# Patient Record
Sex: FEMALE | HISPANIC OR LATINO | Employment: FULL TIME | RURAL
[De-identification: names, ages, dates, MRNs, and addresses within clinical notes are randomized per-mention and may not be internally consistent; named-entity substitution may affect disease eponyms.]

---

## 2017-01-05 ENCOUNTER — OFFICE VISIT (OUTPATIENT)
Dept: FAMILY MEDICINE CLINIC | Facility: CLINIC | Age: 62
End: 2017-01-05

## 2017-01-05 DIAGNOSIS — A04.72 CLOSTRIDIUM DIFFICILE COLITIS: Primary | ICD-10-CM

## 2017-01-05 PROCEDURE — 99213 OFFICE O/P EST LOW 20 MIN: CPT | Performed by: FAMILY MEDICINE

## 2017-01-05 NOTE — PATIENT INSTRUCTIONS
Clostridium Difficile Infection  Clostridium difficile (C. difficile or C. diff) is a bacterium normally found in the intestinal tract or colon. C. difficile infection causes diarrhea and sometimes a severe disease called pseudomembranous colitis (C. difficile colitis). C. difficile colitis can damage the lining of the colon or cause the colon to become very large (toxic megacolon). Older adults and people with certain medical conditions have a greater risk of getting C. difficile infections.  CAUSES  The balance of bacteria in your colon can change when you are sick, especially when taking antibiotic medicine. Taking antibiotics may allow the C. difficile to grow, multiply, and make a toxin that causes C. difficile infection.   SYMPTOMS  · Diarrhea.  · Fever.  · Fatigue.  · Loss of appetite.  · Nausea.  · Abdominal swelling, pain, or tenderness.  · Dehydration.  DIAGNOSIS  Your health care provider may suspect C. difficile infection based on your symptoms and if you have taken antibiotics recently. Your health care provider may also order:  · A lab test that can detect the toxin in your stool.  · A sigmoidoscopy or colonoscopy to look at the appearance of your colon. These procedures involve passing an instrument through your rectum to look at the inside of your colon.  Your health care provider will help determine if these tests are necessary.  TREATMENT  Treatment may include:  · Taking antibiotics that keep C. difficile from growing.  · Stopping the antibiotics you were on before the C. difficile infection began. Only do this if instructed to do so by your health care provider.  · IV fluids and correction of electrolyte imbalance.  · Surgery to remove the infected part of the intestines. This is rare.  HOME CARE INSTRUCTIONS  · Drink enough fluids to keep your urine clear or pale yellow. Avoid milk, caffeine, and alcohol.  · Ask your health care provider for specific rehydration instructions.  · Eat small,  frequent meals rather than large meals.  · Take your antibiotics as directed. Finish them even if you start to feel better.  · Do not use medicines to slow diarrhea. This could delay healing or cause problems.  · Wash your hands thoroughly after using the bathroom and before preparing food. Make sure people who live with you wash their hands often, too.  · Clean all surfaces with a product that contains chlorine bleach.  SEEK MEDICAL CARE IF:  · Your diarrhea lasts longer than expected or comes back after you finish your antibiotic medicine for the C. difficile infection.  · You have trouble staying hydrated.  · You have a fever.  SEEK IMMEDIATE MEDICAL CARE IF:  · You have increasing abdominal pain or tenderness.  · You have blood in your stools, or your stools look dark black and tarry.  · You cannot eat or drink without vomiting.     This information is not intended to replace advice given to you by your health care provider. Make sure you discuss any questions you have with your health care provider.     Document Released: 09/27/2006 Document Revised: 01/08/2016 Document Reviewed: 06/20/2016  [employer] Interactive Patient Education ©2016 [employer]

## 2017-01-05 NOTE — MR AVS SNAPSHOT
Mira Deal   1/5/2017 8:15 AM   Office Visit    Dept Phone:  [phone]   Encounter #:  [id number]    Provider:  Lloyd Varghese MD   Department:  [facility]                Your Full Care Plan              Today's Medication Changes          These changes are accurate as of: 1/5/17  8:32 AM.  If you have any questions, ask your nurse or doctor.               Stop taking medication(s)listed here:     hydrocortisone 2.5 % rectal cream   Commonly known as:  ANUSOL-HC   Stopped by:  Lloyd Varghese MD           metroNIDAZOLE 500 MG tablet   Commonly known as:  FLAGYL   Stopped by:  Lloyd Varghese MD                      Your Updated Medication List          This list is accurate as of: 1/5/17  8:32 AM.  Always use your most recent med list.                ACIDOPHILUS EXTRA STRENGTH capsule   Take 1 tablet/day by mouth Daily With Breakfast.       levothyroxine 100 MCG tablet   Commonly known as:  SYNTHROID, LEVOTHROID   TAKE ONE TABLET BY MOUTH ONCE DAILY       lisinopril 20 MG tablet   Commonly known as:  PRINIVIL,ZESTRIL   TAKE ONE TABLET BY MOUTH ONCE DAILY               You Were Diagnosed With        Codes Comments    Clostridium difficile colitis    -  Primary ICD-10-CM: A04.7  ICD-9-CM: 008.45       Instructions     None    Patient Instructions History      Goals     Other     Diarrhea resolved  Rectal pain resolved        Upcoming Appointments     Visit Type Date Time Department    OFFICE VISIT 1/5/2017  8:15 AM [facility]    OFFICE VISIT 5/5/2017  8:00 AM [facility]      [employer] Signup     [facility] [employer] allows you to send messages to your doctor, view your test results, renew your prescriptions, schedule appointments, and more. To sign up, go to [employer] and click on the Sign Up Now link in the New User? box. Enter your [employer] Activation Code exactly as it appears below along  with the last four digits of your Social Security Number and your Date of Birth () to complete the sign-up process. If you do not sign up before the expiration date, you must request a new code.    [employer] Activation Code: [email or web address]  Expires: 2017  8:32 AM    If you have questions, you can email [email or web address] or call [phone] to talk to our [employer] staff. Remember, [employer] is NOT to be used for urgent needs. For medical emergencies, dial 911.               Other Info from Your Visit           Your Appointments     May 05, 2017  8:00 AM CDT   Office Visit with Lloyd Varghese MD   [facility] (--)    [facility] [place] [address] [place] [address]  [facility] [id number] [phone]           Arrive 15 minutes prior to appointment.              Allergies     Lortab [Hydrocodone-acetaminophen]      Sulfa Antibiotics        Reason for Visit     GI Problem c-diff    Hypertension     Hypothyroidism           Vital Signs     Smoking Status                   Former Smoker           Problems and Diagnoses Noted     Clostridium difficile colitis    -  Primary

## 2017-01-05 NOTE — PROGRESS NOTES
Subjective   Mira Deal is a obese 61 y.o.  female former smoker with HTN, Hypothyroidism, Hyperinsulinar obesity, Hyperlipidemia, Recurrent OM, Eustachian tube dysfunction, Allergies, Chronic knee pain, Past meniscus repair, osteoarthritis, Recent cellulitis of face treated with Clindamycin, developed C-diff Colitis and others see PMH/PSH. Pt presents for exam, to discuss acute health problem.    ' Was better last visit, ate out and had diarrhea start again, not as bad as before seen and abx started, started second course of abx, feeling much better now.'      Diarrhea    This is a new problem. The current episode started more than 1 month ago. The problem has been gradually improving. The stool consistency is described as mucous and watery. Associated symptoms include abdominal pain. Pertinent negatives include no arthralgias, coughing, fever or headaches. 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 She has tried anti-motility drug, increased fluids and analgesics (Flagyl) for the symptoms. The treatment provided significant relief. Clindamycin   Rectal Pain   This is a new problem. The current episode started more than 1 month ago. The problem occurs intermittently. The problem has been gradually improving. Associated symptoms include abdominal pain. Pertinent negatives include no arthralgias, chest pain, coughing, fatigue, fever, headaches, nausea, rash, sore throat or weakness. Treatments tried: As per HPI. The treatment provided significant relief.        The following portions of the patient's history were reviewed and updated as appropriate: allergies, current medications, past family history, past medical history, past social history, past surgical history and problem list.    Review of Systems   Constitutional: Negative for activity change, appetite change, fatigue and fever.   HENT: Negative for ear pain and sore throat.     Eyes: Negative for pain and visual disturbance.   Respiratory: Negative for cough and shortness of breath.    Cardiovascular: Negative for chest pain and palpitations.   Gastrointestinal: Positive for abdominal pain and diarrhea. Negative for nausea and rectal pain.        Resolved   Endocrine: Negative for cold intolerance and heat intolerance.   Genitourinary: Negative for difficulty urinating and dysuria.   Musculoskeletal: Negative for arthralgias and gait problem.   Skin: Negative for color change and rash.   Neurological: Negative for dizziness, weakness and headaches.   Hematological: Negative for adenopathy. Does not bruise/bleed easily.   Psychiatric/Behavioral: Negative for agitation, confusion and sleep disturbance.       Objective   Physical Exam   Constitutional: She is oriented to person, place, and time. She appears well-developed and well-nourished.   HENT:   Head: Normocephalic.   Right Ear: External ear normal.   Left Ear: External ear normal.   Nose: Nose normal.   Mouth/Throat: Oropharynx is clear and moist.   Eyes: Conjunctivae and EOM are normal. Pupils are equal, round, and reactive to light. Left eye exhibits no discharge. No scleral icterus.   Neck: Normal range of motion. No JVD present. No tracheal deviation present. No thyromegaly present.   Cardiovascular: Normal rate, regular rhythm and normal heart sounds.  Exam reveals no gallop and no friction rub.    No murmur heard.  Pulmonary/Chest: Effort normal and breath sounds normal. No respiratory distress. She has no rales. She exhibits no tenderness.   Abdominal: Soft. Bowel sounds are normal. She exhibits no distension and no mass. There is no tenderness. No hernia.   Musculoskeletal: Normal range of motion.   Lymphadenopathy:     She has no cervical adenopathy.   Neurological: She is alert and oriented to person, place, and time.   Skin: Skin is warm and dry.   Psychiatric: She has a normal mood and affect. Her behavior is normal.  Judgment and thought content normal.   Nursing note and vitals reviewed.      Assessment/Plan      Mira was seen today for gi problem, hypertension and hypothyroidism.    Diagnoses and all orders for this visit:    Clostridium difficile colitis  -     C-reactive Protein; Future        Discussed current health problems, C-diff colitis, Tx plan, f/U plan, recheck CRP

## 2017-01-09 ENCOUNTER — TELEPHONE (OUTPATIENT)
Dept: FAMILY MEDICINE CLINIC | Facility: CLINIC | Age: 62
End: 2017-01-09

## 2017-01-09 NOTE — TELEPHONE ENCOUNTER
----- Message from Kamlesh Cool LPN sent at 1/9/2017  8:03 AM CST -----      ----- Message -----     From: Lloyd Varghese MD     Sent: 1/8/2017  10:04 AM       To: Kamlesh Cool LPN    Inflammatory marker still up, F/U if not continuing to improve.      Attempted to call pt.  No answer.  Left message for pt as above, to call if any questions.

## 2017-05-05 ENCOUNTER — RESULTS ENCOUNTER (OUTPATIENT)
Dept: FAMILY MEDICINE CLINIC | Facility: CLINIC | Age: 62
End: 2017-05-05

## 2017-05-05 ENCOUNTER — OFFICE VISIT (OUTPATIENT)
Dept: FAMILY MEDICINE CLINIC | Facility: CLINIC | Age: 62
End: 2017-05-05

## 2017-05-05 ENCOUNTER — LAB (OUTPATIENT)
Dept: LAB | Facility: CLINIC | Age: 62
End: 2017-05-05

## 2017-05-05 VITALS
SYSTOLIC BLOOD PRESSURE: 134 MMHG | HEART RATE: 70 BPM | TEMPERATURE: 98.1 F | OXYGEN SATURATION: 98 % | BODY MASS INDEX: 40.21 KG/M2 | DIASTOLIC BLOOD PRESSURE: 80 MMHG | HEIGHT: 57 IN | WEIGHT: 186.4 LBS

## 2017-05-05 DIAGNOSIS — M79.672 LEFT FOOT PAIN: ICD-10-CM

## 2017-05-05 DIAGNOSIS — Z12.11 SCREENING FOR COLON CANCER: ICD-10-CM

## 2017-05-05 DIAGNOSIS — Z11.59 NEED FOR HEPATITIS C SCREENING TEST: ICD-10-CM

## 2017-05-05 DIAGNOSIS — A04.72 CLOSTRIDIUM DIFFICILE COLITIS: ICD-10-CM

## 2017-05-05 DIAGNOSIS — I10 ESSENTIAL HYPERTENSION: Primary | ICD-10-CM

## 2017-05-05 DIAGNOSIS — I10 ESSENTIAL HYPERTENSION: ICD-10-CM

## 2017-05-05 DIAGNOSIS — E03.9 ACQUIRED HYPOTHYROIDISM: ICD-10-CM

## 2017-05-05 DIAGNOSIS — K62.89 PROCTITIS: ICD-10-CM

## 2017-05-05 DIAGNOSIS — L30.9 DERMATITIS: ICD-10-CM

## 2017-05-05 DIAGNOSIS — E66.9 OBESITY (BMI 30-39.9): ICD-10-CM

## 2017-05-05 LAB
ALBUMIN SERPL-MCNC: 4.3 G/DL (ref 3.4–4.8)
ALBUMIN/GLOB SERPL: 1.3 G/DL (ref 1.1–1.8)
ALP SERPL-CCNC: 133 U/L (ref 38–126)
ALT SERPL W P-5'-P-CCNC: 38 U/L (ref 9–52)
ANION GAP SERPL CALCULATED.3IONS-SCNC: 11 MMOL/L (ref 5–15)
AST SERPL-CCNC: 28 U/L (ref 14–36)
BASOPHILS # BLD AUTO: 0.02 10*3/MM3 (ref 0–0.2)
BASOPHILS NFR BLD AUTO: 0.2 % (ref 0–2)
BILIRUB SERPL-MCNC: 0.8 MG/DL (ref 0.2–1.3)
BILIRUB UR QL STRIP: ABNORMAL
BUN BLD-MCNC: 18 MG/DL (ref 7–21)
BUN/CREAT SERPL: 20.7 (ref 7–25)
CALCIUM SPEC-SCNC: 9.9 MG/DL (ref 8.4–10.2)
CHLORIDE SERPL-SCNC: 103 MMOL/L (ref 95–110)
CLARITY UR: CLEAR
CO2 SERPL-SCNC: 30 MMOL/L (ref 22–31)
COLOR UR: ABNORMAL
CREAT BLD-MCNC: 0.87 MG/DL (ref 0.5–1)
CRP SERPL-MCNC: 1.7 MG/DL (ref 0–1)
DEPRECATED RDW RBC AUTO: 53.2 FL (ref 36.4–46.3)
EOSINOPHIL # BLD AUTO: 0.24 10*3/MM3 (ref 0–0.7)
EOSINOPHIL NFR BLD AUTO: 2.2 % (ref 0–7)
ERYTHROCYTE [DISTWIDTH] IN BLOOD BY AUTOMATED COUNT: 15.5 % (ref 11.5–14.5)
GFR SERPL CREATININE-BSD FRML MDRD: 66 ML/MIN/1.73 (ref 45–104)
GFR SERPL CREATININE-BSD FRML MDRD: 80 ML/MIN/1.73 (ref 45–104)
GLOBULIN UR ELPH-MCNC: 3.3 GM/DL (ref 2.3–3.5)
GLUCOSE BLD-MCNC: 99 MG/DL (ref 60–100)
GLUCOSE UR STRIP-MCNC: NEGATIVE MG/DL
HCT VFR BLD AUTO: 42.4 % (ref 35–45)
HCV AB SER DONR QL: NEGATIVE
HGB BLD-MCNC: 14.2 G/DL (ref 12–15.5)
HGB UR QL STRIP.AUTO: NEGATIVE
IMM GRANULOCYTES # BLD: 0.04 10*3/MM3 (ref 0–0.02)
IMM GRANULOCYTES NFR BLD: 0.4 % (ref 0–0.5)
KETONES UR QL STRIP: ABNORMAL
LEUKOCYTE ESTERASE UR QL STRIP.AUTO: NEGATIVE
LYMPHOCYTES # BLD AUTO: 3.39 10*3/MM3 (ref 0.6–4.2)
LYMPHOCYTES NFR BLD AUTO: 31.1 % (ref 10–50)
MCH RBC QN AUTO: 31.8 PG (ref 26.5–34)
MCHC RBC AUTO-ENTMCNC: 33.5 G/DL (ref 31.4–36)
MCV RBC AUTO: 95.1 FL (ref 80–98)
MONOCYTES # BLD AUTO: 0.86 10*3/MM3 (ref 0–0.9)
MONOCYTES NFR BLD AUTO: 7.9 % (ref 0–12)
NEUTROPHILS # BLD AUTO: 6.36 10*3/MM3 (ref 2–8.6)
NEUTROPHILS NFR BLD AUTO: 58.2 % (ref 37–80)
NITRITE UR QL STRIP: NEGATIVE
PH UR STRIP.AUTO: 5 [PH] (ref 5–8)
PLATELET # BLD AUTO: 320 10*3/MM3 (ref 150–450)
PMV BLD AUTO: 11.4 FL (ref 8–12)
POTASSIUM BLD-SCNC: 4.9 MMOL/L (ref 3.5–5.1)
PROT SERPL-MCNC: 7.6 G/DL (ref 6.3–8.6)
PROT UR QL STRIP: ABNORMAL
RBC # BLD AUTO: 4.46 10*6/MM3 (ref 3.77–5.16)
SODIUM BLD-SCNC: 144 MMOL/L (ref 137–145)
SP GR UR STRIP: 1.03 (ref 1–1.03)
TSH SERPL DL<=0.05 MIU/L-ACNC: 0.31 MIU/ML (ref 0.46–4.68)
UROBILINOGEN UR QL STRIP: ABNORMAL
WBC NRBC COR # BLD: 10.91 10*3/MM3 (ref 3.2–9.8)

## 2017-05-05 PROCEDURE — 81003 URINALYSIS AUTO W/O SCOPE: CPT | Performed by: FAMILY MEDICINE

## 2017-05-05 PROCEDURE — 85025 COMPLETE CBC W/AUTO DIFF WBC: CPT | Performed by: FAMILY MEDICINE

## 2017-05-05 PROCEDURE — 86803 HEPATITIS C AB TEST: CPT | Performed by: FAMILY MEDICINE

## 2017-05-05 PROCEDURE — 84443 ASSAY THYROID STIM HORMONE: CPT | Performed by: FAMILY MEDICINE

## 2017-05-05 PROCEDURE — 99214 OFFICE O/P EST MOD 30 MIN: CPT | Performed by: FAMILY MEDICINE

## 2017-05-05 PROCEDURE — 80053 COMPREHEN METABOLIC PANEL: CPT | Performed by: FAMILY MEDICINE

## 2017-05-05 PROCEDURE — 86140 C-REACTIVE PROTEIN: CPT | Performed by: FAMILY MEDICINE

## 2017-05-05 RX ORDER — TRIAMCINOLONE ACETONIDE 0.25 MG/G
OINTMENT TOPICAL 2 TIMES DAILY
Qty: 80 G | Refills: 1 | Status: SHIPPED | OUTPATIENT
Start: 2017-05-05 | End: 2017-11-16

## 2017-05-08 ENCOUNTER — TELEPHONE (OUTPATIENT)
Dept: FAMILY MEDICINE CLINIC | Facility: CLINIC | Age: 62
End: 2017-05-08

## 2017-05-09 ENCOUNTER — TELEPHONE (OUTPATIENT)
Dept: FAMILY MEDICINE CLINIC | Facility: CLINIC | Age: 62
End: 2017-05-09

## 2017-05-09 PROBLEM — M79.672 LEFT FOOT PAIN: Status: ACTIVE | Noted: 2017-05-09

## 2017-05-09 RX ORDER — LISINOPRIL 20 MG/1
20 TABLET ORAL DAILY
Qty: 30 TABLET | Refills: 5 | Status: SHIPPED | OUTPATIENT
Start: 2017-05-09 | End: 2017-11-14 | Stop reason: SDUPTHER

## 2017-05-09 RX ORDER — LEVOTHYROXINE SODIUM 0.1 MG/1
100 TABLET ORAL DAILY
Qty: 30 TABLET | Refills: 5 | Status: SHIPPED | OUTPATIENT
Start: 2017-05-09 | End: 2017-11-21 | Stop reason: SDUPTHER

## 2017-05-19 ENCOUNTER — OFFICE VISIT (OUTPATIENT)
Dept: PODIATRY | Facility: CLINIC | Age: 62
End: 2017-05-19

## 2017-05-19 ENCOUNTER — TELEPHONE (OUTPATIENT)
Dept: FAMILY MEDICINE CLINIC | Facility: CLINIC | Age: 62
End: 2017-05-19

## 2017-05-19 VITALS — HEIGHT: 57 IN | BODY MASS INDEX: 40.13 KG/M2 | WEIGHT: 186 LBS

## 2017-05-19 DIAGNOSIS — M79.672 LEFT FOOT PAIN: Primary | ICD-10-CM

## 2017-05-19 PROCEDURE — 99202 OFFICE O/P NEW SF 15 MIN: CPT | Performed by: PODIATRIST

## 2017-05-23 ENCOUNTER — OFFICE VISIT (OUTPATIENT)
Dept: FAMILY MEDICINE CLINIC | Facility: CLINIC | Age: 62
End: 2017-05-23

## 2017-05-23 ENCOUNTER — TELEPHONE (OUTPATIENT)
Dept: FAMILY MEDICINE CLINIC | Facility: CLINIC | Age: 62
End: 2017-05-23

## 2017-05-23 VITALS
DIASTOLIC BLOOD PRESSURE: 84 MMHG | TEMPERATURE: 98 F | HEIGHT: 57 IN | WEIGHT: 185.6 LBS | OXYGEN SATURATION: 98 % | SYSTOLIC BLOOD PRESSURE: 126 MMHG | HEART RATE: 74 BPM | BODY MASS INDEX: 40.04 KG/M2

## 2017-05-23 DIAGNOSIS — E66.9 OBESITY (BMI 30-39.9): ICD-10-CM

## 2017-05-23 DIAGNOSIS — E03.9 ACQUIRED HYPOTHYROIDISM: ICD-10-CM

## 2017-05-23 DIAGNOSIS — M79.672 LEFT FOOT PAIN: ICD-10-CM

## 2017-05-23 DIAGNOSIS — I10 ESSENTIAL HYPERTENSION: Primary | ICD-10-CM

## 2017-05-23 PROCEDURE — 99214 OFFICE O/P EST MOD 30 MIN: CPT | Performed by: FAMILY MEDICINE

## 2017-09-14 ENCOUNTER — OFFICE VISIT (OUTPATIENT)
Dept: FAMILY MEDICINE CLINIC | Facility: CLINIC | Age: 62
End: 2017-09-14

## 2017-09-14 VITALS
HEIGHT: 59 IN | DIASTOLIC BLOOD PRESSURE: 73 MMHG | HEART RATE: 70 BPM | TEMPERATURE: 99.1 F | BODY MASS INDEX: 36.49 KG/M2 | SYSTOLIC BLOOD PRESSURE: 124 MMHG | OXYGEN SATURATION: 95 % | WEIGHT: 181 LBS

## 2017-09-14 DIAGNOSIS — H66.92 LEFT OTITIS MEDIA, UNSPECIFIED CHRONICITY, UNSPECIFIED OTITIS MEDIA TYPE: ICD-10-CM

## 2017-09-14 DIAGNOSIS — J02.9 SORE THROAT: ICD-10-CM

## 2017-09-14 DIAGNOSIS — J06.9 UPPER RESPIRATORY TRACT INFECTION, UNSPECIFIED TYPE: Primary | ICD-10-CM

## 2017-09-14 LAB
EXPIRATION DATE: NORMAL
INTERNAL CONTROL: NORMAL
Lab: NORMAL
S PYO AG THROAT QL: NEGATIVE

## 2017-09-14 PROCEDURE — 99213 OFFICE O/P EST LOW 20 MIN: CPT | Performed by: NURSE PRACTITIONER

## 2017-09-14 PROCEDURE — 87880 STREP A ASSAY W/OPTIC: CPT | Performed by: NURSE PRACTITIONER

## 2017-09-14 RX ORDER — CHLORPHENIRAMINE MALEATE 4 MG/1
4 TABLET ORAL EVERY 6 HOURS PRN
Qty: 20 TABLET | Refills: 0 | Status: SHIPPED | OUTPATIENT
Start: 2017-09-14 | End: 2017-11-16

## 2017-09-14 RX ORDER — CEPHALEXIN 500 MG/1
500 CAPSULE ORAL 3 TIMES DAILY
Qty: 30 CAPSULE | Refills: 0 | Status: SHIPPED | OUTPATIENT
Start: 2017-09-14 | End: 2017-11-16

## 2017-09-14 NOTE — PATIENT INSTRUCTIONS
Otitis Media, Adult  Otitis media is redness, soreness, and inflammation of the middle ear. Otitis media may be caused by allergies or, most commonly, by infection. Often it occurs as a complication of the common cold.  SIGNS AND SYMPTOMS  Symptoms of otitis media may include:  · Earache.  · Fever.  · Ringing in your ear.  · Headache.  · Leakage of fluid from the ear.  DIAGNOSIS  To diagnose otitis media, your health care provider will examine your ear with an otoscope. This is an instrument that allows your health care provider to see into your ear in order to examine your eardrum. Your health care provider also will ask you questions about your symptoms.  TREATMENT   Typically, otitis media resolves on its own within 3-5 days. Your health care provider may prescribe medicine to ease your symptoms of pain. If otitis media does not resolve within 5 days or is recurrent, your health care provider may prescribe antibiotic medicines if he or she suspects that a bacterial infection is the cause.  HOME CARE INSTRUCTIONS   · If you were prescribed an antibiotic medicine, finish it all even if you start to feel better.  · Take medicines only as directed by your health care provider.  · Keep all follow-up visits as directed by your health care provider.  SEEK MEDICAL CARE IF:  · You have otitis media only in one ear, or bleeding from your nose, or both.  · You notice a lump on your neck.  · You are not getting better in 3-5 days.  · You feel worse instead of better.  SEEK IMMEDIATE MEDICAL CARE IF:   · You have pain that is not controlled with medicine.  · You have swelling, redness, or pain around your ear or stiffness in your neck.  · You notice that part of your face is paralyzed.  · You notice that the bone behind your ear (mastoid) is tender when you touch it.  MAKE SURE YOU:   · Understand these instructions.  · Will watch your condition.  · Will get help right away if you are not doing well or get worse.     This  "information is not intended to replace advice given to you by your health care provider. Make sure you discuss any questions you have with your health care provider.     Document Released: 09/22/2005 Document Revised: 04/10/2017 Document Reviewed: 07/15/2014  [employer] Interactive Patient Education ©2017 [employer]  Upper Respiratory Infection, Adult  Most upper respiratory infections (URIs) are a viral infection of the air passages leading to the lungs. A URI affects the nose, throat, and upper air passages. The most common type of URI is nasopharyngitis and is typically referred to as \"the common cold.\"  URIs run their course and usually go away on their own. Most of the time, a URI does not require medical attention, but sometimes a bacterial infection in the upper airways can follow a viral infection. This is called a secondary infection. Sinus and middle ear infections are common types of secondary upper respiratory infections.  Bacterial pneumonia can also complicate a URI. A URI can worsen asthma and chronic obstructive pulmonary disease (COPD). Sometimes, these complications can require emergency medical care and may be life threatening.   CAUSES  Almost all URIs are caused by viruses. A virus is a type of germ and can spread from one person to another.   RISKS FACTORS  You may be at risk for a URI if:   · You smoke.    · You have chronic heart or lung disease.  · You have a weakened defense (immune) system.    · You are very young or very old.    · You have nasal allergies or asthma.  · You work in crowded or poorly ventilated areas.  · You work in health care facilities or schools.  SIGNS AND SYMPTOMS   Symptoms typically develop 2-3 days after you come in contact with a cold virus. Most viral URIs last 7-10 days. However, viral URIs from the influenza virus (flu virus) can last 14-18 days and are typically more severe. Symptoms may include:   · Runny or stuffy (congested) nose.    · Sneezing. "    · Cough.    · Sore throat.    · Headache.    · Fatigue.    · Fever.    · Loss of appetite.    · Pain in your forehead, behind your eyes, and over your cheekbones (sinus pain).  · Muscle aches.    DIAGNOSIS   Your health care provider may diagnose a URI by:  · Physical exam.  · Tests to check that your symptoms are not due to another condition such as:  ¨ Strep throat.  ¨ Sinusitis.  ¨ Pneumonia.  ¨ Asthma.  TREATMENT   A URI goes away on its own with time. It cannot be cured with medicines, but medicines may be prescribed or recommended to relieve symptoms. Medicines may help:  · Reduce your fever.  · Reduce your cough.  · Relieve nasal congestion.  HOME CARE INSTRUCTIONS   · Take medicines only as directed by your health care provider.    · Gargle warm saltwater or take cough drops to comfort your throat as directed by your health care provider.  · Use a warm mist humidifier or inhale steam from a shower to increase air moisture. This may make it easier to breathe.  · Drink enough fluid to keep your urine clear or pale yellow.    · Eat soups and other clear broths and maintain good nutrition.    · Rest as needed.    · Return to work when your temperature has returned to normal or as your health care provider advises. You may need to stay home longer to avoid infecting others. You can also use a face mask and careful hand washing to prevent spread of the virus.  · Increase the usage of your inhaler if you have asthma.    · Do not use any tobacco products, including cigarettes, chewing tobacco, or electronic cigarettes. If you need help quitting, ask your health care provider.  PREVENTION   The best way to protect yourself from getting a cold is to practice good hygiene.   · Avoid oral or hand contact with people with cold symptoms.    · Wash your hands often if contact occurs.    There is no clear evidence that vitamin C, vitamin E, echinacea, or exercise reduces the chance of developing a cold. However, it is  always recommended to get plenty of rest, exercise, and practice good nutrition.   SEEK MEDICAL CARE IF:   · You are getting worse rather than better.    · Your symptoms are not controlled by medicine.    · You have chills.  · You have worsening shortness of breath.  · You have brown or red mucus.  · You have yellow or brown nasal discharge.  · You have pain in your face, especially when you bend forward.  · You have a fever.  · You have swollen neck glands.  · You have pain while swallowing.  · You have white areas in the back of your throat.  SEEK IMMEDIATE MEDICAL CARE IF:   · You have severe or persistent:    Headache.    Ear pain.    Sinus pain.    Chest pain.  · You have chronic lung disease and any of the following:    Wheezing.    Prolonged cough.    Coughing up blood.    A change in your usual mucus.  · You have a stiff neck.  · You have changes in your:    Vision.    Hearing.    Thinking.    Mood.  MAKE SURE YOU:   · Understand these instructions.  · Will watch your condition.  · Will get help right away if you are not doing well or get worse.     This information is not intended to replace advice given to you by your health care provider. Make sure you discuss any questions you have with your health care provider.     Document Released: 06/13/2002 Document Revised: 05/03/2016 Document Reviewed: 03/25/2015  [employer] Interactive Patient Education ©2017 [employer]

## 2017-09-14 NOTE — PROGRESS NOTES
Subjective   Mira Deal is a 61 y.o. female.     Earache    There is pain in the left ear. This is a new problem. Episode onset: x 2 days. The problem occurs hourly. The problem has been unchanged. Maximum temperature: low grade. The pain is at a severity of 7/10 (ear/neck/throat). Associated symptoms include coughing ( minimal), neck pain ( left side), rhinorrhea and a sore throat. Pertinent negatives include no abdominal pain, diarrhea, ear discharge, headaches, hearing loss, rash or vomiting. She has tried acetaminophen for the symptoms. The treatment provided mild relief.   Sore Throat    This is a new problem. Episode onset: x 2-3 days. The problem has been unchanged. The pain is worse on the left side. Maximum temperature: low grade. The pain is at a severity of 7/10 (neck/throat/ear). Associated symptoms include congestion, coughing ( minimal), ear pain, neck pain ( left side) and swollen glands ( behind left ear). Pertinent negatives include no abdominal pain, diarrhea, drooling, ear discharge, headaches, hoarse voice, plugged ear sensation, shortness of breath, stridor, trouble swallowing or vomiting. She has had no exposure to strep or mono. She has tried acetaminophen for the symptoms. The treatment provided mild relief.        The following portions of the patient's history were reviewed and updated as appropriate: allergies, current medications, past medical history, past social history, past surgical history and problem list.    Review of Systems   Constitutional: Positive for chills and fever ( low grade). Negative for appetite change.   HENT: Positive for congestion, ear pain, rhinorrhea and sore throat. Negative for drooling, ear discharge, hearing loss, hoarse voice, sinus pressure, sneezing and trouble swallowing.    Respiratory: Positive for cough ( minimal). Negative for chest tightness, shortness of breath and stridor.    Cardiovascular: Negative for chest pain.   Gastrointestinal: Negative  "for abdominal pain, diarrhea, nausea and vomiting.   Musculoskeletal: Positive for myalgias ( mild) and neck pain ( left side). Negative for neck stiffness.   Skin: Negative for rash.   Neurological: Negative for dizziness and headaches.   Hematological: Positive for adenopathy ( behind left ear).       Objective    /73 (BP Location: Left arm, Patient Position: Sitting, Cuff Size: Adult)  Pulse 70  Temp 99.1 °F (37.3 °C) (Tympanic)   Ht 59\" (149.9 cm)  Wt 181 lb (82.1 kg)  SpO2 95%  BMI 36.56 kg/m2    Physical Exam   Constitutional: She is oriented to person, place, and time. She appears well-developed and well-nourished. No distress.   HENT:   Head: Normocephalic and atraumatic.   Right Ear: Tympanic membrane and ear canal normal.   Left Ear: Ear canal normal. Tympanic membrane is erythematous ( dull).   Nose: Mucosal edema ( injected, mildly stuffed) present. Right sinus exhibits no maxillary sinus tenderness and no frontal sinus tenderness. Left sinus exhibits no maxillary sinus tenderness and no frontal sinus tenderness.   Mouth/Throat: Uvula is midline and mucous membranes are normal. Posterior oropharyngeal erythema ( mild injection) present. No oropharyngeal exudate.   Eyes: Conjunctivae are normal. Right eye exhibits no discharge. Left eye exhibits no discharge.   Cardiovascular: Normal rate and regular rhythm.    Pulmonary/Chest: Effort normal and breath sounds normal. She has no wheezes. She has no rales.   Loose cough     Lymphadenopathy:     Cervical adenopathy:  shotty on left; left postauricular tender node    Neurological: She is alert and oriented to person, place, and time.   Nursing note and vitals reviewed.    Recent Results (from the past 24 hour(s))   POCT rapid strep A    Collection Time: 09/14/17 11:40 AM   Result Value Ref Range    Rapid Strep A Screen Negative Negative, VALID, INVALID, Not Performed    Internal Control Passed Passed    Lot Number [id number]     Expiration Date " 08-          Assessment/Plan   Mira was seen today for fever, earache, sore throat and neck pain.    Diagnoses and all orders for this visit:    Upper respiratory tract infection, unspecified type  -     chlorpheniramine (CHLOR-TRIMETON) 4 MG tablet; Take 1 tablet by mouth Every 6 (Six) Hours As Needed for Rhinitis.    Left otitis media, unspecified chronicity, unspecified otitis media type  -     cephalexin (KEFLEX) 500 MG capsule; Take 1 capsule by mouth 3 (Three) Times a Day. If worsening ear pain or persistent fever    Sore throat  -     POCT rapid strep A      Rx for Chlortrimeton.  May continue with Tylenol.  Throat lozenges as needed.     Rx for Keflex if fever persists or earache does not improve.  Has hx of C-diff, so will give it a few days to see if the OM if viral as well.     RTW: 9-16-17    RTC or see PCP if symptoms persist/worsen

## 2017-11-14 RX ORDER — LISINOPRIL 20 MG/1
TABLET ORAL
Qty: 30 TABLET | Refills: 5 | Status: SHIPPED | OUTPATIENT
Start: 2017-11-14 | End: 2017-11-21 | Stop reason: SDUPTHER

## 2017-11-16 ENCOUNTER — OFFICE VISIT (OUTPATIENT)
Dept: FAMILY MEDICINE CLINIC | Facility: CLINIC | Age: 62
End: 2017-11-16

## 2017-11-16 VITALS
BODY MASS INDEX: 38.62 KG/M2 | HEART RATE: 65 BPM | OXYGEN SATURATION: 94 % | HEIGHT: 57 IN | TEMPERATURE: 97.8 F | DIASTOLIC BLOOD PRESSURE: 75 MMHG | SYSTOLIC BLOOD PRESSURE: 135 MMHG | WEIGHT: 179 LBS

## 2017-11-16 DIAGNOSIS — J06.9 UPPER RESPIRATORY TRACT INFECTION, UNSPECIFIED TYPE: ICD-10-CM

## 2017-11-16 DIAGNOSIS — R06.02 SHORTNESS OF BREATH: ICD-10-CM

## 2017-11-16 DIAGNOSIS — J20.9 ACUTE BRONCHITIS, UNSPECIFIED ORGANISM: Primary | ICD-10-CM

## 2017-11-16 PROCEDURE — 99213 OFFICE O/P EST LOW 20 MIN: CPT | Performed by: NURSE PRACTITIONER

## 2017-11-16 RX ORDER — ALBUTEROL SULFATE 90 UG/1
2 AEROSOL, METERED RESPIRATORY (INHALATION) EVERY 4 HOURS PRN
Qty: 18 G | Refills: 0 | Status: SHIPPED | OUTPATIENT
Start: 2017-11-16 | End: 2018-05-23

## 2017-11-16 RX ORDER — BROMPHENIRAMINE MALEATE, PSEUDOEPHEDRINE HYDROCHLORIDE, AND DEXTROMETHORPHAN HYDROBROMIDE 2; 30; 10 MG/5ML; MG/5ML; MG/5ML
SYRUP ORAL
Qty: 180 ML | Refills: 0 | Status: SHIPPED | OUTPATIENT
Start: 2017-11-16 | End: 2017-11-21

## 2017-11-16 RX ORDER — ALBUTEROL SULFATE 2.5 MG/3ML
2.5 SOLUTION RESPIRATORY (INHALATION) ONCE
Status: COMPLETED | OUTPATIENT
Start: 2017-11-16 | End: 2017-11-16

## 2017-11-16 RX ADMIN — ALBUTEROL SULFATE 2.5 MG: 2.5 SOLUTION RESPIRATORY (INHALATION) at 09:59

## 2017-11-16 NOTE — PROGRESS NOTES
Subjective   Mira Deal is a 62 y.o. female.     Cough   This is a new problem. Episode onset: x 3 days. The problem has been gradually worsening. The cough is non-productive. Associated symptoms include chest pain ( with cough), headaches, nasal congestion, postnasal drip, rhinorrhea and a sore throat. Pertinent negatives include no chills, ear congestion, ear pain, fever, heartburn, hemoptysis, myalgias, rash, shortness of breath, sweats, weight loss or wheezing. Nothing aggravates the symptoms. Treatments tried: tussin. The treatment provided mild relief. There is no history of asthma, bronchitis or COPD.   URI    This is a new problem. Episode onset: x 4 days. The problem has been gradually worsening. There has been no fever. Associated symptoms include chest pain ( with cough), congestion, coughing, headaches, rhinorrhea, sinus pain ( mild), sneezing and a sore throat. Pertinent negatives include no abdominal pain, diarrhea, dysuria, ear pain, joint pain, joint swelling, nausea, neck pain, plugged ear sensation, rash, swollen glands, vomiting or wheezing. Treatments tried: alkaseltzer, tussin. The treatment provided mild relief.        The following portions of the patient's history were reviewed and updated as appropriate: allergies, current medications, past medical history, past social history, past surgical history and problem list.    Review of Systems   Constitutional: Negative for appetite change, chills, fever and weight loss.   HENT: Positive for congestion, postnasal drip, rhinorrhea, sinus pain ( mild), sinus pressure ( mild --generalized), sneezing and sore throat. Negative for ear pain.    Eyes: Negative for discharge and itching.   Respiratory: Positive for cough and chest tightness. Negative for hemoptysis, shortness of breath and wheezing.    Cardiovascular: Positive for chest pain ( with cough).   Gastrointestinal: Negative for abdominal pain, diarrhea, heartburn, nausea and vomiting.  "  Genitourinary: Negative for dysuria.   Musculoskeletal: Negative for joint pain, myalgias and neck pain.   Skin: Negative for rash.   Neurological: Positive for headaches. Negative for dizziness.   Hematological: Negative for adenopathy.       Objective    /75 (BP Location: Right arm, Patient Position: Sitting, Cuff Size: Adult)  Pulse 65  Temp 97.8 °F (36.6 °C) (Tympanic)   Ht 57\" (144.8 cm)  Wt 179 lb (81.2 kg)  SpO2 94%  BMI 38.74 kg/m2    Physical Exam   Constitutional: She is oriented to person, place, and time. She appears well-developed and well-nourished. No distress.   HENT:   Head: Normocephalic and atraumatic.   Right Ear: Ear canal normal. Tympanic membrane is not erythematous ( dull).   Left Ear: Ear canal normal. Tympanic membrane is injected.   Nose: Mucosal edema ( injected) present.   Mouth/Throat: Uvula is midline and mucous membranes are normal. Posterior oropharyngeal erythema ( mild injection with PND) present.   No localized pain over the sinuses     Eyes: Conjunctivae are normal.   Cardiovascular: Normal rate and regular rhythm.    Pulmonary/Chest: Effort normal.   Decreased aeration with tight, wheezy cough.  Albuterol neb given in office. Significantly improved aeration following treatment.  Wheezing resolved.    Lymphadenopathy:     She has no cervical adenopathy.   Neurological: She is alert and oriented to person, place, and time.   Nursing note and vitals reviewed.      Assessment/Plan   Mira was seen today for cough and uri.    Diagnoses and all orders for this visit:    Acute bronchitis, unspecified organism  -     albuterol (PROVENTIL HFA;VENTOLIN HFA) 108 (90 Base) MCG/ACT inhaler; Inhale 2 puffs Every 4 (Four) Hours As Needed for Wheezing or Shortness of Air.    Shortness of breath  -     albuterol (PROVENTIL) nebulizer solution 0.083% 2.5 mg/3mL; Take 2.5 mg by nebulization 1 (One) Time.  -     Nebulizer Treatment    Upper respiratory tract infection, unspecified " type  -     brompheniramine-pseudoephedrine-DM 30-2-10 MG/5ML syrup; 10 ml po QID prn cough/congestion      Push fluids  Rest  Tylenol or Motrin as needed  Rx for Albuterol, Bromfed (monitor B/P while taking)    Will defer antibiotics as no fever or purulent sputum present.  See PCP or RTC if these develop for re-evaluation.      RTW: 11-19-17

## 2017-11-16 NOTE — PATIENT INSTRUCTIONS
Acute Bronchitis  Bronchitis is inflammation of the airways that extend from the windpipe into the lungs (bronchi). The inflammation often causes mucus to develop. This leads to a cough, which is the most common symptom of bronchitis.   In acute bronchitis, the condition usually develops suddenly and goes away over time, usually in a couple weeks. Smoking, allergies, and asthma can make bronchitis worse. Repeated episodes of bronchitis may cause further lung problems.   CAUSES  Acute bronchitis is most often caused by the same virus that causes a cold. The virus can spread from person to person (contagious) through coughing, sneezing, and touching contaminated objects.  SIGNS AND SYMPTOMS   · Cough.    · Fever.    · Coughing up mucus.    · Body aches.    · Chest congestion.    · Chills.    · Shortness of breath.    · Sore throat.    DIAGNOSIS   Acute bronchitis is usually diagnosed through a physical exam. Your health care provider will also ask you questions about your medical history. Tests, such as chest X-rays, are sometimes done to rule out other conditions.   TREATMENT   Acute bronchitis usually goes away in a couple weeks. Oftentimes, no medical treatment is necessary. Medicines are sometimes given for relief of fever or cough. Antibiotic medicines are usually not needed but may be prescribed in certain situations. In some cases, an inhaler may be recommended to help reduce shortness of breath and control the cough. A cool mist vaporizer may also be used to help thin bronchial secretions and make it easier to clear the chest.   HOME CARE INSTRUCTIONS  · Get plenty of rest.    · Drink enough fluids to keep your urine clear or pale yellow (unless you have a medical condition that requires fluid restriction). Increasing fluids may help thin your respiratory secretions (sputum) and reduce chest congestion, and it will prevent dehydration.    · Take medicines only as directed by your health care provider.  · If  "you were prescribed an antibiotic medicine, finish it all even if you start to feel better.  · Avoid smoking and secondhand smoke. Exposure to cigarette smoke or irritating chemicals will make bronchitis worse. If you are a smoker, consider using nicotine gum or skin patches to help control withdrawal symptoms. Quitting smoking will help your lungs heal faster.    · Reduce the chances of another bout of acute bronchitis by washing your hands frequently, avoiding people with cold symptoms, and trying not to touch your hands to your mouth, nose, or eyes.    · Keep all follow-up visits as directed by your health care provider.    SEEK MEDICAL CARE IF:  Your symptoms do not improve after 1 week of treatment.   SEEK IMMEDIATE MEDICAL CARE IF:  · You develop an increased fever or chills.    · You have chest pain.    · You have severe shortness of breath.  · You have bloody sputum.    · You develop dehydration.  · You faint or repeatedly feel like you are going to pass out.  · You develop repeated vomiting.  · You develop a severe headache.  MAKE SURE YOU:   · Understand these instructions.  · Will watch your condition.  · Will get help right away if you are not doing well or get worse.     This information is not intended to replace advice given to you by your health care provider. Make sure you discuss any questions you have with your health care provider.     Document Released: 01/25/2006 Document Revised: 01/08/2016 Document Reviewed: 06/10/2014  [employer] Patient Education ©2017 [employer]  Upper Respiratory Infection, Adult  Most upper respiratory infections (URIs) are a viral infection of the air passages leading to the lungs. A URI affects the nose, throat, and upper air passages. The most common type of URI is nasopharyngitis and is typically referred to as \"the common cold.\"  URIs run their course and usually go away on their own. Most of the time, a URI does not require medical attention, but " sometimes a bacterial infection in the upper airways can follow a viral infection. This is called a secondary infection. Sinus and middle ear infections are common types of secondary upper respiratory infections.  Bacterial pneumonia can also complicate a URI. A URI can worsen asthma and chronic obstructive pulmonary disease (COPD). Sometimes, these complications can require emergency medical care and may be life threatening.   CAUSES  Almost all URIs are caused by viruses. A virus is a type of germ and can spread from one person to another.   RISKS FACTORS  You may be at risk for a URI if:   · You smoke.    · You have chronic heart or lung disease.  · You have a weakened defense (immune) system.    · You are very young or very old.    · You have nasal allergies or asthma.  · You work in crowded or poorly ventilated areas.  · You work in health care facilities or schools.  SIGNS AND SYMPTOMS   Symptoms typically develop 2-3 days after you come in contact with a cold virus. Most viral URIs last 7-10 days. However, viral URIs from the influenza virus (flu virus) can last 14-18 days and are typically more severe. Symptoms may include:   · Runny or stuffy (congested) nose.    · Sneezing.    · Cough.    · Sore throat.    · Headache.    · Fatigue.    · Fever.    · Loss of appetite.    · Pain in your forehead, behind your eyes, and over your cheekbones (sinus pain).  · Muscle aches.    DIAGNOSIS   Your health care provider may diagnose a URI by:  · Physical exam.  · Tests to check that your symptoms are not due to another condition such as:  ¨ Strep throat.  ¨ Sinusitis.  ¨ Pneumonia.  ¨ Asthma.  TREATMENT   A URI goes away on its own with time. It cannot be cured with medicines, but medicines may be prescribed or recommended to relieve symptoms. Medicines may help:  · Reduce your fever.  · Reduce your cough.  · Relieve nasal congestion.  HOME CARE INSTRUCTIONS   · Take medicines only as directed by your health care  provider.    · Gargle warm saltwater or take cough drops to comfort your throat as directed by your health care provider.  · Use a warm mist humidifier or inhale steam from a shower to increase air moisture. This may make it easier to breathe.  · Drink enough fluid to keep your urine clear or pale yellow.    · Eat soups and other clear broths and maintain good nutrition.    · Rest as needed.    · Return to work when your temperature has returned to normal or as your health care provider advises. You may need to stay home longer to avoid infecting others. You can also use a face mask and careful hand washing to prevent spread of the virus.  · Increase the usage of your inhaler if you have asthma.    · Do not use any tobacco products, including cigarettes, chewing tobacco, or electronic cigarettes. If you need help quitting, ask your health care provider.  PREVENTION   The best way to protect yourself from getting a cold is to practice good hygiene.   · Avoid oral or hand contact with people with cold symptoms.    · Wash your hands often if contact occurs.    There is no clear evidence that vitamin C, vitamin E, echinacea, or exercise reduces the chance of developing a cold. However, it is always recommended to get plenty of rest, exercise, and practice good nutrition.   SEEK MEDICAL CARE IF:   · You are getting worse rather than better.    · Your symptoms are not controlled by medicine.    · You have chills.  · You have worsening shortness of breath.  · You have brown or red mucus.  · You have yellow or brown nasal discharge.  · You have pain in your face, especially when you bend forward.  · You have a fever.  · You have swollen neck glands.  · You have pain while swallowing.  · You have white areas in the back of your throat.  SEEK IMMEDIATE MEDICAL CARE IF:   · You have severe or persistent:    Headache.    Ear pain.    Sinus pain.    Chest pain.  · You have chronic lung disease and any of the following:     Wheezing.    Prolonged cough.    Coughing up blood.    A change in your usual mucus.  · You have a stiff neck.  · You have changes in your:    Vision.    Hearing.    Thinking.    Mood.  MAKE SURE YOU:   · Understand these instructions.  · Will watch your condition.  · Will get help right away if you are not doing well or get worse.     This information is not intended to replace advice given to you by your health care provider. Make sure you discuss any questions you have with your health care provider.     Document Released: 06/13/2002 Document Revised: 05/03/2016 Document Reviewed: 03/25/2015  [employer] Patient Education ©2017 [employer]

## 2017-11-21 ENCOUNTER — OFFICE VISIT (OUTPATIENT)
Dept: FAMILY MEDICINE CLINIC | Facility: CLINIC | Age: 62
End: 2017-11-21

## 2017-11-21 VITALS
DIASTOLIC BLOOD PRESSURE: 76 MMHG | BODY MASS INDEX: 38.94 KG/M2 | HEIGHT: 57 IN | OXYGEN SATURATION: 97 % | HEART RATE: 73 BPM | WEIGHT: 180.5 LBS | SYSTOLIC BLOOD PRESSURE: 130 MMHG | TEMPERATURE: 97.7 F

## 2017-11-21 DIAGNOSIS — I10 ESSENTIAL HYPERTENSION: Primary | ICD-10-CM

## 2017-11-21 DIAGNOSIS — E03.9 ACQUIRED HYPOTHYROIDISM: ICD-10-CM

## 2017-11-21 DIAGNOSIS — J06.9 UPPER RESPIRATORY TRACT INFECTION, UNSPECIFIED TYPE: ICD-10-CM

## 2017-11-21 PROCEDURE — 99214 OFFICE O/P EST MOD 30 MIN: CPT | Performed by: FAMILY MEDICINE

## 2017-11-21 RX ORDER — LEVOTHYROXINE SODIUM 0.1 MG/1
100 TABLET ORAL DAILY
Qty: 30 TABLET | Refills: 5 | Status: SHIPPED | OUTPATIENT
Start: 2017-11-21 | End: 2018-05-23 | Stop reason: SDUPTHER

## 2017-11-21 RX ORDER — LISINOPRIL 20 MG/1
20 TABLET ORAL DAILY
Qty: 30 TABLET | Refills: 5 | Status: SHIPPED | OUTPATIENT
Start: 2017-11-21 | End: 2018-11-14 | Stop reason: SDUPTHER

## 2017-11-21 NOTE — PROGRESS NOTES
Subjective   Mira Deal is a 62 y.o. obese  female former smoker with HTN, Hypothyroidism, Hyperinsulinar obesity, Hyperlipidemia, H/O Recurrent OM, Eustachian tube dysfunction, Allergies, Chronic knee pain, Past meniscus repair, Osteoarthritis, H/O C-diff colitis after Clindamycin for cellulitis of face, and other health problems see PMH/PSH. Pt presents for exam, to discuss health problem list, Tx and F/U plans      ' Recently went to [facility] with URI, earaches, has gotten over illness, feeling better from that. But,  fell at work and hit L knee, will be seeing WC, has filled out paper work. B/P doing has been good at checks'..     Hypertension   This is a chronic problem. The current episode started more than 1 year ago. The problem is unchanged. The problem is controlled. Pertinent negatives include no chest pain, headaches, neck pain, palpitations or shortness of breath. Risk factors for coronary artery disease include obesity and post-menopausal state. Past treatments include ACE inhibitors. The current treatment provides significant improvement.   Obesity   This is a chronic problem. The current episode started more than 1 year ago. The problem occurs daily. The problem has been unchanged. Associated symptoms include arthralgias and joint swelling. Pertinent negatives include no abdominal pain, chest pain, chills, congestion, coughing, fatigue, fever, headaches, myalgias, nausea, neck pain, rash, sore throat, vomiting or weakness. The symptoms are aggravated by eating. Treatments tried: Diet and exercise. The treatment provided no relief.   Lower Extremity Issue   This is a new (Fell hit L leg at work) problem. The current episode started in the past 7 days. The problem has been waxing and waning. Associated symptoms include arthralgias and joint swelling. Pertinent negatives include no abdominal pain, chest pain, chills, congestion, coughing, fatigue, fever, headaches, myalgias, nausea,  neck pain, rash, sore throat, vomiting or weakness. The symptoms are aggravated by walking and standing. She has tried NSAIDs and acetaminophen (Ankle brace, Physical therapy.) for the symptoms. The treatment provided mild (Has appt with  Occupational health to Tx L knee) relief.   URI    This is a new problem. The current episode started 1 to 4 weeks ago. The problem has been resolved. Pertinent negatives include no abdominal pain, chest pain, congestion, coughing, diarrhea, dysuria, ear pain, headaches, nausea, neck pain, rash, rhinorrhea, sinus pain ( mild), sneezing, sore throat, vomiting or wheezing. She has tried decongestant, antihistamine, NSAIDs and acetaminophen for the symptoms. The treatment provided moderate relief.        The following portions of the patient's history were reviewed and updated as appropriate: allergies, current medications, past family history, past medical history, past social history, past surgical history and problem list.    Review of Systems   Constitutional: Negative for activity change, appetite change, chills, fatigue and fever.   HENT: Negative for congestion, ear pain, postnasal drip, rhinorrhea, sinus pain ( mild), sinus pressure ( mild --generalized), sneezing and sore throat.         URI symptoms resolving with meds.   Eyes: Negative for pain, discharge, itching and visual disturbance.   Respiratory: Negative for cough, chest tightness, shortness of breath and wheezing.    Cardiovascular: Negative for chest pain and palpitations.   Gastrointestinal: Negative for abdominal pain, diarrhea, nausea, rectal pain and vomiting.        Resolved   Endocrine: Negative for cold intolerance and heat intolerance.   Genitourinary: Negative for difficulty urinating and dysuria.   Musculoskeletal: Positive for arthralgias and joint swelling. Negative for gait problem, myalgias and neck pain.        L knee   Skin: Negative for color change and rash.   Neurological: Negative for dizziness,  weakness and headaches.   Hematological: Negative for adenopathy. Does not bruise/bleed easily.   Psychiatric/Behavioral: Negative for agitation, confusion and sleep disturbance.       Objective   Physical Exam   Constitutional: She is oriented to person, place, and time. She appears well-developed and well-nourished. No distress.   HENT:   Head: Normocephalic and atraumatic.   Right Ear: External ear normal.   Left Ear: External ear normal.   Nose: Nose normal.   Mouth/Throat: Oropharynx is clear and moist. No oropharyngeal exudate.   Brownish fluid behind R TMI. L TMI. Able to clear ET bilateral   Eyes: Conjunctivae and EOM are normal. Pupils are equal, round, and reactive to light. Right eye exhibits no discharge. Left eye exhibits no discharge. No scleral icterus.   Neck: Normal range of motion. Neck supple. No JVD present. No tracheal deviation present. No thyromegaly present.   Cardiovascular: Normal rate, regular rhythm, normal heart sounds and intact distal pulses.  Exam reveals no gallop and no friction rub.    No murmur heard.  Pulmonary/Chest: Effort normal and breath sounds normal. No respiratory distress. She has no wheezes. She has no rales. She exhibits no tenderness.   Abdominal: Soft. Bowel sounds are normal. She exhibits no distension and no mass. There is no tenderness. No hernia.   Musculoskeletal: Normal range of motion. She exhibits no edema, tenderness or deformity.   Lymphadenopathy:     She has no cervical adenopathy.   Neurological: She is alert and oriented to person, place, and time. She has normal reflexes. She displays normal reflexes. No cranial nerve deficit. She exhibits normal muscle tone. Coordination normal.   Skin: Skin is warm and dry. No rash noted. She is not diaphoretic. No erythema. No pallor.   Psoriasis   Psychiatric: She has a normal mood and affect. Her behavior is normal. Judgment and thought content normal.   Nursing note and vitals reviewed.      Assessment/Plan    [place] was seen today for hypertension, hypothyroidism and bronchitis.    Diagnoses and all orders for this visit:    Essential hypertension    Upper respiratory tract infection, unspecified type    Acquired hypothyroidism    Other orders  -     lisinopril (PRINIVIL,ZESTRIL) 20 MG tablet; Take 1 tablet by mouth Daily.  -     levothyroxine (SYNTHROID, LEVOTHROID) 100 MCG tablet; Take 1 tablet by mouth Daily.      Discussed current health problems, meds, indications, Tx plan. Discussed Tx and F/U plan.          This document has been electronically signed by Lloyd Varghese MD on November 21, 2017

## 2017-11-30 ENCOUNTER — OFFICE VISIT (OUTPATIENT)
Dept: FAMILY MEDICINE CLINIC | Facility: CLINIC | Age: 62
End: 2017-11-30

## 2017-11-30 VITALS
HEART RATE: 83 BPM | WEIGHT: 179.2 LBS | OXYGEN SATURATION: 99 % | SYSTOLIC BLOOD PRESSURE: 110 MMHG | DIASTOLIC BLOOD PRESSURE: 70 MMHG | HEIGHT: 57 IN | BODY MASS INDEX: 38.66 KG/M2 | TEMPERATURE: 97.8 F

## 2017-11-30 DIAGNOSIS — J06.9 UPPER RESPIRATORY TRACT INFECTION, UNSPECIFIED TYPE: Primary | ICD-10-CM

## 2017-11-30 DIAGNOSIS — I10 ESSENTIAL HYPERTENSION: ICD-10-CM

## 2017-11-30 DIAGNOSIS — J01.41 ACUTE RECURRENT PANSINUSITIS: ICD-10-CM

## 2017-11-30 PROCEDURE — 99214 OFFICE O/P EST MOD 30 MIN: CPT | Performed by: FAMILY MEDICINE

## 2017-11-30 RX ORDER — BROMPHENIRAMINE MALEATE, PSEUDOEPHEDRINE HYDROCHLORIDE, AND DEXTROMETHORPHAN HYDROBROMIDE 2; 30; 10 MG/5ML; MG/5ML; MG/5ML
5 SYRUP ORAL 4 TIMES DAILY PRN
Qty: 118 ML | Refills: 1 | Status: SHIPPED | OUTPATIENT
Start: 2017-11-30 | End: 2018-05-23

## 2017-11-30 RX ORDER — PREDNISONE 10 MG/1
10 TABLET ORAL SEE ADMIN INSTRUCTIONS
Qty: 17 TABLET | Refills: 0 | Status: SHIPPED | OUTPATIENT
Start: 2017-11-30 | End: 2018-05-23

## 2017-11-30 RX ORDER — AZITHROMYCIN 250 MG/1
TABLET, FILM COATED ORAL
Qty: 6 TABLET | Refills: 0 | Status: SHIPPED | OUTPATIENT
Start: 2017-11-30 | End: 2018-05-23

## 2017-11-30 RX ORDER — FAMOTIDINE 20 MG/1
20 TABLET, FILM COATED ORAL 2 TIMES DAILY
Qty: 30 TABLET | Refills: 0 | Status: SHIPPED | OUTPATIENT
Start: 2017-11-30 | End: 2018-05-23

## 2017-11-30 NOTE — PROGRESS NOTES
Subjective   Mira Deal is a 62 y.o. morbidly obese  female former smoker with HTN, Hypothyroidism, Hyperinsulinar obesity, Hyperlipidemia, H/O Recurrent OM, Eustachian tube dysfunction, Allergies, Chronic knee pain, Past meniscus repair, Osteoarthritis, H/O C-diff colitis after Clindamycin for cellulitis of face, and other health problems see PMH/PSH. Pt presents for exam, to discuss acute health problem.      ' 10 days headache, sinus pressure and drainage, earache, fever and chills. Symptoms not relieved by Sharifa-Beckemeyer cold med. Now has harsh unrelenting cough, throat getting sore'..     Hypertension   This is a chronic problem. The current episode started more than 1 year ago. The problem is unchanged. The problem is controlled. Associated symptoms include shortness of breath. Pertinent negatives include no chest pain, headaches, neck pain or palpitations. Risk factors for coronary artery disease include obesity and post-menopausal state. Past treatments include ACE inhibitors. The current treatment provides significant improvement.   Obesity   This is a chronic problem. The current episode started more than 1 year ago. The problem occurs daily. The problem has been unchanged. Associated symptoms include arthralgias, chills, congestion, coughing, a fever, joint swelling and a sore throat. Pertinent negatives include no abdominal pain, chest pain, fatigue, headaches, myalgias, nausea, neck pain, rash, vomiting or weakness. The symptoms are aggravated by eating. Treatments tried: Diet and exercise. The treatment provided no relief.   Lower Extremity Issue   This is a new (Fell hit L leg at work) problem. The current episode started in the past 7 days. The problem has been waxing and waning. Associated symptoms include arthralgias, chills, congestion, coughing, a fever, joint swelling and a sore throat. Pertinent negatives include no abdominal pain, chest pain, fatigue, headaches, myalgias, nausea,  neck pain, rash, vomiting or weakness. The symptoms are aggravated by walking and standing. She has tried NSAIDs and acetaminophen (Ankle brace, Physical therapy.) for the symptoms. The treatment provided mild (Has appt with  Occupational health to Tx L knee) relief.   URI    This is a new problem. The current episode started 1 to 4 weeks ago. The problem has been resolved. Associated symptoms include congestion, coughing, ear pain, rhinorrhea, sinus pain ( mild), sneezing, a sore throat and wheezing. Pertinent negatives include no abdominal pain, chest pain, diarrhea, dysuria, headaches, nausea, neck pain, rash or vomiting. She has tried decongestant, antihistamine, NSAIDs and acetaminophen for the symptoms. The treatment provided moderate relief.        The following portions of the patient's history were reviewed and updated as appropriate: allergies, current medications, past family history, past medical history, past social history, past surgical history and problem list.    Review of Systems   Constitutional: Positive for chills and fever. Negative for fatigue.   HENT: Positive for congestion, ear pain, postnasal drip, rhinorrhea, sinus pain ( mild), sinus pressure, sneezing and sore throat.    Eyes: Negative.    Respiratory: Positive for cough, shortness of breath and wheezing.    Cardiovascular: Negative for chest pain and palpitations.   Gastrointestinal: Negative for abdominal pain, diarrhea, nausea and vomiting.   Endocrine: Negative.    Genitourinary: Negative for dysuria.   Musculoskeletal: Positive for arthralgias and joint swelling. Negative for myalgias and neck pain.   Skin: Negative for rash.   Allergic/Immunologic: Positive for environmental allergies.   Neurological: Negative for weakness and headaches.   Hematological: Negative.    Psychiatric/Behavioral: Negative.        Objective   Physical Exam   Constitutional: She is oriented to person, place, and time. She appears well-developed and  well-nourished. No distress.   HENT:   Head: Normocephalic and atraumatic.   Right Ear: External ear normal.   Left Ear: External ear normal.   Nose: Nose normal.   Mouth/Throat: Oropharynx is clear and moist. No oropharyngeal exudate.   Brownish fluid behind R TMI. L TMI. Able to clear ET bilateral    Erythema Nasopharynx  Tan PND  Has C/O pain over sinuses R more than L   Eyes: Conjunctivae and EOM are normal. Pupils are equal, round, and reactive to light. Right eye exhibits no discharge. Left eye exhibits no discharge. No scleral icterus.   Neck: Normal range of motion. Neck supple. No JVD present. No tracheal deviation present. No thyromegaly present.   Cardiovascular: Normal rate, regular rhythm, normal heart sounds and intact distal pulses.  Exam reveals no gallop and no friction rub.    No murmur heard.  Pulmonary/Chest: Effort normal and breath sounds normal. No respiratory distress. She has no wheezes. She has no rales. She exhibits no tenderness.   Scattered rhonchi, productive cough, martínez tan secretions.   Abdominal: Soft. Bowel sounds are normal. She exhibits no distension and no mass. There is no tenderness. No hernia.   Musculoskeletal: Normal range of motion. She exhibits no edema, tenderness or deformity.   Lymphadenopathy:     She has no cervical adenopathy.   Neurological: She is alert and oriented to person, place, and time. She has normal reflexes. She displays normal reflexes. No cranial nerve deficit. She exhibits normal muscle tone. Coordination normal.   Skin: Skin is warm and dry. No rash noted. She is not diaphoretic. No erythema. No pallor.   Psoriasis   Psychiatric: She has a normal mood and affect. Her behavior is normal. Judgment and thought content normal.   Nursing note and vitals reviewed.      Assessment/Plan   Mira was seen today for cough and headache.    Diagnoses and all orders for this visit:    Upper respiratory tract infection, unspecified type    Acute recurrent  pansinusitis    Essential hypertension    Other orders  -     brompheniramine-pseudoephedrine-DM 30-2-10 MG/5ML syrup; Take 5 mL by mouth 4 (Four) Times a Day As Needed for Congestion or Cough.  -     predniSONE (DELTASONE) 10 MG tablet; Take 1 tablet by mouth See Admin Instructions. Take 1 Tab Tid x3 days, Then 1 Tab Bid x 2 days Then 1 Tab QD x3 days,then D/C  -     azithromycin (ZITHROMAX) 250 MG tablet; Take 2 tablets the first day, then 1 tablet daily for 4 days.  -     famotidine (PEPCID) 20 MG tablet; Take 1 tablet by mouth 2 (Two) Times a Day.      Discussed current health problems, sinusitis, URI, Tx plan, rationale. Discussed F/U prn if not improved, or call with any concerns.          This document has been electronically signed by Lloyd Varghese MD on November 30, 2017

## 2018-05-23 ENCOUNTER — OFFICE VISIT (OUTPATIENT)
Dept: FAMILY MEDICINE CLINIC | Facility: CLINIC | Age: 63
End: 2018-05-23

## 2018-05-23 VITALS
BODY MASS INDEX: 39.4 KG/M2 | OXYGEN SATURATION: 99 % | DIASTOLIC BLOOD PRESSURE: 72 MMHG | WEIGHT: 182.6 LBS | SYSTOLIC BLOOD PRESSURE: 130 MMHG | HEART RATE: 83 BPM | TEMPERATURE: 97.8 F | HEIGHT: 57 IN

## 2018-05-23 DIAGNOSIS — M25.562 CHRONIC PAIN OF LEFT KNEE: ICD-10-CM

## 2018-05-23 DIAGNOSIS — I10 ESSENTIAL HYPERTENSION: Primary | ICD-10-CM

## 2018-05-23 DIAGNOSIS — E66.9 OBESITY (BMI 30-39.9): ICD-10-CM

## 2018-05-23 DIAGNOSIS — G89.29 CHRONIC PAIN OF LEFT KNEE: ICD-10-CM

## 2018-05-23 DIAGNOSIS — E03.9 ACQUIRED HYPOTHYROIDISM: ICD-10-CM

## 2018-05-23 PROCEDURE — 99214 OFFICE O/P EST MOD 30 MIN: CPT | Performed by: FAMILY MEDICINE

## 2018-05-23 RX ORDER — LEVOTHYROXINE SODIUM 0.1 MG/1
100 TABLET ORAL DAILY
Qty: 30 TABLET | Refills: 5 | Status: SHIPPED | OUTPATIENT
Start: 2018-05-23 | End: 2018-11-15 | Stop reason: SDUPTHER

## 2018-05-23 NOTE — PROGRESS NOTES
Subjective   Mira Deal is a 62 y.o. morbidly obese  female former smoker with HTN, Hypothyroidism, Hyperinsulinar obesity, Hyperlipidemia, H/O Recurrent OM, Eustachian tube dysfunction, Allergies, Chronic knee pain, Past meniscus repair, Osteoarthritis, H/O C-diff colitis after Clindamycin, and other health problems see PMH/PSH. Pt presents for exam, to discuss acute health problem.       ' L knee pain has been flaring, worse over last month, pain 8/10 at times. B/P has been good at checks. Taking meds as Rx'ed. Have not had thyroid level checked recently'.       Hypertension   This is a chronic problem. The current episode started more than 1 year ago. The problem is unchanged. The problem is controlled. Pertinent negatives include no chest pain, headaches, neck pain, palpitations or shortness of breath. Risk factors for coronary artery disease include obesity and post-menopausal state. Current antihypertension treatment includes ACE inhibitors. The current treatment provides significant improvement.   Hypothyroidism   This is a chronic problem. The current episode started more than 1 year ago. The problem occurs daily. The problem has been gradually improving. Associated symptoms include arthralgias and joint swelling. Pertinent negatives include no abdominal pain, chest pain, congestion, coughing, fatigue, fever, headaches, myalgias, nausea, neck pain, rash, sore throat, vomiting or weakness. Nothing aggravates the symptoms. Treatments tried: Levothyroxine. The treatment provided significant relief.   Obesity   This is a chronic problem. The current episode started more than 1 year ago. The problem occurs daily. The problem has been gradually improving. Associated symptoms include arthralgias and joint swelling. Pertinent negatives include no abdominal pain, chest pain, congestion, coughing, fatigue, fever, headaches, myalgias, nausea, neck pain, rash, sore throat, vomiting or weakness. The symptoms  are aggravated by eating. Treatments tried: Diet, exercise. The treatment provided no relief.        The following portions of the patient's history were reviewed and updated as appropriate: allergies, current medications, past family history, past medical history, past social history, past surgical history and problem list.    Review of Systems   Constitutional: Negative for fatigue and fever.   HENT: Negative for congestion, ear pain, postnasal drip, rhinorrhea, sinus pain ( mild), sinus pressure, sneezing and sore throat.    Eyes: Negative.    Respiratory: Negative for cough, shortness of breath and wheezing.    Cardiovascular: Negative for chest pain and palpitations.   Gastrointestinal: Negative for abdominal pain, diarrhea, nausea and vomiting.   Endocrine: Negative.    Genitourinary: Negative for dysuria.   Musculoskeletal: Positive for arthralgias and joint swelling. Negative for myalgias and neck pain.   Skin: Negative for rash.   Allergic/Immunologic: Positive for environmental allergies.   Neurological: Negative for weakness and headaches.   Hematological: Negative.    Psychiatric/Behavioral: Negative.        Objective   Physical Exam   Constitutional: She is oriented to person, place, and time. She appears well-developed and well-nourished. No distress.   HENT:   Head: Normocephalic and atraumatic.   Right Ear: External ear normal.   Left Ear: External ear normal.   Nose: Nose normal.   Mouth/Throat: Oropharynx is clear and moist. No oropharyngeal exudate.   Eyes: Conjunctivae and EOM are normal. Pupils are equal, round, and reactive to light. Right eye exhibits no discharge. Left eye exhibits no discharge. No scleral icterus.   Neck: Normal range of motion. Neck supple. No JVD present. No tracheal deviation present. No thyromegaly present.   Cardiovascular: Normal rate, regular rhythm, normal heart sounds and intact distal pulses.  Exam reveals no gallop and no friction rub.    No murmur  heard.  Pulmonary/Chest: Effort normal and breath sounds normal. No respiratory distress. She has no wheezes. She has no rales. She exhibits no tenderness.   Abdominal: Soft. Bowel sounds are normal. She exhibits no distension and no mass. There is no tenderness. No hernia.   Musculoskeletal: Normal range of motion. She exhibits no edema, tenderness or deformity.   Lymphadenopathy:     She has no cervical adenopathy.   Neurological: She is alert and oriented to person, place, and time. She has normal reflexes. She displays normal reflexes. No cranial nerve deficit. She exhibits normal muscle tone. Coordination normal.   Skin: Skin is warm and dry. No rash noted. She is not diaphoretic. No erythema. No pallor.   Psoriasis   Psychiatric: She has a normal mood and affect. Her behavior is normal. Judgment and thought content normal.   Nursing note and vitals reviewed.      Assessment/Plan   Mira was seen today for hypertension and hypothyroidism.    Diagnoses and all orders for this visit:    Essential hypertension  -     CBC & Differential; Future  -     Comprehensive metabolic panel; Future  -     TSH; Future  -     Urinalysis With / Culture If Indicated - Urine, Clean Catch; Future    Acquired hypothyroidism  -     CBC & Differential; Future  -     Comprehensive metabolic panel; Future  -     TSH; Future  -     Urinalysis With / Culture If Indicated - Urine, Clean Catch; Future    Chronic pain of left knee  -     Ambulatory Referral to Orthopedic Surgery    Obesity (BMI 30-39.9)    Other orders  -     levothyroxine (SYNTHROID, LEVOTHROID) 100 MCG tablet; Take 1 tablet by mouth Daily.      Discussed current health problems , meds, indications, Tx plan, rationale. Discussed USPSTF recommendations. Discussed F/U plan.    Patient's Body mass index is 39.51 kg/m². BMI is above normal parameters. Recommendations include: educational material, exercise counseling, nutrition counseling and referral to primary  care.          This document has been electronically signed by Lloyd Varghese MD

## 2018-05-27 PROBLEM — M25.562 CHRONIC PAIN OF LEFT KNEE: Status: ACTIVE | Noted: 2018-05-27

## 2018-05-27 PROBLEM — G89.29 CHRONIC PAIN OF LEFT KNEE: Status: ACTIVE | Noted: 2018-05-27

## 2018-06-04 PROCEDURE — 87086 URINE CULTURE/COLONY COUNT: CPT | Performed by: FAMILY MEDICINE

## 2018-06-04 PROCEDURE — 81001 URINALYSIS AUTO W/SCOPE: CPT | Performed by: FAMILY MEDICINE

## 2018-06-04 PROCEDURE — 80053 COMPREHEN METABOLIC PANEL: CPT | Performed by: FAMILY MEDICINE

## 2018-06-04 PROCEDURE — 85025 COMPLETE CBC W/AUTO DIFF WBC: CPT | Performed by: FAMILY MEDICINE

## 2018-06-04 PROCEDURE — 84443 ASSAY THYROID STIM HORMONE: CPT | Performed by: FAMILY MEDICINE

## 2018-06-07 ENCOUNTER — TELEPHONE (OUTPATIENT)
Dept: FAMILY MEDICINE CLINIC | Facility: CLINIC | Age: 63
End: 2018-06-07

## 2018-06-07 NOTE — TELEPHONE ENCOUNTER
----- Message from Lloyd Varghese MD sent at 6/5/2018  7:09 AM CDT -----  Thyroid is overtreated should hold one dose of levothyroxine weekly, take Monday through Saturday hold on Sunday.  Other labs look okay we'll go over att follow-up visit

## 2018-06-07 NOTE — TELEPHONE ENCOUNTER
Attempted to call pt with results.  No answer.  Left VM with results, directions for levothyroxine.

## 2018-06-12 ENCOUNTER — TELEPHONE (OUTPATIENT)
Dept: FAMILY MEDICINE CLINIC | Facility: CLINIC | Age: 63
End: 2018-06-12

## 2018-06-12 NOTE — TELEPHONE ENCOUNTER
Left message on patient's voicemail that she needs to come by the office for a meningococcal vaccine and a pneumovax 23, due to history of splenectomy, if she has not had these within the last 5 years. 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

## 2018-07-09 ENCOUNTER — OFFICE VISIT (OUTPATIENT)
Dept: FAMILY MEDICINE CLINIC | Facility: CLINIC | Age: 63
End: 2018-07-09

## 2018-07-09 VITALS
DIASTOLIC BLOOD PRESSURE: 82 MMHG | HEART RATE: 68 BPM | HEIGHT: 57 IN | SYSTOLIC BLOOD PRESSURE: 130 MMHG | BODY MASS INDEX: 39.22 KG/M2 | OXYGEN SATURATION: 98 % | WEIGHT: 181.8 LBS | TEMPERATURE: 97.9 F

## 2018-07-09 DIAGNOSIS — G89.29 CHRONIC PAIN OF LEFT KNEE: ICD-10-CM

## 2018-07-09 DIAGNOSIS — M25.562 CHRONIC PAIN OF LEFT KNEE: ICD-10-CM

## 2018-07-09 DIAGNOSIS — I10 ESSENTIAL HYPERTENSION: ICD-10-CM

## 2018-07-09 DIAGNOSIS — E66.9 OBESITY (BMI 30-39.9): ICD-10-CM

## 2018-07-09 DIAGNOSIS — Z01.818 PRE-OPERATIVE CLEARANCE: Primary | ICD-10-CM

## 2018-07-09 PROCEDURE — 99214 OFFICE O/P EST MOD 30 MIN: CPT | Performed by: FAMILY MEDICINE

## 2018-07-09 NOTE — PROGRESS NOTES
Subjective   Mira Deal is a 62 y.o. obese  female former smoker with HTN, Hypothyroidism, Hyperinsulinar obesity, Hyperlipidemia, H/O Recurrent OM, Eustachian tube dysfunction, Allergies, Chronic knee pain, Past meniscus repair, Osteoarthritis, H/O C-diff colitis after Clindamycin, and other health problems see PMH/PSH. Pt presents for exam, to discuss health problems Tx and F/u plans       ' Chronic L Knee pain has been worse , saw Ortho on referral. Have scheduled for L TKA  Will be done next week, have PW for Clearance here. '    Hypertension   This is a chronic problem. The current episode started more than 1 year ago. The problem is unchanged. The problem is controlled. Pertinent negatives include no chest pain, headaches, neck pain, palpitations or shortness of breath. Risk factors for coronary artery disease include obesity and post-menopausal state. Current antihypertension treatment includes ACE inhibitors. The current treatment provides significant improvement.   Hypothyroidism   This is a chronic problem. The current episode started more than 1 year ago. The problem occurs daily. The problem has been gradually improving. Associated symptoms include arthralgias and joint swelling. Pertinent negatives include no abdominal pain, chest pain, congestion, coughing, fatigue, fever, headaches, myalgias, nausea, neck pain, rash, sore throat, vomiting or weakness. Nothing aggravates the symptoms. Treatments tried: Levothyroxine. The treatment provided significant relief.   Obesity   This is a chronic problem. The current episode started more than 1 year ago. The problem occurs daily. The problem has been gradually improving. Associated symptoms include arthralgias and joint swelling. Pertinent negatives include no abdominal pain, chest pain, congestion, coughing, fatigue, fever, headaches, myalgias, nausea, neck pain, rash, sore throat, vomiting or weakness. The symptoms are aggravated by eating.  Treatments tried: Diet, exercise. The treatment provided no relief.   Knee Pain    Incident onset: Chronic. Injury mechanism: Chronic use. The pain is present in the left knee. The pain is at a severity of 5/10. The symptoms are aggravated by weight bearing. She has tried NSAIDs and rest for the symptoms. The treatment provided no relief (Plan for TKA).        The following portions of the patient's history were reviewed and updated as appropriate: allergies, current medications, past family history, past medical history, past social history, past surgical history and problem list.    Review of Systems   Constitutional: Negative for fatigue and fever.   HENT: Negative for congestion, ear pain, postnasal drip, rhinorrhea, sinus pain ( mild), sinus pressure, sneezing and sore throat.    Eyes: Negative.    Respiratory: Negative for cough, shortness of breath and wheezing.    Cardiovascular: Negative for chest pain and palpitations.   Gastrointestinal: Negative for abdominal pain, diarrhea, nausea and vomiting.   Endocrine: Negative.    Genitourinary: Negative for dysuria.   Musculoskeletal: Positive for arthralgias and joint swelling. Negative for myalgias and neck pain.        L knee worse than R   Skin: Negative for rash.   Allergic/Immunologic: Positive for environmental allergies.   Neurological: Negative for weakness and headaches.   Hematological: Negative.    Psychiatric/Behavioral: Negative.        Objective   Physical Exam   Constitutional: She is oriented to person, place, and time. She appears well-developed and well-nourished. No distress.   HENT:   Head: Normocephalic and atraumatic.   Right Ear: External ear normal.   Left Ear: External ear normal.   Nose: Nose normal.   Mouth/Throat: Oropharynx is clear and moist. No oropharyngeal exudate.   Eyes: Conjunctivae and EOM are normal. Pupils are equal, round, and reactive to light. Right eye exhibits no discharge. Left eye exhibits no discharge. No scleral  icterus.   Neck: Normal range of motion. Neck supple. No JVD present. No tracheal deviation present. No thyromegaly present.   Cardiovascular: Normal rate, regular rhythm, normal heart sounds and intact distal pulses.  Exam reveals no gallop and no friction rub.    No murmur heard.  Pulmonary/Chest: Effort normal and breath sounds normal. No respiratory distress. She has no wheezes. She has no rales. She exhibits no tenderness.   Abdominal: Soft. Bowel sounds are normal. She exhibits no distension and no mass. There is no tenderness. No hernia.   Musculoskeletal: She exhibits no edema, tenderness or deformity.   WB 4 with ROM L knee   Lymphadenopathy:     She has no cervical adenopathy.   Neurological: She is alert and oriented to person, place, and time. She has normal reflexes. She displays normal reflexes. No cranial nerve deficit. She exhibits normal muscle tone. Coordination normal.   Skin: Skin is warm and dry. No rash noted. She is not diaphoretic. No erythema. No pallor.   Psoriasis   Psychiatric: She has a normal mood and affect. Her behavior is normal. Judgment and thought content normal.   Nursing note and vitals reviewed.      Assessment/Plan   Mira was seen today for surgical clearance.    Diagnoses and all orders for this visit:    Pre-operative clearance    Obesity (BMI 30-39.9)    Essential hypertension    Chronic pain of left knee      Discussed health problems, meds, indications, plan for TKA. Recent labs reviewed, a risk assessment tool was reviewed with Pt, risk for surgery are understood by Pt, see scanned documents. Pt is cleared from FM standpoint to procede for L TKA, discussed F/u here.           This document has been electronically signed by Lloyd Varghese MD

## 2018-07-10 PROBLEM — Z01.818 PRE-OPERATIVE CLEARANCE: Status: ACTIVE | Noted: 2018-07-10

## 2018-07-11 ENCOUNTER — TRANSCRIBE ORDERS (OUTPATIENT)
Dept: PHYSICAL THERAPY | Facility: HOSPITAL | Age: 63
End: 2018-07-11

## 2018-07-11 DIAGNOSIS — M25.562 ACUTE PAIN OF LEFT KNEE: Primary | ICD-10-CM

## 2018-07-11 DIAGNOSIS — Z96.652 S/P TOTAL KNEE ARTHROPLASTY, LEFT: ICD-10-CM

## 2018-07-19 ENCOUNTER — HOSPITAL ENCOUNTER (OUTPATIENT)
Dept: PHYSICAL THERAPY | Facility: HOSPITAL | Age: 63
Setting detail: THERAPIES SERIES
Discharge: HOME | End: 2018-07-19

## 2018-07-19 DIAGNOSIS — Z96.652 STATUS POST TOTAL LEFT KNEE REPLACEMENT: Primary | ICD-10-CM

## 2018-07-19 PROCEDURE — G0283 ELEC STIM OTHER THAN WOUND: HCPCS | Performed by: PHYSICAL THERAPIST

## 2018-07-19 PROCEDURE — 97162 PT EVAL MOD COMPLEX 30 MIN: CPT | Performed by: PHYSICAL THERAPIST

## 2018-07-19 PROCEDURE — 97110 THERAPEUTIC EXERCISES: CPT | Performed by: PHYSICAL THERAPIST

## 2018-07-19 NOTE — THERAPY EVALUATION
"    Outpatient Physical Therapy Ortho Initial Evaluation  [facility]  Eneida Ruth, PT, DPT, CSCS       Patient Name: Mira Deal  : 1955  MRN: [id number]  Today's Date: 2018      Visit Date: 2018     Pt reports 3/10 pain pre treatment, \"numb\"/10 pain post treatment  Reports N/A% of improvement.  Attended  visits.  Insurance available: 30 visits  Next MD appt: 2018.  Recertification: 2018.    Patient Active Problem List   Diagnosis   • Essential hypertension   • Acquired hypothyroidism   • Obesity (BMI 30-39.9)   • Rectal tenesmus   • Proctitis   • Clostridium difficile infection   • Clostridium difficile colitis   • Screening for colon cancer   • Need for hepatitis C screening test   • Left foot pain   • Upper respiratory tract infection   • Left otitis media   • Acute recurrent pansinusitis   • Chronic pain of left knee   • Pre-operative clearance        Past Medical History:   Diagnosis Date   • Acute suppurative otitis media without spontaneous rupture of eardrum     right ear   • Benign essential hypertension    • Chronic osteoarthritis    • Diverticular disease of colon    • Dysfunction of eustachian tube     Will start OTC Nasal steroid      • Encounter for gynecological examination (general) (routine) without abnormal findings    • Encounter for screening for malignant neoplasm of breast    • Encounter for screening mammogram for malignant neoplasm of breast     16 negative      • Gastrointestinal hemorrhage    • Hypercholesterolemia    • Hyperlipidemia    • Hypothyroidism    • Left lower quadrant pain    • Mild depression (CMS/HCC)    • Nausea    • Obesity     Hyperinsulinar    • Obesity, unspecified    • Vertigo         Past Surgical History:   Procedure Laterality Date   • CARPAL TUNNEL RELEASE     • COLONOSCOPY      Dr Maria   • FOREARM SURGERY  2011    /wrist   • INJECTION OF MEDICATION  2016    Kenalog (1)    "   • INJECTION OF MEDICATION  05/12/2016    Rocephin (1)      • INJECTION OF MEDICATION  05/12/2016    Zofran (1)      • KNEE CARTILAGE SURGERY  2012   • SPLENECTOMY  1972   • TOTAL KNEE ARTHROPLASTY Left 07/16/2018   • VAGINAL HYSTERECTOMY SALPINGO OOPHORECTOMY  1995    cerv cancer       Visit Dx:     ICD-10-CM ICD-9-CM   1. Status post total left knee replacement Z96.652 V43.65     Number of days off work: Off since surgery    Patient is .    Patient has grown children.    Medications: Lisinopril 20mg, Levothyroxine  Asprin 325mg, Oxycodone/PAP    Allergies: SUlfa, Lortab          Patient History     Row Name 07/19/18 0800             History    Chief Complaint Pain  -AJ      Type of Pain Knee pain  -AJ      Date Current Problem(s) Began 07/16/18  -      Brief Description of Current Complaint Patient reports years of issues with the knees prior to surgery. She reports she had injects, therapy, and lseveral scopes. She reprots she decided ot undergo a TKA for pain. She reports she had surgery monday and home tuesday. No assistive device prior to surgery. 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 Yes, Ortho  -AJ      What clinical tests have you had for this problem? X-ray  -AJ      Related/Recent Hospitalizations Yes  -AJ      Date of Hospitalization 07/16/18  -AJ      Surgery/Hospitalization L TKA  -AJ      History of Previous Related Injuries None  -AJ      Are you  "or can you be pregnant No  -AJ         Pain     Pain Location Knee  -AJ      Pain at Present 3  -AJ      Pain at Best 3  -AJ      Pain at Worst 8  -AJ      Pain Frequency Constant/continuous  -AJ      Pain Description Aching   Stiffness  -AJ      What Performance Factors Make the Current Problem(s) WORSE? bending it  -AJ      What Performance Factors Make the Current Problem(s) BETTER? Ice  -AJ      Tolerance Time- Standing ~5 minutes  -AJ      Tolerance Time- Sitting 1 hour  -AJ      Tolerance Time- Walking ~ 5 minutes  -AJ      Is your sleep disturbed? No  -AJ      Is medication used to assist with sleep? Yes  -AJ      Difficulties at work? Off since surgery  -AJ      Difficulties with ADL's? dressing, grooming  -AJ      Difficulties with recreational activities? fishing, bowling  -AJ        User Key  (r) = Recorded By, (t) = Taken By, (c) = Cosigned By    Initials Name Provider Type    AJ Eneida Ruth, PT Physical Therapist                PT Ortho     Row Name 07/19/18 0900       Subjective Comments    Subjective Comments Patient wishes to get the knee feeling normal  -AJ       Precautions and Contraindications    Precautions None  -AJ       Subjective Pain    Able to rate subjective pain? yes  -AJ    Pre-Treatment Pain Level 3  -AJ    Post-Treatment Pain Level --   \"numb\"  -AJ       Posture/Observations    Posture- WNL Posture is WNL   for B LE  -AJ    Observations Edema;Erythema;Incision healing   Incision covered waterproof bandage, small bloody drainage  -AJ    Posture/Observations Comments No acute distress, wearing immobolizer on L LE and ambulating with RW.  -AJ       Special Tests/Palpation    Special Tests/Palpation --   Mod TTP globally in L knee  -AJ       Right Lower Ext    Rt Knee Extension/Flexion AROM 0°-125°  -AJ       Left Lower Ext    Lt Knee Extension/Flexion AROM 15°-68°  -AJ       MMT (Manual Muscle Testing)    Additional Documentation --  -AJ       General Assessment (Manual Muscle " Testing)    Comment, General Manual Muscle Testing (MMT) Assessment Able to SLR< but with ~10° lag and extension deficit  -AJ       Sensation    Sensation WNL? WFL  -AJ    Light Touch No apparent deficits  -AJ    Additional Comments Some numbness on lateral knee.  -AJ       Lower Extremity Flexibility    Hamstrings Left:;Moderately limited;Right:;Mildly limited  -AJ       RLE Quick Girth (cm)    Other 1 40 cm   circum at the knee joint line  -AJ       LLE Quick Girth (cm)    Other 1 46.8 cm   circum at the knee joint line  -AJ       Transfers    Comment (Transfers) I with all transfers, unweights L LE with sit to/from stand  -AJ       Gait/Stairs Assessment/Training    [place]ham Level (Gait) conditional independence  -AJ    Assistive Device (Gait) walker, front-wheeled  -AJ    Pattern (Gait) 3-point  -AJ    Deviations/Abnormal Patterns (Gait) left sided deviations;antalgic  -AJ    Left Sided Gait Deviations hip circumduction   wearing knee immobolizer  -AJ    Number of Steps (Stairs) Not tested.  -AJ    Comment (Gait/Stairs) WBAT with RW wearing knee immobolizer  -AJ      User Key  (r) = Recorded By, (t) = Taken By, (c) = Cosigned By    Initials Name Provider Type    AJ Eneida Ruth, PT Physical Therapist          Therapy Education  Given: HEP, Symptoms/condition management, Pain management, Fall prevention and home safety, Mobility training, Edema management, Bandaging/dressing change (POC)  Program: New  How Provided: Verbal, Demonstration, Written  Provided to: Patient  Level of Understanding: Verbalized, Demonstrated           PT OP Goals     Row Name 07/19/18 1000          PT Short Term Goals    STG Date to Achieve 08/09/18  -AJ     STG 1 I with HEP and have additions/changes by next recertification.  -AJ     STG 2 AROM L knee 5° from extension or less.  -AJ     STG 3 AROM L knee flexion >= 95°.  -AJ     STG 4 Able to perform 20 SLR with no lag present.  -AJ     STG 5 No increase in circum  measurements for joint effusion.  -        Long Term Goals    LTG Date to Achieve 08/31/18  -     LTG 1 AROm L knee 0°->=115°.  -     LTG 2 B LE 5/5  -     LTG 3 Circum measurements for joint effusion within 2cm of R=L.  -     LTG 4 Patient able to ambulate 1/4 mile non-antalgically with no assistive device and no increase in pain.  -     LTG 5 Patient able to ambulate up/down 3 steps reciprocally HRA for balance x10 reps.  -     LTG 6 I with final HEP.  -     LTG 7 D/C with a final HEP and free 30 day fitness formula membership.  -        Time Calculation    PT Goal Re-Cert Due Date 08/09/18  -       User Key  (r) = Recorded By, (t) = Taken By, (c) = Cosigned By    Initials Name Provider Type     Eneida Ruth, PT Physical Therapist         Barriers to Rehab: Include significant or possible arthritic/degenerative changes that have occurred within the joint, The patient's obesity.    Safety Issues: None noted.          PT Assessment/Plan     Row Name 07/19/18 1000          PT Assessment    Functional Limitations Impaired gait;Impaired locomotion;Limitation in home management;Limitations in community activities;Performance in leisure activities;Performance in self-care ADL;Performance in work activities  -     Impairments Balance;Edema;Endurance;Gait;Impaired flexibility;Impaired muscle endurance;Impaired muscle length;Impaired muscle power;Range of motion;Pain;Muscle strength;Joint mobility  -     Assessment Comments Patient did well with al lther ex and given written copy of HEP exercises.  -     Rehab Potential Good  -     Patient/caregiver participated in establishment of treatment plan and goals Yes  -     Patient would benefit from skilled therapy intervention Yes  -        PT Plan    PT Frequency 3x/week  -     Predicted Duration of Therapy Intervention (Therapy Eval) 4-6 weeks  -     Planned CPT's? PT EVAL MOD COMPLELITY: 86142;PT RE-EVAL: 77824;PT THER PROC EA 15  "MIN: 81522;PT THER ACT EA 15 MIN: 09291;PT MANUAL THERAPY EA 15 MIN: 32246;PT ELECTRICAL STIM UNATTEND: ;PT THER SUPP EA 15 MIN  -AJ     Physical Therapy Interventions (Optional Details) balance training;gait training;gross motor skills;home exercise program;joint mobilization;manual therapy techniques;modalities;patient/family education;ROM (Range of Motion);stair training;strengthening;stretching  -AJ     PT Plan Comments Add St. HS S.  -AJ       User Key  (r) = Recorded By, (t) = Taken By, (c) = Cosigned By    Initials Name Provider Type    AJ Eneida Ruth, PT Physical Therapist       Other therapeutic activities and/or exercises will be prescribed depending on the patients progress or lack there of.          Modalities     Row Name 07/19/18 0900             Ice    Ice Applied Yes  -AJ      Location L knee with elevation  -AJ      Ice S/P Rx Yes  -AJ         ELECTRICAL STIMULATION    Attended/Unattended Unattended  -AJ      Stimulation Type IFC  -AJ      Location/Electrode Placement/Other 20  -AJ        User Key  (r) = Recorded By, (t) = Taken By, (c) = Cosigned By    Initials Name Provider Type    AJ Eneida Ruth, PT Physical Therapist              Exercises     Row Name 07/19/18 0900             Subjective Comments    Subjective Comments Patient wishes to get the knee feeling normal  -AJ         Subjective Pain    Able to rate subjective pain? yes  -AJ      Pre-Treatment Pain Level 3  -AJ      Post-Treatment Pain Level --   \"numb\"  -AJ         Exercise 1    Exercise Name 1 Pro II LE- Rocking  -AJ      Time 1 8 minutes  -AJ      Additional Comments L 1.0  -AJ         Exercise 2    Exercise Name 2 L St. Lunge S  -AJ      Reps 2 10  -AJ      Time 2 10\" hold  -AJ         Exercise 3    Exercise Name 3 L Quad sets  -AJ      Reps 3 20  -AJ      Time 3 5\" hold  -AJ         Exercise 4    Exercise Name 4 L SLR  -AJ      Sets 4 3  -AJ      Reps 4 5  -AJ      Time 4 no hold  -AJ         Exercise 5    " "Exercise Name 5 Heel prop  -AJ      Time 5 5 minutes  -AJ         Exercise 6    Exercise Name 6 Heel slides with strap  -AJ      Reps 6 10  -AJ      Time 6 3-5\" hold end of range  -AJ        User Key  (r) = Recorded By, (t) = Taken By, (c) = Cosigned By    Initials Name Provider Type    DIVYA Ruth PT Physical Therapist                        Outcome Measure Options: Lower Extremity Functional Scale (LEFS)  Lower Extremity Functional Index  Any of your usual work, housework or school activities: A little bit of difficulty  Your usual hobbies, recreational or sporting activities: No difficulty  Getting into or out of the bath: Extreme difficulty or unable to perform activity  Walking between rooms: No difficulty  Putting on your shoes or socks: Moderate difficulty  Squatting: Extreme difficulty or unable to perform activity  Lifting an object, like a bag of groceries from the floor: Moderate difficulty  Performing light activities around your home: A little bit of difficulty  Performing heavy activities around your home: Quite a bit of difficulty  Getting into or out of a car: A little bit of difficulty  Walking 2 blocks: Quite a bit of difficulty  Walking a mile: Extreme difficulty or unable to perform activity  Going up or down 10 stairs (about 1 flight of stairs): Moderate difficulty  Standing for 1 hour: Quite a bit of difficulty  Sitting for 1 hour: No difficulty  Running on even ground: Extreme difficulty or unable to perform activity  Running on uneven ground: Extreme difficulty or unable to perform activity  Making sharp turns while running fast: Extreme difficulty or unable to perform activity  Hopping: Extreme difficulty or unable to perform activity  Rolling over in bed: Extreme difficulty or unable to perform activity  Total: 30      Time Calculation:   Start Time: 0848  Stop Time: 1000  Time Calculation (min): 72 min  Total Timed Code Minutes- PT: 25 minute(s)     Therapy Charges for Today  "    Code Description Service Date Service Provider Modifiers Qty    56093414743 HC PT EVAL MOD COMPLEXITY 2 7/19/2018 Eneida Ruth, PT GP 1    52881234708 HC PT THER PROC EA 15 MIN 7/19/2018 Eneida Ruth, PT GP 2    75659053090 HC PT THER SUPP EA 15 MIN 7/19/2018 Eneida Ruth, PT GP 1    05157447353 HC PT ELECTRICAL STIM UNATTENDED 7/19/2018 Eneida Ruth, PT  1     NH TENS SUPPL 2 LEAD PER MONTH 7/19/2018 Eneida Ruth, PT  1          PT G-Codes  Outcome Measure Options: Lower Extremity Functional Scale (LEFS)         Eneida Ruth, PT, DPT, CSCS  7/19/2018

## 2018-07-20 ENCOUNTER — HOSPITAL ENCOUNTER (OUTPATIENT)
Dept: PHYSICAL THERAPY | Facility: HOSPITAL | Age: 63
Setting detail: THERAPIES SERIES
Discharge: HOME | End: 2018-07-20

## 2018-07-20 DIAGNOSIS — Z96.652 STATUS POST TOTAL LEFT KNEE REPLACEMENT: Primary | ICD-10-CM

## 2018-07-20 PROCEDURE — G0283 ELEC STIM OTHER THAN WOUND: HCPCS

## 2018-07-20 PROCEDURE — 97110 THERAPEUTIC EXERCISES: CPT

## 2018-07-20 NOTE — THERAPY TREATMENT NOTE
Outpatient Physical Therapy Ortho Treatment Note  [facility]  Katy Vital PTA       Patient Name: Mira Deal  : 1955  MRN: [id number]  Today's Date: 2018      Visit Date: 2018     Visits: 2/2  Insurance Visits Approved: 30 visits  Recert Due: 2018  MD Appt: 2018  Pain: pretreatment 6/10; post treatment 0/10  Improvement: pt is subjectively reporting 0% improvement since initial evaluation    Visit Dx:    ICD-10-CM ICD-9-CM   1. Status post total left knee replacement Z96.652 V43.65       Patient Active Problem List   Diagnosis   • Essential hypertension   • Acquired hypothyroidism   • Obesity (BMI 30-39.9)   • Rectal tenesmus   • Proctitis   • Clostridium difficile infection   • Clostridium difficile colitis   • Screening for colon cancer   • Need for hepatitis C screening test   • Left foot pain   • Upper respiratory tract infection   • Left otitis media   • Acute recurrent pansinusitis   • Chronic pain of left knee   • Pre-operative clearance        Past Medical History:   Diagnosis Date   • Acute suppurative otitis media without spontaneous rupture of eardrum     right ear   • Benign essential hypertension    • Chronic osteoarthritis    • Diverticular disease of colon    • Dysfunction of eustachian tube     Will start OTC Nasal steroid      • Encounter for gynecological examination (general) (routine) without abnormal findings    • Encounter for screening for malignant neoplasm of breast    • Encounter for screening mammogram for malignant neoplasm of breast     16 negative      • Gastrointestinal hemorrhage    • Hypercholesterolemia    • Hyperlipidemia    • Hypothyroidism    • Left lower quadrant pain    • Mild depression (CMS/HCC)    • Nausea    • Obesity     Hyperinsulinar    • Obesity, unspecified    • Vertigo         Past Surgical History:   Procedure Laterality Date   • CARPAL TUNNEL RELEASE     • COLONOSCOPY      Dr Maria  "  • FOREARM SURGERY  2011    /wrist   • INJECTION OF MEDICATION  05/12/2016    Kenalog (1)      • INJECTION OF MEDICATION  05/12/2016    Rocephin (1)      • INJECTION OF MEDICATION  05/12/2016    Zofran (1)      • KNEE CARTILAGE SURGERY  2012   • SPLENECTOMY  1972   • TOTAL KNEE ARTHROPLASTY Left 07/16/2018   • VAGINAL HYSTERECTOMY SALPINGO OOPHORECTOMY  1995    cerv cancer             PT Ortho     Row Name 07/20/18 0800       Subjective Comments    Subjective Comments states that she is very tired and sore  -       Precautions and Contraindications    Precautions None  -       Subjective Pain    Able to rate subjective pain? yes  -    Pre-Treatment Pain Level 6  -    Post-Treatment Pain Level 0  -       Left Lower Ext    Lt Knee Extension/Flexion AROM 9°-95°  -    Row Name 07/19/18 0900       Subjective Comments    Subjective Comments Patient wishes to get the knee feeling normal  -       Precautions and Contraindications    Precautions None  -       Subjective Pain    Able to rate subjective pain? yes  -    Pre-Treatment Pain Level 3  -    Post-Treatment Pain Level --   \"numb\"  -       Posture/Observations    Posture- WNL Posture is WNL   for B LE  -    Observations Edema;Erythema;Incision healing   Incision covered waterproof bandage, small bloody drainage  -    Posture/Observations Comments No acute distress, wearing immobolizer on L LE and ambulating with RW.  -       Special Tests/Palpation    Special Tests/Palpation --   Mod TTP globally in L knee  -AJ       Right Lower Ext    Rt Knee Extension/Flexion AROM 0°-125°  -AJ       Left Lower Ext    Lt Knee Extension/Flexion AROM 15°-68°  -       MMT (Manual Muscle Testing)    Additional Documentation --  -       General Assessment (Manual Muscle Testing)    Comment, General Manual Muscle Testing (MMT) Assessment Able to SLR< but with ~10° lag and extension deficit  -       Sensation    Sensation WNL? WFL  -    Light Touch No " apparent deficits  -    Additional Comments Some numbness on lateral knee.  -AJ       Lower Extremity Flexibility    Hamstrings Left:;Moderately limited;Right:;Mildly limited  -AJ       RLE Quick Girth (cm)    Other 1 40 cm   circum at the knee joint line  -AJ       LLE Quick Girth (cm)    Other 1 46.8 cm   circum at the knee joint line  -AJ       Transfers    Comment (Transfers) I with all transfers, unweights L LE with sit to/from stand  -AJ       Gait/Stairs Assessment/Training    [place] Level (Gait) conditional independence  -    Assistive Device (Gait) walker, front-wheeled  -AJ    Pattern (Gait) 3-point  -AJ    Deviations/Abnormal Patterns (Gait) left sided deviations;antalgic  -AJ    Left Sided Gait Deviations hip circumduction   wearing knee immobolizer  -AJ    Number of Steps (Stairs) Not tested.  -    Comment (Gait/Stairs) WBAT with RW wearing knee immobolizer  -      User Key  (r) = Recorded By, (t) = Taken By, (c) = Cosigned By    Initials Name Provider Type     Katy Vital, PTA Physical Therapy Assistant     Eneida Ruth, PT Physical Therapist                            PT Assessment/Plan     Row Name 07/20/18 0800 07/19/18 1000       PT Assessment    Functional Limitations  -- Impaired gait;Impaired locomotion;Limitation in home management;Limitations in community activities;Performance in leisure activities;Performance in self-care ADL;Performance in work activities  -    Impairments  -- Balance;Edema;Endurance;Gait;Impaired flexibility;Impaired muscle endurance;Impaired muscle length;Impaired muscle power;Range of motion;Pain;Muscle strength;Joint mobility  -    Assessment Comments patient has improved AROM today with good effort throughout  - Patient did well with al lther ex and given written copy of HEP exercises.  -    Rehab Potential  -- Good  -    Patient/caregiver participated in establishment of treatment plan and goals  -- Yes  -    Patient would  benefit from skilled therapy intervention  -- Yes  -       PT Plan    PT Frequency 3x/week  - 3x/week  -    Predicted Duration of Therapy Intervention (Therapy Eval)  -- 4-6 weeks  -    Planned CPT's?  -- PT EVAL MOD COMPLELITY: 07602;PT RE-EVAL: 55444;PT THER PROC EA 15 MIN: 22314;PT THER ACT EA 15 MIN: 89517;PT MANUAL THERAPY EA 15 MIN: 16976;PT ELECTRICAL STIM UNATTEND: ;PT THER SUPP EA 15 MIN  -    Physical Therapy Interventions (Optional Details)  -- balance training;gait training;gross motor skills;home exercise program;joint mobilization;manual therapy techniques;modalities;patient/family education;ROM (Range of Motion);stair training;strengthening;stretching  -    PT Plan Comments add standing HS S, resume lunge S   -MH Add St. HS S.  -      User Key  (r) = Recorded By, (t) = Taken By, (c) = Cosigned By    Initials Name Provider Type     Katy Vital PTA Physical Therapy Assistant    AJ Eneida Ruth, PT Physical Therapist                Modalities     Row Name 07/20/18 0800             Ice    Ice Applied Yes  -      Location L knee with elevation  -      Rx Minutes Other:   20 minutes  -      Ice S/P Rx Yes  -         ELECTRICAL STIMULATION    Attended/Unattended Unattended  -      Stimulation Type IFC  -      Location/Electrode Placement/Other 20  -        User Key  (r) = Recorded By, (t) = Taken By, (c) = Cosigned By    Initials Name Provider Type     Katy Vital PTA Physical Therapy Assistant                Exercises     Row Name 07/20/18 0800             Subjective Comments    Subjective Comments states that she is very tired and sore  -         Subjective Pain    Able to rate subjective pain? yes  -      Pre-Treatment Pain Level 6  -MH      Post-Treatment Pain Level 0  -         Exercise 1    Exercise Name 1 Pro II LE Rocking  -      Time 1 10 minutes  -      Additional Comments L 1.0  -MH         Exercise 2    Exercise Name 2 Left Heel Prop   -MH      Time 2 5 minutes  -MH         Exercise 3    Exercise Name 3 Quad Sets  -MH      Reps 3 20  -MH      Time 3 5 sec hold  -MH         Exercise 4    Exercise Name 4 L SLR Fwd Flex  -MH      Reps 4 20  -MH      Time 4 3-5 sec hold  -MH         Exercise 5    Exercise Name 5 SAQ  -MH      Time 5 5 minutes  -MH         Exercise 6    Exercise Name 6 Heel Slides with strap  -MH      Time 6 5 minutes with a 3-5 sec hold at end range  -        User Key  (r) = Recorded By, (t) = Taken By, (c) = Cosigned By    Initials Name Provider Type     Katy Vital, PTA Physical Therapy Assistant                               PT OP Goals     Row Name 07/20/18 0800 07/19/18 1000       PT Short Term Goals    STG Date to Achieve 08/09/18  -MH 08/09/18  -    STG 1 I with HEP and have additions/changes by next recertification.  - I with HEP and have additions/changes by next recertification.  -    STG 1 Progress Ongoing  -MH  --    STG 2 AROM L knee 5° from extension or less.  -MH AROM L knee 5° from extension or less.  -    STG 2 Progress Ongoing  -MH  --    STG 3 AROM L knee flexion >= 95°.  - AROM L knee flexion >= 95°.  -    STG 3 Progress Met  -  --    STG 4 Able to perform 20 SLR with no lag present.  - Able to perform 20 SLR with no lag present.  -    STG 4 Progress Ongoing  -  --    STG 5 No increase in circum measurements for joint effusion.  - No increase in circum measurements for joint effusion.  -    STG 5 Progress Ongoing  -MH  --       Long Term Goals    LTG Date to Achieve 08/31/18  -MH 08/31/18  -    LTG 1 AROm L knee 0°->=115°.  -MH AROm L knee 0°->=115°.  -    LTG 1 Progress Ongoing  -MH  --    LTG 2 B LE 5/5  -MH B LE 5/5  -    LTG 2 Progress Ongoing  -MH  --    LTG 3 Circum measurements for joint effusion within 2cm of R=L.  - Circum measurements for joint effusion within 2cm of R=L.  -    LTG 3 Progress Ongoing  -MH  --    LTG 4 Patient able to ambulate 1/4 mile  non-antalgically with no assistive device and no increase in pain.  - Patient able to ambulate 1/4 mile non-antalgically with no assistive device and no increase in pain.  -    LTG 4 Progress Ongoing  -  --    LTG 5 Patient able to ambulate up/down 3 steps reciprocally HRA for balance x10 reps.  - Patient able to ambulate up/down 3 steps reciprocally HRA for balance x10 reps.  -    LTG 5 Progress Ongoing  -  --    LTG 6 I with final HEP.  - I with final HEP.  -    LTG 6 Progress Ongoing  -  --    LTG 7 D/C with a final HEP and free 30 day fitness formula membership.  - D/C with a final HEP and free 30 day fitness formula membership.  -    LTG 7 Progress Ongoing  -  --       Time Calculation    PT Goal Re-Cert Due Date 08/09/18  - 08/09/18  -      User Key  (r) = Recorded By, (t) = Taken By, (c) = Cosigned By    Initials Name Provider Type     Katy Vital PTA Physical Therapy Assistant     Eneida Ruth, PT Physical Therapist          Therapy Education  Education Details: SLR, Heel Slides, Heel Prop every hour  Given: HEP, Symptoms/condition management, Pain management, Fall prevention and home safety, Mobility training, Edema management, Bandaging/dressing change  Program: Reinforced  How Provided: Verbal, Demonstration  Provided to: Patient  Level of Understanding: Verbalized, Demonstrated              Time Calculation:   Start Time: 0800  Stop Time: 0905  Time Calculation (min): 65 min  Total Timed Code Minutes- PT: 45 minute(s)    Therapy Charges for Today     Code Description Service Date Service Provider Modifiers Qty    11523222565 HC PT THER PROC EA 15 MIN 7/20/2018 Katy Vital PTA GP 3    48481675453 HC PT THER SUPP EA 15 MIN 7/20/2018 Katy Vital PTA GP 1    69315302524 HC PT ELECTRICAL STIM UNATTENDED 7/20/2018 Katy Vital PTA  1                    Katy Vital PTA  7/20/2018

## 2018-07-23 ENCOUNTER — HOSPITAL ENCOUNTER (OUTPATIENT)
Dept: PHYSICAL THERAPY | Facility: HOSPITAL | Age: 63
Setting detail: THERAPIES SERIES
Discharge: HOME | End: 2018-07-23

## 2018-07-23 DIAGNOSIS — Z96.652 STATUS POST TOTAL LEFT KNEE REPLACEMENT: Primary | ICD-10-CM

## 2018-07-23 PROCEDURE — G0283 ELEC STIM OTHER THAN WOUND: HCPCS | Performed by: PHYSICAL THERAPIST

## 2018-07-23 PROCEDURE — 97110 THERAPEUTIC EXERCISES: CPT | Performed by: PHYSICAL THERAPIST

## 2018-07-23 NOTE — THERAPY TREATMENT NOTE
Outpatient Physical Therapy Ortho Treatment Note  [facility]  Eneida Ruth, PT, DPT, CSCS       Patient Name: Mira Deal  : 1955  MRN: [id number]  Today's Date: 2018      Visit Date: 2018     Pt reports 6/10 pain pre treatment, 0/10 pain post treatment  Reports 25% of improvement.  Attended 3/3 visits.  Insurance available: 30 visits  Next MD appt: 2018.  Recertification: 2018.    Visit Dx:    ICD-10-CM ICD-9-CM   1. Status post total left knee replacement Z96.652 V43.65       Patient Active Problem List   Diagnosis   • Essential hypertension   • Acquired hypothyroidism   • Obesity (BMI 30-39.9)   • Rectal tenesmus   • Proctitis   • Clostridium difficile infection   • Clostridium difficile colitis   • Screening for colon cancer   • Need for hepatitis C screening test   • Left foot pain   • Upper respiratory tract infection   • Left otitis media   • Acute recurrent pansinusitis   • Chronic pain of left knee   • Pre-operative clearance        Past Medical History:   Diagnosis Date   • Acute suppurative otitis media without spontaneous rupture of eardrum     right ear   • Benign essential hypertension    • Chronic osteoarthritis    • Diverticular disease of colon    • Dysfunction of eustachian tube     Will start OTC Nasal steroid      • Encounter for gynecological examination (general) (routine) without abnormal findings    • Encounter for screening for malignant neoplasm of breast    • Encounter for screening mammogram for malignant neoplasm of breast     16 negative      • Gastrointestinal hemorrhage    • Hypercholesterolemia    • Hyperlipidemia    • Hypothyroidism    • Left lower quadrant pain    • Mild depression (CMS/HCC)    • Nausea    • Obesity     Hyperinsulinar    • Obesity, unspecified    • Vertigo         Past Surgical History:   Procedure Laterality Date   • CARPAL TUNNEL RELEASE     • COLONOSCOPY      Dr Maria   •  FOREARM SURGERY  2011    /wrist   • INJECTION OF MEDICATION  05/12/2016    Kenalog (1)      • INJECTION OF MEDICATION  05/12/2016    Rocephin (1)      • INJECTION OF MEDICATION  05/12/2016    Zofran (1)      • KNEE CARTILAGE SURGERY  2012   • SPLENECTOMY  1972   • TOTAL KNEE ARTHROPLASTY Left 07/16/2018   • VAGINAL HYSTERECTOMY SALPINGO OOPHORECTOMY  1995    cerv cancer             PT Ortho     Row Name 07/23/18 0800       Subjective Comments    Subjective Comments Patient rpeorts the knee is still really stiff. Reports it is geting better though.  -       Precautions and Contraindications    Precautions/Limitations no known precautions/limitations  -       Subjective Pain    Able to rate subjective pain? yes  -AJ    Pre-Treatment Pain Level 6  -AJ    Post-Treatment Pain Level 0  -       Posture/Observations    Posture/Observations Comments No acute distress, wearing immobolizer on L LE and ambulating with RW.  -AJ       Left Lower Ext    Lt Knee Extension/Flexion AROM 5°-100°  -      User Key  (r) = Recorded By, (t) = Taken By, (c) = Cosigned By    Initials Name Provider Type    DIVYA Ruth, PT Physical Therapist                            PT Assessment/Plan     Row Name 07/23/18 0800          PT Assessment    Assessment Comments Improved ROM today with both extension and flexion.  -        PT Plan    PT Frequency 3x/week  -     PT Plan Comments Add step ups F/L and possible shuttle.  -       User Key  (r) = Recorded By, (t) = Taken By, (c) = Cosigned By    Initials Name Provider Type    DIVYA Ruth, PT Physical Therapist                Modalities     Row Name 07/23/18 0800             Ice    Ice Applied Yes  -      Location L knee with elevation  -      Rx Minutes 15 mins  -      Ice S/P Rx Yes  -         ELECTRICAL STIMULATION    Attended/Unattended Unattended  -      Stimulation Type IFC  -      Location/Electrode Placement/Other L knee with elevation  -AJ       "18203 - PT Electrical Stimulation (Manual) Minutes 15  -AJ        User Key  (r) = Recorded By, (t) = Taken By, (c) = Cosigned By    Initials Name Provider Type    AJ Eneida Ruth, PT Physical Therapist                Exercises     Row Name 07/23/18 0800             Subjective Comments    Subjective Comments Patient rpeorts the knee is still really stiff. Reports it is geting better though.  -AJ         Subjective Pain    Able to rate subjective pain? yes  -AJ      Pre-Treatment Pain Level 6  -AJ      Post-Treatment Pain Level 0  -AJ         Exercise 1    Exercise Name 1 Pro II LE  -AJ      Time 1 10 minutes  -AJ      Additional Comments L 3.0  -AJ         Exercise 2    Exercise Name 2 Sit to/from Stand  -AJ      Sets 2 2  -AJ      Reps 2 10  -AJ         Exercise 3    Exercise Name 3 L St. [place] S  -AJ      Reps 3 10  -AJ      Time 3 10\" hold  -AJ         Exercise 4    Exercise Name 4 Wall slides  -AJ      Time 4 5\" holds for 5 minutes  -AJ         Exercise 5    Exercise Name 5 SLR  -AJ      Sets 5 2  -AJ      Reps 5 10  -AJ      Time 5 no hold  -AJ         Exercise 6    Exercise Name 6 SAQ  -AJ      Reps 6 20  -AJ      Time 6 5\" hold  -AJ         Exercise 7    Exercise Name 7 Heel prop  -AJ      Time 7 5 minutes  -AJ         Exercise 8    Exercise Name 8 Heel slides with strap  -AJ      Time 8 5\" end of range holds for 5 minutes  -AJ        User Key  (r) = Recorded By, (t) = Taken By, (c) = Cosigned By    Initials Name Provider Type    AJ Eneida Ruth, PT Physical Therapist                               PT OP Goals     Row Name 07/23/18 0800          PT Short Term Goals    STG Date to Achieve 08/09/18  -AJ     STG 1 I with HEP and have additions/changes by next recertification.  -AJ     STG 1 Progress Ongoing  -     STG 2 AROM L knee 5° from extension or less.  -AJ     STG 2 Progress Ongoing  -     STG 3 AROM L knee flexion >= 95°.  -AJ     STG 3 Progress Met  -     STG 4 Able to perform 20 SLR " with no lag present.  -     STG 4 Progress Ongoing  -     STG 5 No increase in circum measurements for joint effusion.  -     STG 5 Progress Ongoing  -        Long Term Goals    LTG Date to Achieve 08/31/18  -     LTG 1 AROm L knee 0°->=115°.  -     LTG 1 Progress Ongoing  -     LTG 2 B LE 5/5  -     LTG 2 Progress Ongoing  -     LTG 3 Circum measurements for joint effusion within 2cm of R=L.  -     LTG 3 Progress Ongoing  -     LTG 4 Patient able to ambulate 1/4 mile non-antalgically with no assistive device and no increase in pain.  -     LTG 4 Progress Ongoing  -     LTG 5 Patient able to ambulate up/down 3 steps reciprocally HRA for balance x10 reps.  -     LTG 5 Progress Ongoing  -     LTG 6 I with final HEP.  -     LTG 6 Progress Ongoing  -     LTG 7 D/C with a final HEP and free 30 day fitness formula membership.  -     LTG 7 Progress Ongoing  -        Time Calculation    PT Goal Re-Cert Due Date 08/09/18  -       User Key  (r) = Recorded By, (t) = Taken By, (c) = Cosigned By    Initials Name Provider Type    DIVYA Ruth PT Physical Therapist          Therapy Education  Given: HEP, Pain management  Program: Reinforced  How Provided: Verbal  Provided to: Patient  Level of Understanding: Verbalized, Demonstrated              Time Calculation:   Start Time: 0800  Stop Time: 0910  Time Calculation (min): 70 min  Total Timed Code Minutes- PT: 55 minute(s)    Therapy Charges for Today     Code Description Service Date Service Provider Modifiers Qty    34639991572  PT THER PROC EA 15 MIN 7/23/2018 Eneida Ruth PT GP 4    [id number]  PT ELECTRICAL STIM UNATTENDED 7/23/2018 Eneida Ruth, PT  1                    Eneida Ruth PT, DPT, CSCS  7/23/2018

## 2018-07-24 ENCOUNTER — HOSPITAL ENCOUNTER (OUTPATIENT)
Dept: PHYSICAL THERAPY | Facility: HOSPITAL | Age: 63
Setting detail: THERAPIES SERIES
Discharge: HOME | End: 2018-07-24

## 2018-07-24 DIAGNOSIS — Z96.652 STATUS POST TOTAL LEFT KNEE REPLACEMENT: Primary | ICD-10-CM

## 2018-07-24 PROCEDURE — 97110 THERAPEUTIC EXERCISES: CPT

## 2018-07-24 PROCEDURE — G0283 ELEC STIM OTHER THAN WOUND: HCPCS

## 2018-07-24 NOTE — THERAPY TREATMENT NOTE
Outpatient Physical Therapy Ortho Treatment Note  [facility]     Patient Name: Mira Deal  : 1955  MRN: [id number]  Today's Date: 2018      Visit Date: 2018  Pt reports 8/10 pain pre treatment, 0/10 pain post treatment  Reports 25% of improvement.  Attended 4/4 visits.  Insurance available: 30 visits  Next MD appt:2018.  Recertification: 2018.  Visit Dx:    ICD-10-CM ICD-9-CM   1. Status post total left knee replacement Z96.652 V43.65       Patient Active Problem List   Diagnosis   • Essential hypertension   • Acquired hypothyroidism   • Obesity (BMI 30-39.9)   • Rectal tenesmus   • Proctitis   • Clostridium difficile infection   • Clostridium difficile colitis   • Screening for colon cancer   • Need for hepatitis C screening test   • Left foot pain   • Upper respiratory tract infection   • Left otitis media   • Acute recurrent pansinusitis   • Chronic pain of left knee   • Pre-operative clearance        Past Medical History:   Diagnosis Date   • Acute suppurative otitis media without spontaneous rupture of eardrum     right ear   • Benign essential hypertension    • Chronic osteoarthritis    • Diverticular disease of colon    • Dysfunction of eustachian tube     Will start OTC Nasal steroid      • Encounter for gynecological examination (general) (routine) without abnormal findings    • Encounter for screening for malignant neoplasm of breast    • Encounter for screening mammogram for malignant neoplasm of breast     16 negative      • Gastrointestinal hemorrhage    • Hypercholesterolemia    • Hyperlipidemia    • Hypothyroidism    • Left lower quadrant pain    • Mild depression (CMS/HCC)    • Nausea    • Obesity     Hyperinsulinar    • Obesity, unspecified    • Vertigo         Past Surgical History:   Procedure Laterality Date   • CARPAL TUNNEL RELEASE     • COLONOSCOPY      Dr Maria   • FOREARM SURGERY  2011    /wrist   • INJECTION OF  MEDICATION  05/12/2016    Kenalog (1)      • INJECTION OF MEDICATION  05/12/2016    Rocephin (1)      • INJECTION OF MEDICATION  05/12/2016    Zofran (1)      • KNEE CARTILAGE SURGERY  2012   • SPLENECTOMY  1972   • TOTAL KNEE ARTHROPLASTY Left 07/16/2018   • VAGINAL HYSTERECTOMY SALPINGO OOPHORECTOMY  1995    cerv cancer             PT Ortho     Row Name 07/24/18 1017       Subjective Comments    Subjective Comments Pt stated that she is really stiff. pt unable to sleep per patient. pt stated that she was able to get leg upon bed today.  -TL       Precautions and Contraindications    Precautions/Limitations no known precautions/limitations  -TL       Subjective Pain    Able to rate subjective pain? yes  -TL    Pre-Treatment Pain Level 8  -TL    Post-Treatment Pain Level 0  -TL       Left Lower Ext    Lt Knee Extension/Flexion AROM 3-100  -TL    Row Name 07/23/18 0800       Subjective Comments    Subjective Comments Patient rpeorts the knee is still really stiff. Reports it is geting better though.  -AJ       Precautions and Contraindications    Precautions/Limitations no known precautions/limitations  -AJ       Subjective Pain    Able to rate subjective pain? yes  -AJ    Pre-Treatment Pain Level 6  -AJ    Post-Treatment Pain Level 0  -AJ       Posture/Observations    Posture/Observations Comments No acute distress, wearing immobolizer on L LE and ambulating with RW.  -AJ       Left Lower Ext    Lt Knee Extension/Flexion AROM 5°-100°  -AJ      User Key  (r) = Recorded By, (t) = Taken By, (c) = Cosigned By    Initials Name Provider Type    DIVYA Ruth, PT Physical Therapist    TL Kailyn Maharaj, PTA Physical Therapy Assistant                            PT Assessment/Plan     Row Name 07/24/18 1017          PT Assessment    Assessment Comments Pt has improved with ext this date. Pt has met short term goals 2 and 3. Pt progressing toward goals. Pt performed step ups without difficulty. Pt able to perform  pro ll full revolutions.  -TL        PT Plan    PT Frequency 3x/week  -TL     PT Plan Comments add shuttle leg press next visits if able  -TL       User Key  (r) = Recorded By, (t) = Taken By, (c) = Cosigned By    Initials Name Provider Type    TL Kailyn Maharaj PTA Physical Therapy Assistant                Modalities     Row Name 07/24/18 1017             Ice    Ice Applied Yes  -TL      Location left knee with elevation  -TL      Rx Minutes 15 mins  -TL      Ice S/P Rx Yes  -TL         ELECTRICAL STIMULATION    Attended/Unattended Unattended  -TL      Stimulation Type IFC  -TL      Location/Electrode Placement/Other left knee with elevation  -TL      74981 - PT Electrical Stimulation (Manual) Minutes 15  -TL        User Key  (r) = Recorded By, (t) = Taken By, (c) = Cosigned By    Initials Name Provider Type    GERI Anguloantoinette COLLINS SHELLY Maharaj Physical Therapy Assistant                Exercises     Row Name 07/24/18 1017             Subjective Comments    Subjective Comments Pt stated that she is really stiff. pt unable to sleep per patient. pt stated that she was able to get leg upon bed today.  -TL         Subjective Pain    Able to rate subjective pain? yes  -TL      Pre-Treatment Pain Level 8  -TL      Post-Treatment Pain Level 0  -TL         Exercise 1    Exercise Name 1 Pro II LE  -TL      Time 1 10 minutes  -TL      Additional Comments level 3  -TL         Exercise 2    Exercise Name 2 Sit to/from Stand  -TL      Reps 2 20  -TL         Exercise 3    Exercise Name 3 L St. Lunge S  -TL      Reps 3 10  -TL      Time 3 10 sec hold  -TL         Exercise 4    Exercise Name 4 step ups fwd/lat  -TL      Reps 4 10 each  -TL         Exercise 5    Exercise Name 5 SLR  -TL      Sets 5 2  -TL      Reps 5 10  -TL      Time 5 5 sec hold  -TL         Exercise 6    Exercise Name 6 supine heelslides  -TL      Reps 6 20  -TL      Additional Comments 10 with strap/10 without  -TL         Exercise 7    Exercise Name 7 quad sets  -TL       Reps 7 20  -TL      Time 7 5 sec hold  -TL        User Key  (r) = Recorded By, (t) = Taken By, (c) = Cosigned By    Initials Name Provider Type    GERI Maharaj PTA Physical Therapy Assistant                               PT OP Goals     Row Name 07/24/18 1000          PT Short Term Goals    STG Date to Achieve 08/09/18  -TL     STG 1 I with HEP and have additions/changes by next recertification.  -TL     STG 1 Progress Ongoing;Progressing  -TL     STG 2 AROM L knee 5° from extension or less.  -TL     STG 2 Progress Met  -TL     STG 3 AROM L knee flexion >= 95°.  -TL     STG 3 Progress Met  -TL     STG 4 Able to perform 20 SLR with no lag present.  -TL     STG 4 Progress Ongoing;Progressing  -TL     STG 5 No increase in circum measurements for joint effusion.  -TL     STG 5 Progress Ongoing  -TL        Long Term Goals    LTG Date to Achieve 08/31/18  -TL     LTG 1 AROm L knee 0°->=115°.  -TL     LTG 1 Progress Ongoing  -TL     LTG 2 B LE 5/5  -TL     LTG 2 Progress Ongoing  -TL     LTG 3 Circum measurements for joint effusion within 2cm of R=L.  -TL     LTG 3 Progress Ongoing  -TL     LTG 4 Patient able to ambulate 1/4 mile non-antalgically with no assistive device and no increase in pain.  -TL     LTG 4 Progress Ongoing  -TL     LTG 5 Patient able to ambulate up/down 3 steps reciprocally HRA for balance x10 reps.  -TL     LTG 5 Progress Ongoing  -TL     LTG 6 I with final HEP.  -TL     LTG 6 Progress Ongoing  -TL     LTG 7 D/C with a final HEP and free 30 day fitness formula membership.  -TL     LTG 7 Progress Ongoing  -TL        Time Calculation    PT Goal Re-Cert Due Date 08/09/18  -TL       User Key  (r) = Recorded By, (t) = Taken By, (c) = Cosigned By    Initials Name Provider Type    GERI Maharaj PTA Physical Therapy Assistant          Therapy Education  Given: HEP, Symptoms/condition management, Pain management  Program: Reinforced  How Provided: Verbal  Provided to: Patient  Level of  Understanding: Verbalized, Demonstrated              Time Calculation:   Start Time: 1017  Stop Time: 1120  Time Calculation (min): 63 min  Total Timed Code Minutes- PT: 48 minute(s)  Therapy Suggested Charges     Code   Minutes Charges    81249 (CPT®) Hc Pt Neuromusc Re Education Ea 15 Min      89021 (CPT®) Hc Pt Ther Proc Ea 15 Min      34636 (CPT®) Hc Gait Training Ea 15 Min      61388 (CPT®) Hc Pt Therapeutic Act Ea 15 Min      40858 (CPT®) Hc Pt Manual Therapy Ea 15 Min      19478 (CPT®) Hc Pt Ther Massage- Per 15 Min      13929 (CPT®) Hc Pt Iontophoresis Ea 15 Min      53926 (CPT®) Hc Pt Elec Stim Ea-Per 15 Min 15 1    10349 (CPT®) Hc Pt Ultrasound Ea 15 Min      46526 (CPT®) Hc Pt Self Care/Mgmt/Train Ea 15 Min      Total  15 1        Therapy Charges for Today     Code Description Service Date Service Provider Modifiers Qty    55772784163 HC PT THER PROC EA 15 MIN 7/24/2018 Kailyn Maharaj, PTA GP 3    76526562501 HC PT ELECTRICAL STIM UNATTENDED 7/24/2018 Kailyn Maharaj, PTA  1                    Kailyn Maharaj, PTA  7/24/2018

## 2018-07-27 ENCOUNTER — HOSPITAL ENCOUNTER (OUTPATIENT)
Dept: PHYSICAL THERAPY | Facility: HOSPITAL | Age: 63
Setting detail: THERAPIES SERIES
Discharge: HOME | End: 2018-07-27

## 2018-07-27 DIAGNOSIS — Z96.652 STATUS POST TOTAL LEFT KNEE REPLACEMENT: Primary | ICD-10-CM

## 2018-07-27 PROCEDURE — 97110 THERAPEUTIC EXERCISES: CPT

## 2018-07-27 PROCEDURE — G0283 ELEC STIM OTHER THAN WOUND: HCPCS

## 2018-07-27 NOTE — THERAPY TREATMENT NOTE
Outpatient Physical Therapy Ortho Treatment Note  [facility]     Patient Name: Mira Deal  : 1955  MRN: [id number]  Today's Date: 2018      Visit Date: 2018  Pt reports 4/10 pain pre treatment, 3/10 pain post treatment  Reports 25% of improvement.  Attended 5/5 visits.  Insurance available:30 day   Next MD appt: 2018.  Recertification: 2018.  Visit Dx:    ICD-10-CM ICD-9-CM   1. Status post total left knee replacement Z96.652 V43.65       Patient Active Problem List   Diagnosis   • Essential hypertension   • Acquired hypothyroidism   • Obesity (BMI 30-39.9)   • Rectal tenesmus   • Proctitis   • Clostridium difficile infection   • Clostridium difficile colitis   • Screening for colon cancer   • Need for hepatitis C screening test   • Left foot pain   • Upper respiratory tract infection   • Left otitis media   • Acute recurrent pansinusitis   • Chronic pain of left knee   • Pre-operative clearance        Past Medical History:   Diagnosis Date   • Acute suppurative otitis media without spontaneous rupture of eardrum     right ear   • Benign essential hypertension    • Chronic osteoarthritis    • Diverticular disease of colon    • Dysfunction of eustachian tube     Will start OTC Nasal steroid      • Encounter for gynecological examination (general) (routine) without abnormal findings    • Encounter for screening for malignant neoplasm of breast    • Encounter for screening mammogram for malignant neoplasm of breast     16 negative      • Gastrointestinal hemorrhage    • Hypercholesterolemia    • Hyperlipidemia    • Hypothyroidism    • Left lower quadrant pain    • Mild depression (CMS/HCC)    • Nausea    • Obesity     Hyperinsulinar    • Obesity, unspecified    • Vertigo         Past Surgical History:   Procedure Laterality Date   • CARPAL TUNNEL RELEASE     • COLONOSCOPY      Dr Maria   • FOREARM SURGERY  2011    /wrist   • INJECTION OF  MEDICATION  05/12/2016    Kenalog (1)      • INJECTION OF MEDICATION  05/12/2016    Rocephin (1)      • INJECTION OF MEDICATION  05/12/2016    Zofran (1)      • KNEE CARTILAGE SURGERY  2012   • SPLENECTOMY  1972   • TOTAL KNEE ARTHROPLASTY Left 07/16/2018   • VAGINAL HYSTERECTOMY SALPINGO OOPHORECTOMY  1995    cerv cancer             PT Ortho     Row Name 07/27/18 0800       Subjective Comments    Subjective Comments Pt continues to have trouble sleeping.  -TL       Precautions and Contraindications    Precautions/Limitations no known precautions/limitations  -TL       Subjective Pain    Able to rate subjective pain? yes  -TL    Pre-Treatment Pain Level 4  -TL       Left Lower Ext    Lt Knee Extension/Flexion AROM ext 3-110  -TL      User Key  (r) = Recorded By, (t) = Taken By, (c) = Cosigned By    Initials Name Provider Type    GERI Maharaj PTA Physical Therapy Assistant                            PT Assessment/Plan     Row Name 07/27/18 0847          PT Assessment    Assessment Comments No new goals met. pt progressing well with flexion. pt increase ROM flexion by 10 degrees. Pt tolerated wallslides. Pt reports that she is icing and doing her HEP at home. Pt still has some quad lag with SLR. Encourage pt to work on SLR and quad sets Ham s at home  -TL        PT Plan    PT Frequency 3x/week  -TL     PT Plan Comments add shuttle  -TL       User Key  (r) = Recorded By, (t) = Taken By, (c) = Cosigned By    Initials Name Provider Type    GERI Maharaj PTA Physical Therapy Assistant                Modalities     Row Name 07/27/18 0900 07/27/18 0800          Subjective Pain    Post-Treatment Pain Level  -- 3  -TL        Ice    Ice Applied Yes  -TL  --     Location left knee with elevation  -TL  --     Rx Minutes 15 mins  -TL  --     Ice S/P Rx Yes  -TL  --        ELECTRICAL STIMULATION    Attended/Unattended Unattended  -TL  --     Stimulation Type IFC  -TL  --     Location/Electrode Placement/Other left  knee with elevation  -TL  --     49541 - PT Electrical Stimulation (Manual) Minutes 15  -TL  --       User Key  (r) = Recorded By, (t) = Taken By, (c) = Cosigned By    Initials Name Provider Type    TL Kailyn Maharaj PTA Physical Therapy Assistant                Exercises     Row Name 07/27/18 0800             Subjective Comments    Subjective Comments Pt continues to have trouble sleeping.  -TL         Subjective Pain    Able to rate subjective pain? yes  -TL      Pre-Treatment Pain Level 4  -TL      Post-Treatment Pain Level 3  -TL         Exercise 1    Exercise Name 1 Pro II LE  -TL      Time 1 10 minutes  -TL      Additional Comments level 4.0  -TL         Exercise 2    Exercise Name 2 B St ham S  -TL      Reps 2 2  -TL      Time 2 30 sec hold  -TL         Exercise 3    Exercise Name 3 Incline S  -TL      Reps 3 2  -TL      Time 3 30 sec hold  -TL         Exercise 4    Exercise Name 4 L st Lunge S  -TL      Reps 4 10  -TL      Time 4 10 sec hold  -TL         Exercise 5    Exercise Name 5 wall slides flexion  -TL      Time 5 5 mins ,5 sec hold  -TL         Exercise 6    Exercise Name 6 Supine heelslides  -TL      Reps 6 20  -TL      Time 6 5 sec hold  -TL         Exercise 7    Exercise Name 7 quad sets  -TL      Reps 7 20  -TL         Exercise 8    Exercise Name 8 SLR  -TL      Reps 8 20  -TL         Exercise 9    Exercise Name 9 Heel prop for ext  -TL      Time 9 5 mins  -TL        User Key  (r) = Recorded By, (t) = Taken By, (c) = Cosigned By    Initials Name Provider Type    TL Kailyn Maharaj PTA Physical Therapy Assistant                               PT OP Goals     Row Name 07/27/18 0847          PT Short Term Goals    STG Date to Achieve 08/09/18  -TL     STG 1 I with HEP and have additions/changes by next recertification.  -TL     STG 1 Progress Ongoing;Progressing  -TL     STG 2 AROM L knee 5° from extension or less.  -TL     STG 2 Progress Met  -TL     STG 3 AROM L knee flexion >= 95°.  -TL     STG  3 Progress Met  -TL     STG 4 Able to perform 20 SLR with no lag present.  -TL     STG 4 Progress Ongoing;Progressing  -TL     STG 5 No increase in circum measurements for joint effusion.  -TL     STG 5 Progress Ongoing  -TL        Long Term Goals    LTG Date to Achieve 08/31/18  -TL     LTG 1 AROm L knee 0°->=115°.  -TL     LTG 1 Progress Ongoing  -TL     LTG 2 B LE 5/5  -TL     LTG 2 Progress Ongoing  -TL     LTG 3 Circum measurements for joint effusion within 2cm of R=L.  -TL     LTG 3 Progress Ongoing  -TL     LTG 4 Patient able to ambulate 1/4 mile non-antalgically with no assistive device and no increase in pain.  -TL     LTG 4 Progress Ongoing  -TL     LTG 5 Patient able to ambulate up/down 3 steps reciprocally HRA for balance x10 reps.  -TL     LTG 5 Progress Ongoing  -TL     LTG 6 I with final HEP.  -TL     LTG 6 Progress Ongoing  -TL     LTG 7 D/C with a final HEP and free 30 day fitness formula membership.  -TL     LTG 7 Progress Ongoing  -TL        Time Calculation    PT Goal Re-Cert Due Date 08/09/18  -       User Key  (r) = Recorded By, (t) = Taken By, (c) = Cosigned By    Initials Name Provider Type    GERI Maharaj PTA Physical Therapy Assistant          Therapy Education  Education Details: wallslides,ice before bed  Given: HEP, Symptoms/condition management, Pain management  Program: New  How Provided: Verbal, Demonstration  Provided to: Patient  Level of Understanding: Verbalized, Demonstrated              Time Calculation:   Start Time: 0847  Stop Time: 0959  Time Calculation (min): 72 min  Total Timed Code Minutes- PT: 52 minute(s)  Therapy Suggested Charges     Code   Minutes Charges    30151 (CPT®) Hc Pt Neuromusc Re Education Ea 15 Min      94382 (CPT®) Hc Pt Ther Proc Ea 15 Min      86080 (CPT®) Hc Gait Training Ea 15 Min      92814 (CPT®) Hc Pt Therapeutic Act Ea 15 Min      77578 (CPT®) Hc Pt Manual Therapy Ea 15 Min      66856 (CPT®) Hc Pt Ther Massage- Per 15 Min      61208  (CPT®) Hc Pt Iontophoresis Ea 15 Min      02673 (CPT®) Hc Pt Elec Stim Ea-Per 15 Min 15 1    07758 (CPT®) Hc Pt Ultrasound Ea 15 Min      11318 (CPT®) Hc Pt Self Care/Mgmt/Train Ea 15 Min      Total  15 1        Therapy Charges for Today     Code Description Service Date Service Provider Modifiers Qty    47568564077 HC PT THER PROC EA 15 MIN 7/27/2018 Kailyn Maharaj, PTA GP 3    58407722642 HC PT ELECTRICAL STIM UNATTENDED 7/27/2018 Kailyn Maharaj, PTA  1                    aKilyn Maharaj, PTA  7/27/2018

## 2018-07-30 ENCOUNTER — HOSPITAL ENCOUNTER (OUTPATIENT)
Dept: PHYSICAL THERAPY | Facility: HOSPITAL | Age: 63
Setting detail: THERAPIES SERIES
Discharge: HOME | End: 2018-07-30

## 2018-07-30 DIAGNOSIS — Z96.652 STATUS POST TOTAL LEFT KNEE REPLACEMENT: Primary | ICD-10-CM

## 2018-07-30 PROCEDURE — 97110 THERAPEUTIC EXERCISES: CPT

## 2018-07-30 PROCEDURE — G0283 ELEC STIM OTHER THAN WOUND: HCPCS

## 2018-07-30 NOTE — THERAPY TREATMENT NOTE
"    Outpatient Physical Therapy Ortho Treatment Note  [facility]  Katy Vital PTA       Patient Name: Mira Deal  : 1955  MRN: [id number]  Today's Date: 2018      Visit Date: 2018     Visits: 6  Insurance Visits Approved: 30 visits  Recert Due: 2018  MD Appt: 2018  Pain: pretreatment 4/10; post treatment \"numb\"/10  Improvement: pt is subjectively reporting 70% improvement since initial evaluation    Visit Dx:    ICD-10-CM ICD-9-CM   1. Status post total left knee replacement Z96.652 V43.65       Patient Active Problem List   Diagnosis   • Essential hypertension   • Acquired hypothyroidism   • Obesity (BMI 30-39.9)   • Rectal tenesmus   • Proctitis   • Clostridium difficile infection   • Clostridium difficile colitis   • Screening for colon cancer   • Need for hepatitis C screening test   • Left foot pain   • Upper respiratory tract infection   • Left otitis media   • Acute recurrent pansinusitis   • Chronic pain of left knee   • Pre-operative clearance        Past Medical History:   Diagnosis Date   • Acute suppurative otitis media without spontaneous rupture of eardrum     right ear   • Benign essential hypertension    • Chronic osteoarthritis    • Diverticular disease of colon    • Dysfunction of eustachian tube     Will start OTC Nasal steroid      • Encounter for gynecological examination (general) (routine) without abnormal findings    • Encounter for screening for malignant neoplasm of breast    • Encounter for screening mammogram for malignant neoplasm of breast     16 negative      • Gastrointestinal hemorrhage    • Hypercholesterolemia    • Hyperlipidemia    • Hypothyroidism    • Left lower quadrant pain    • Mild depression (CMS/HCC)    • Nausea    • Obesity     Hyperinsulinar    • Obesity, unspecified    • Vertigo         Past Surgical History:   Procedure Laterality Date   • CARPAL TUNNEL RELEASE     • COLONOSCOPY      Dr" "Maria   • FOREARM SURGERY  2011    /wrist   • INJECTION OF MEDICATION  05/12/2016    Kenalog (1)      • INJECTION OF MEDICATION  05/12/2016    Rocephin (1)      • INJECTION OF MEDICATION  05/12/2016    Zofran (1)      • KNEE CARTILAGE SURGERY  2012   • SPLENECTOMY  1972   • TOTAL KNEE ARTHROPLASTY Left 07/16/2018   • VAGINAL HYSTERECTOMY SALPINGO OOPHORECTOMY  1995    cerv cancer             PT Ortho     Row Name 07/30/18 0800       Subjective Comments    Subjective Comments reports that this morning she had a pretty severe muscle spasm in her leg. states that was the first time that had happened  -       Precautions and Contraindications    Precautions/Limitations no known precautions/limitations  -       Subjective Pain    Able to rate subjective pain? yes  -    Pre-Treatment Pain Level 4  -    Post-Treatment Pain Level --   \"numb\"  -       Left Lower Ext    Lt Knee Extension/Flexion AROM 4-110°  -      User Key  (r) = Recorded By, (t) = Taken By, (c) = Cosigned By    Initials Name Provider Type     Katy Vital PTA Physical Therapy Assistant                            PT Assessment/Plan     Row Name 07/30/18 0800          PT Assessment    Assessment Comments SLR with no quad lag despite patient's current extension deficit  -        PT Plan    PT Frequency 3x/week  -     PT Plan Comments continue working on ROM Extension  -       User Key  (r) = Recorded By, (t) = Taken By, (c) = Cosigned By    Initials Name Provider Type     Katy Vital PTA Physical Therapy Assistant                Modalities     Row Name 07/30/18 0800             Ice    Ice Applied Yes  -      Location left knee with elevation  -      Rx Minutes --   20 minutes  -      Ice S/P Rx Yes  -         ELECTRICAL STIMULATION    Attended/Unattended Unattended  -      Stimulation Type IFC  -      Location/Electrode Placement/Other left knee with elevation  -      24542 - PT Electrical Stimulation (Manual) Minutes " "20  -        User Key  (r) = Recorded By, (t) = Taken By, (c) = Cosigned By    Initials Name Provider Type     Katy PRASHANT Vital PTA Physical Therapy Assistant                Exercises     Row Name 07/30/18 0800             Subjective Comments    Subjective Comments reports that this morning she had a pretty severe muscle spasm in her leg. states that was the first time that had happened  -         Subjective Pain    Able to rate subjective pain? yes  -      Pre-Treatment Pain Level 4  -      Post-Treatment Pain Level --   \"numb\"  -         Exercise 1    Exercise Name 1 Pro II LE's  -      Time 1 10 minutes  -      Additional Comments L 4.0  -         Exercise 2    Exercise Name 2 Sit to/from stand   -      Reps 2 20  -         Exercise 3    Exercise Name 3 Shuttle 2L  -      Time 3 5 minutes  -      Additional Comments 7 cords  -         Exercise 4    Exercise Name 4 Shuttle 2L  -      Time 4 5 minutes  -      Additional Comments 5 cords  -         Exercise 5    Exercise Name 5 SLR Fwd Flexion  -      Reps 5 20  -         Exercise 6    Exercise Name 6 Heel Prop  -      Time 6 5 minutes  -         Exercise 7    Exercise Name 7 Heel Slides with Strap  -      Time 7 5 minutes  -        User Key  (r) = Recorded By, (t) = Taken By, (c) = Cosigned By    Initials Name Provider Type     Katy Vital PTA Physical Therapy Assistant                               PT OP Goals     Row Name 07/30/18 0800          PT Short Term Goals    STG Date to Achieve 08/09/18  -     STG 1 I with HEP and have additions/changes by next recertification.  -     STG 1 Progress Ongoing;Progressing  -     STG 2 AROM L knee 5° from extension or less.  -     STG 2 Progress Met  [facility]     STG 3 AROM L knee flexion >= 95°.  -     STG 3 Progress Met  -[facility]     STG 4 Able to perform 20 SLR with no lag present.  -     STG 4 Progress Met  [facility]     STG 5 No increase in circum measurements for joint " effusion.  -     STG 5 Progress Ongoing  -        Long Term Goals    LTG Date to Achieve 08/31/18  -     LTG 1 AROm L knee 0°->=115°.  -     LTG 1 Progress Ongoing  -     LTG 2 B LE 5/5  -     LTG 2 Progress Ongoing  -     LTG 3 Circum measurements for joint effusion within 2cm of R=L.  -     LTG 3 Progress Ongoing  -     LTG 4 Patient able to ambulate 1/4 mile non-antalgically with no assistive device and no increase in pain.  -     LTG 4 Progress Ongoing  -     LTG 5 Patient able to ambulate up/down 3 steps reciprocally HRA for balance x10 reps.  -     LTG 5 Progress Ongoing  -     LTG 6 I with final HEP.  -     LTG 6 Progress Ongoing  [facility]     LTG 7 D/C with a final HEP and free 30 day fitness formula membership.  -     LTG 7 Progress Ongoing  [facility]        Time Calculation    PT Goal Re-Cert Due Date 08/09/18  -       User Key  (r) = Recorded By, (t) = Taken By, (c) = Cosigned By    Initials Name Provider Type     Katy Vital PTA Physical Therapy Assistant          Therapy Education  Given: HEP, Symptoms/condition management, Pain management  Program: Reinforced  How Provided: Verbal, Demonstration  Provided to: Patient  Level of Understanding: Verbalized, Demonstrated              Time Calculation:   Start Time: 0851  Stop Time: 1005  Time Calculation (min): 74 min  PT Non-Billable Time (min): 5 min (waiting on clinician)  Total Timed Code Minutes- PT: 50 minute(s)  Therapy Suggested Charges     Code   Minutes Charges    92487 (CPT®)  Pt Neuromusc Re Education Ea 15 Min      58733 (CPT®) Hc Pt Ther Proc Ea 15 Min      59363 (CPT®) Hc Gait Training Ea 15 Min      65748 (CPT®) Hc Pt Therapeutic Act Ea 15 Min      87120 (CPT®) Hc Pt Manual Therapy Ea 15 Min      98019 (CPT®) Hc Pt Ther Massage- Per 15 Min      45059 (CPT®) Hc Pt Iontophoresis Ea 15 Min      42524 (CPT®) Hc Pt Elec Stim Ea-Per 15 Min 20 1    74394 (CPT®) Hc Pt Ultrasound Ea 15 Min      14043 (CPT®) Hc Pt Self  Care/Mgmt/Train Ea 15 Min      Total  20 1        Therapy Charges for Today     Code Description Service Date Service Provider Modifiers Qty    65493372541 HC PT THER PROC EA 15 MIN 7/30/2018 Katy Vital, PTA GP 3    97013983281 HC PT THER SUPP EA 15 MIN 7/30/2018 Katy Vital, PTA GP 1    11621771724 HC PT ELECTRICAL STIM UNATTENDED 7/30/2018 Katy Vital, PTA  1                    Katy Vital, PTA  7/30/2018

## 2018-08-01 ENCOUNTER — HOSPITAL ENCOUNTER (OUTPATIENT)
Dept: PHYSICAL THERAPY | Facility: HOSPITAL | Age: 63
Setting detail: THERAPIES SERIES
Discharge: HOME | End: 2018-08-01

## 2018-08-01 DIAGNOSIS — Z96.652 STATUS POST TOTAL LEFT KNEE REPLACEMENT: Primary | ICD-10-CM

## 2018-08-01 PROCEDURE — G0283 ELEC STIM OTHER THAN WOUND: HCPCS

## 2018-08-01 PROCEDURE — 97110 THERAPEUTIC EXERCISES: CPT

## 2018-08-01 NOTE — THERAPY TREATMENT NOTE
Outpatient Physical Therapy Ortho Treatment Note  [facility]  Katy Vital PTA       Patient Name: Mira Deal  : 1955  MRN: [id number]  Today's Date: 2018      Visit Date: 2018     Visits:   Insurance Visits Approved: 30 visits  Recert Due: 2018  MD Appt: TBD  Pain: pretreatment 0/10; post treatment 2/10  Improvement: pt is subjectively reporting *70% improvement since initial evaluation    Visit Dx:    ICD-10-CM ICD-9-CM   1. Status post total left knee replacement Z96.652 V43.65       Patient Active Problem List   Diagnosis   • Essential hypertension   • Acquired hypothyroidism   • Obesity (BMI 30-39.9)   • Rectal tenesmus   • Proctitis   • Clostridium difficile infection   • Clostridium difficile colitis   • Screening for colon cancer   • Need for hepatitis C screening test   • Left foot pain   • Upper respiratory tract infection   • Left otitis media   • Acute recurrent pansinusitis   • Chronic pain of left knee   • Pre-operative clearance        Past Medical History:   Diagnosis Date   • Acute suppurative otitis media without spontaneous rupture of eardrum     right ear   • Benign essential hypertension    • Chronic osteoarthritis    • Diverticular disease of colon    • Dysfunction of eustachian tube     Will start OTC Nasal steroid      • Encounter for gynecological examination (general) (routine) without abnormal findings    • Encounter for screening for malignant neoplasm of breast    • Encounter for screening mammogram for malignant neoplasm of breast     16 negative      • Gastrointestinal hemorrhage    • Hypercholesterolemia    • Hyperlipidemia    • Hypothyroidism    • Left lower quadrant pain    • Mild depression (CMS/HCC)    • Nausea    • Obesity     Hyperinsulinar    • Obesity, unspecified    • Vertigo         Past Surgical History:   Procedure Laterality Date   • CARPAL TUNNEL RELEASE     • COLONOSCOPY      Dr Maria   •  "FOREARM SURGERY  2011    /wrist   • INJECTION OF MEDICATION  05/12/2016    Kenalog (1)      • INJECTION OF MEDICATION  05/12/2016    Rocephin (1)      • INJECTION OF MEDICATION  05/12/2016    Zofran (1)      • KNEE CARTILAGE SURGERY  2012   • SPLENECTOMY  1972   • TOTAL KNEE ARTHROPLASTY Left 07/16/2018   • VAGINAL HYSTERECTOMY SALPINGO OOPHORECTOMY  1995    cerv cancer             PT Ortho     Row Name 08/01/18 0800       Subjective Comments    Subjective Comments apprehensive about being here today. didn't want to come. nervous because now she doesn't have the immobilizer or the staples in and is just nervous about it all now.   -       Precautions and Contraindications    Precautions/Limitations no known precautions/limitations  -       Subjective Pain    Able to rate subjective pain? yes  -    Pre-Treatment Pain Level 0  -    Post-Treatment Pain Level 2  -       Posture/Observations    Posture/Observations Comments ambulates with RW, mild antalgic gait  -       Left Lower Ext    Lt Knee Extension/Flexion AROM 4°-111°  -    Row Name 07/30/18 0800       Subjective Comments    Subjective Comments reports that this morning she had a pretty severe muscle spasm in her leg. states that was the first time that had happened  -       Precautions and Contraindications    Precautions/Limitations no known precautions/limitations  -       Subjective Pain    Able to rate subjective pain? yes  -    Pre-Treatment Pain Level 4  -    Post-Treatment Pain Level --   \"numb\"  -       Left Lower Ext    Lt Knee Extension/Flexion AROM 4-110°  -      User Key  (r) = Recorded By, (t) = Taken By, (c) = Cosigned By    Initials Name Provider Type     Katy Vital, PTA Physical Therapy Assistant                            PT Assessment/Plan     Row Name 08/01/18 0800          PT Assessment    Assessment Comments patient continues to lack full knee extension at this time. cues for appropriate technique with step ups " this treatment  -        PT Plan    PT Frequency 3x/week  -     PT Plan Comments continue pushing Extension ROM at this time. standing exercises sucha s HR/TR and mini squats to be added  -       User Key  (r) = Recorded By, (t) = Taken By, (c) = Cosigned By    Initials Name Provider Type     Katy Vital PTA Physical Therapy Assistant                Modalities     Row Name 08/01/18 0800             Ice    Ice Applied Yes  -      Location left knee with elevation  -      Rx Minutes Other:   20 mins  -      Ice S/P Rx Yes  -         ELECTRICAL STIMULATION    Attended/Unattended Unattended  -      Stimulation Type IFC  -      Location/Electrode Placement/Other left knee with elevation  -      52312 - PT Electrical Stimulation (Manual) Minutes 20  -        User Key  (r) = Recorded By, (t) = Taken By, (c) = Cosigned By    Initials Name Provider Type     Katy PRASHANT SHELLY Vital Physical Therapy Assistant                Exercises     Row Name 08/01/18 0800             Subjective Comments    Subjective Comments apprehensive about being here today. didn't want to come. nervous because now she doesn't have the immobilizer or the staples in and is just nervous about it all now.   -         Subjective Pain    Able to rate subjective pain? yes  -      Pre-Treatment Pain Level 0  -      Post-Treatment Pain Level 2  -         Exercise 1    Exercise Name 1 Pro II LE's  -      Time 1 10 minutes  -      Additional Comments L 4.0  -         Exercise 2    Exercise Name 2 B St. HS S  -      Reps 2 2  -      Time 2 30 sec hold  -         Exercise 3    Exercise Name 3 L St. Lunge S  -MH      Reps 3 10  -      Time 3 10 sec hold  -         Exercise 4    Exercise Name 4 Heel Prop   -      Time 4 5 minutes  -      Additional Comments 3#  -         Exercise 5    Exercise Name 5 Heel Slides  -      Time 5 5 minutes  -      Additional Comments with strap  -         Exercise 6     Exercise Name 6 Step Up Fwd  -      Reps 6 20  -      Additional Comments 6 inch step  -        User Key  (r) = Recorded By, (t) = Taken By, (c) = Cosigned By    Initials Name Provider Type     Katy Vital PTA Physical Therapy Assistant                               PT OP Goals     Row Name 08/01/18 0800          PT Short Term Goals    STG Date to Achieve 08/09/18  -     STG 1 I with HEP and have additions/changes by next recertification.  -     STG 1 Progress Partially Met;Ongoing  -     STG 2 AROM L knee 5° from extension or less.  -     STG 2 Progress Met  -     STG 3 AROM L knee flexion >= 95°.  -     STG 3 Progress Met  -     STG 4 Able to perform 20 SLR with no lag present.  -     STG 4 Progress Met  -     STG 5 No increase in circum measurements for joint effusion.  -     STG 5 Progress Ongoing  [facility]        Long Term Goals    LTG Date to Achieve 08/31/18  -     LTG 1 AROm L knee 0°->=115°.  -     LTG 1 Progress Ongoing  -     LTG 2 B LE 5/5  -     LTG 2 Progress Ongoing  -     LTG 3 Circum measurements for joint effusion within 2cm of R=L.  -     LTG 3 Progress Ongoing  [facility]     LTG 4 Patient able to ambulate 1/4 mile non-antalgically with no assistive device and no increase in pain.  -     LTG 4 Progress Ongoing  -     LTG 5 Patient able to ambulate up/down 3 steps reciprocally HRA for balance x10 reps.  -     LTG 5 Progress Ongoing  [facility]     LTG 6 I with final HEP.  -     LTG 6 Progress Ongoing  [facility]     LTG 7 D/C with a final HEP and free 30 day fitness formula membership.  -     LT 7 Progress Ongoing  [facility]        Time Calculation    PT Goal Re-Cert Due Date 08/09/18  -       User Key  (r) = Recorded By, (t) = Taken By, (c) = Cosigned By    Initials Name Provider Type     Katy Vital PTA Physical Therapy Assistant          Therapy Education  Education Details: heel prop, heel slides every hour for 5 minutes  Given: HEP, Symptoms/condition management,  Pain management  Program: Reinforced  How Provided: Verbal, Demonstration  Provided to: Patient  Level of Understanding: Verbalized, Demonstrated              Time Calculation:   Start Time: 0852  Stop Time: 0954  Time Calculation (min): 62 min  Total Timed Code Minutes- PT: 42 minute(s)    Therapy Charges for Today     Code Description Service Date Service Provider Modifiers Qty    91602675507 HC PT THER PROC EA 15 MIN 8/1/2018 Katy Vital, PTA GP 3    94591681244 HC PT THER SUPP EA 15 MIN 8/1/2018 Katy Vital, PTA GP 1    89876881725 HC PT ELECTRICAL STIM UNATTENDED 8/1/2018 Katy Vital, PTA  1                    Katy Vital, PTA  8/1/2018

## 2018-08-03 ENCOUNTER — HOSPITAL ENCOUNTER (OUTPATIENT)
Dept: PHYSICAL THERAPY | Facility: HOSPITAL | Age: 63
Setting detail: THERAPIES SERIES
Discharge: HOME | End: 2018-08-03

## 2018-08-03 DIAGNOSIS — Z96.652 STATUS POST TOTAL LEFT KNEE REPLACEMENT: Primary | ICD-10-CM

## 2018-08-03 PROCEDURE — G0283 ELEC STIM OTHER THAN WOUND: HCPCS

## 2018-08-03 PROCEDURE — 97110 THERAPEUTIC EXERCISES: CPT

## 2018-08-03 NOTE — THERAPY TREATMENT NOTE
Outpatient Physical Therapy Ortho Treatment Note  [facility]  Katy Vital PTA       Patient Name: Mira Deal  : 1955  MRN: [id number]  Today's Date: 8/3/2018      Visit Date: 2018     Visits:   Insurance Visits Approved: 30 visits  Recert Due: 2018  MD Appt: 2018  Pain: pretreatment 0/10; post treatment 0/10  Improvement: pt is subjectively reporting 40% improvement since initial evaluation    Visit Dx:    ICD-10-CM ICD-9-CM   1. Status post total left knee replacement Z96.652 V43.65       Patient Active Problem List   Diagnosis   • Essential hypertension   • Acquired hypothyroidism   • Obesity (BMI 30-39.9)   • Rectal tenesmus   • Proctitis   • Clostridium difficile infection   • Clostridium difficile colitis   • Screening for colon cancer   • Need for hepatitis C screening test   • Left foot pain   • Upper respiratory tract infection   • Left otitis media   • Acute recurrent pansinusitis   • Chronic pain of left knee   • Pre-operative clearance        Past Medical History:   Diagnosis Date   • Acute suppurative otitis media without spontaneous rupture of eardrum     right ear   • Benign essential hypertension    • Chronic osteoarthritis    • Diverticular disease of colon    • Dysfunction of eustachian tube     Will start OTC Nasal steroid      • Encounter for gynecological examination (general) (routine) without abnormal findings    • Encounter for screening for malignant neoplasm of breast    • Encounter for screening mammogram for malignant neoplasm of breast     16 negative      • Gastrointestinal hemorrhage    • Hypercholesterolemia    • Hyperlipidemia    • Hypothyroidism    • Left lower quadrant pain    • Mild depression (CMS/HCC)    • Nausea    • Obesity     Hyperinsulinar    • Obesity, unspecified    • Vertigo         Past Surgical History:   Procedure Laterality Date   • CARPAL TUNNEL RELEASE     • COLONOSCOPY      Dr Maria    • FOREARM SURGERY  2011    /wrist   • INJECTION OF MEDICATION  05/12/2016    Kenalog (1)      • INJECTION OF MEDICATION  05/12/2016    Rocephin (1)      • INJECTION OF MEDICATION  05/12/2016    Zofran (1)      • KNEE CARTILAGE SURGERY  2012   • SPLENECTOMY  1972   • TOTAL KNEE ARTHROPLASTY Left 07/16/2018   • VAGINAL HYSTERECTOMY SALPINGO OOPHORECTOMY  1995    cerv cancer             PT Ortho     Row Name 08/03/18 0900       Subjective Comments    Subjective Comments stats needs to cancel for monday secondary to her  having an appointment in [place]. states that she doesn't have pain today just apprehensive and feels stiff.   -       Precautions and Contraindications    Precautions/Limitations no known precautions/limitations  -       Subjective Pain    Able to rate subjective pain? yes  -    Pre-Treatment Pain Level 0  -    Post-Treatment Pain Level 0  -       Left Lower Ext    Lt Knee Extension/Flexion AROM 3° from full extension  -    Row Name 08/01/18 0800       Subjective Comments    Subjective Comments apprehensive about being here today. didn't want to come. nervous because now she doesn't have the immobilizer or the staples in and is just nervous about it all now.   -       Precautions and Contraindications    Precautions/Limitations no known precautions/limitations  -       Subjective Pain    Able to rate subjective pain? yes  -    Pre-Treatment Pain Level 0  -    Post-Treatment Pain Level 2  -       Posture/Observations    Posture/Observations Comments ambulates with RW, mild antalgic gait  -       Left Lower Ext    Lt Knee Extension/Flexion AROM 4°-111°  -      User Key  (r) = Recorded By, (t) = Taken By, (c) = Cosigned By    Initials Name Provider Type     Katy Vital PTA Physical Therapy Assistant                            PT Assessment/Plan     Row Name 08/03/18 0900          PT Assessment    Assessment Comments patient requires cues with step ups to allow  left knee to bend to decend and to straigthen to ascend steps. cues and encouragement throughout treatment.   -        PT Plan    PT Frequency 3x/week  -     PT Plan Comments assess ROM next visit  -       User Key  (r) = Recorded By, (t) = Taken By, (c) = Cosigned By    Initials Name Provider Type     Katy Vital, PTA Physical Therapy Assistant                Modalities     Row Name 08/03/18 0900             Ice    Ice Applied Yes  -      Location left knee with elevation  -      Rx Minutes Other:   20 minutes  -      Ice S/P Rx Yes  -         ELECTRICAL STIMULATION    Attended/Unattended Unattended  -      Stimulation Type IFC  -      Location/Electrode Placement/Other left knee with elevation  -      85315 - PT Electrical Stimulation (Manual) Minutes 20  -        User Key  (r) = Recorded By, (t) = Taken By, (c) = Cosigned By    Initials Name Provider Type     Katy Vital, PTA Physical Therapy Assistant                Exercises     Row Name 08/03/18 0900             Subjective Comments    Subjective Comments stats needs to cancel for monday secondary to her  having an appointment in [place]. states that she doesn't have pain today just apprehensive and feels stiff.   -         Subjective Pain    Able to rate subjective pain? yes  -      Pre-Treatment Pain Level 0  -      Post-Treatment Pain Level 0  -         Exercise 1    Exercise Name 1 Pro II LE's  -      Time 1 10 minutes  -      Additional Comments L 4.0  -         Exercise 2    Exercise Name 2 Sit to/from stand   -      Reps 2 20  -MH         Exercise 3    Exercise Name 3 Wall Slides  -      Time 3 5 minutes  -         Exercise 4    Exercise Name 4 Prone Hang  -      Time 4 5 minutes  -         Exercise 5    Exercise Name 5 B St. HS S  -      Reps 5 2  -      Time 5 30 sec hold  -         Exercise 6    Exercise Name 6 L St. Lunge S  -      Reps 6 10  -      Time 6 10 sec hold  -          Exercise 7    Exercise Name 7 Step Up Fwd  -      Reps 7 20  -      Additional Comments 6 inch step  -         Exercise 8    Exercise Name 8 Fwd Step Down/Retro Step Up  -      Reps 8 20  -      Additional Comments 4 inch step  -         Exercise 9    Exercise Name 9 Shuttle 2L   -      Time 9 5 minutes  -      Additional Comments 7 cords  -         Exercise 10    Exercise Name 10 Shuttle 1L   -      Time 10 5 minutes  -      Additional Comments 5 cords  -         Exercise 11    Exercise Name 11 Heel Prop  -      Time 11 5 minutes  -      Additional Comments 3# weight  -        User Key  (r) = Recorded By, (t) = Taken By, (c) = Cosigned By    Initials Name Provider Type    Katy Burrows, PTA Physical Therapy Assistant                               PT OP Goals     Row Name 08/03/18 0900          PT Short Term Goals    STG Date to Achieve 08/09/18  -     STG 1 I with HEP and have additions/changes by next recertification.  -     STG 1 Progress Partially Met;Ongoing  -     STG 2 AROM L knee 5° from extension or less.  -     STG 2 Progress Met  -     STG 3 AROM L knee flexion >= 95°.  -     STG 3 Progress Met  -     STG 4 Able to perform 20 SLR with no lag present.  -     STG 4 Progress Met  -     STG 5 No increase in circum measurements for joint effusion.  -     STG 5 Progress Ongoing  -        Long Term Goals    LTG Date to Achieve 08/31/18  -     LTG 1 AROm L knee 0°->=115°.  -     LTG 1 Progress Ongoing  [facility]     LTG 2 B LE 5/5  -     LTG 2 Progress Ongoing  [facility]     LTG 3 Circum measurements for joint effusion within 2cm of R=L.  -     LTG 3 Progress Ongoing  [facility]     LTG 4 Patient able to ambulate 1/4 mile non-antalgically with no assistive device and no increase in pain.  -     LTG 4 Progress Ongoing  [facility]     LTG 5 Patient able to ambulate up/down 3 steps reciprocally HRA for balance x10 reps.  -     LTG 5 Progress Ongoing  [facility]     LTG 6 I  with final HEP.  -     LTG 6 Progress Ongoing  -     LTG 7 D/C with a final HEP and free 30 day fitness formula membership.  -     LTG 7 Progress Ongoing  -        Time Calculation    PT Goal Re-Cert Due Date 08/09/18  -       User Key  (r) = Recorded By, (t) = Taken By, (c) = Cosigned By    Initials Name Provider Type     Katy Vital PTA Physical Therapy Assistant          Therapy Education  Given: HEP, Symptoms/condition management, Pain management  Program: Reinforced  How Provided: Verbal, Demonstration  Provided to: Patient  Level of Understanding: Verbalized, Demonstrated              Time Calculation:   Start Time: 0934  Stop Time: 1057  Time Calculation (min): 83 min  Total Timed Code Minutes- PT: 63 minute(s)    Therapy Charges for Today     Code Description Service Date Service Provider Modifiers Qty    26746879449 HC PT THER PROC EA 15 MIN 8/3/2018 Katy Vital PTA GP 4    32010759752 HC PT THER SUPP EA 15 MIN 8/3/2018 Katy Vital PTA GP 1    49080177275 HC PT ELECTRICAL STIM UNATTENDED 8/3/2018 Katy Vital PTA  1                    Katy Vital PTA  8/3/2018

## 2018-08-06 ENCOUNTER — APPOINTMENT (OUTPATIENT)
Dept: PHYSICAL THERAPY | Facility: HOSPITAL | Age: 63
End: 2018-08-06

## 2018-08-08 ENCOUNTER — HOSPITAL ENCOUNTER (OUTPATIENT)
Dept: PHYSICAL THERAPY | Facility: HOSPITAL | Age: 63
Setting detail: THERAPIES SERIES
Discharge: HOME | End: 2018-08-08

## 2018-08-08 DIAGNOSIS — Z96.652 STATUS POST TOTAL LEFT KNEE REPLACEMENT: Primary | ICD-10-CM

## 2018-08-08 PROCEDURE — 97110 THERAPEUTIC EXERCISES: CPT

## 2018-08-08 PROCEDURE — G0283 ELEC STIM OTHER THAN WOUND: HCPCS

## 2018-08-08 NOTE — THERAPY TREATMENT NOTE
Outpatient Physical Therapy Ortho Treatment Note  [facility]     Patient Name: Mira Deal  : 1955  MRN: [id number]  Today's Date: 2018      Visit Date: 2018  Pt reports 2/10 pain pre treatment, 0/10 pain post treatment  Reports 40% of improvement.  Attended 9/10 visits.  Insurance available: 30 visits  Next MD appt: 2018.  Recertification: 2018.  Visit Dx:    ICD-10-CM ICD-9-CM   1. Status post total left knee replacement Z96.652 V43.65       Patient Active Problem List   Diagnosis   • Essential hypertension   • Acquired hypothyroidism   • Obesity (BMI 30-39.9)   • Rectal tenesmus   • Proctitis   • Clostridium difficile infection   • Clostridium difficile colitis   • Screening for colon cancer   • Need for hepatitis C screening test   • Left foot pain   • Upper respiratory tract infection   • Left otitis media   • Acute recurrent pansinusitis   • Chronic pain of left knee   • Pre-operative clearance        Past Medical History:   Diagnosis Date   • Acute suppurative otitis media without spontaneous rupture of eardrum     right ear   • Benign essential hypertension    • Chronic osteoarthritis    • Diverticular disease of colon    • Dysfunction of eustachian tube     Will start OTC Nasal steroid      • Encounter for gynecological examination (general) (routine) without abnormal findings    • Encounter for screening for malignant neoplasm of breast    • Encounter for screening mammogram for malignant neoplasm of breast     16 negative      • Gastrointestinal hemorrhage    • Hypercholesterolemia    • Hyperlipidemia    • Hypothyroidism    • Left lower quadrant pain    • Mild depression (CMS/HCC)    • Nausea    • Obesity     Hyperinsulinar    • Obesity, unspecified    • Vertigo         Past Surgical History:   Procedure Laterality Date   • CARPAL TUNNEL RELEASE     • COLONOSCOPY      Dr Maria   • FOREARM SURGERY  2011    /wrist   • INJECTION OF  MEDICATION  05/12/2016    Kenalog (1)      • INJECTION OF MEDICATION  05/12/2016    Rocephin (1)      • INJECTION OF MEDICATION  05/12/2016    Zofran (1)      • KNEE CARTILAGE SURGERY  2012   • SPLENECTOMY  1972   • TOTAL KNEE ARTHROPLASTY Left 07/16/2018   • VAGINAL HYSTERECTOMY SALPINGO OOPHORECTOMY  1995    cerv cancer             PT Ortho     Row Name 08/08/18 1000       Subjective Pain    Able to rate subjective pain? yes  -TL    Pre-Treatment Pain Level 2  -TL    Post-Treatment Pain Level 1  -TL       Left Lower Ext    Lt Knee Extension/Flexion AROM 2-112  -TL      User Key  (r) = Recorded By, (t) = Taken By, (c) = Cosigned By    Initials Name Provider Type    Kailyn Summers PTA Physical Therapy Assistant                            PT Assessment/Plan     Row Name 08/08/18 1200          PT Assessment    Assessment Comments Pt slowly progressing with ROm. No signs of infections. pt working toward goals . No new goals met. Pt tolerated stool scoots this date and LAQ with 2 #weight.  -TL        PT Plan    PT Frequency 3x/week  -TL     PT Plan Comments Start gym walk and per alternating steps  -TL       User Key  (r) = Recorded By, (t) = Taken By, (c) = Cosigned By    Initials Name Provider Type    TL Kailyn Maharaj PTA Physical Therapy Assistant                Modalities     Row Name 08/08/18 1000             Ice    Ice Applied Yes  -TL      Location left knee  -TL      Rx Minutes 15 mins  -TL      Ice S/P Rx Yes  -TL         ELECTRICAL STIMULATION    Attended/Unattended Unattended  -TL      Stimulation Type IFC  -TL      Location/Electrode Placement/Other left knee  -TL      55599 - PT Electrical Stimulation (Manual) Minutes 15  -TL        User Key  (r) = Recorded By, (t) = Taken By, (c) = Cosigned By    Initials Name Provider Type    Kailyn Summers PTA Physical Therapy Assistant                Exercises     Row Name 08/08/18 1000             Subjective Pain    Able to rate subjective pain? yes   -TL      Pre-Treatment Pain Level 2  -TL      Post-Treatment Pain Level 1  -TL         Exercise 1    Exercise Name 1 Pro ll legs  -TL      Time 1 10 mins  -TL      Additional Comments Level 5  -TL         Exercise 2    Exercise Name 2 B St ham S  -TL      Reps 2 2  -TL      Time 2 30 sec hold  -TL         Exercise 3    Exercise Name 3 Lunge S  -TL      Reps 3 10  -TL      Time 3 10 sec hold  -TL         Exercise 4    Exercise Name 4 Track walk with cane  -TL      Additional Comments 395 feet with cane SBA with few cues. No LOB  -TL         Exercise 5    Exercise Name 5 shuttle press 2L leg press  -TL      Time 5 5 mins  -TL      Additional Comments 7 cords  -TL         Exercise 6    Exercise Name 6 shuttle press L!  -TL      Time 6 5 mins   -TL      Additional Comments 5 cords  -TL         Exercise 7    Exercise Name 7 stool scoot  -TL      Reps 7 2  -TL         Exercise 8    Exercise Name 8 Heel prop for ext  -TL      Time 8 5 mins  -TL         Exercise 9    Exercise Name 9 LAQ with #2  -TL      Reps 9 20  -TL        User Key  (r) = Recorded By, (t) = Taken By, (c) = Cosigned By    Initials Name Provider Type    TL Kailyn Maharaj, PTA Physical Therapy Assistant                               PT OP Goals     Row Name 08/08/18 1000          PT Short Term Goals    STG Date to Achieve 08/09/18  -TL     STG 1 I with HEP and have additions/changes by next recertification.  -TL     STG 1 Progress Partially Met;Ongoing  -TL     STG 2 AROM L knee 5° from extension or less.  -TL     STG 2 Progress Met  -TL     STG 3 AROM L knee flexion >= 95°.  -TL     STG 3 Progress Met  -TL     STG 4 Able to perform 20 SLR with no lag present.  -TL     STG 4 Progress Met  -TL     STG 5 No increase in circum measurements for joint effusion.  -TL     STG 5 Progress Ongoing  -TL        Long Term Goals    LTG Date to Achieve 08/31/18  -TL     LTG 1 AROm L knee 0°->=115°.  -TL     LTG 1 Progress Ongoing  -TL     LTG 2 B LE 5/5  -TL     LTG 2  Progress Ongoing  -TL     LTG 3 Circum measurements for joint effusion within 2cm of R=L.  -TL     LTG 3 Progress Ongoing  -TL     LTG 4 Patient able to ambulate 1/4 mile non-antalgically with no assistive device and no increase in pain.  -TL     LTG 4 Progress Ongoing  -TL     LTG 5 Patient able to ambulate up/down 3 steps reciprocally HRA for balance x10 reps.  -TL     LTG 5 Progress Ongoing  -TL     LTG 6 I with final HEP.  -TL     LTG 6 Progress Ongoing  -TL     LTG 7 D/C with a final HEP and free 30 day fitness formula membership.  -TL     LTG 7 Progress Ongoing  -TL        Time Calculation    PT Goal Re-Cert Due Date 08/09/18  -TL       User Key  (r) = Recorded By, (t) = Taken By, (c) = Cosigned By    Initials Name Provider Type    Kailyn Summers PTA Physical Therapy Assistant          Therapy Education  Education Details: education proper use of cane  Given: HEP, Symptoms/condition management, Pain management, Mobility training  Program: New  How Provided: Verbal, Demonstration  Provided to: Patient  Level of Understanding: Teach back education performed, Verbalized, Demonstrated              Time Calculation:   Start Time: 1015  Stop Time: 1132  Time Calculation (min): 77 min  Total Timed Code Minutes- PT: 62 minute(s)  Therapy Suggested Charges     Code   Minutes Charges    15892 (CPT®) Hc Pt Neuromusc Re Education Ea 15 Min      17391 (CPT®) Hc Pt Ther Proc Ea 15 Min      70002 (CPT®) Hc Gait Training Ea 15 Min      00222 (CPT®) Hc Pt Therapeutic Act Ea 15 Min      67466 (CPT®) Hc Pt Manual Therapy Ea 15 Min      65789 (CPT®) Hc Pt Ther Massage- Per 15 Min      84481 (CPT®) Hc Pt Iontophoresis Ea 15 Min      09479 (CPT®) Hc Pt Elec Stim Ea-Per 15 Min 15 1    52692 (CPT®) Hc Pt Ultrasound Ea 15 Min      04994 (CPT®) Hc Pt Self Care/Mgmt/Train Ea 15 Min      Total  15 1        Therapy Charges for Today     Code Description Service Date Service Provider Modifiers Qty    61562562766 HC PT THER PROC EA 15  MIN 8/8/2018 Kailyn Maharaj, PTA GP 4    22151599849 HC PT ELECTRICAL STIM UNATTENDED 8/8/2018 Kailyn Maharaj, PTA  1                    Kailyn Maharaj, SHELLY  8/8/2018

## 2018-08-10 ENCOUNTER — HOSPITAL ENCOUNTER (OUTPATIENT)
Dept: PHYSICAL THERAPY | Facility: HOSPITAL | Age: 63
Setting detail: THERAPIES SERIES
Discharge: HOME | End: 2018-08-10

## 2018-08-10 DIAGNOSIS — Z96.652 STATUS POST TOTAL LEFT KNEE REPLACEMENT: Primary | ICD-10-CM

## 2018-08-10 PROCEDURE — 97110 THERAPEUTIC EXERCISES: CPT | Performed by: PHYSICAL THERAPIST

## 2018-08-10 PROCEDURE — G0283 ELEC STIM OTHER THAN WOUND: HCPCS | Performed by: PHYSICAL THERAPIST

## 2018-08-10 NOTE — THERAPY PROGRESS REPORT/RE-CERT
Outpatient Physical Therapy Ortho Progress Note  [facility]  Eneida Ruth, PT, DPT, CSCS       Patient Name: Mira Deal  : 1955  MRN: [id number]  Today's Date: 8/10/2018      Visit Date: 08/10/2018     Pt reports 1/10 pain pre treatment, 0/10 pain post treatment  Reports 40% of improvement.  Attended 10/11 visits.  Insurance available: 30 visits  Next MD appt: 2018.  Recertification: 2018.    Visit Dx:    ICD-10-CM ICD-9-CM   1. Status post total left knee replacement Z96.652 V43.65       Patient Active Problem List   Diagnosis   • Essential hypertension   • Acquired hypothyroidism   • Obesity (BMI 30-39.9)   • Rectal tenesmus   • Proctitis   • Clostridium difficile infection   • Clostridium difficile colitis   • Screening for colon cancer   • Need for hepatitis C screening test   • Left foot pain   • Upper respiratory tract infection   • Left otitis media   • Acute recurrent pansinusitis   • Chronic pain of left knee   • Pre-operative clearance        Past Medical History:   Diagnosis Date   • Acute suppurative otitis media without spontaneous rupture of eardrum     right ear   • Benign essential hypertension    • Chronic osteoarthritis    • Diverticular disease of colon    • Dysfunction of eustachian tube     Will start OTC Nasal steroid      • Encounter for gynecological examination (general) (routine) without abnormal findings    • Encounter for screening for malignant neoplasm of breast    • Encounter for screening mammogram for malignant neoplasm of breast     16 negative      • Gastrointestinal hemorrhage    • Hypercholesterolemia    • Hyperlipidemia    • Hypothyroidism    • Left lower quadrant pain    • Mild depression (CMS/HCC)    • Nausea    • Obesity     Hyperinsulinar    • Obesity, unspecified    • Vertigo         Past Surgical History:   Procedure Laterality Date   • CARPAL TUNNEL RELEASE     • COLONOSCOPY      Dr Maria   •  FOREARM SURGERY  2011    /wrist   • INJECTION OF MEDICATION  05/12/2016    Kenalog (1)      • INJECTION OF MEDICATION  05/12/2016    Rocephin (1)      • INJECTION OF MEDICATION  05/12/2016    Zofran (1)      • KNEE CARTILAGE SURGERY  2012   • SPLENECTOMY  1972   • TOTAL KNEE ARTHROPLASTY Left 07/16/2018   • VAGINAL HYSTERECTOMY SALPINGO OOPHORECTOMY  1995    cerv cancer     Changes to medications: None noted.    Changes to MD orders: None noted.          PT Ortho     Row Name 08/10/18 1000       Subjective Comments    Subjective Comments Patient reports the knee is feeling pretty good.  -AJ       Precautions and Contraindications    Precautions/Limitations no known precautions/limitations  -AJ       Subjective Pain    Able to rate subjective pain? yes  -AJ    Pre-Treatment Pain Level 1  -AJ    Post-Treatment Pain Level 0  -AJ       Posture/Observations    Posture/Observations Comments Incision is well healed, ambulating with stiff gait and straight cane.  -AJ       Special Tests/Palpation    Special Tests/Palpation --   Mild ant knee TTP.  -AJ       Right Lower Ext    Rt Knee Extension/Flexion AROM 0°-125°  -AJ       Left Lower Ext    Lt Knee Extension/Flexion AROM 2°-120°  -AJ       General Assessment (Manual Muscle Testing)    Comment, General Manual Muscle Testing (MMT) Assessment L QS 4/5, HS 4/5, others 5/5  -AJ       Sensation    Sensation WNL? WFL  -AJ    Additional Comments Some numbness on lateral knee.  -AJ       Lower Extremity Flexibility    Hamstrings Bilateral:;Mildly limited  -AJ       Pathomechanics    Lower Extremity Pathomechanics Antalgic with midstance  -AJ       Transfers    Comment (Transfers) I with all transfers.  -AJ       Gait/Stairs Assessment/Training    Gait/Stairs Assessment/Training gait/ambulation assistive device  -AJ    [place] Level (Gait) conditional independence  -AJ    Assistive Device (Gait) cane, straight  -AJ    Pattern (Gait) 3-point  -AJ    Deviations/Abnormal Patterns  (Gait) left sided deviations;antalgic  -AJ    Handrail Location (Stairs) both sides  -AJ    Number of Steps (Stairs) 9  -AJ    Ascending Technique (Stairs) step-over-step  -AJ    Descending Technique (Stairs) step-over-step  -AJ    Row Name 08/08/18 1000       Subjective Pain    Able to rate subjective pain? yes  -TL    Pre-Treatment Pain Level 2  -TL    Post-Treatment Pain Level 1  -TL       Left Lower Ext    Lt Knee Extension/Flexion AROM 2-112  -TL      User Key  (r) = Recorded By, (t) = Taken By, (c) = Cosigned By    Initials Name Provider Type    AJ Eneida Ruth, PT Physical Therapist    TL Kailyn Maharaj, PTA Physical Therapy Assistant         Barriers to Rehab: The patient's obesity      Safety Issues: None noted.            PT Assessment/Plan     Row Name 08/10/18 1000          PT Assessment    Functional Limitations Impaired gait;Impaired locomotion;Limitation in home management;Limitations in community activities;Performance in leisure activities;Performance in self-care ADL;Performance in work activities  -     Impairments Balance;Edema;Endurance;Gait;Impaired flexibility;Impaired muscle endurance;Impaired muscle length;Impaired muscle power;Range of motion;Pain;Muscle strength;Joint mobility  -AJ     Assessment Comments Progresing well with ROM and strength. Gait smoothed after instruction.  -AJ     Rehab Potential Good  -AJ     Patient/caregiver participated in establishment of treatment plan and goals Yes  -AJ     Patient would benefit from skilled therapy intervention Yes  -AJ        PT Plan    PT Frequency 3x/week  -AJ     Predicted Duration of Therapy Intervention (Therapy Eval) 2-3 more weeks, 6-9 more visits  -AJ     Planned CPT's? PT RE-EVAL: 98061;PT THER PROC EA 15 MIN: 61083;PT THER ACT EA 15 MIN: 31638;PT MANUAL THERAPY EA 15 MIN: 87451;PT ELECTRICAL STIM UNATTEND: ;PT THER SUPP EA 15 MIN  -AJ     Physical Therapy Interventions (Optional Details) balance training;gait  "training;gross motor skills;home exercise program;joint mobilization;manual therapy techniques;modalities;patient/family education;postural re-education;ROM (Range of Motion);stair training;strengthening;stretching  -AJ     PT Plan Comments Progress overall strength, rom, and work on smoothing gait.  -AJ       User Key  (r) = Recorded By, (t) = Taken By, (c) = Cosigned By    Initials Name Provider Type    Eneida Adame, PT Physical Therapist       Other therapeutic activities and/or exercises will be prescribed depending on the patients progress or lack there of.          Modalities     Row Name 08/10/18 1000             Ice    Ice Applied Yes  -AJ      Location left knee  -AJ      Rx Minutes 15 mins  -AJ      Ice S/P Rx Yes  -AJ         ELECTRICAL STIMULATION    Attended/Unattended Unattended  -      Stimulation Type IFC  -AJ      Location/Electrode Placement/Other left knee  -AJ      33874 - PT Electrical Stimulation (Manual) Minutes 15  -AJ        User Key  (r) = Recorded By, (t) = Taken By, (c) = Cosigned By    Initials Name Provider Type    Eneida Adame, PT Physical Therapist                Exercises     Row Name 08/10/18 1000             Subjective Comments    Subjective Comments Patient reports the knee is feeling pretty good.  -AJ         Subjective Pain    Able to rate subjective pain? yes  -AJ      Pre-Treatment Pain Level 1  -AJ      Post-Treatment Pain Level 0  -AJ         Exercise 1    Exercise Name 1 Pro II LE  -AJ      Time 1 10 minutes  -AJ      Additional Comments L 5.0  -AJ         Exercise 2    Exercise Name 2 B St. S  -AJ      Reps 2 2  -AJ      Time 2 30 seconds  -AJ         Exercise 3    Exercise Name 3 L St. Lunge S  -AJ      Reps 3 10  -AJ      Time 3 10\" holds  -AJ         Exercise 4    Exercise Name 4 3 reciprocal steps  -AJ      Reps 4 3  -AJ         Exercise 5    Exercise Name 5 Heel slides with strap  -AJ      Time 5 5\" holds for 5 minutes  -AJ         Exercise 6 " "   Exercise Name 6 Heel Prop with 3# weight  -      Time 6 5 minutes  -         Exercise 7    Exercise Name 7 SLR  -      Sets 7 2  -      Reps 7 10  -      Time 7 5\" hold  -         Exercise 8    Exercise Name 8 Gym: Track Walk  -      Reps 8 1 lap  -         Exercise 9    Exercise Name 9 B St. ITB S  -      Reps 9 2  -      Time 9 30 seconds  -        User Key  (r) = Recorded By, (t) = Taken By, (c) = Cosigned By    Initials Name Provider Type    Eneida Adame, PT Physical Therapist                               PT OP Goals     Row Name 08/10/18 1000          PT Short Term Goals    STG Date to Achieve 08/09/18  -     STG 1 I with HEP and have additions/changes by next recertification.  -     STG 1 Progress Met  -     STG 2 AROM L knee 5° from extension or less.  -     STG 2 Progress Met  -     STG 3 AROM L knee flexion >= 95°.  -     STG 3 Progress Met  -     STG 4 Able to perform 20 SLR with no lag present.  -     STG 4 Progress Met  -     STG 5 No increase in circum measurements for joint effusion.  -     STG 5 Progress Ongoing  -        Long Term Goals    LTG Date to Achieve 08/31/18  -     LTG 1 AROm L knee 0°->=115°.  -     LTG 1 Progress Ongoing;Partially Met;Progressing  -     LTG 2 B LE 5/5  -     LTG 2 Progress Ongoing;Progressing;Not Met  -     LTG 3 Circum measurements for joint effusion within 2cm of R=L.  -     LTG 3 Progress Ongoing  -     LTG 4 Patient able to ambulate 1/4 mile non-antalgically with no assistive device and no increase in pain.  -     LTG 4 Progress Ongoing;Progressing;Not Met  -     LTG 5 Patient able to ambulate up/down 3 steps reciprocally HRA for balance x10 reps.  -     LTG 5 Progress Ongoing;Progressing;Not Met  -     LTG 6 I with final HEP.  -     LTG 6 Progress Ongoing  -     LTG 7 D/C with a final HEP and free 30 day fitness formula membership.  -     LTG 7 Progress Ongoing  -        Time " Calculation    PT Goal Re-Cert Due Date 08/31/18  -DIVYA       User Key  (r) = Recorded By, (t) = Taken By, (c) = Cosigned By    Initials Name Provider Type    Eneida Adame, PT Physical Therapist          Therapy Education  Given: HEP, Symptoms/condition management, Pain management, Mobility training  Program: New  How Provided: Verbal, Demonstration  Provided to: Patient  Level of Understanding: Verbalized, Demonstrated    Outcome Measure Options: Lower Extremity Functional Scale (LEFS)  Lower Extremity Functional Index  Any of your usual work, housework or school activities: Moderate difficulty  Your usual hobbies, recreational or sporting activities: A little bit of difficulty  Getting into or out of the bath: Moderate difficulty  Walking between rooms: A little bit of difficulty  Putting on your shoes or socks: A little bit of difficulty  Squatting: Moderate difficulty  Lifting an object, like a bag of groceries from the floor: No difficulty  Performing light activities around your home: A little bit of difficulty  Performing heavy activities around your home: Moderate difficulty  Getting into or out of a car: A little bit of difficulty  Walking 2 blocks: A little bit of difficulty  Walking a mile: A little bit of difficulty  Going up or down 10 stairs (about 1 flight of stairs): Moderate difficulty  Standing for 1 hour: Moderate difficulty  Sitting for 1 hour: No difficulty  Running on even ground: Extreme difficulty or unable to perform activity  Running on uneven ground: Extreme difficulty or unable to perform activity  Making sharp turns while running fast: Extreme difficulty or unable to perform activity  Hopping: Extreme difficulty or unable to perform activity  Rolling over in bed: No difficulty  Total: 45      Time Calculation:   Start Time: 1004  Stop Time: 1116  Time Calculation (min): 72 min  Total Timed Code Minutes- PT: 57 minute(s)    Therapy Charges for Today     Code Description Service  Date Service Provider Modifiers Qty    46810227350 HC PT THER PROC EA 15 MIN 8/10/2018 Eneida Ruth, PT GP 4    56304304992 HC PT ELECTRICAL STIM UNATTENDED 8/10/2018 Eneida Ruth, PT  1    42591982617 HC PT THER SUPP EA 15 MIN 8/10/2018 Eneida Ruth, PT GP 1          PT G-Codes  Outcome Measure Options: Lower Extremity Functional Scale (LEFS)         Eneida Ruth PT, DPT, CSCS  8/10/2018

## 2018-08-14 ENCOUNTER — HOSPITAL ENCOUNTER (OUTPATIENT)
Dept: PHYSICAL THERAPY | Facility: HOSPITAL | Age: 63
Setting detail: THERAPIES SERIES
Discharge: HOME | End: 2018-08-14

## 2018-08-14 DIAGNOSIS — Z96.652 STATUS POST TOTAL LEFT KNEE REPLACEMENT: Primary | ICD-10-CM

## 2018-08-14 PROCEDURE — 97110 THERAPEUTIC EXERCISES: CPT

## 2018-08-14 NOTE — THERAPY TREATMENT NOTE
Outpatient Physical Therapy Ortho Treatment Note  [facility]  Ashwini Quinn ATC       Patient Name: Mira Deal  : 1955  MRN: [id number]  Today's Date: 2018      Visit Date: 2018  Pt reports 3/10 pain pre treatment, 3/10 pain post treatment  Reports 40% of improvement.  Attended 11/12 visits.  Insurance available: 30 visits  Next MD appt: 2018.  Recertification: 2018.  Visit Dx:    ICD-10-CM ICD-9-CM   1. Status post total left knee replacement Z96.652 V43.65       Patient Active Problem List   Diagnosis   • Essential hypertension   • Acquired hypothyroidism   • Obesity (BMI 30-39.9)   • Rectal tenesmus   • Proctitis   • Clostridium difficile infection   • Clostridium difficile colitis   • Screening for colon cancer   • Need for hepatitis C screening test   • Left foot pain   • Upper respiratory tract infection   • Left otitis media   • Acute recurrent pansinusitis   • Chronic pain of left knee   • Pre-operative clearance        Past Medical History:   Diagnosis Date   • Acute suppurative otitis media without spontaneous rupture of eardrum     right ear   • Benign essential hypertension    • Chronic osteoarthritis    • Diverticular disease of colon    • Dysfunction of eustachian tube     Will start OTC Nasal steroid      • Encounter for gynecological examination (general) (routine) without abnormal findings    • Encounter for screening for malignant neoplasm of breast    • Encounter for screening mammogram for malignant neoplasm of breast     16 negative      • Gastrointestinal hemorrhage    • Hypercholesterolemia    • Hyperlipidemia    • Hypothyroidism    • Left lower quadrant pain    • Mild depression (CMS/HCC)    • Nausea    • Obesity     Hyperinsulinar    • Obesity, unspecified    • Vertigo         Past Surgical History:   Procedure Laterality Date   • CARPAL TUNNEL RELEASE     • COLONOSCOPY      Dr Maria   • FOREARM SURGERY       /wrist   • INJECTION OF MEDICATION  05/12/2016    Kenalog (1)      • INJECTION OF MEDICATION  05/12/2016    Rocephin (1)      • INJECTION OF MEDICATION  05/12/2016    Zofran (1)      • KNEE CARTILAGE SURGERY  2012   • SPLENECTOMY  1972   • TOTAL KNEE ARTHROPLASTY Left 07/16/2018   • VAGINAL HYSTERECTOMY SALPINGO OOPHORECTOMY  1995    cerv cancer             PT Ortho     Row Name 08/14/18 0900       Subjective Comments    Subjective Comments (P)  states that she is a little painful and is having some stiffness  -HB       Subjective Pain    Able to rate subjective pain? (P)  yes  -HB    Pre-Treatment Pain Level (P)  3  -HB      User Key  (r) = Recorded By, (t) = Taken By, (c) = Cosigned By    Initials Name Provider Type    Ashwini Prince, ATC                             PT Assessment/Plan     Row Name 08/14/18 1000          PT Assessment    Assessment Comments (P)  patient started to feel alittle sick at her stomach from taking medication without eating and we ended a little early so she can go home and rest   -HB        PT Plan    PT Frequency (P)  3x/week  -HB     PT Plan Comments (P)  resume full sesion and work on gait   -HB       User Key  (r) = Recorded By, (t) = Taken By, (c) = Cosigned By    Initials Name Provider Type     Ashwini Quinn, ATC                 Modalities     Row Name 08/14/18 0900             Ice    Patient reports will apply ice at home to involved area (P)  Yes  -HB        User Key  (r) = Recorded By, (t) = Taken By, (c) = Cosigned By    Initials Name Provider Type    Ashwini Prince, ATC                 Exercises     Row Name 08/14/18 0900             Subjective Comments    Subjective Comments (P)  states that she is a little painful and is having some stiffness  -HB         Subjective Pain    Able to rate subjective pain? (P)  yes  -HB      Pre-Treatment Pain Level (P)  3  -HB         Exercise 1    Exercise Name 1 (P)  Pro II LE  -HB       "Time 1 (P)  12 minutes  -HB      Additional Comments (P)  L 4.0  -HB         Exercise 2    Exercise Name 2 (P)  B St. S  -HB      Reps 2 (P)  2  -HB      Time 2 (P)  30 seconds  -HB         Exercise 3    Exercise Name 3 (P)  L St. Lunge S  -HB      Reps 3 (P)  10  -HB      Time 3 (P)  10\" holds  -HB         Exercise 4    Exercise Name 4 (P)  heelslides with strap   -HB      Time 4 (P)  5 minutes  -HB         Exercise 5    Exercise Name 5 (P)  heel prop wtih 4# weight   -HB      Time 5 (P)  5 minutes  -HB         Exercise 6    Exercise Name 6 (P)  Shuttle 2 L/1L  -HB      Time 6 (P)  5 minutes each   -HB      Additional Comments (P)  6 cors  -HB         Exercise 7    Exercise Name 7 (P)  stool scoots   -HB      Reps 7 (P)  2  -HB        User Key  (r) = Recorded By, (t) = Taken By, (c) = Cosigned By    Initials Name Provider Type    Ashwini Prince, ATC                                PT OP Goals     Row Name 08/14/18 1000          PT Short Term Goals    STG Date to Achieve (P)  08/09/18  -HB     STG 1 (P)  I with HEP and have additions/changes by next recertification.  -HB     STG 1 Progress (P)  Met  -HB     STG 2 (P)  AROM L knee 5° from extension or less.  -HB     STG 2 Progress (P)  Met  -HB     STG 3 (P)  AROM L knee flexion >= 95°.  -HB     STG 3 Progress (P)  Met  -HB     STG 4 (P)  Able to perform 20 SLR with no lag present.  -HB     STG 4 Progress (P)  Met  -HB     STG 5 (P)  No increase in circum measurements for joint effusion.  -HB     STG 5 Progress (P)  Ongoing  -HB        Long Term Goals    LTG Date to Achieve (P)  08/31/18  -HB     LTG 1 (P)  AROm L knee 0°->=115°.  -HB     LTG 1 Progress (P)  Ongoing;Partially Met;Progressing  -HB     LTG 2 (P)  B LE 5/5  -HB     LTG 2 Progress (P)  Ongoing;Progressing;Not Met  -HB     LTG 3 (P)  Circum measurements for joint effusion within 2cm of R=L.  -HB     LTG 3 Progress (P)  Ongoing  -HB     LTG 4 (P)  Patient able to ambulate 1/4 mile " non-antalgically with no assistive device and no increase in pain.  -HB     LTG 4 Progress (P)  Ongoing;Progressing;Not Met  -HB     LTG 5 (P)  Patient able to ambulate up/down 3 steps reciprocally HRA for balance x10 reps.  -HB     LTG 5 Progress (P)  Ongoing;Progressing;Not Met  -HB     LTG 6 (P)  I with final HEP.  -HB     LTG 6 Progress (P)  Ongoing  -HB     LTG 7 (P)  D/C with a final HEP and free 30 day fitness formula membership.  -HB     LTG 7 Progress (P)  Ongoing  -HB       User Key  (r) = Recorded By, (t) = Taken By, (c) = Cosigned By    Initials Name Provider Type    Ashwini Prince ATC                          Time Calculation:   Start Time: (P) 0955  Stop Time: (P) 1050  Time Calculation (min): (P) 55 min  Total Timed Code Minutes- PT: (P) 55 minute(s)      Therapy Charges for Today     Code Description Service Date Service Provider Modifiers Qty    56614136239 HC PT THER PROC EA 15 MIN 8/14/2018 Ashwini Quinn ATC  4                    Ashwini Quinn ATC  8/14/2018

## 2018-08-15 ENCOUNTER — HOSPITAL ENCOUNTER (OUTPATIENT)
Dept: PHYSICAL THERAPY | Facility: HOSPITAL | Age: 63
Setting detail: THERAPIES SERIES
Discharge: HOME | End: 2018-08-15

## 2018-08-15 DIAGNOSIS — Z96.652 STATUS POST TOTAL LEFT KNEE REPLACEMENT: Primary | ICD-10-CM

## 2018-08-15 PROCEDURE — G0283 ELEC STIM OTHER THAN WOUND: HCPCS

## 2018-08-15 PROCEDURE — 97110 THERAPEUTIC EXERCISES: CPT

## 2018-08-15 NOTE — THERAPY TREATMENT NOTE
"    Outpatient Physical Therapy Ortho Treatment Note  [facility]  Katy Vital PTA       Patient Name: Mira Deal  : 1955  MRN: [id number]  Today's Date: 8/15/2018      Visit Date: 08/15/2018     Visits:   Insurance Visits Approved: 30  Recert Due: 2018  MD Appt: 2018  Pain: pretreatment 6/10; post treatment \"cold\"/10  Improvement: pt is subjectively reporting 40% improvement since initial evaluation    Visit Dx:    ICD-10-CM ICD-9-CM   1. Status post total left knee replacement Z96.652 V43.65       Patient Active Problem List   Diagnosis   • Essential hypertension   • Acquired hypothyroidism   • Obesity (BMI 30-39.9)   • Rectal tenesmus   • Proctitis   • Clostridium difficile infection   • Clostridium difficile colitis   • Screening for colon cancer   • Need for hepatitis C screening test   • Left foot pain   • Upper respiratory tract infection   • Left otitis media   • Acute recurrent pansinusitis   • Chronic pain of left knee   • Pre-operative clearance        Past Medical History:   Diagnosis Date   • Acute suppurative otitis media without spontaneous rupture of eardrum     right ear   • Benign essential hypertension    • Chronic osteoarthritis    • Diverticular disease of colon    • Dysfunction of eustachian tube     Will start OTC Nasal steroid      • Encounter for gynecological examination (general) (routine) without abnormal findings    • Encounter for screening for malignant neoplasm of breast    • Encounter for screening mammogram for malignant neoplasm of breast     16 negative      • Gastrointestinal hemorrhage    • Hypercholesterolemia    • Hyperlipidemia    • Hypothyroidism    • Left lower quadrant pain    • Mild depression (CMS/HCC)    • Nausea    • Obesity     Hyperinsulinar    • Obesity, unspecified    • Vertigo         Past Surgical History:   Procedure Laterality Date   • CARPAL TUNNEL RELEASE     • COLONOSCOPY      Dr Maria "   • FOREARM SURGERY  2011    /wrist   • INJECTION OF MEDICATION  05/12/2016    Kenalog (1)      • INJECTION OF MEDICATION  05/12/2016    Rocephin (1)      • INJECTION OF MEDICATION  05/12/2016    Zofran (1)      • KNEE CARTILAGE SURGERY  2012   • SPLENECTOMY  1972   • TOTAL KNEE ARTHROPLASTY Left 07/16/2018   • VAGINAL HYSTERECTOMY SALPINGO OOPHORECTOMY  1995    cerv cancer             PT Ortho     Row Name 08/15/18 0800       Subjective Comments    Subjective Comments states that she hadn't been taking her pain medication but had to this morning.   -       Subjective Pain    Able to rate subjective pain? yes  -    Pre-Treatment Pain Level 6  -MH    Post-Treatment Pain Level --   cold  -MH       Left Lower Ext    Lt Knee Extension/Flexion AROM 2°-120°  -    Row Name 08/14/18 0900       Subjective Comments    Subjective Comments (P)  states that she is a little painful and is having some stiffness  -       Subjective Pain    Able to rate subjective pain? (P)  yes  -    Pre-Treatment Pain Level (P)  3  -HB      User Key  (r) = Recorded By, (t) = Taken By, (c) = Cosigned By    Initials Name Provider Type    Katy Burrows PTA Physical Therapy Assistant     Ashwini Quinn, ATC                             PT Assessment/Plan     Row Name 08/15/18 0800 08/14/18 1000       PT Assessment    Assessment Comments patient did well today with treatment. did have to reduce shuttle resistance for single leg today though. no significant change in AROM for left knee  - (P)  patient started to feel alittle sick at her stomach from taking medication without eating and we ended a little early so she can go home and rest   -       PT Plan    PT Frequency 3x/week  - (P)  3x/week  -    PT Plan Comments next visit track walk  - (P)  resume full sesion and work on gait   -      User Key  (r) = Recorded By, (t) = Taken By, (c) = Cosigned By    Initials Name Provider Type    Katy Burrows PTA  Physical Therapy Assistant    Ashwini Prince, ATC                 Modalities     Row Name 08/15/18 0800             Ice    Ice Applied Yes  -      Location left knee  -      Rx Minutes --   20 minutes  -      Ice S/P Rx Yes  -         ELECTRICAL STIMULATION    Attended/Unattended Unattended  -      Stimulation Type IFC  -      Location/Electrode Placement/Other left knee  -      11602 - PT Electrical Stimulation (Manual) Minutes 20  -MH        User Key  (r) = Recorded By, (t) = Taken By, (c) = Cosigned By    Initials Name Provider Type     Katy Vital, PTA Physical Therapy Assistant                Exercises     Row Name 08/15/18 0800             Subjective Comments    Subjective Comments states that she hadn't been taking her pain medication but had to this morning.   -         Subjective Pain    Able to rate subjective pain? yes  -      Pre-Treatment Pain Level 6  -      Post-Treatment Pain Level --   cold  -         Exercise 1    Exercise Name 1 Pro II LE's  -      Time 1 10 minutes  -      Additional Comments L 6.0  -         Exercise 2    Exercise Name 2 B St. HS S  -      Reps 2 2  -MH      Time 2 30 sec hold  -         Exercise 3    Exercise Name 3 L St. Lunge S  -      Reps 3 10  -      Time 3 10 sec hold  -         Exercise 4    Exercise Name 4 Shuttle 2L   -      Time 4 5 minutes  -      Additional Comments 7 cords  -         Exercise 5    Exercise Name 5 Shuttle 1L  -      Time 5 5 minutes  -      Additional Comments 5 cords  -         Exercise 6    Exercise Name 6 Stool Scoots  -      Reps 6 2 laps carpet  -         Exercise 7    Exercise Name 7 Step Up Fwd  -      Reps 7 20  -MH      Additional Comments 6 inch  -MH         Exercise 8    Exercise Name 8 Step Up Lat   -MH      Reps 8 20  -MH      Additional Comments 6 inch  -         Exercise 9    Exercise Name 9 Heel Prop  -      Time 9 5 minutes  -         Exercise 10     Exercise Name 10 St. ITB S  -MH      Reps 10 2  -MH      Time 10 30 sec hold  -MH        User Key  (r) = Recorded By, (t) = Taken By, (c) = Cosigned By    Initials Name Provider Type    Katy Burrows, PTA Physical Therapy Assistant                               PT OP Goals     Row Name 08/15/18 0800 08/14/18 1000       PT Short Term Goals    STG Date to Achieve 08/09/18  -MH (P)  08/09/18  -HB    STG 1 I with HEP and have additions/changes by next recertification.  -MH (P)  I with HEP and have additions/changes by next recertification.  -HB    STG 1 Progress Met  -MH (P)  Met  -HB    STG 2 AROM L knee 5° from extension or less.  -MH (P)  AROM L knee 5° from extension or less.  -HB    STG 2 Progress Met  -MH (P)  Met  -HB    STG 3 AROM L knee flexion >= 95°.  -MH (P)  AROM L knee flexion >= 95°.  -HB    STG 3 Progress Met  -MH (P)  Met  -HB    STG 4 Able to perform 20 SLR with no lag present.  -MH (P)  Able to perform 20 SLR with no lag present.  -HB    STG 4 Progress Met  -MH (P)  Met  -HB    STG 5 No increase in circum measurements for joint effusion.  -MH (P)  No increase in circum measurements for joint effusion.  -HB    STG 5 Progress Ongoing  -MH (P)  Ongoing  -HB       Long Term Goals    LTG Date to Achieve 08/31/18  -MH (P)  08/31/18  -HB    LTG 1 AROm L knee 0°->=115°.  -MH (P)  AROm L knee 0°->=115°.  -HB    LTG 1 Progress Ongoing;Partially Met;Progressing  -MH (P)  Ongoing;Partially Met;Progressing  -HB    LTG 2 B LE 5/5  -MH (P)  B LE 5/5  -HB    LTG 2 Progress Ongoing;Progressing;Not Met  -MH (P)  Ongoing;Progressing;Not Met  -HB    LTG 3 Circum measurements for joint effusion within 2cm of R=L.  -MH (P)  Circum measurements for joint effusion within 2cm of R=L.  -HB    LTG 3 Progress Ongoing  -MH (P)  Ongoing  -HB    LTG 4 Patient able to ambulate 1/4 mile non-antalgically with no assistive device and no increase in pain.  -MH (P)  Patient able to ambulate 1/4 mile non-antalgically with no assistive  device and no increase in pain.  -HB    LTG 4 Progress Ongoing;Progressing;Not Met  -MH (P)  Ongoing;Progressing;Not Met  -HB    LTG 5 Patient able to ambulate up/down 3 steps reciprocally HRA for balance x10 reps.  -MH (P)  Patient able to ambulate up/down 3 steps reciprocally HRA for balance x10 reps.  -HB    LTG 5 Progress Ongoing;Progressing;Not Met  -MH (P)  Ongoing;Progressing;Not Met  -HB    LTG 6 I with final HEP.  -MH (P)  I with final HEP.  -HB    LTG 6 Progress Ongoing  -MH (P)  Ongoing  -HB    LTG 7 D/C with a final HEP and free 30 day fitness formula membership.  -MH (P)  D/C with a final HEP and free 30 day fitness formula membership.  -HB    LTG 7 Progress Ongoing  -MH (P)  Ongoing  -HB       Time Calculation    PT Goal Re-Cert Due Date 08/31/18  -MH  --      User Key  (r) = Recorded By, (t) = Taken By, (c) = Cosigned By    Initials Name Provider Type     Katy Vital PTA Physical Therapy Assistant    Ashwini Prince, ATC           Therapy Education  Given: HEP, Symptoms/condition management, Pain management, Mobility training  Program: Reinforced  How Provided: Verbal, Demonstration  Provided to: Patient  Level of Understanding: Verbalized, Demonstrated              Time Calculation:   Start Time: 0800  Stop Time: 0920  Time Calculation (min): 80 min  Total Timed Code Minutes- PT: 55 minute(s)    Therapy Charges for Today     Code Description Service Date Service Provider Modifiers Qty    38690761874 HC PT THER PROC EA 15 MIN 8/15/2018 Katy Vital PTA GP 4    76719840086 HC PT THER SUPP EA 15 MIN 8/15/2018 Katy Vital PTA GP 1    54040981540 HC PT ELECTRICAL STIM UNATTENDED 8/15/2018 Katy Vital PTA  1                    Katy Vital PTA  8/15/2018

## 2018-08-17 ENCOUNTER — HOSPITAL ENCOUNTER (OUTPATIENT)
Dept: PHYSICAL THERAPY | Facility: HOSPITAL | Age: 63
Setting detail: THERAPIES SERIES
Discharge: HOME | End: 2018-08-17

## 2018-08-17 DIAGNOSIS — Z96.652 STATUS POST TOTAL LEFT KNEE REPLACEMENT: Primary | ICD-10-CM

## 2018-08-17 PROCEDURE — 97110 THERAPEUTIC EXERCISES: CPT

## 2018-08-17 NOTE — THERAPY TREATMENT NOTE
Outpatient Physical Therapy Ortho Treatment Note  [facility]  Ashwini Quinn ATC       Patient Name: Mira Deal  : 1955  MRN: [id number]  Today's Date: 2018      Visit Date: 2018  Pt reports 5/10 pain pre treatment, 3/10 pain post treatment  Reports 60% of improvement.  Attended 13/14 visits.  Insurance available: 30  Next MD appt: 2018.  Recertification: 2018.  Visit Dx:    ICD-10-CM ICD-9-CM   1. Status post total left knee replacement Z96.652 V43.65       Patient Active Problem List   Diagnosis   • Essential hypertension   • Acquired hypothyroidism   • Obesity (BMI 30-39.9)   • Rectal tenesmus   • Proctitis   • Clostridium difficile infection   • Clostridium difficile colitis   • Screening for colon cancer   • Need for hepatitis C screening test   • Left foot pain   • Upper respiratory tract infection   • Left otitis media   • Acute recurrent pansinusitis   • Chronic pain of left knee   • Pre-operative clearance        Past Medical History:   Diagnosis Date   • Acute suppurative otitis media without spontaneous rupture of eardrum     right ear   • Benign essential hypertension    • Chronic osteoarthritis    • Diverticular disease of colon    • Dysfunction of eustachian tube     Will start OTC Nasal steroid      • Encounter for gynecological examination (general) (routine) without abnormal findings    • Encounter for screening for malignant neoplasm of breast    • Encounter for screening mammogram for malignant neoplasm of breast     16 negative      • Gastrointestinal hemorrhage    • Hypercholesterolemia    • Hyperlipidemia    • Hypothyroidism    • Left lower quadrant pain    • Mild depression (CMS/HCC)    • Nausea    • Obesity     Hyperinsulinar    • Obesity, unspecified    • Vertigo         Past Surgical History:   Procedure Laterality Date   • CARPAL TUNNEL RELEASE     • COLONOSCOPY      Dr Maria   • FOREARM SURGERY  2011    /wrist    • INJECTION OF MEDICATION  05/12/2016    Kenalog (1)      • INJECTION OF MEDICATION  05/12/2016    Rocephin (1)      • INJECTION OF MEDICATION  05/12/2016    Zofran (1)      • KNEE CARTILAGE SURGERY  2012   • SPLENECTOMY  1972   • TOTAL KNEE ARTHROPLASTY Left 07/16/2018   • VAGINAL HYSTERECTOMY SALPINGO OOPHORECTOMY  1995    cerv cancer             PT Ortho     Row Name 08/17/18 0900       Subjective Comments    Subjective Comments (P)  states that she feels very stiff this date and is having a bit of pain   -       Subjective Pain    Able to rate subjective pain? (P)  yes  -    Pre-Treatment Pain Level (P)  5  -    Row Name 08/15/18 0800       Subjective Comments    Subjective Comments states that she hadn't been taking her pain medication but had to this morning.   -       Subjective Pain    Able to rate subjective pain? yes  -    Pre-Treatment Pain Level 6  -    Post-Treatment Pain Level --   cold  -MH       Left Lower Ext    Lt Knee Extension/Flexion AROM 2°-120°  -      User Key  (r) = Recorded By, (t) = Taken By, (c) = Cosigned By    Initials Name Provider Type     Katy Vital, PTA Physical Therapy Assistant     Ashwini Quinn, ATC                             PT Assessment/Plan     Row Name 08/17/18 0900          PT Assessment    Assessment Comments (P)  reported being stiff this date and added three minutes to the PRO 2 to loosen up. defered ice this date due to feeling good after track walk  -        PT Plan    PT Frequency (P)  3x/week  -     PT Plan Comments (P)  resume and cont to progress. start blance exercises  -       User Key  (r) = Recorded By, (t) = Taken By, (c) = Cosigned By    Initials Name Provider Type    Ashwini Prince ATC                 Modalities     Row Name 08/17/18 0900             Ice    Patient denies application of Ice (P)  Yes  -        User Key  (r) = Recorded By, (t) = Taken By, (c) = Cosigned By    Initials Name  Provider Type    HB Ashwini Quinn ATC                 Exercises     Row Name 08/17/18 0900             Subjective Comments    Subjective Comments (P)  states that she feels very stiff this date and is having a bit of pain   -HB         Subjective Pain    Able to rate subjective pain? (P)  yes  -HB      Pre-Treatment Pain Level (P)  5  -HB      Post-Treatment Pain Level (P)  3  -HB         Exercise 1    Exercise Name 1 (P)  Pro II LE's  -HB      Time 1 (P)  13 minutes  -HB      Additional Comments (P)  L 5.0  -HB         Exercise 2    Exercise Name 2 (P)  B St. HS S  -HB      Reps 2 (P)  2  -HB      Time 2 (P)  30 sec hold  -HB         Exercise 3    Exercise Name 3 (P)  L St. Lunge S  -HB      Reps 3 (P)  10  -HB      Time 3 (P)  10 sec hold  -HB         Exercise 4    Exercise Name 4 (P)  sit to stands   -HB      Sets 4 (P)  2  -HB      Reps 4 (P)  20  -HB         Exercise 5    Exercise Name 5 (P)  recip stairs  -HB      Reps 5 (P)  10  -HB         Exercise 6    Exercise Name 6 (P)  shuttle 2L with ad ADD  -HB      Time 6 (P)  5 minutes  -HB      Additional Comments (P)  5 cords  -HB         Exercise 7    Exercise Name 7 (P)  Shuttle 1L  -HB      Time 7 (P)  5 minutes  -HB      Additional Comments (P)  5 cords  -HB         Exercise 8    Exercise Name 8 (P)  stool scoots   -HB      Reps 8 (P)  3  -HB         Exercise 9    Exercise Name 9 (P)  large retro steps in // bars  -HB      Time 9 (P)  3 minutes  -HB         Exercise 10    Exercise Name 10 (P)  side stepping in // bars  -HB      Time 10 (P)  3 minutes  -HB         Exercise 11    Exercise Name 11 (P)  track walk  -HB      Reps 11 (P)  1 lap  -HB      Additional Comments (P)  1 lap no cane with minimal gair cues  -HB        User Key  (r) = Recorded By, (t) = Taken By, (c) = Cosigned By    Initials Name Provider Type    HB Ashwini Quinn, ATC                                PT OP Goals     Row Name 08/17/18 0900          PT Short  Term Goals    STG Date to Achieve (P)  08/09/18  -HB     STG 1 (P)  I with HEP and have additions/changes by next recertification.  -HB     STG 1 Progress (P)  Met  -HB     STG 2 (P)  AROM L knee 5° from extension or less.  -HB     STG 2 Progress (P)  Met  -HB     STG 3 (P)  AROM L knee flexion >= 95°.  -HB     STG 3 Progress (P)  Met  -HB     STG 4 (P)  Able to perform 20 SLR with no lag present.  -HB     STG 4 Progress (P)  Met  -HB     STG 5 (P)  No increase in circum measurements for joint effusion.  -HB     STG 5 Progress (P)  Ongoing  -HB        Long Term Goals    LTG Date to Achieve (P)  08/31/18  -HB     LTG 1 (P)  AROm L knee 0°->=115°.  -HB     LTG 1 Progress (P)  Ongoing;Partially Met;Progressing  -HB     LTG 2 (P)  B LE 5/5  -HB     LTG 2 Progress (P)  Ongoing;Progressing;Not Met  -HB     LTG 3 (P)  Circum measurements for joint effusion within 2cm of R=L.  -HB     LTG 3 Progress (P)  Ongoing  -HB     LTG 4 (P)  Patient able to ambulate 1/4 mile non-antalgically with no assistive device and no increase in pain.  -HB     LTG 4 Progress (P)  Ongoing;Progressing;Not Met  -HB     LTG 5 (P)  Patient able to ambulate up/down 3 steps reciprocally HRA for balance x10 reps.  -HB     LTG 5 Progress (P)  Partially Met  -HB     LTG 6 (P)  I with final HEP.  -HB     LTG 6 Progress (P)  Ongoing  -HB     LTG 7 (P)  D/C with a final HEP and free 30 day fitness formula membership.  -HB     LTG 7 Progress (P)  Ongoing  -HB        Time Calculation    PT Goal Re-Cert Due Date (P)  08/31/18  -HB       User Key  (r) = Recorded By, (t) = Taken By, (c) = Cosigned By    Initials Name Provider Type    Ashwini Prince, ATC                          Time Calculation:   Start Time: (P) 0850  Stop Time: (P) 0948  Time Calculation (min): (P) 58 min  Total Timed Code Minutes- PT: (P) 58 minute(s)      Therapy Charges for Today     Code Description Service Date Service Provider Modifiers Qty    [id number]  PT THER  PROC EA 15 MIN 8/17/2018 Ashwini Quinn, ATC  4                    Ashwini Quinn, MERA  8/17/2018

## 2018-08-20 ENCOUNTER — HOSPITAL ENCOUNTER (OUTPATIENT)
Dept: PHYSICAL THERAPY | Facility: HOSPITAL | Age: 63
Setting detail: THERAPIES SERIES
Discharge: HOME | End: 2018-08-20

## 2018-08-20 DIAGNOSIS — Z96.652 STATUS POST TOTAL LEFT KNEE REPLACEMENT: Primary | ICD-10-CM

## 2018-08-20 PROCEDURE — G0283 ELEC STIM OTHER THAN WOUND: HCPCS

## 2018-08-20 PROCEDURE — 97110 THERAPEUTIC EXERCISES: CPT

## 2018-08-20 NOTE — THERAPY TREATMENT NOTE
"    Outpatient Physical Therapy Ortho Treatment Note  [facility]     Patient Name: Mira Deal  : 1955  MRN: [id number]  Today's Date: 2018      Visit Date: 2018  Pt reports 5/10 pain pre treatment,\"numb\" /10 pain post treatment  Reports 60% of improvement.  Attended 14/15 visits.  Insurance available:30 visits   Next MD appt: Aug [id number].  Recertification: 2018.  Visit Dx:    ICD-10-CM ICD-9-CM   1. Status post total left knee replacement Z96.652 V43.65       Patient Active Problem List   Diagnosis   • Essential hypertension   • Acquired hypothyroidism   • Obesity (BMI 30-39.9)   • Rectal tenesmus   • Proctitis   • Clostridium difficile infection   • Clostridium difficile colitis   • Screening for colon cancer   • Need for hepatitis C screening test   • Left foot pain   • Upper respiratory tract infection   • Left otitis media   • Acute recurrent pansinusitis   • Chronic pain of left knee   • Pre-operative clearance        Past Medical History:   Diagnosis Date   • Acute suppurative otitis media without spontaneous rupture of eardrum     right ear   • Benign essential hypertension    • Chronic osteoarthritis    • Diverticular disease of colon    • Dysfunction of eustachian tube     Will start OTC Nasal steroid      • Encounter for gynecological examination (general) (routine) without abnormal findings    • Encounter for screening for malignant neoplasm of breast    • Encounter for screening mammogram for malignant neoplasm of breast     16 negative      • Gastrointestinal hemorrhage    • Hypercholesterolemia    • Hyperlipidemia    • Hypothyroidism    • Left lower quadrant pain    • Mild depression (CMS/HCC)    • Nausea    • Obesity     Hyperinsulinar    • Obesity, unspecified    • Vertigo         Past Surgical History:   Procedure Laterality Date   • CARPAL TUNNEL RELEASE     • COLONOSCOPY      Dr Maria   • FOREARM SURGERY  2011    /wrist   • " INJECTION OF MEDICATION  05/12/2016    Kenalog (1)      • INJECTION OF MEDICATION  05/12/2016    Rocephin (1)      • INJECTION OF MEDICATION  05/12/2016    Zofran (1)      • KNEE CARTILAGE SURGERY  2012   • SPLENECTOMY  1972   • TOTAL KNEE ARTHROPLASTY Left 07/16/2018   • VAGINAL HYSTERECTOMY SALPINGO OOPHORECTOMY  1995    cerv cancer             PT Ortho     Row Name 08/20/18 0800       Subjective Comments    Subjective Comments no new c/o's this date  -TL       Subjective Pain    Able to rate subjective pain? yes  -TL    Pre-Treatment Pain Level 5  -TL      User Key  (r) = Recorded By, (t) = Taken By, (c) = Cosigned By    Initials Name Provider Type    Kailyn Summers PTA Physical Therapy Assistant                            PT Assessment/Plan     Row Name 08/20/18 1000          PT Assessment    Assessment Comments pt working toward goals. No new goals met. Pt more tearful today. Pt stated that she hurting more. Pt ROM has not changed. Pt doing well without using assisted device but does have small limp.Pt not taking pain medicine to help decrease pain. Pt reports that she is doing her HEP several times a day.   -TL        PT Plan    PT Frequency 3x/week  -TL     PT Plan Comments continue with balance ex and working on less muscle gurading and continue to push ROm  -TL       User Key  (r) = Recorded By, (t) = Taken By, (c) = Cosigned By    Initials Name Provider Type    Kailyn Summers PTA Physical Therapy Assistant                Modalities     Row Name 08/20/18 0900             Ice    Ice Applied Yes  -TL      Location left knee  -TL      Rx Minutes 15 mins  -TL      Ice S/P Rx Yes  -TL         ELECTRICAL STIMULATION    Attended/Unattended Unattended  -TL      Stimulation Type IFC  -TL      Location/Electrode Placement/Other left knee  -TL        User Key  (r) = Recorded By, (t) = Taken By, (c) = Cosigned By    Initials Name Provider Type    Kailyn Summers PTA Physical Therapy Assistant                 Exercises     Row Name 08/20/18 0800             Subjective Comments    Subjective Comments no new c/o's this date  -TL         Subjective Pain    Able to rate subjective pain? yes  -TL      Pre-Treatment Pain Level 5  -TL         Exercise 1    Exercise Name 1 Pro II LE's  -TL      Time 1 10mins  -TL      Additional Comments level 6  -TL         Exercise 2    Exercise Name 2 B St. HS S  -TL      Reps 2 2  -TL      Time 2 30 sec hold  -TL         Exercise 3    Exercise Name 3 L St. Lunge S  -TL      Reps 3 10  -TL      Time 3 10 sec hold  -TL         Exercise 4    Exercise Name 4 stool scoots  -TL      Reps 4 3  -TL         Exercise 5    Exercise Name 5 Marching on airex pad  -TL      Reps 5 20  -TL      Additional Comments without support. Pt did touch bar at times  -TL         Exercise 6    Exercise Name 6 side stepping on airex beam  -TL      Additional Comments 5 laps without support  -TL         Exercise 7    Exercise Name 7 heel to toe on airex  -TL      Additional Comments 5 laps with touching bar at times  -TL         Exercise 8    Exercise Name 8 Track   -TL      Additional Comments 2laps with cues on push off and less muscle guarding  -TL         Exercise 9    Exercise Name 9 shuttle press 2L  -TL      Time 9 5 mins  -TL      Additional Comments 6 cords  -TL         Exercise 10    Exercise Name 10 attempted shuttle 1L  -TL      Additional Comments pt was in tears and PTA had pt to stop  -TL        User Key  (r) = Recorded By, (t) = Taken By, (c) = Cosigned By    Initials Name Provider Type    TL Kailyn Maharaj, PTA Physical Therapy Assistant                               PT OP Goals     Row Name 08/20/18 0845          PT Short Term Goals    STG Date to Achieve 08/09/18  -TL     STG 1 I with HEP and have additions/changes by next recertification.  -TL     STG 1 Progress Met  -TL     STG 2 AROM L knee 5° from extension or less.  -TL     STG 2 Progress Met  -TL     STG 3 AROM L knee flexion >= 95°.  -TL      STG 3 Progress Met  -TL     STG 4 Able to perform 20 SLR with no lag present.  -TL     STG 4 Progress Met  -TL     STG 5 No increase in circum measurements for joint effusion.  -TL     STG 5 Progress Ongoing  -TL        Long Term Goals    LTG Date to Achieve 08/31/18  -TL     LTG 1 AROm L knee 0°->=115°.  -TL     LTG 1 Progress Ongoing;Partially Met;Progressing  -TL     LTG 2 B LE 5/5  -TL     LTG 2 Progress Ongoing;Progressing;Not Met  -TL     LTG 3 Circum measurements for joint effusion within 2cm of R=L.  -TL     LTG 3 Progress Ongoing  -TL     LTG 4 Patient able to ambulate 1/4 mile non-antalgically with no assistive device and no increase in pain.  -TL     LTG 4 Progress Ongoing;Progressing;Not Met  -TL     LTG 5 Patient able to ambulate up/down 3 steps reciprocally HRA for balance x10 reps.  -TL     LTG 5 Progress Partially Met  -TL     LTG 6 I with final HEP.  -TL     LTG 6 Progress Ongoing  -TL     LTG 7 D/C with a final HEP and free 30 day fitness formula membership.  -TL     LTG 7 Progress Ongoing  -TL        Time Calculation    PT Goal Re-Cert Due Date 08/31/18  -       User Key  (r) = Recorded By, (t) = Taken By, (c) = Cosigned By    Initials Name Provider Type    Kailyn Summers, PTA Physical Therapy Assistant          Therapy Education  Education Details: review pt to do SLR, heelslides with strap, prop hang,quad sets  Given: HEP, Symptoms/condition management, Pain management, Posture/body mechanics  Program: Reinforced  How Provided: Verbal  Provided to: Patient  Level of Understanding: Verbalized              Time Calculation:   Start Time: 0845  Stop Time: 1013  Time Calculation (min): 88 min  Total Timed Code Minutes- PT: 73 minute(s)  Therapy Suggested Charges     Code   Minutes Charges    32968 (CPT®) Hc Pt Neuromusc Re Education Ea 15 Min      70731 (CPT®) Hc Pt Ther Proc Ea 15 Min      58037 (CPT®) Hc Gait Training Ea 15 Min      14623 (CPT®) Hc Pt Therapeutic Act Ea 15 Min       07241 (CPT®) Hc Pt Manual Therapy Ea 15 Min      32825 (CPT®) Hc Pt Ther Massage- Per 15 Min      22285 (CPT®) Hc Pt Iontophoresis Ea 15 Min      01142 (CPT®) Hc Pt Elec Stim Ea-Per 15 Min 15 1    62093 (CPT®) Hc Pt Ultrasound Ea 15 Min      50497 (CPT®) Hc Pt Self Care/Mgmt/Train Ea 15 Min      77708 (CPT®) Hc Pt Prosthetic (S) Train Initial Encounter, Each 15 Min      34408 (CPT®) Hc Orthotic(S) Mgmt/Train Initial Encounter, Each 15min      98577 (CPT®) Hc Pt Aquatic Therapy Ea 15 Min      07165 (CPT®) Hc Pt Orthotic(S)/Prosthetic(S) Encounter, Each 15 Min      Total  15 1        Therapy Charges for Today     Code Description Service Date Service Provider Modifiers Qty    13720929344 HC PT THER PROC EA 15 MIN 8/20/2018 Kailyn Maharaj, PTA GP 5    50640251828 HC PT ELECTRICAL STIM UNATTENDED 8/20/2018 Kailyn Maharaj, PTA  1                    Kailyn Maharaj, SHELLY  8/20/2018

## 2018-08-22 ENCOUNTER — HOSPITAL ENCOUNTER (OUTPATIENT)
Dept: PHYSICAL THERAPY | Facility: HOSPITAL | Age: 63
Setting detail: THERAPIES SERIES
Discharge: HOME | End: 2018-08-22

## 2018-08-22 DIAGNOSIS — Z96.652 STATUS POST TOTAL LEFT KNEE REPLACEMENT: Primary | ICD-10-CM

## 2018-08-22 PROCEDURE — 97110 THERAPEUTIC EXERCISES: CPT

## 2018-08-22 NOTE — THERAPY TREATMENT NOTE
Outpatient Physical Therapy Ortho Treatment Note  [facility]  Katy Vital PTA       Patient Name: Mria Deal  : 1955  MRN: [id number]  Today's Date: 2018      Visit Date: 2018     Visits: 15/16  Insurance Visits Approved:   Recert Due: 2018  MD Appt: TBD  Pain: pretreatment 0/10; post treatment 0/10  Improvement: pt is subjectively reporting 60% improvement since initial evaluation    Visit Dx:    ICD-10-CM ICD-9-CM   1. Status post total left knee replacement Z96.652 V43.65       Patient Active Problem List   Diagnosis   • Essential hypertension   • Acquired hypothyroidism   • Obesity (BMI 30-39.9)   • Rectal tenesmus   • Proctitis   • Clostridium difficile infection   • Clostridium difficile colitis   • Screening for colon cancer   • Need for hepatitis C screening test   • Left foot pain   • Upper respiratory tract infection   • Left otitis media   • Acute recurrent pansinusitis   • Chronic pain of left knee   • Pre-operative clearance        Past Medical History:   Diagnosis Date   • Acute suppurative otitis media without spontaneous rupture of eardrum     right ear   • Benign essential hypertension    • Chronic osteoarthritis    • Diverticular disease of colon    • Dysfunction of eustachian tube     Will start OTC Nasal steroid      • Encounter for gynecological examination (general) (routine) without abnormal findings    • Encounter for screening for malignant neoplasm of breast    • Encounter for screening mammogram for malignant neoplasm of breast     16 negative      • Gastrointestinal hemorrhage    • Hypercholesterolemia    • Hyperlipidemia    • Hypothyroidism    • Left lower quadrant pain    • Mild depression (CMS/HCC)    • Nausea    • Obesity     Hyperinsulinar    • Obesity, unspecified    • Vertigo         Past Surgical History:   Procedure Laterality Date   • CARPAL TUNNEL RELEASE     • COLONOSCOPY      Dr Maria   • FOREARM  SURGERY  2011    /wrist   • INJECTION OF MEDICATION  05/12/2016    Kenalog (1)      • INJECTION OF MEDICATION  05/12/2016    Rocephin (1)      • INJECTION OF MEDICATION  05/12/2016    Zofran (1)      • KNEE CARTILAGE SURGERY  2012   • SPLENECTOMY  1972   • TOTAL KNEE ARTHROPLASTY Left 07/16/2018   • VAGINAL HYSTERECTOMY SALPINGO OOPHORECTOMY  1995    cerv cancer             PT Ortho     Row Name 08/22/18 0800       Subjective Comments    Subjective Comments states that she took a pain pill before treatment today. states that she is mentally numb because of it. states that she can't hardly focus.   -       Subjective Pain    Able to rate subjective pain? yes  -    Pre-Treatment Pain Level 0  -    Post-Treatment Pain Level 0  -    Row Name 08/20/18 0800       Subjective Comments    Subjective Comments no new c/o's this date  -       Subjective Pain    Able to rate subjective pain? yes  -    Pre-Treatment Pain Level 5  -      User Key  (r) = Recorded By, (t) = Taken By, (c) = Cosigned By    Initials Name Provider Type     Katy Vital PTA Physical Therapy Assistant     Kailyn Maharaj, SHELLY Physical Therapy Assistant                            PT Assessment/Plan     Row Name 08/22/18 0800          PT Assessment    Assessment Comments good performance throughout. did not use IFC or ice secondary to patient having a medicated cream on with essential oils that she she did not want to wipe off her legs this treatment. concerned about reaction.   -        PT Plan    PT Frequency 3x/week  -     PT Plan Comments airx plyotoss next visit  -       User Key  (r) = Recorded By, (t) = Taken By, (c) = Cosigned By    Initials Name Provider Type     Katy Vital PTA Physical Therapy Assistant                Modalities     Row Name 08/22/18 0800             Ice    Patient denies application of Ice Yes  -      Patient reports will apply ice at home to involved area Yes  -        User Key  (r) =  Recorded By, (t) = Taken By, (c) = Cosigned By    Initials Name Provider Type     Katy Vital PTA Physical Therapy Assistant                Exercises     Row Name 08/22/18 0800             Subjective Comments    Subjective Comments states that she took a pain pill before treatment today. states that she is mentally numb because of it. states that she can't hardly focus.   -         Subjective Pain    Able to rate subjective pain? yes  -      Pre-Treatment Pain Level 0  -      Post-Treatment Pain Level 0  -         Exercise 1    Exercise Name 1 Pro II LE's  -      Time 1 10 minutes  -      Additional Comments L 6.0  -MH         Exercise 2    Exercise Name 2 B St. HS S  -      Reps 2 2  -MH      Time 2 30 sec hold  -         Exercise 3    Exercise Name 3 L St. Lunge S  -      Reps 3 10  -      Time 3 10 sec hold  -         Exercise 4    Exercise Name 4 Shuttle 2L  -      Time 4 5 minutes  -      Additional Comments 7 cords  -         Exercise 5    Exercise Name 5 Shuttle 1L  -      Time 5 5 minutes  -      Additional Comments 7 cords  -         Exercise 6    Exercise Name 6 Stool Scoots  -      Reps 6 3 laps  -         Exercise 7    Exercise Name 7 Reciprocal Stairs  -      Reps 7 10  -MH         Exercise 8    Exercise Name 8 Ecc Step [place]  -      Reps 8 20  -MH      Additional Comments 6 inch step  -         Exercise 9    Exercise Name 9 Fwd Step Up  -      Reps 9 20  -MH      Additional Comments 6 inch step  -MH         Exercise 10    Exercise Name 10 Lat Step Up  -      Reps 10 20  -MH      Additional Comments 6 inch step  -MH         Exercise 11    Exercise Name 11 Airx Beam Fwd with MS  -MH      Reps 11 5 laps  -         Exercise 12    Exercise Name 12 Airx Beam Lat with MS  -MH      Reps 12 5 laps  -         Exercise 13    Exercise Name 13 Heel Prop  -      Time 13 5 minutes  -      Additional Comments 5# weight on knee  -        User Key  (r) =  Recorded By, (t) = Taken By, (c) = Cosigned By    Initials Name Provider Type     Katy Vital PTA Physical Therapy Assistant                               PT OP Goals     Row Name 08/22/18 0800          PT Short Term Goals    STG Date to Achieve 08/09/18  -     STG 1 I with HEP and have additions/changes by next recertification.  -     STG 1 Progress Met  -     STG 2 AROM L knee 5° from extension or less.  -     STG 2 Progress Met  -     STG 3 AROM L knee flexion >= 95°.  -     STG 3 Progress Met  -     STG 4 Able to perform 20 SLR with no lag present.  -     STG 4 Progress Met  -     STG 5 No increase in circum measurements for joint effusion.  -     STG 5 Progress Ongoing  -        Long Term Goals    LTG Date to Achieve 08/31/18  -     LTG 1 AROm L knee 0°->=115°.  -     LTG 1 Progress Ongoing;Partially Met;Progressing  -     LTG 2 B LE 5/5  -     LTG 2 Progress Ongoing;Progressing;Not Met  -     LTG 3 Circum measurements for joint effusion within 2cm of R=L.  -     LTG 3 Progress Ongoing  -     LTG 4 Patient able to ambulate 1/4 mile non-antalgically with no assistive device and no increase in pain.  -     LTG 4 Progress Ongoing;Progressing;Not Met  -     LTG 5 Patient able to ambulate up/down 3 steps reciprocally HRA for balance x10 reps.  -     LTG 5 Progress Met  [facility]     LTG 6 I with final HEP.  -     LT 6 Progress Ongoing  -     LTG 7 D/C with a final HEP and free 30 day fitness formula membership.  -[facility] 7 Progress Ongoing  -        Time Calculation    PT Goal Re-Cert Due Date 08/31/18  -       User Key  (r) = Recorded By, (t) = Taken By, (c) = Cosigned By    Initials Name Provider Type    Katy Burrows PTA Physical Therapy Assistant          Therapy Education  Given: HEP, Symptoms/condition management, Pain management, Posture/body mechanics  Program: Reinforced  How Provided: Verbal  Provided to: Patient  Level of Understanding:  Verbalized              Time Calculation:   Start Time: 0845  Stop Time: 0954  Time Calculation (min): 69 min  Total Timed Code Minutes- PT: 69 minute(s)    Therapy Charges for Today     Code Description Service Date Service Provider Modifiers Qty    39064094718 HC PT THER PROC EA 15 MIN 8/22/2018 Katy Vital PTA GP 5    23530110435 HC PT THER SUPP EA 15 MIN 8/22/2018 Katy Vital PTA GP 1                    Katy Vital PTA  8/22/2018

## 2018-08-24 ENCOUNTER — HOSPITAL ENCOUNTER (OUTPATIENT)
Dept: PHYSICAL THERAPY | Facility: HOSPITAL | Age: 63
Setting detail: THERAPIES SERIES
Discharge: HOME | End: 2018-08-24

## 2018-08-24 DIAGNOSIS — Z96.652 STATUS POST TOTAL LEFT KNEE REPLACEMENT: Primary | ICD-10-CM

## 2018-08-24 PROCEDURE — 97110 THERAPEUTIC EXERCISES: CPT | Performed by: PHYSICAL THERAPY ASSISTANT

## 2018-08-24 NOTE — THERAPY TREATMENT NOTE
Outpatient Physical Therapy Ortho Treatment Note  [facility]     Patient Name: Mira Deal  : 1955  MRN: [id number]  Today's Date: 2018      Visit Date: 2018     Visits:   Insurance Visits Approved:  Due: 2018  MD Appt: TBD  Pain: pretreatment 0/10; post treatment 0/10  Improvement: pt is subjectively reporting 60% improvement since initial evaluation     Visit Dx:  Visit Dx:    ICD-10-CM ICD-9-CM   1. Status post total left knee replacement Z96.652 V43.65       Patient Active Problem List   Diagnosis   • Essential hypertension   • Acquired hypothyroidism   • Obesity (BMI 30-39.9)   • Rectal tenesmus   • Proctitis   • Clostridium difficile infection   • Clostridium difficile colitis   • Screening for colon cancer   • Need for hepatitis C screening test   • Left foot pain   • Upper respiratory tract infection   • Left otitis media   • Acute recurrent pansinusitis   • Chronic pain of left knee   • Pre-operative clearance        Past Medical History:   Diagnosis Date   • Acute suppurative otitis media without spontaneous rupture of eardrum     right ear   • Benign essential hypertension    • Chronic osteoarthritis    • Diverticular disease of colon    • Dysfunction of eustachian tube     Will start OTC Nasal steroid      • Encounter for gynecological examination (general) (routine) without abnormal findings    • Encounter for screening for malignant neoplasm of breast    • Encounter for screening mammogram for malignant neoplasm of breast     16 negative      • Gastrointestinal hemorrhage    • Hypercholesterolemia    • Hyperlipidemia    • Hypothyroidism    • Left lower quadrant pain    • Mild depression (CMS/HCC)    • Nausea    • Obesity     Hyperinsulinar    • Obesity, unspecified    • Vertigo         Past Surgical History:   Procedure Laterality Date   • CARPAL TUNNEL RELEASE     • COLONOSCOPY      Dr Maria   • FOREARM SURGERY       /wrist   • INJECTION OF MEDICATION  05/12/2016    Kenalog (1)      • INJECTION OF MEDICATION  05/12/2016    Rocephin (1)      • INJECTION OF MEDICATION  05/12/2016    Zofran (1)      • KNEE CARTILAGE SURGERY  2012   • SPLENECTOMY  1972   • TOTAL KNEE ARTHROPLASTY Left 07/16/2018   • VAGINAL HYSTERECTOMY SALPINGO OOPHORECTOMY  1995    cerv cancer             PT Ortho     Row Name 08/24/18 0800       Subjective Comments    Subjective Comments Pt reports she is hurting bad today, states she took her pain meds prior to PT  -       Subjective Pain    Able to rate subjective pain? yes  -    Pre-Treatment Pain Level 8  -    Post-Treatment Pain Level 1  -       Posture/Observations    Posture/Observations Comments Pt ambulates Independant with mod antalgic gait.  -       Left Lower Ext    Lt Knee Extension/Flexion AROM 2-122°  -    Row Name 08/22/18 0800       Subjective Comments    Subjective Comments states that she took a pain pill before treatment today. states that she is mentally numb because of it. states that she can't hardly focus.   -       Subjective Pain    Able to rate subjective pain? yes  -    Pre-Treatment Pain Level 0  -    Post-Treatment Pain Level 0  -      User Key  (r) = Recorded By, (t) = Taken By, (c) = Cosigned By    Initials Name Provider Type     Katy Vital PTA Physical Therapy Assistant     Nestor Maradiaga PTA Physical Therapy Assistant                            PT Assessment/Plan     Row Name 08/24/18 0900          PT Assessment    Assessment Comments good tolerance with all ther ex this date, Pt had decrease in pain throughout tx, Pt able to maintain ROM with no loss, Pt continues mod antalgic gait throughout tx  -        PT Plan    PT Frequency 3x/week  -     PT Plan Comments MD note for 8-28  -       User Key  (r) = Recorded By, (t) = Taken By, (c) = Cosigned By    Initials Name Provider Type     Nestor Maradiaga PTA Physical Therapy Assistant            "     Modalities     Row Name 08/24/18 0800             Ice    Patient denies application of Ice Yes  -JH        User Key  (r) = Recorded By, (t) = Taken By, (c) = Cosigned By    Initials Name Provider Type    Nestor Villarreal PTA Physical Therapy Assistant                Exercises     Row Name 08/24/18 0800             Subjective Comments    Subjective Comments Pt reports she is hurting bad today, states she took her pain meds prior to PT  -         Subjective Pain    Able to rate subjective pain? yes  -JH      Pre-Treatment Pain Level 8  -JH      Post-Treatment Pain Level 1  -JH         Exercise 1    Exercise Name 1 Pro II LE's  -JH      Time 1 10 minutes  -JH      Additional Comments L 6.0  -JH         Exercise 2    Exercise Name 2 B St. HS S  -JH      Reps 2 2  -JH      Time 2 30 sec hold  -JH         Exercise 3    Exercise Name 3 L St. Lunge S  -JH      Reps 3 10  -JH      Time 3 10 sec hold  -JH         Exercise 4    Exercise Name 4 Shuttle 2L  -JH      Time 4 2'  -JH      Additional Comments 7 cords  -JH         Exercise 5    Exercise Name 5 Shuttle 1L  -JH      Time 5 2'  -JH      Additional Comments 7 cords  -JH         Exercise 6    Exercise Name 6 Stool Scoots  -JH      Reps 6 3 laps  -JH         Exercise 7    Exercise Name 7 MS  -JH      Sets 7 2  -JH      Reps 7 10  -JH         Exercise 8    Exercise Name 8 step ups fwd/lat  -JH      Sets 8 2  -JH      Reps 8 10  -JH      Additional Comments 6\" step  -JH         Exercise 9    Exercise Name 9 ecc step dwn  -JH      Sets 9 2  -JH      Reps 9 10  -JH      Additional Comments 6\" step  -JH         Exercise 10    Exercise Name 10 heel slides with strap  -JH      Sets 10 2  -JH      Reps 10 10  -JH        User Key  (r) = Recorded By, (t) = Taken By, (c) = Cosigned By    Initials Name Provider Type    Nestor Villarreal PTA Physical Therapy Assistant                               PT OP Goals     Row Name 08/24/18 0900          PT Short Term Goals    STG Date " to Achieve 08/09/18  -     STG 1 I with HEP and have additions/changes by next recertification.  -     STG 1 Progress Met  -     STG 2 AROM L knee 5° from extension or less.  -     STG 2 Progress Met  -     STG 3 AROM L knee flexion >= 95°.  -     STG 3 Progress Met  -     STG 4 Able to perform 20 SLR with no lag present.  -     STG 4 Progress Met  -     STG 5 No increase in circum measurements for joint effusion.  -     STG 5 Progress Ongoing  -        Long Term Goals    LTG Date to Achieve 08/31/18  -     LTG 1 AROm L knee 0°->=115°.  -     LTG 1 Progress Ongoing;Partially Met;Progressing  -     LTG 2 B LE 5/5  -     LTG 2 Progress Ongoing;Progressing;Not Met  -     LTG 3 Circum measurements for joint effusion within 2cm of R=L.  -     LTG 3 Progress Ongoing  -     LTG 4 Patient able to ambulate 1/4 mile non-antalgically with no assistive device and no increase in pain.  -     LTG 4 Progress Ongoing;Progressing;Not Met  -     LTG 5 Patient able to ambulate up/down 3 steps reciprocally HRA for balance x10 reps.  -     LTG 5 Progress Met  -     LTG 6 I with final HEP.  -[facility] 6 [facility] Ong[facility]ing  [facility] with a final HEP and free 30 day fitness formula membership.  -[facility] 7 Progress Ongoing  -        Time Calculation    PT Goal Re-Cert Due Date 08/31/18  -       User Key  (r) = Recorded By, (t) = Taken By, (c) = Cosigned By    Initials Name Provider Type     Nestor Maradiaga PTA Physical Therapy Assistant                         Time Calculation:   Start Time: 0847  Stop Time: 0941  Time Calculation (min): 54 min  Therapy Suggested Charges     Code   Minutes Charges    None           Therapy Charges for Today     Code Description Service Date Service Provider Modifiers Qty    21584522214 HC PT THER PROC EA 15 MIN 8/24/2018 Nestor Maradiaga PTA GP 4                    Nestor Maradiaga PTA  8/24/2018

## 2018-08-27 ENCOUNTER — HOSPITAL ENCOUNTER (OUTPATIENT)
Dept: PHYSICAL THERAPY | Facility: HOSPITAL | Age: 63
Setting detail: THERAPIES SERIES
Discharge: HOME | End: 2018-08-27

## 2018-08-27 DIAGNOSIS — Z96.652 STATUS POST TOTAL LEFT KNEE REPLACEMENT: Primary | ICD-10-CM

## 2018-08-27 PROCEDURE — G0283 ELEC STIM OTHER THAN WOUND: HCPCS

## 2018-08-27 PROCEDURE — 97110 THERAPEUTIC EXERCISES: CPT

## 2018-08-27 NOTE — THERAPY TREATMENT NOTE
Outpatient Physical Therapy Ortho Treatment Note  [facility] [place]  Katy Vital PTA       Patient Name: Mira Deal  : 1955  MRN: [id number]  Today's Date: 2018      Visit Date: 2018     Visits: [id number]  Insurance Visits Approved: 30  Recert Due: 2018  MD Appt: 2018  Pain: pretreatment 3/10; post treatment 0/10  Improvement: pt is subjectively reporting 60% improvement since initial evaluation    Visit Dx:    ICD-10-CM ICD-9-CM   1. Status post total left knee replacement Z96.652 V43.65       Patient Active Problem List   Diagnosis   • Essential hypertension   • Acquired hypothyroidism   • Obesity (BMI 30-39.9)   • Rectal tenesmus   • Proctitis   • Clostridium difficile infection   • Clostridium difficile colitis   • Screening for colon cancer   • Need for hepatitis C screening test   • Left foot pain   • Upper respiratory tract infection   • Left otitis media   • Acute recurrent pansinusitis   • Chronic pain of left knee   • Pre-operative clearance        Past Medical History:   Diagnosis Date   • Acute suppurative otitis media without spontaneous rupture of eardrum     right ear   • Benign essential hypertension    • Chronic osteoarthritis    • Diverticular disease of colon    • Dysfunction of eustachian tube     Will start OTC Nasal steroid      • Encounter for gynecological examination (general) (routine) without abnormal findings    • Encounter for screening for malignant neoplasm of breast    • Encounter for screening mammogram for malignant neoplasm of breast     16 negative      • Gastrointestinal hemorrhage    • Hypercholesterolemia    • Hyperlipidemia    • Hypothyroidism    • Left lower quadrant pain    • Mild depression (CMS/HCC)    • Nausea    • Obesity     Hyperinsulinar    • Obesity, unspecified    • Vertigo         Past Surgical History:   Procedure Laterality Date   • CARPAL TUNNEL RELEASE     • COLONOSCOPY      Dr Maria   •  FOREARM SURGERY  2011    /wrist   • INJECTION OF MEDICATION  05/12/2016    Kenalog (1)      • INJECTION OF MEDICATION  05/12/2016    Rocephin (1)      • INJECTION OF MEDICATION  05/12/2016    Zofran (1)      • KNEE CARTILAGE SURGERY  2012   • SPLENECTOMY  1972   • TOTAL KNEE ARTHROPLASTY Left 07/16/2018   • VAGINAL HYSTERECTOMY SALPINGO OOPHORECTOMY  1995    cerv cancer             PT Ortho     Row Name 08/27/18 0900       Subjective Comments    Subjective Comments states that she is having some pain today.   -       Subjective Pain    Able to rate subjective pain? yes  -    Pre-Treatment Pain Level 3  -    Post-Treatment Pain Level 0  -       Left Lower Ext    Lt Knee Extension/Flexion AROM 5-120°  -       General Assessment (Manual Muscle Testing)    Comment, General Manual Muscle Testing (MMT) Assessment L Quads 5/5, Hamstrings 5-/5; R Quads 5-/5, Hamstrings 5/5  -       RLE Quick Girth (cm)    Other 1 37.2 cm   circumference at joint line of knee  -       LLE Quick Girth (cm)    Other 1 40.2 cm   circumference at joint line of knee  -      User Key  (r) = Recorded By, (t) = Taken By, (c) = Cosigned By    Initials Name Provider Type     Katy Vital PTA Physical Therapy Assistant                            PT Assessment/Plan     Row Name 08/27/18 0800          PT Assessment    Assessment Comments MD note sent with patient. patient continues with an extensino deficit. has improved strength. has decreased edema but is still 3cm L to R difference.   -        PT Plan    PT Frequency 3x/week  -     PT Plan Comments next visit resume stool scoots  -       User Key  (r) = Recorded By, (t) = Taken By, (c) = Cosigned By    Initials Name Provider Type     Katy Vital PTA Physical Therapy Assistant                Modalities     Row Name 08/27/18 0900             Ice    Ice Applied Yes  -      Location left knee  -      Rx Minutes --   20 mins  -      Ice S/P Rx Yes  -          ELECTRICAL STIMULATION    Attended/Unattended Unattended  -      Stimulation Type IFC  -      Location/Electrode Placement/Other left knee  -      03988 - PT Electrical Stimulation (Manual) Minutes 20  -        User Key  (r) = Recorded By, (t) = Taken By, (c) = Cosigned By    Initials Name Provider Type     Katy Vital PTA Physical Therapy Assistant                Exercises     Row Name 08/27/18 0900             Subjective Comments    Subjective Comments states that she is having some pain today.   -         Subjective Pain    Able to rate subjective pain? yes  -      Pre-Treatment Pain Level 3  -      Post-Treatment Pain Level 0  -         Exercise 1    Exercise Name 1 Pro II LE's  -      Time 1 10 minutes  -      Additional Comments L 7.0  -         Exercise 2    Exercise Name 2 B St. HS S  -      Reps 2 2  -      Time 2 30 sec hold  -         Exercise 3    Exercise Name 3 L St. Lunge S  -      Reps 3 10  -      Time 3 10 sec hold  -         Exercise 4    Exercise Name 4 Shuttle 2L  -      Time 4 5 minutes  -      Additional Comments 7 cords  -         Exercise 5    Exercise Name 5 Track Walk   -      Reps 5 4 laps  -         Exercise 6    Exercise Name 6 Pbars Toe Walking  -      Reps 6 5 laps  -         Exercise 7    Exercise Name 7 Pbars Heel Walking  -      Reps 7 5 laps  -         Exercise 8    Exercise Name 8 Pre Gait Activity Heel Strike, Foot Flat, Toe off rocking fwd and backwards exagerating each component  -         Exercise 9    Exercise Name 9 Walking  -      Reps 9 300 feet  -         Exercise 10    Exercise Name 10 Reciprocal Stairs  -      Reps 10 10  -         Exercise 11    Exercise Name 11 Heel Prop   -      Time 11 5 minutes  -      Additional Comments 5# weight  -         Exercise 12    Exercise Name 12 MD Note  -        User Key  (r) = Recorded By, (t) = Taken By, (c) = Cosigned By    Initials Name Provider Type      Katy Vital PTA Physical Therapy Assistant                               PT OP Goals     Row Name 08/27/18 0800          PT Short Term Goals    STG Date to Achieve 08/09/18  -     STG 1 I with HEP and have additions/changes by next recertification.  -     STG 1 Progress Met  -     STG 2 AROM L knee 5° from extension or less.  -     STG 2 Progress Met  -     STG 3 AROM L knee flexion >= 95°.  -     STG 3 Progress Met  -     STG 4 Able to perform 20 SLR with no lag present.  -     STG 4 Progress Met  -     STG 5 No increase in circum measurements for joint effusion.  -     STG 5 Progress Ongoing  -        Long Term Goals    LTG Date to Achieve 08/31/18  -     LTG 1 AROm L knee 0°->=115°.  -     LTG 1 Progress Ongoing;Partially Met;Progressing  -     LTG 2 B LE 5/5  -     LTG 2 Progress Partially Met;Progressing  -     LTG 3 Circum measurements for joint effusion within 2cm of R=L.  -     LTG 3 Progress Ongoing  -     LTG 4 Patient able to ambulate 1/4 mile non-antalgically with no assistive device and no increase in pain.  -     LTG 4 Progress Partially Met;Ongoing  -     LTG 5 Patient able to ambulate up/down 3 steps reciprocally HRA for balance x10 reps.  -     LTG 5 Progress Met  -     LTG 6 I with final HEP.  -     LTG 6 Progress Ongoing  -     LTG 7 D/C with a final HEP and free 30 day fitness formula membership.  -     LTG 7 Progress Ongoing  -        Time Calculation    PT Goal Re-Cert Due Date 08/31/18  -       User Key  (r) = Recorded By, (t) = Taken By, (c) = Cosigned By    Initials Name Provider Type     Katy Vital PTA Physical Therapy Assistant          Therapy Education  Education Details: should be walking 5 minutes every waking hour without rest break.   Given: HEP, Symptoms/condition management, Pain management, Posture/body mechanics  Program: Reinforced  How Provided: Verbal  Provided to: Patient  Level of Understanding: Verbalized               Time Calculation:   Start Time: 0845  Stop Time: 1014  Time Calculation (min): 89 min  Total Timed Code Minutes- PT: 69 minute(s)    Therapy Charges for Today     Code Description Service Date Service Provider Modifiers Qty    21448084285 HC PT THER PROC EA 15 MIN 8/27/2018 Katy Vital, PTA GP 5    31186516710 HC PT THER SUPP EA 15 MIN 8/27/2018 Katy Vital PTA GP 1    [id number] HC PT ELECTRICAL STIM UNATTENDED 8/27/2018 Katy Vital PTA  1                    Katy Vital, SHELLY  8/27/2018

## 2018-08-29 ENCOUNTER — HOSPITAL ENCOUNTER (OUTPATIENT)
Dept: PHYSICAL THERAPY | Facility: HOSPITAL | Age: 63
Setting detail: THERAPIES SERIES
Discharge: HOME | End: 2018-08-29

## 2018-08-29 DIAGNOSIS — Z96.652 STATUS POST TOTAL LEFT KNEE REPLACEMENT: Primary | ICD-10-CM

## 2018-08-29 PROCEDURE — 97110 THERAPEUTIC EXERCISES: CPT

## 2018-08-29 PROCEDURE — 97110 THERAPEUTIC EXERCISES: CPT | Performed by: PHYSICAL THERAPIST

## 2018-08-29 PROCEDURE — G0283 ELEC STIM OTHER THAN WOUND: HCPCS

## 2018-08-29 NOTE — THERAPY PROGRESS REPORT/RE-CERT
Outpatient Physical Therapy Ortho Progress Note  [facility]  Eneida Ruth, PT, DPT, CSCS       Patient Name: Mira Deal  : 1955  MRN: [id number]  Today's Date: 2018      Visit Date: 2018     Pt reports 4/10 pain pre treatment, 0/10 pain post treatment  Reports 60% of improvement.  Attended 18/ visits.  Insurance available: 30 visits  Next MD appt: 6 weeks .  Recertification: 2018.    Visit Dx:    ICD-10-CM ICD-9-CM   1. Status post total left knee replacement Z96.652 V43.65       Patient Active Problem List   Diagnosis   • Essential hypertension   • Acquired hypothyroidism   • Obesity (BMI 30-39.9)   • Rectal tenesmus   • Proctitis   • Clostridium difficile infection   • Clostridium difficile colitis   • Screening for colon cancer   • Need for hepatitis C screening test   • Left foot pain   • Upper respiratory tract infection   • Left otitis media   • Acute recurrent pansinusitis   • Chronic pain of left knee   • Pre-operative clearance        Past Medical History:   Diagnosis Date   • Acute suppurative otitis media without spontaneous rupture of eardrum     right ear   • Benign essential hypertension    • Chronic osteoarthritis    • Diverticular disease of colon    • Dysfunction of eustachian tube     Will start OTC Nasal steroid      • Encounter for gynecological examination (general) (routine) without abnormal findings    • Encounter for screening for malignant neoplasm of breast    • Encounter for screening mammogram for malignant neoplasm of breast     16 negative      • Gastrointestinal hemorrhage    • Hypercholesterolemia    • Hyperlipidemia    • Hypothyroidism    • Left lower quadrant pain    • Mild depression (CMS/HCC)    • Nausea    • Obesity     Hyperinsulinar    • Obesity, unspecified    • Vertigo         Past Surgical History:   Procedure Laterality Date   • CARPAL TUNNEL RELEASE     • COLONOSCOPY      Dr Maria   •  FOREARM SURGERY  2011    /wrist   • INJECTION OF MEDICATION  05/12/2016    Kenalog (1)      • INJECTION OF MEDICATION  05/12/2016    Rocephin (1)      • INJECTION OF MEDICATION  05/12/2016    Zofran (1)      • KNEE CARTILAGE SURGERY  2012   • SPLENECTOMY  1972   • TOTAL KNEE ARTHROPLASTY Left 07/16/2018   • VAGINAL HYSTERECTOMY SALPINGO OOPHORECTOMY  1995    cerv cancer     Number of days off work: Off since surgery    Changes to medications: None noted.    Changes to MD orders: Continue PT, enphasize extension          PT Ortho     Row Name 08/29/18 1000       Left Lower Ext    Lt Knee Extension/Flexion AROM knee flexion 120° post treatment  -    Row Name 08/29/18 0900       Subjective Comments    Subjective Comments Patient reports she is doing okay. Reports she is trying to time her pain medications wit therapy appoints.  -       Precautions and Contraindications    Precautions/Limitations no known precautions/limitations  -       Subjective Pain    Able to rate subjective pain? yes  -    Pre-Treatment Pain Level 4  -    Post-Treatment Pain Level 0  -       Posture/Observations    Posture- WNL Posture is WNL   for B LEs  -    Posture/Observations Comments No acute distress, patient ambulates in with stiff gait.  -       Special Tests/Palpation    Special Tests/Palpation --   Mild global TTP  -AJ       Right Lower Ext    Rt Knee Extension/Flexion AROM 0°-125°  -AJ       Left Lower Ext    Lt Knee Extension/Flexion AROM 1°-113°  -AJ       MMT (Manual Muscle Testing)    General MMT Comments b LE 5/5, except L HS 5-/5  -AJ       Sensation    Sensation WNL? WFL  -AJ    Light Touch No apparent deficits  -AJ    Additional Comments Some numbness on lateral knee.  -AJ       Lower Extremity Flexibility    Hamstrings Left:;Mildly limited;Right:;WNL  -AJ       RLE Quick Girth (cm)    Other 1 39.7 cm  -AJ       LLE Quick Girth (cm)    Other 1 41.8 cm  -AJ       Transfers    Comment (Transfers) I with all  transfers, = WB B LE.  -       Gait/Stairs Assessment/Training    Comment (Gait/Stairs) FWB, stiff gait, no distress.  -    Row Name 08/27/18 0900       Subjective Comments    Subjective Comments states that she is having some pain today.   -       Subjective Pain    Able to rate subjective pain? yes  -    Pre-Treatment Pain Level 3  -    Post-Treatment Pain Level 0  -       Left Lower Ext    Lt Knee Extension/Flexion AROM 5-120°  -       RLE Quick Girth (cm)    Other 1 37.2 cm   circumference at joint line of knee  -       LLE Quick Girth (cm)    Other 1 40.2 cm   circumference at joint line of knee  -       Manual Muscle Testing (MMT)    Comment, General Manual Muscle Testing (MMT) Assessment L Quads 5/5, Hamstrings 5-/5; R Quads 5-/5, Hamstrings 5/5  -      User Key  (r) = Recorded By, (t) = Taken By, (c) = Cosigned By    Initials Name Provider Type     Katy Vital PTA Physical Therapy Assistant    Eneida Adame, PT Physical Therapist        Barriers to Rehab: Possible poor/questionable compliance with HEP and pushing ROM.      Safety Issues: None noted.            PT Assessment/Plan     Row Name 08/29/18 1100          PT Assessment    Functional Limitations Impaired gait;Impaired locomotion;Limitation in home management;Limitations in community activities;Performance in leisure activities;Performance in self-care ADL;Performance in work activities  -     Impairments Balance;Edema;Endurance;Gait;Impaired flexibility;Impaired muscle endurance;Impaired muscle length;Impaired muscle power;Range of motion;Pain;Muscle strength;Joint mobility  -     Assessment Comments Patint had improved ROM post rx. Strength/endurance good overall.  -     Rehab Potential Good  -     Patient/caregiver participated in establishment of treatment plan and goals Yes  -     Patient would benefit from skilled therapy intervention Yes  -        PT Plan    PT Frequency 3x/week  -      Predicted Duration of Therapy Intervention (Therapy Eval) 2-3 more weeks  -     Planned CPT's? PT RE-EVAL: 96263;PT THER PROC EA 15 MIN: 01166;PT THER ACT EA 15 MIN: 28139;PT MANUAL THERAPY EA 15 MIN: 74455;PT ELECTRICAL STIM UNATTEND: ;PT THER SUPP EA 15 MIN  -     Physical Therapy Interventions (Optional Details) balance training;gait training;gross motor skills;home exercise program;joint mobilization;manual therapy techniques;patient/family education;ROM (Range of Motion);strengthening;stretching  -     PT Plan Comments Concentrate on extension and smoothing gait pattern  -       User Key  (r) = Recorded By, (t) = Taken By, (c) = Cosigned By    Initials Name Provider Type    Eneida Adame, PT Physical Therapist       Other therapeutic activities and/or exercises will be prescribed depending on the patients progress or lack there of.          Modalities     Row Name 08/29/18 0900             Ice    Ice Applied Yes  -      Location left knee  -      Rx Minutes 15 mins  -      Ice S/P Rx Yes  -         ELECTRICAL STIMULATION    Attended/Unattended Unattended  -      Stimulation Type IFC  -      Location/Electrode Placement/Other left knee  -      27060 - PT Electrical Stimulation (Manual) Minutes 15  -        User Key  (r) = Recorded By, (t) = Taken By, (c) = Cosigned By    Initials Name Provider Type     Katy Vital PTA Physical Therapy Assistant                Exercises     Row Name 08/29/18 0900             Subjective Comments    Subjective Comments Patient reports she is doing okay. Reports she is trying to time her pain medications wit therapy appoints.  -         Subjective Pain    Able to rate subjective pain? yes  -      Pre-Treatment Pain Level 4  -      Post-Treatment Pain Level 0  -         Exercise 1    Exercise Name 1 Pro II LE's  -      Time 1 10 minutes  -      Additional Comments L 6.5  -         Exercise 2    Exercise Name 2 B St. HS S   "-AJ      Reps 2 2  -AJ      Time 2 30 sec hold  -AJ         Exercise 3    Exercise Name 3 L St. Lunge S  -AJ      Reps 3 10  -AJ      Time 3 10 sec hold  -AJ         Exercise 4    Exercise Name 4 Crank board  -      Time 4 5 minutes  -AJ         Exercise 5    Exercise Name 5 Prone hang with 3# cuff weight  -      Time 5 5 minutes  -AJ      Additional Comments 3# cuff weight  -AJ         Exercise 6    Exercise Name 6 Pbars heel walking  -AJ      Reps 6 5 laps  -         Exercise 7    Exercise Name 7 Heel slides with strap  -AJ      Reps 7 20  -AJ      Time 7 5\" holds  -AJ         Exercise 8    Exercise Name 8 L St. Lunge S  -      Reps 8 10  -      Time 8 10 sec hold  -         Exercise 9    Exercise Name 9 Shuttle 2L  -      Time 9 5 minutes  -      Additional Comments 7 cords  -         Exercise 10    Exercise Name 10 Shuttle 1 L  -      Time 10 5 minutes  -      Additional Comments 7 cords  -MH         Exercise 11    Exercise Name 11 ambulation  -      Reps 11 300 feet  -        User Key  (r) = Recorded By, (t) = Taken By, (c) = Cosigned By    Initials Name Provider Type     Katy Vital, PTA Physical Therapy Assistant    AJ Eneida Ruth, PT Physical Therapist                               PT OP Goals     Row Name 08/29/18 1000          PT Short Term Goals    STG Date to Achieve 08/09/18  -     STG 1 I with HEP and have additions/changes by next recertification.  -     STG 1 Progress Met  -     STG 2 AROM L knee 5° from extension or less.  -     STG 2 Progress Met  -     STG 3 AROM L knee flexion >= 95°.  -     STG 3 Progress Met  -     STG 4 Able to perform 20 SLR with no lag present.  -     STG 4 Progress Met  -     STG 5 No increase in circum measurements for joint effusion.  -     STG 5 Progress Met  -        Long Term Goals    LTG Date to Achieve 08/31/18  -     LTG 1 AROm L knee 0°->=115°.  -     LTG 1 Progress Ongoing;Partially Met;Progressing "  -     LTG 2 B LE 5/5  -     LTG 2 Progress Partially Met;Progressing  -     LTG 3 Circum measurements for joint effusion within 2cm of R=L.  -     [facility] 3 [facility] able to ambulate 1/4 mile non-antalgically with no assistive device and no increase in pain.  -     LTG 4 Progress Partially Met;Ongoing  -     LTG 5 Patient able to ambulate up/down 3 steps reciprocally HRA for balance x10 reps.  -     LTG 5 Progress Met  -     LT 6 I with final HEP.  -     LTG 6 Progress Ongoing  -     LTG 7 D/C with a final HEP and free 30 day fitness formula membership.  -     LTG 7 Progress Ongoing  -        Time Calculation    PT Goal Re-Cert Due Date 09/19/18  -       User Key  (r) = Recorded By, (t) = Taken By, (c) = Cosigned By    Initials Name Provider Type    Eneida Adame, PT Physical Therapist          Therapy Education  Given: HEP, Symptoms/condition management, Pain management, Posture/body mechanics (POC)  Program: Reinforced  How Provided: Verbal, Demonstration  Provided to: Patient, Caregiver  Level of Understanding: Verbalized, Demonstrated    Outcome Measure Options: Lower Extremity Functional Scale (LEFS)  Lower Extremity Functional Index  Any of your usual work, housework or school activities: A little bit of difficulty  Your usual hobbies, recreational or sporting activities: A little bit of difficulty  Getting into or out of the bath: No difficulty  Walking between rooms: No difficulty  Putting on your shoes or socks: No difficulty  Squatting: No difficulty  Lifting an object, like a bag of groceries from the floor: No difficulty  Performing light activities around your home: No difficulty  Performing heavy activities around your home: A little bit of difficulty  Getting into or out of a car: No difficulty  Walking 2 blocks: No difficulty  Walking a mile: No difficulty  Going up or down 10 stairs (about 1 flight of stairs): A little bit of  difficulty  Standing for 1 hour: A little bit of difficulty  Sitting for 1 hour: No difficulty  Running on even ground: Quite a bit of difficulty  Running on uneven ground: Quite a bit of difficulty  Making sharp turns while running fast: Quite a bit of difficulty  Hopping: Quite a bit of difficulty  Rolling over in bed: No difficulty  Total: 63      Time Calculation:   Start Time: 0932  Stop Time: 1100  Time Calculation (min): 88 min  Total Timed Code Minutes- PT: 73 minute(s)    Therapy Charges for Today     Code Description Service Date Service Provider Modifiers Qty    31708815250 HC PT THER PROC EA 15 MIN 8/29/2018 Eneida Ruth, PT GP 3    59063412640 HC PT THER SUPP EA 15 MIN 8/29/2018 Eneida Ruth, PT GP 1      x2 Ther Proc-MH  x1 Ther Sup-MH  x1 Electrical stim unattended-  Katy Vital, PTA          PT G-Codes  Outcome Measure Options: Lower Extremity Functional Scale (LEFS)         Eneida Ruth PT, DPT, CSCS  8/29/2018

## 2018-08-30 ENCOUNTER — HOSPITAL ENCOUNTER (OUTPATIENT)
Dept: PHYSICAL THERAPY | Facility: HOSPITAL | Age: 63
Setting detail: THERAPIES SERIES
Discharge: HOME | End: 2018-08-30

## 2018-08-30 DIAGNOSIS — Z96.652 STATUS POST TOTAL LEFT KNEE REPLACEMENT: Primary | ICD-10-CM

## 2018-08-30 PROCEDURE — G0283 ELEC STIM OTHER THAN WOUND: HCPCS

## 2018-08-30 PROCEDURE — 97110 THERAPEUTIC EXERCISES: CPT

## 2018-09-04 ENCOUNTER — HOSPITAL ENCOUNTER (OUTPATIENT)
Dept: PHYSICAL THERAPY | Facility: HOSPITAL | Age: 63
Setting detail: THERAPIES SERIES
Discharge: HOME | End: 2018-09-04

## 2018-09-04 DIAGNOSIS — Z96.652 STATUS POST TOTAL LEFT KNEE REPLACEMENT: Primary | ICD-10-CM

## 2018-09-04 PROCEDURE — G0283 ELEC STIM OTHER THAN WOUND: HCPCS

## 2018-09-04 PROCEDURE — 97110 THERAPEUTIC EXERCISES: CPT

## 2018-09-04 NOTE — THERAPY TREATMENT NOTE
Outpatient Physical Therapy Ortho Treatment Note  [facility]     Patient Name: Mira Deal  : 1955  MRN: [id number]  Today's Date: 2018      Visit Date: 2018  Pt reports 3/10 pain pre treatment, 0/10 pain post treatment  Reports 60% of improvement.  Attended 20/21 visits.  Insurance available:30 visits   Next MD appt: 6 weeks .  Recertification: 2018.  Visit Dx:    ICD-10-CM ICD-9-CM   1. Status post total left knee replacement Z96.652 V43.65       Patient Active Problem List   Diagnosis   • Essential hypertension   • Acquired hypothyroidism   • Obesity (BMI 30-39.9)   • Rectal tenesmus   • Proctitis   • Clostridium difficile infection   • Clostridium difficile colitis   • Screening for colon cancer   • Need for hepatitis C screening test   • Left foot pain   • Upper respiratory tract infection   • Left otitis media   • Acute recurrent pansinusitis   • Chronic pain of left knee   • Pre-operative clearance        Past Medical History:   Diagnosis Date   • Acute suppurative otitis media without spontaneous rupture of eardrum     right ear   • Benign essential hypertension    • Chronic osteoarthritis    • Diverticular disease of colon    • Dysfunction of eustachian tube     Will start OTC Nasal steroid      • Encounter for gynecological examination (general) (routine) without abnormal findings    • Encounter for screening for malignant neoplasm of breast    • Encounter for screening mammogram for malignant neoplasm of breast     16 negative      • Gastrointestinal hemorrhage    • Hypercholesterolemia    • Hyperlipidemia    • Hypothyroidism    • Left lower quadrant pain    • Mild depression (CMS/HCC)    • Nausea    • Obesity     Hyperinsulinar    • Obesity, unspecified    • Vertigo         Past Surgical History:   Procedure Laterality Date   • CARPAL TUNNEL RELEASE     • COLONOSCOPY      Dr Maria   • FOREARM SURGERY  2011    /wrist   • INJECTION  OF MEDICATION  05/12/2016    Kenalog (1)      • INJECTION OF MEDICATION  05/12/2016    Rocephin (1)      • INJECTION OF MEDICATION  05/12/2016    Zofran (1)      • KNEE CARTILAGE SURGERY  2012   • SPLENECTOMY  1972   • TOTAL KNEE ARTHROPLASTY Left 07/16/2018   • VAGINAL HYSTERECTOMY SALPINGO OOPHORECTOMY  1995    cerv cancer             PT Ortho     Row Name 09/04/18 0930       Subjective Comments    Subjective Comments Pt reports that her pain is 3/10 today. Pt walking in muscle guarding the left leg.  -TL       Subjective Pain    Able to rate subjective pain? yes  -TL    Pre-Treatment Pain Level 3  -TL       Left Lower Ext    Lt Knee Extension/Flexion AROM 0-115 arom  -TL      User Key  (r) = Recorded By, (t) = Taken By, (c) = Cosigned By    Initials Name Provider Type    TL Kailyn Maharaj PTA Physical Therapy Assistant                                Modalities     Row Name 09/04/18 0930             Ice    Ice Applied Yes  -TL      Location left knee  -TL      Rx Minutes 15 mins  -TL      Ice S/P Rx Yes  -TL         ELECTRICAL STIMULATION    Attended/Unattended Unattended  -TL      Location/Electrode Placement/Other left knee  -TL      08880 - PT Electrical Stimulation (Manual) Minutes 15  -TL        User Key  (r) = Recorded By, (t) = Taken By, (c) = Cosigned By    Initials Name Provider Type    TL Kailyn Maharaj PTA Physical Therapy Assistant                Exercises     Row Name 09/04/18 0930             Subjective Comments    Subjective Comments Pt reports that her pain is 3/10 today. Pt walking in muscle guarding the left leg.  -TL         Subjective Pain    Able to rate subjective pain? yes  -TL      Pre-Treatment Pain Level 3  -TL         Exercise 1    Exercise Name 1 Pro II LE's  -TL      Time 1 10 mins  -TL      Additional Comments level 7.0  -TL         Exercise 2    Exercise Name 2 B St. HS S  -TL      Reps 2 2  -TL      Time 2 30 sec hold  -TL         Exercise 3    Exercise Name 3 incline  -TL       Reps 3 2  -TL      Time 3 30 sec hold  -TL         Exercise 4    Exercise Name 4 Crank board  -TL      Time 4 5 mins  -TL         Exercise 5    Exercise Name 5 Prone hang with cuff weight  -TL      Time 5 5 mins  -TL      Additional Comments 4# cuff weight  -TL         Exercise 6    Exercise Name 6 step over lg and sm hurdles without supports  -TL      Reps 6 5 laps  -TL         Exercise 7    Exercise Name 7 retro gait  -TL      Reps 7 5 laps  -TL         Exercise 8    Exercise Name 8 heel to toe gait  -TL      Reps 8 5 laps  -TL         Exercise 9    Exercise Name 9 walk 1/4mile track  -TL         Exercise 10    Exercise Name 10 ecc step downs  -TL         Exercise 11    Exercise Name 11 see measurements  -TL        User Key  (r) = Recorded By, (t) = Taken By, (c) = Cosigned By    Initials Name Provider Type    Kailyn Summers, PTA Physical Therapy Assistant                               PT OP Goals     Row Name 09/04/18 0930          PT Short Term Goals    STG Date to Achieve 08/09/18  -TL     STG 1 I with HEP and have additions/changes by next recertification.  -TL     STG 1 Progress Met  -TL     STG 2 AROM L knee 5° from extension or less.  -TL     STG 2 Progress Met  -TL     STG 3 AROM L knee flexion >= 95°.  -TL     STG 3 Progress Met  -TL     STG 4 Able to perform 20 SLR with no lag present.  -TL     STG 4 Progress Met  -TL     STG 5 No increase in circum measurements for joint effusion.  -TL     STG 5 Progress Met  -TL        Long Term Goals    LTG Date to Achieve 08/31/18  -TL     LTG 1 AROm L knee 0°->=115°.  -TL     LTG 1 Progress Met  -TL     LTG 2 B LE 5/5  -TL     LTG 2 Progress Partially Met;Progressing  -TL     LTG 3 Circum measurements for joint effusion within 2cm of R=L.  -TL     LTG 3 Progress Met  -TL     LTG 4 Patient able to ambulate 1/4 mile non-antalgically with no assistive device and no increase in pain.  -TL     LTG 4 Progress Partially Met;Ongoing  -TL     LTG 5 Patient able to  ambulate up/down 3 steps reciprocally HRA for balance x10 reps.  -TL     LTG 5 Progress Met  -TL     LTG 6 I with final HEP.  -TL     LTG 6 Progress Ongoing  -TL     LTG 7 D/C with a final HEP and free 30 day fitness formula membership.  -TL     LTG 7 Progress Ongoing  -TL        Time Calculation    PT Goal Re-Cert Due Date 09/19/18  -TL       User Key  (r) = Recorded By, (t) = Taken By, (c) = Cosigned By    Initials Name Provider Type    Kailyn Summers, SHELLY Physical Therapy Assistant                         Time Calculation:   Start Time: 0930  Stop Time: 1040  Time Calculation (min): 70 min  Total Timed Code Minutes- PT: 50 minute(s)  Therapy Suggested Charges     Code   Minutes Charges    91321 (CPT®) Hc Pt Neuromusc Re Education Ea 15 Min      64933 (CPT®) Hc Pt Ther Proc Ea 15 Min      33590 (CPT®) Hc Gait Training Ea 15 Min      97999 (CPT®) Hc Pt Therapeutic Act Ea 15 Min      21345 (CPT®) Hc Pt Manual Therapy Ea 15 Min      56061 (CPT®) Hc Pt Ther Massage- Per 15 Min      25583 (CPT®) Hc Pt Iontophoresis Ea 15 Min      24551 (CPT®) Hc Pt Elec Stim Ea-Per 15 Min 15 1    35546 (CPT®) Hc Pt Ultrasound Ea 15 Min      30737 (CPT®) Hc Pt Self Care/Mgmt/Train Ea 15 Min      66771 (CPT®) Hc Pt Prosthetic (S) Train Initial Encounter, Each 15 Min      58181 (CPT®) Hc Orthotic(S) Mgmt/Train Initial Encounter, Each 15min      27080 (CPT®) Hc Pt Aquatic Therapy Ea 15 Min      38850 (CPT®) Hc Pt Orthotic(S)/Prosthetic(S) Encounter, Each 15 Min      Total  15 1        Therapy Charges for Today     Code Description Service Date Service Provider Modifiers Qty    23569334623 HC PT THER PROC EA 15 MIN 9/4/2018 Kailyn Maharaj PTA GP 3    39917739466 HC PT ELECTRICAL STIM UNATTENDED 9/4/2018 Kailyn Maharaj, PTA  1                    Kailyn Maharaj PTA  9/4/2018

## 2018-09-05 ENCOUNTER — HOSPITAL ENCOUNTER (OUTPATIENT)
Dept: PHYSICAL THERAPY | Facility: HOSPITAL | Age: 63
Setting detail: THERAPIES SERIES
Discharge: HOME | End: 2018-09-05

## 2018-09-05 DIAGNOSIS — Z96.652 STATUS POST TOTAL LEFT KNEE REPLACEMENT: Primary | ICD-10-CM

## 2018-09-05 PROCEDURE — G0283 ELEC STIM OTHER THAN WOUND: HCPCS

## 2018-09-05 PROCEDURE — 97110 THERAPEUTIC EXERCISES: CPT

## 2018-09-05 NOTE — THERAPY TREATMENT NOTE
Outpatient Physical Therapy Ortho Treatment Note  [facility]  Ashwini Quinn ATC       Patient Name: Mira Deal  : 1955  MRN: [id number]  Today's Date: 2018      Visit Date: 2018  Pt reports 0/10 pain pre treatment, 0/10 pain post treatment  Reports 60% of improvement.  Attended 21/22 visits.  Insurance available: 30  Next MD appt: .  Recertification: 2018.  Visit Dx:    ICD-10-CM ICD-9-CM   1. Status post total left knee replacement Z96.652 V43.65       Patient Active Problem List   Diagnosis   • Essential hypertension   • Acquired hypothyroidism   • Obesity (BMI 30-39.9)   • Rectal tenesmus   • Proctitis   • Clostridium difficile infection   • Clostridium difficile colitis   • Screening for colon cancer   • Need for hepatitis C screening test   • Left foot pain   • Upper respiratory tract infection   • Left otitis media   • Acute recurrent pansinusitis   • Chronic pain of left knee   • Pre-operative clearance        Past Medical History:   Diagnosis Date   • Acute suppurative otitis media without spontaneous rupture of eardrum     right ear   • Benign essential hypertension    • Chronic osteoarthritis    • Diverticular disease of colon    • Dysfunction of eustachian tube     Will start OTC Nasal steroid      • Encounter for gynecological examination (general) (routine) without abnormal findings    • Encounter for screening for malignant neoplasm of breast    • Encounter for screening mammogram for malignant neoplasm of breast     16 negative      • Gastrointestinal hemorrhage    • Hypercholesterolemia    • Hyperlipidemia    • Hypothyroidism    • Left lower quadrant pain    • Mild depression (CMS/HCC)    • Nausea    • Obesity     Hyperinsulinar    • Obesity, unspecified    • Vertigo         Past Surgical History:   Procedure Laterality Date   • CARPAL TUNNEL RELEASE     • COLONOSCOPY      Dr Maria   • FOREARM SURGERY      /wrist    • INJECTION OF MEDICATION  05/12/2016    Kenalog (1)      • INJECTION OF MEDICATION  05/12/2016    Rocephin (1)      • INJECTION OF MEDICATION  05/12/2016    Zofran (1)      • KNEE CARTILAGE SURGERY  2012   • SPLENECTOMY  1972   • TOTAL KNEE ARTHROPLASTY Left 07/16/2018   • VAGINAL HYSTERECTOMY SALPINGO OOPHORECTOMY  1995    cerv cancer             PT Ortho     Row Name 09/05/18 0900       Subjective Comments    Subjective Comments (P)  reports that she ishaving no pain and is finally feeling as though she is getting back to normal  -HB       Subjective Pain    Able to rate subjective pain? (P)  yes  -HB    Pre-Treatment Pain Level (P)  0  -HB       Posture/Observations    Posture/Observations Comments (P)  No acute distress, improved smoother gait  -    Row Name 09/04/18 0930       Subjective Comments    Subjective Comments Pt reports that her pain is 3/10 today. Pt walking in muscle guarding the left leg.  -TL       Subjective Pain    Able to rate subjective pain? yes  -TL    Pre-Treatment Pain Level 3  -TL       Left Lower Ext    Lt Knee Extension/Flexion AROM 0-115 arom  -TL      User Key  (r) = Recorded By, (t) = Taken By, (c) = Cosigned By    Initials Name Provider Type     Ashwini Quinn, ATC     TL Kailyn Maharaj, PTA Physical Therapy Assistant                            PT Assessment/Plan     Row Name 09/05/18 1000          PT Assessment    Assessment Comments (P)  did well iwth blance activities. maintianed 0 degrees of extension throughout therex and from previous visit  -        PT Plan    PT Frequency (P)  3x/week  -     PT Plan Comments (P)  cont to progress balance activiites  -       User Key  (r) = Recorded By, (t) = Taken By, (c) = Cosigned By    Initials Name Provider Type     Ashwini Quinn, ATC                 Modalities     Row Name 09/05/18 0900             Ice    Ice Applied (P)  Yes  -HB      Location (P)  left knee  -HB      Rx Minutes (P)  15  "mins  -HB      Ice S/P Rx (P)  Yes  -HB         ELECTRICAL STIMULATION    Attended/Unattended (P)  Unattended  -HB      Stimulation Type (P)  IFC  -HB      Location/Electrode Placement/Other (P)  left knee  -HB      79381 - PT Electrical Stimulation (Manual) Minutes (P)  15  -HB        User Key  (r) = Recorded By, (t) = Taken By, (c) = Cosigned By    Initials Name Provider Type    HB Ashwini Quinn, ATC                 Exercises     Row Name 09/05/18 0900             Subjective Comments    Subjective Comments (P)  reports that she ishaving no pain and is finally feeling as though she is getting back to normal  -HB         Subjective Pain    Able to rate subjective pain? (P)  yes  -HB      Pre-Treatment Pain Level (P)  0  -HB         Exercise 1    Exercise Name 1 (P)  Pro II LE's  -HB      Time 1 (P)  10 mins  -HB      Additional Comments (P)  L 7.0  -HB         Exercise 2    Exercise Name 2 (P)  B St. HS S  -HB      Reps 2 (P)  2  -HB      Time 2 (P)  30 sec hold  -HB         Exercise 3    Exercise Name 3 (P)  incline  -HB      Reps 3 (P)  2  -HB      Time 3 (P)  30 sec hold  -HB         Exercise 4    Exercise Name 4 (P)  Fwd/Lat/ecc step ups   -HB      Reps 4 (P)  20  -HB      Additional Comments (P)  6 \" step  -HB         Exercise 5    Exercise Name 5 (P)  hurdles sm/lg  -HB      Reps 5 (P)  5 laps  -HB         Exercise 6    Exercise Name 6 (P)  retro gait   -HB      Reps 6 (P)  5 laps  -HB         Exercise 7    Exercise Name 7 (P)  track walk   -HB      Reps 7 (P)  4 laps  -HB         Exercise 8    Exercise Name 8 (P)  airex MS plyotoss   -HB      Reps 8 (P)  20  -HB      Additional Comments (P)  4 pounds  -HB         Exercise 9    Exercise Name 9 (P)  Airex SLS plyotoss  -HB      Reps 9 (P)  20  -HB      Additional Comments (P)  4 pounds  -HB         Exercise 10    Exercise Name 10 (P)  dynadisc heismans  -HB      Reps 10 (P)  20  -HB        User Key  (r) = Recorded By, (t) = Taken By, (c) = " Cosigned By    Initials Name Provider Type    Ashwini Prince, ATC                                PT OP Goals     Row Name 09/05/18 0900          PT Short Term Goals    STG Date to Achieve (P)  08/09/18  -HB     STG 1 (P)  I with HEP and have additions/changes by next recertification.  -HB     STG 1 Progress (P)  Met  -HB     STG 2 (P)  AROM L knee 5° from extension or less.  -HB     STG 2 Progress (P)  Met  -HB     STG 3 (P)  AROM L knee flexion >= 95°.  -HB     STG 3 Progress (P)  Met  -HB     STG 4 (P)  Able to perform 20 SLR with no lag present.  -HB     STG 4 Progress (P)  Met  -HB     STG 5 (P)  No increase in circum measurements for joint effusion.  -HB     STG 5 Progress (P)  Met  -HB        Long Term Goals    LTG Date to Achieve (P)  08/31/18  -HB     LTG 1 (P)  AROm L knee 0°->=115°.  -HB     LTG 1 Progress (P)  Met  -HB     LTG 2 (P)  B LE 5/5  -HB     LTG 2 Progress (P)  Partially Met;Progressing  -HB     LTG 3 (P)  Circum measurements for joint effusion within 2cm of R=L.  -HB     LTG 3 Progress (P)  Met  -HB     LTG 4 (P)  Patient able to ambulate 1/4 mile non-antalgically with no assistive device and no increase in pain.  -HB     LTG 4 Progress (P)  Partially Met;Ongoing  -HB     LTG 5 (P)  Patient able to ambulate up/down 3 steps reciprocally HRA for balance x10 reps.  -HB     LTG 5 Progress (P)  Met  -HB     LTG 6 (P)  I with final HEP.  -HB     LTG 6 Progress (P)  Ongoing  -HB     LTG 7 (P)  D/C with a final HEP and free 30 day fitness formula membership.  -HB     LTG 7 Progress (P)  Ongoing  -HB        Time Calculation    PT Goal Re-Cert Due Date (P)  09/19/18  -HB       User Key  (r) = Recorded By, (t) = Taken By, (c) = Cosigned By    Initials Name Provider Type    Ashwini Prince, ATC                          Time Calculation:   Start Time: (P) 0920  Stop Time: (P) 1025  Time Calculation (min): (P) 65 min  Total Timed Code Minutes- PT: (P) 50  minute(s)      Therapy Charges for Today     Code Description Service Date Service Provider Modifiers Qty    39871678971 HC PT THER PROC EA 15 MIN 9/5/2018 Ashwini Quinn, ATC  3    91572064335 HC PT THER SUPP EA 15 MIN 9/5/2018 Ashwini Quinn, ATC  1    [id number]  PT ELECTRICAL STIM UNATTENDED 9/5/2018 Ashwini Quinn, ATC  1                    Ashwini Quinn ATC  9/5/2018

## 2018-09-07 ENCOUNTER — HOSPITAL ENCOUNTER (OUTPATIENT)
Dept: PHYSICAL THERAPY | Facility: HOSPITAL | Age: 63
Setting detail: THERAPIES SERIES
Discharge: HOME | End: 2018-09-07

## 2018-09-07 DIAGNOSIS — Z96.652 STATUS POST TOTAL LEFT KNEE REPLACEMENT: Primary | ICD-10-CM

## 2018-09-07 PROCEDURE — 97110 THERAPEUTIC EXERCISES: CPT

## 2018-09-07 NOTE — THERAPY TREATMENT NOTE
Outpatient Physical Therapy Ortho Treatment Note  [facility]     Patient Name: Mira Deal  : 1955  MRN: [id number]  Today's Date: 2018      Visit Date: 2018  Pt reports 0/10 pain pre treatment, 0/10 pain post treatment  Reports 60% of improvement.  Attended 22/23 visits.  Insurance available: 30 visits  Next MD appt:6 weeks .  Recertification: 2018.  Visit Dx:    ICD-10-CM ICD-9-CM   1. Status post total left knee replacement Z96.652 V43.65       Patient Active Problem List   Diagnosis   • Essential hypertension   • Acquired hypothyroidism   • Obesity (BMI 30-39.9)   • Rectal tenesmus   • Proctitis   • Clostridium difficile infection   • Clostridium difficile colitis   • Screening for colon cancer   • Need for hepatitis C screening test   • Left foot pain   • Upper respiratory tract infection   • Left otitis media   • Acute recurrent pansinusitis   • Chronic pain of left knee   • Pre-operative clearance        Past Medical History:   Diagnosis Date   • Acute suppurative otitis media without spontaneous rupture of eardrum     right ear   • Benign essential hypertension    • Chronic osteoarthritis    • Diverticular disease of colon    • Dysfunction of eustachian tube     Will start OTC Nasal steroid      • Encounter for gynecological examination (general) (routine) without abnormal findings    • Encounter for screening for malignant neoplasm of breast    • Encounter for screening mammogram for malignant neoplasm of breast     16 negative      • Gastrointestinal hemorrhage    • Hypercholesterolemia    • Hyperlipidemia    • Hypothyroidism    • Left lower quadrant pain    • Mild depression (CMS/HCC)    • Nausea    • Obesity     Hyperinsulinar    • Obesity, unspecified    • Vertigo         Past Surgical History:   Procedure Laterality Date   • CARPAL TUNNEL RELEASE     • COLONOSCOPY      Dr Maria   • FOREARM SURGERY  2011    /wrist   • INJECTION  OF MEDICATION  05/12/2016    Kenalog (1)      • INJECTION OF MEDICATION  05/12/2016    Rocephin (1)      • INJECTION OF MEDICATION  05/12/2016    Zofran (1)      • KNEE CARTILAGE SURGERY  2012   • SPLENECTOMY  1972   • TOTAL KNEE ARTHROPLASTY Left 07/16/2018   • VAGINAL HYSTERECTOMY SALPINGO OOPHORECTOMY  1995    cerv cancer             PT Ortho     Row Name 09/07/18 0900       Subjective Comments    Subjective Comments Pt reported that she fell Tuesday or Wednesday. Pt did not go to the doctor . Pt stated that she feel off a rolling chair. Pt denies pain but states that she feels numb.  -TL       Subjective Pain    Able to rate subjective pain? yes  -TL    Pre-Treatment Pain Level --   pt stated that she feels numb.   -TL    Row Name 09/05/18 0900       Subjective Comments    Subjective Comments (P)  reports that she ishaving no pain and is finally feeling as though she is getting back to normal  -HB       Subjective Pain    Able to rate subjective pain? (P)  yes  -HB    Pre-Treatment Pain Level (P)  0  -HB       Posture/Observations    Posture/Observations Comments (P)  No acute distress, improved smoother gait  -HB      User Key  (r) = Recorded By, (t) = Taken By, (c) = Cosigned By    Initials Name Provider Type    HB Ashwini Quinn, ATC     TL Kailyn Maharaj, PTA Physical Therapy Assistant                            PT Assessment/Plan     Row Name 09/07/18 1000          PT Assessment    Assessment Comments Pt reported that she fell getting into a rolling chair. No injuries reported. Pt did not go to the doctor. Pt reports that she was sore yesterday. PTA recommended to stay away from chairs that roll. pt verbalized understanding of instructions. No increase swelling. pt maintaining ROM with the left knee.   No new goals met at this time.  -TL        PT Plan    PT Frequency 3x/week  -TL     PT Plan Comments Progress to d/c soon.  -TL       User Key  (r) = Recorded By, (t) = Taken By, (c) =  Cosigned By    Initials Name Provider Type    TL Nicko Kailyn COLLINS PTA Physical Therapy Assistant                Modalities     Row Name 09/07/18 0900             Ice    Ice Applied Yes  -TL      Location both knees  -TL      Rx Minutes 15 mins  -TL      Ice S/P Rx Yes  -TL        User Key  (r) = Recorded By, (t) = Taken By, (c) = Cosigned By    Initials Name Provider Type    TL Kailyn MaharajSHELLY Physical Therapy Assistant                Exercises     Row Name 09/07/18 0900             Subjective Comments    Subjective Comments Pt reported that she fell Tuesday or Wednesday. Pt did not go to the doctor . Pt stated that she feel off a rolling chair. Pt denies pain but states that she feels numb.  -TL         Subjective Pain    Able to rate subjective pain? yes  -TL      Pre-Treatment Pain Level --   pt stated that she feels numb.   -TL         Exercise 1    Exercise Name 1 Pro II LE's  -TL      Time 1 10 mins  -TL      Additional Comments level 7  -TL         Exercise 2    Exercise Name 2 B St. HS S  -TL      Reps 2 2  -TL      Time 2 30 sec hold  -TL         Exercise 3    Exercise Name 3 incline  -TL      Reps 3 2  -TL      Time 3 30 sec hold  -TL         Exercise 4    Exercise Name 4 Fwd/Lat/ecc step ups   -TL      Reps 4 20  -TL         Exercise 5    Exercise Name 5 step down ecc  -TL      Reps 5 20  -TL         Exercise 6    Exercise Name 6 sharon disc Heisman step  -TL      Reps 6 20  -TL         Exercise 7    Exercise Name 7 airex MS plyotoss  -TL      Reps 7 20  -TL      Additional Comments 4 pound ball  -TL         Exercise 8    Exercise Name 8 airex SLS plyotoss  -TL      Reps 8 20   with some unsteadinsess. SLS with left wb leg  -TL      Additional Comments 4 pound ball  -TL         Exercise 9    Exercise Name 9 fwd step over lg and sm hurdles  -TL      Reps 9 5 laps  -TL         Exercise 10    Exercise Name 10 side stepping over lg and sm hurdles  -TL      Reps 10 5mins  -TL        User Key  (r) =  Recorded By, (t) = Taken By, (c) = Cosigned By    Initials Name Provider Type    Kailyn Summers PTA Physical Therapy Assistant                               PT OP Goals     Row Name 09/07/18 0900          PT Short Term Goals    STG Date to Achieve 08/09/18  -TL     STG 1 I with HEP and have additions/changes by next recertification.  -TL     STG 1 Progress Met  -TL     STG 2 AROM L knee 5° from extension or less.  -TL     STG 2 Progress Met  -TL     STG 3 AROM L knee flexion >= 95°.  -TL     STG 3 Progress Met  -TL     STG 4 Able to perform 20 SLR with no lag present.  -TL     STG 4 Progress Met  -TL     STG 5 No increase in circum measurements for joint effusion.  -TL     STG 5 Progress Met  -TL        Long Term Goals    LTG Date to Achieve 08/31/18  -TL     LTG 1 AROm L knee 0°->=115°.  -TL     LTG 1 Progress Met  -TL     LTG 2 B LE 5/5  -TL     LTG 2 Progress Partially Met;Progressing  -TL     LTG 3 Circum measurements for joint effusion within 2cm of R=L.  -TL     LTG 3 Progress Met  -TL     LTG 4 Patient able to ambulate 1/4 mile non-antalgically with no assistive device and no increase in pain.  -TL     LTG 4 Progress Partially Met;Ongoing  -TL     LTG 5 Patient able to ambulate up/down 3 steps reciprocally HRA for balance x10 reps.  -TL     LTG 5 Progress Met  -TL     LTG 6 I with final HEP.  -TL     LTG 6 Progress Ongoing  -TL     LTG 7 D/C with a final HEP and free 30 day fitness formula membership.  -TL     LTG 7 Progress Ongoing  -TL       User Key  (r) = Recorded By, (t) = Taken By, (c) = Cosigned By    Initials Name Provider Type    Kailyn Summers PTA Physical Therapy Assistant          Therapy Education  Given: HEP, Fall prevention and home safety  How Provided: Verbal  Provided to: Patient  Level of Understanding: Verbalized              Time Calculation:   Start Time: 0930  Stop Time: 1034  Time Calculation (min): 64 min  PT Non-Billable Time (min): 15 min  Total Timed Code Minutes- PT:  49 minute(s)  Therapy Suggested Charges     Code   Minutes Charges    None           Therapy Charges for Today     Code Description Service Date Service Provider Modifiers Qty    22288278114 HC PT THER PROC EA 15 MIN 9/7/2018 Kailyn Maharaj, SHELLY GP 3    02493189394 HC PT THER SUPP EA 15 MIN 9/7/2018 Kailyn Maharaj, PTA GP 1                    Kailyn Maharaj, SHELLY  9/7/2018

## 2018-09-10 ENCOUNTER — HOSPITAL ENCOUNTER (OUTPATIENT)
Dept: PHYSICAL THERAPY | Facility: HOSPITAL | Age: 63
Setting detail: THERAPIES SERIES
Discharge: HOME | End: 2018-09-10

## 2018-09-10 DIAGNOSIS — Z96.652 STATUS POST TOTAL LEFT KNEE REPLACEMENT: Primary | ICD-10-CM

## 2018-09-10 PROCEDURE — G0283 ELEC STIM OTHER THAN WOUND: HCPCS

## 2018-09-10 PROCEDURE — 97110 THERAPEUTIC EXERCISES: CPT

## 2018-09-10 NOTE — THERAPY TREATMENT NOTE
"    Outpatient Physical Therapy Ortho Treatment Note  [facility]  Katy Vital PTA       Patient Name: Mira Deal  : 1955  MRN: [id number]  Today's Date: 9/10/2018      Visit Date: 09/10/2018     Visits:   Insurance Visits Approved: 30 visits  Recert Due: 2018  MD Appt: 6 weeks  Pain: pretreatment 3/10; post treatment \"not even a 1\"/10  Improvement: pt is subjectively reporting 60% improvement since initial evaluation    Visit Dx:    ICD-10-CM ICD-9-CM   1. Status post total left knee replacement Z96.652 V43.65       Patient Active Problem List   Diagnosis   • Essential hypertension   • Acquired hypothyroidism   • Obesity (BMI 30-39.9)   • Rectal tenesmus   • Proctitis   • Clostridium difficile infection   • Clostridium difficile colitis   • Screening for colon cancer   • Need for hepatitis C screening test   • Left foot pain   • Upper respiratory tract infection   • Left otitis media   • Acute recurrent pansinusitis   • Chronic pain of left knee   • Pre-operative clearance        Past Medical History:   Diagnosis Date   • Acute suppurative otitis media without spontaneous rupture of eardrum     right ear   • Benign essential hypertension    • Chronic osteoarthritis    • Diverticular disease of colon    • Dysfunction of eustachian tube     Will start OTC Nasal steroid      • Encounter for gynecological examination (general) (routine) without abnormal findings    • Encounter for screening for malignant neoplasm of breast    • Encounter for screening mammogram for malignant neoplasm of breast     16 negative      • Gastrointestinal hemorrhage    • Hypercholesterolemia    • Hyperlipidemia    • Hypothyroidism    • Left lower quadrant pain    • Mild depression (CMS/HCC)    • Nausea    • Obesity     Hyperinsulinar    • Obesity, unspecified    • Vertigo         Past Surgical History:   Procedure Laterality Date   • CARPAL TUNNEL RELEASE     • COLONOSCOPY   " "   Dr Maria   • FOREARM SURGERY  2011    /wrist   • INJECTION OF MEDICATION  05/12/2016    Kenalog (1)      • INJECTION OF MEDICATION  05/12/2016    Rocephin (1)      • INJECTION OF MEDICATION  05/12/2016    Zofran (1)      • KNEE CARTILAGE SURGERY  2012   • SPLENECTOMY  1972   • TOTAL KNEE ARTHROPLASTY Left 07/16/2018   • VAGINAL HYSTERECTOMY SALPINGO OOPHORECTOMY  1995    cerv cancer             PT Ortho     Row Name 09/10/18 1300       Subjective Pain    Able to rate subjective pain? yes  -    Pre-Treatment Pain Level 3  -MH    Post-Treatment Pain Level --   \"not even a 1\"  -       Left Lower Ext    Lt Knee Extension/Flexion AROM 0-120°  -      User Key  (r) = Recorded By, (t) = Taken By, (c) = Cosigned By    Initials Name Provider Type    Katy Burrows PTA Physical Therapy Assistant                            PT Assessment/Plan     Row Name 09/10/18 1300          PT Assessment    Assessment Comments patient continues with improved AROM this treatment. non antalgic gait with ambulation.   -        PT Plan    PT Frequency 3x/week  -     PT Plan Comments continue to assess for consistency with ROM, if remains consistent may consider discharging at end of this week. if not will continue on to be rechecked.   -       User Key  (r) = Recorded By, (t) = Taken By, (c) = Cosigned By    Initials Name Provider Type     Katy Vital PTA Physical Therapy Assistant                Modalities     Row Name 09/10/18 1300             Ice    Ice Applied Yes  -      Location both knees  -MH      Rx Minutes Other:   20 minutes  -      Ice S/P Rx Yes  -         ELECTRICAL STIMULATION    Attended/Unattended Unattended  -      Stimulation Type IFC  -      Location/Electrode Placement/Other left knee  -      08178 - PT Electrical Stimulation (Manual) Minutes 20  -        User Key  (r) = Recorded By, (t) = Taken By, (c) = Cosigned By    Initials Name Provider Type    Katy Burrows PTA Physical " "Therapy Assistant                Exercises     Row Name 09/10/18 1300             Subjective Comments    Subjective Comments pt states that her medicine still hasn't kicked in, took it about an hour before therapy.   -         Subjective Pain    Able to rate subjective pain? yes  -      Pre-Treatment Pain Level 3  -      Post-Treatment Pain Level --   \"not even a 1\"  -         Exercise 1    Exercise Name 1 Pro II LE's  -      Time 1 10 minutes  -      Additional Comments L 7.0  -         Exercise 2    Exercise Name 2 B St. HS S  -      Reps 2 2  -      Time 2 30 sec hold  -         Exercise 3    Exercise Name 3 L St. Lunge S  -      Reps 3 2  -      Time 3 30 sec hold  -         Exercise 4    Exercise Name 4 Incline Calf S  -      Reps 4 2  -      Time 4 30 sec hold  -         Exercise 5    Exercise Name 5 Heel Slides with Strap  -      Reps 5 5 minutes  -         Exercise 6    Exercise Name 6 ambulation- Track Walk  -      Time 6 4 laps  -         Exercise 7    Exercise Name 7 Reciprocal Stairs  -      Reps 7 10  -        User Key  (r) = Recorded By, (t) = Taken By, (c) = Cosigned By    Initials Name Provider Type    Katy Burrows, PTA Physical Therapy Assistant                               PT OP Goals     Row Name 09/10/18 1300          PT Short Term Goals    STG Date to Achieve 08/09/18  -     STG 1 I with HEP and have additions/changes by next recertification.  -     STG 1 Progress Met  [facility]     STG 2 AROM L knee 5° from extension or less.  -     STG 2 Progress Met  [facility]     STG 3 AROM L knee flexion >= 95°.  -     STG 3 Progress Met  [facility]     STG 4 Able to perform 20 SLR with no lag present.  -     STG 4 Progress Met  [facility]     STG 5 No increase in circum measurements for joint effusion.  -     STG 5 Progress Met  [facility]        Long Term Goals    LTG Date to Achieve 08/31/18  -     LTG 1 AROm L knee 0°->=115°.  -     LTG 1 Progress Met  [facility]     LTG 2 B LE " 5/5  -     LTG 2 Progress Partially Met;Progressing  -     LTG 3 Circum measurements for joint effusion within 2cm of R=L.  -     LTG 3 Progress Met  -     LTG 4 Patient able to ambulate 1/4 mile non-antalgically with no assistive device and no increase in pain.  -     [facility] able to ambulate up/down 3 steps reciprocally HRA for balance x10 reps.  -     LTG 5 Progress Met  -     LTG 6 I with final HEP.  -     LTG 6 Progress Ongoing  -     LTG 7 D/C with a final HEP and free 30 day fitness formula membership.  -[facility] 7 Progress Ongoing  -        Time Calculation    PT Goal Re-Cert Due Date 09/19/18  -       User Key  (r) = Recorded By, (t) = Taken By, (c) = Cosigned By    Initials Name Provider Type     Katy Vital PTA Physical Therapy Assistant          Therapy Education  Given: HEP, Symptoms/condition management, Pain management  Program: Reinforced  How Provided: Verbal, Demonstration  Provided to: Patient  Level of Understanding: Verbalized, Demonstrated              Time Calculation:   Start Time: 1347  Stop Time: 1450  Time Calculation (min): 63 min  Total Timed Code Minutes- PT: 43 minute(s)    Therapy Charges for Today     Code Description Service Date Service Provider Modifiers Qty    82908376389 HC PT THER PROC EA 15 MIN 9/10/2018 Katy Vital PTA GP 3    77546156026 HC PT THER SUPP EA 15 MIN 9/10/2018 Katy Vital PTA GP 1    70670223368 HC PT ELECTRICAL STIM UNATTENDED 9/10/2018 Katy Vital PTA  1                    Katy Vital PTA  9/10/2018

## 2018-09-12 ENCOUNTER — HOSPITAL ENCOUNTER (OUTPATIENT)
Dept: PHYSICAL THERAPY | Facility: HOSPITAL | Age: 63
Setting detail: THERAPIES SERIES
Discharge: HOME | End: 2018-09-12

## 2018-09-12 DIAGNOSIS — Z96.652 STATUS POST TOTAL LEFT KNEE REPLACEMENT: Primary | ICD-10-CM

## 2018-09-12 PROCEDURE — 97110 THERAPEUTIC EXERCISES: CPT

## 2018-09-12 NOTE — THERAPY TREATMENT NOTE
Outpatient Physical Therapy Ortho Treatment Note  [facility]     Patient Name: Mira Deal  : 1955  MRN: [id number]  Today's Date: 2018      Visit Date: 2018  Pt reports 2/10 pain pre treatment, 0/10 pain post treatment  Reports 60% of improvement.  Attended 24/25 visits.  Insurance available:30 visits   Next MD appt:6 weeks .  Recertification: 2018.  Visit Dx:    ICD-10-CM ICD-9-CM   1. Status post total left knee replacement Z96.652 V43.65       Patient Active Problem List   Diagnosis   • Essential hypertension   • Acquired hypothyroidism   • Obesity (BMI 30-39.9)   • Rectal tenesmus   • Proctitis   • Clostridium difficile infection   • Clostridium difficile colitis   • Screening for colon cancer   • Need for hepatitis C screening test   • Left foot pain   • Upper respiratory tract infection   • Left otitis media   • Acute recurrent pansinusitis   • Chronic pain of left knee   • Pre-operative clearance        Past Medical History:   Diagnosis Date   • Acute suppurative otitis media without spontaneous rupture of eardrum     right ear   • Benign essential hypertension    • Chronic osteoarthritis    • Diverticular disease of colon    • Dysfunction of eustachian tube     Will start OTC Nasal steroid      • Encounter for gynecological examination (general) (routine) without abnormal findings    • Encounter for screening for malignant neoplasm of breast    • Encounter for screening mammogram for malignant neoplasm of breast     16 negative      • Gastrointestinal hemorrhage    • Hypercholesterolemia    • Hyperlipidemia    • Hypothyroidism    • Left lower quadrant pain    • Mild depression (CMS/HCC)    • Nausea    • Obesity     Hyperinsulinar    • Obesity, unspecified    • Vertigo         Past Surgical History:   Procedure Laterality Date   • CARPAL TUNNEL RELEASE     • COLONOSCOPY      Dr Maria   • FOREARM SURGERY  2011    /wrist   • INJECTION  "OF MEDICATION  05/12/2016    Kenalog (1)      • INJECTION OF MEDICATION  05/12/2016    Rocephin (1)      • INJECTION OF MEDICATION  05/12/2016    Zofran (1)      • KNEE CARTILAGE SURGERY  2012   • SPLENECTOMY  1972   • TOTAL KNEE ARTHROPLASTY Left 07/16/2018   • VAGINAL HYSTERECTOMY SALPINGO OOPHORECTOMY  1995    cerv cancer             PT Ortho     Row Name 09/10/18 1300       Subjective Pain    Able to rate subjective pain? yes  -    Pre-Treatment Pain Level 3  -MH    Post-Treatment Pain Level --   \"not even a 1\"  -MH       Left Lower Ext    Lt Knee Extension/Flexion AROM 0-120°  -      User Key  (r) = Recorded By, (t) = Taken By, (c) = Cosigned By    Initials Name Provider Type     Katy Vital, SHELLY Physical Therapy Assistant                                Modalities     Row Name 09/12/18 1000             Ice    Ice Applied Yes  -TL      Location both knees  -TL      Rx Minutes Other:  -TL      Ice S/P Rx Yes  -TL        User Key  (r) = Recorded By, (t) = Taken By, (c) = Cosigned By    Initials Name Provider Type     Kailyn Maharaj, SHELLY Physical Therapy Assistant                Exercises     Row Name 09/12/18 1000             Subjective Comments    Subjective Comments Pt has been doing her HEP.  -TL         Subjective Pain    Able to rate subjective pain? yes  -TL      Pre-Treatment Pain Level 2  -TL         Exercise 1    Exercise Name 1 Pro II LE's  -TL      Time 1 10 mins  -TL      Additional Comments level 7.0  -TL         Exercise 2    Exercise Name 2 B St. HS S  -TL      Reps 2 2  -TL      Time 2 30 sec hold  -TL         Exercise 3    Exercise Name 3 L St. Lunge S  -TL      Reps 3 10  -TL      Time 3 10 sec hold  -TL         Exercise 4    Exercise Name 4 Incline Calf S  -TL      Reps 4 2  -TL      Time 4 30 sec hold  -TL         Exercise 5    Exercise Name 5 shuttle leg press 2 L  -TL      Time 5 5 mins  -TL      Additional Comments 7 cords  -TL         Exercise 6    Exercise Name 6 shuttle leg " press 1l  -TL      Time 6 5 mins  -TL      Additional Comments 7 cords  -TL         Exercise 7    Exercise Name 7 stool scoot sm stool  -TL      Reps 7 3  -TL         Exercise 8    Exercise Name 8 wall squats  -TL      Reps 8 10  -TL      Time 8 3 sec hold  -TL         Exercise 9    Exercise Name 9 Gym back/side stepping gait  -TL         Exercise 10    Exercise Name 10 semi reclined heelslides  -TL      Reps 10 5 mins  -TL      Additional Comments with strap  -TL         Exercise 11    Exercise Name 11 SLR  -TL      Reps 11 20  -TL      Time 11 5 sec hold  -TL        User Key  (r) = Recorded By, (t) = Taken By, (c) = Cosigned By    Initials Name Provider Type    TL Kailyn Maharaj, PTA Physical Therapy Assistant                               PT OP Goals     Row Name 09/12/18 1000          PT Short Term Goals    STG Date to Achieve 08/09/18  -TL     STG 1 I with HEP and have additions/changes by next recertification.  -TL     STG 1 Progress Met  -TL     STG 2 AROM L knee 5° from extension or less.  -TL     STG 2 Progress Met  -TL     STG 3 AROM L knee flexion >= 95°.  -TL     STG 3 Progress Met  -TL     STG 4 Able to perform 20 SLR with no lag present.  -TL     STG 4 Progress Met  -TL     STG 5 No increase in circum measurements for joint effusion.  -TL     STG 5 Progress Met  -TL        Long Term Goals    LTG Date to Achieve 08/31/18  -TL     LTG 1 AROm L knee 0°->=115°.  -TL     LTG 1 Progress Met  -TL     LTG 2 B LE 5/5  -TL     LTG 2 Progress Partially Met;Progressing  -TL     LTG 3 Circum measurements for joint effusion within 2cm of R=L.  -TL     LTG 3 Progress Met  -TL     LTG 4 Patient able to ambulate 1/4 mile non-antalgically with no assistive device and no increase in pain.  -TL     LTG 4 Progress Met  -TL     LTG 5 Patient able to ambulate up/down 3 steps reciprocally HRA for balance x10 reps.  -TL     LTG 5 Progress Met  -TL     LTG 6 I with final HEP.  -TL     LTG 6 Progress Progressing  -TL     LTG 7  D/C with a final HEP and free 30 day fitness formula membership.  -TL     LTG 7 Progress Progressing  -TL       User Key  (r) = Recorded By, (t) = Taken By, (c) = Cosigned By    Initials Name Provider Type    Kailyn Summers PTA Physical Therapy Assistant          Therapy Education  Given: HEP, Symptoms/condition management  Program: Reinforced  How Provided: Verbal, Demonstration, Written  Provided to: Patient  Level of Understanding: Verbalized, Demonstrated              Time Calculation:   Start Time: 0932  Stop Time: 1045  Time Calculation (min): 73 min  PT Non-Billable Time (min): 15 min  Total Timed Code Minutes- PT: 58 minute(s)  Therapy Suggested Charges     Code   Minutes Charges    None           Therapy Charges for Today     Code Description Service Date Service Provider Modifiers Qty    78157195369 HC PT THER PROC EA 15 MIN 9/12/2018 Kailyn Maharaj PTA GP 4    11192554910 HC PT THER SUPP EA 15 MIN 9/12/2018 Kailyn Maharaj PTA GP 1                    Kailyn Maharaj PTA  9/12/2018

## 2018-09-14 ENCOUNTER — HOSPITAL ENCOUNTER (OUTPATIENT)
Dept: PHYSICAL THERAPY | Facility: HOSPITAL | Age: 63
Setting detail: THERAPIES SERIES
Discharge: HOME | End: 2018-09-14

## 2018-09-14 DIAGNOSIS — Z96.652 STATUS POST TOTAL LEFT KNEE REPLACEMENT: Primary | ICD-10-CM

## 2018-09-14 PROCEDURE — 97110 THERAPEUTIC EXERCISES: CPT

## 2018-09-14 NOTE — THERAPY DISCHARGE NOTE
Outpatient Physical Therapy Ortho Treatment Note/Discharge Summary  [facility]  Katy Vital PTA       Patient Name: Mira Deal  : 1955  MRN: [id number]  Today's Date: 2018      Visit Date: 2018     Visits:   Insurance Visits Approved: 30 visits  Recert Due: 2018  MD Appt: 6 weeks  Pain: pretreatment 0/10; post treatment 0/10  Improvement: pt is subjectively reporting 60% improvement since initial evaluation    Visit Dx:    ICD-10-CM ICD-9-CM   1. Status post total left knee replacement Z96.652 V43.65       Patient Active Problem List   Diagnosis   • Essential hypertension   • Acquired hypothyroidism   • Obesity (BMI 30-39.9)   • Rectal tenesmus   • Proctitis   • Clostridium difficile infection   • Clostridium difficile colitis   • Screening for colon cancer   • Need for hepatitis C screening test   • Left foot pain   • Upper respiratory tract infection   • Left otitis media   • Acute recurrent pansinusitis   • Chronic pain of left knee   • Pre-operative clearance        Past Medical History:   Diagnosis Date   • Acute suppurative otitis media without spontaneous rupture of eardrum     right ear   • Benign essential hypertension    • Chronic osteoarthritis    • Diverticular disease of colon    • Dysfunction of eustachian tube     Will start OTC Nasal steroid      • Encounter for gynecological examination (general) (routine) without abnormal findings    • Encounter for screening for malignant neoplasm of breast    • Encounter for screening mammogram for malignant neoplasm of breast     16 negative      • Gastrointestinal hemorrhage    • Hypercholesterolemia    • Hyperlipidemia    • Hypothyroidism    • Left lower quadrant pain    • Mild depression (CMS/HCC)    • Nausea    • Obesity     Hyperinsulinar    • Obesity, unspecified    • Vertigo         Past Surgical History:   Procedure Laterality Date   • CARPAL TUNNEL RELEASE     • COLONOSCOPY   2013    Dr Maria   • FOREARM SURGERY  2011    /wrist   • INJECTION OF MEDICATION  05/12/2016    Kenalog (1)      • INJECTION OF MEDICATION  05/12/2016    Rocephin (1)      • INJECTION OF MEDICATION  05/12/2016    Zofran (1)      • KNEE CARTILAGE SURGERY  2012   • SPLENECTOMY  1972   • TOTAL KNEE ARTHROPLASTY Left 07/16/2018   • VAGINAL HYSTERECTOMY SALPINGO OOPHORECTOMY  1995    cerv cancer             PT Ortho     Row Name 09/14/18 0900       Subjective Comments    Subjective Comments patient states that she is doing well today.   -       Subjective Pain    Able to rate subjective pain? yes  -    Pre-Treatment Pain Level 0  -    Post-Treatment Pain Level 0  -       Left Lower Ext    Lt Knee Extension/Flexion AROM 0-122°  -       MMT (Manual Muscle Testing)    General MMT Comments 5/5 B LE Strength  -      User Key  (r) = Recorded By, (t) = Taken By, (c) = Cosigned By    Initials Name Provider Type     Katy Vital, SHELLY Physical Therapy Assistant                            PT Assessment/Plan     Row Name 09/14/18 0900          PT Assessment    Assessment Comments patient has been consistent with mantaining AROM of left knee. improved strength. independent with HEP  -        PT Plan    PT Frequency 3x/week  -     PT Plan Comments Discharge to independent management with free 30 days fitness membership  -       User Key  (r) = Recorded By, (t) = Taken By, (c) = Cosigned By    Initials Name Provider Type     Katy Vital PTA Physical Therapy Assistant                    Exercises     Row Name 09/14/18 0900             Subjective Comments    Subjective Comments patient states that she is doing well today.   -         Subjective Pain    Able to rate subjective pain? yes  -      Pre-Treatment Pain Level 0  -      Post-Treatment Pain Level 0  -         Exercise 1    Exercise Name 1 Pro II LE's  -      Time 1 10 minutes  -      Additional Comments L 7.0  -         Exercise 2     Exercise Name 2 B St. HS S  -      Reps 2 2  -      Time 2 30 sec hold  -         Exercise 3    Exercise Name 3 L St. Lunge S  -      Reps 3 10  -      Time 3 10 sec hold  -         Exercise 4    Exercise Name 4 stool scoots  -      Reps 4 2 laps  -         Exercise 5    Exercise Name 5 shuttle 2L  -      Reps 5 20  -      Additional Comments 7 cords  -         Exercise 6    Exercise Name 6 shuttle 1L  -      Reps 6 20  -      Additional Comments 7 cords  -         Exercise 7    Exercise Name 7 reciprocal stairs  -      Reps 7 10  -         Exercise 8    Exercise Name 8 Heel Slides  -      Time 8 5 minutes  -        User Key  (r) = Recorded By, (t) = Taken By, (c) = Cosigned By    Initials Name Provider Type    Katy Burrows, PTA Physical Therapy Assistant                               PT OP Goals     Row Name 09/14/18 0900          PT Short Term Goals    STG Date to Achieve 08/09/18  -     STG 1 I with HEP and have additions/changes by next recertification.  -     STG 1 Progress Met  [facility]     STG 2 AROM L knee 5° from extension or less.  -     STG 2 Progress Met  [facility]     STG 3 AROM L knee flexion >= 95°.  -     STG 3 Progress Met  [facility]     STG 4 Able to perform 20 SLR with no lag present.  -     STG 4 Progress Met  [facility]     STG 5 No increase in circum measurements for joint effusion.  -     STG 5 Progress Met  [facility]        Long Term Goals    LTG Date to Achieve 08/31/18  -     LTG 1 AROm L knee 0°->=115°.  -     LTG 1 Progress Met  -     LTG 2 B LE 5/5  -     LTG 2 Progress Met  [facility]     LTG 3 Circum measurements for joint effusion within 2cm of R=L.  -     LTG 3 Progress Met  [facility]     LTG 4 Patient able to ambulate 1/4 mile non-antalgically with no assistive device and no increase in pain.  -     LTG 4 Progress Met  [facility]     LTG 5 Patient able to ambulate up/down 3 steps reciprocally HRA for balance x10 reps.  -     LTG 5 Progress Met  -MH     LTG 6 I with  final HEP.  -     LTG 6 Progress Met  -     LTG 7 D/C with a final HEP and free 30 day fitness formula membership.  -     LTG 7 Progress Met  -        Time Calculation    PT Goal Re-Cert Due Date 09/19/18  -       User Key  (r) = Recorded By, (t) = Taken By, (c) = Cosigned By    Initials Name Provider Type     Katy Vital PTA Physical Therapy Assistant          Therapy Education  Given: HEP, Symptoms/condition management  Program: Reinforced  How Provided: Verbal, Demonstration  Provided to: Patient  Level of Understanding: Verbalized, Demonstrated              Time Calculation:   Start Time: 0935  Stop Time: 1015  Time Calculation (min): 40 min  Total Timed Code Minutes- PT: 40 minute(s)    Therapy Charges for Today     Code Description Service Date Service Provider Modifiers Qty    85748613418 HC PT THER PROC EA 15 MIN 9/14/2018 Katy Vital PTA GP 3    06282656605 HC PT THER SUPP EA 15 MIN 9/14/2018 Katy Vital PTA GP 1                OP PT Discharge Summary  Date of Discharge: 09/14/18  Reason for Discharge: All goals achieved  Outcomes Achieved: Able to achieve all goals within established timeline, Refer to plan of care for updates on goals achieved  Discharge Destination: Home with home program  Discharge Instructions/Additional Comments: free 30 days fitness membership      Katy Vital PTA  9/14/2018

## 2018-09-17 ENCOUNTER — APPOINTMENT (OUTPATIENT)
Dept: PHYSICAL THERAPY | Facility: HOSPITAL | Age: 63
End: 2018-09-17

## 2018-09-18 ENCOUNTER — APPOINTMENT (OUTPATIENT)
Dept: PHYSICAL THERAPY | Facility: HOSPITAL | Age: 63
End: 2018-09-18

## 2018-09-20 ENCOUNTER — APPOINTMENT (OUTPATIENT)
Dept: PHYSICAL THERAPY | Facility: HOSPITAL | Age: 63
End: 2018-09-20

## 2018-11-13 ENCOUNTER — OFFICE VISIT (OUTPATIENT)
Dept: FAMILY MEDICINE CLINIC | Facility: CLINIC | Age: 63
End: 2018-11-13

## 2018-11-13 ENCOUNTER — LAB (OUTPATIENT)
Dept: LAB | Facility: HOSPITAL | Age: 63
End: 2018-11-13

## 2018-11-13 VITALS
BODY MASS INDEX: 38.25 KG/M2 | SYSTOLIC BLOOD PRESSURE: 142 MMHG | WEIGHT: 177.3 LBS | TEMPERATURE: 98.4 F | DIASTOLIC BLOOD PRESSURE: 88 MMHG | HEIGHT: 57 IN | OXYGEN SATURATION: 99 % | HEART RATE: 79 BPM

## 2018-11-13 DIAGNOSIS — Z12.31 ENCOUNTER FOR SCREENING MAMMOGRAM FOR MALIGNANT NEOPLASM OF BREAST: Primary | ICD-10-CM

## 2018-11-13 DIAGNOSIS — E03.9 ACQUIRED HYPOTHYROIDISM: ICD-10-CM

## 2018-11-13 DIAGNOSIS — H92.09 EARACHE: ICD-10-CM

## 2018-11-13 DIAGNOSIS — J01.41 ACUTE RECURRENT PANSINUSITIS: ICD-10-CM

## 2018-11-13 DIAGNOSIS — I10 ESSENTIAL HYPERTENSION: ICD-10-CM

## 2018-11-13 LAB — TSH SERPL DL<=0.05 MIU/L-ACNC: 0.16 MIU/ML (ref 0.46–4.68)

## 2018-11-13 PROCEDURE — 99214 OFFICE O/P EST MOD 30 MIN: CPT | Performed by: FAMILY MEDICINE

## 2018-11-13 PROCEDURE — 84443 ASSAY THYROID STIM HORMONE: CPT

## 2018-11-13 RX ORDER — CETIRIZINE HYDROCHLORIDE 10 MG/1
10 TABLET ORAL DAILY
Qty: 30 TABLET | Refills: 5 | Status: SHIPPED | OUTPATIENT
Start: 2018-11-13 | End: 2019-04-18

## 2018-11-13 RX ORDER — CEPHALEXIN 500 MG/1
500 CAPSULE ORAL 3 TIMES DAILY
Qty: 21 CAPSULE | Refills: 0 | Status: SHIPPED | OUTPATIENT
Start: 2018-11-13 | End: 2018-11-27

## 2018-11-13 RX ORDER — PREDNISONE 10 MG/1
10 TABLET ORAL SEE ADMIN INSTRUCTIONS
Qty: 17 TABLET | Refills: 0 | Status: SHIPPED | OUTPATIENT
Start: 2018-11-13 | End: 2018-11-27

## 2018-11-13 NOTE — PROGRESS NOTES
Subjective   Mira Deal is a 63 y.o. obese  female former smoker with HTN, Hypothyroidism, Hyperinsulinar obesity, Hyperlipidemia, H/O Recurrent OM, Eustachian tube dysfunction, Allergies, Chronic knee pain, Past meniscus repair, Osteoarthritis, H/O C-diff colitis after Clindamycin, and other health problems see PMH/PSH. Pt presents for exam, to discuss health problems Tx and F/u plans       ' Have congestion, feeling feverish, having chills, both ears hurting, worsening over last 24 hrs, not relieved by OTC Cold med. Was not able to go to work. B/P has been OK. L TKA still painful'.    Hypertension   This is a chronic problem. The current episode started more than 1 year ago. The problem is unchanged. The problem is controlled. Pertinent negatives include no chest pain, headaches, neck pain, palpitations or shortness of breath. Risk factors for coronary artery disease include obesity and post-menopausal state. Current antihypertension treatment includes ACE inhibitors. The current treatment provides significant improvement.   Hypothyroidism   This is a chronic problem. The current episode started more than 1 year ago. The problem occurs daily. The problem has been gradually improving. Associated symptoms include arthralgias, chills, coughing, a fever, joint swelling and a sore throat. Pertinent negatives include no abdominal pain, chest pain, congestion, fatigue, headaches, myalgias, nausea, neck pain, rash, vomiting or weakness. Nothing aggravates the symptoms. Treatments tried: Levothyroxine. The treatment provided significant relief.   Obesity   This is a chronic problem. The current episode started more than 1 year ago. The problem occurs daily. The problem has been gradually improving. Associated symptoms include arthralgias, chills, coughing, a fever, joint swelling and a sore throat. Pertinent negatives include no abdominal pain, chest pain, congestion, fatigue, headaches, myalgias,  nausea, neck pain, rash, vomiting or weakness. The symptoms are aggravated by eating. Treatments tried: Diet, exercise. The treatment provided no relief.   Knee Pain    Incident onset: Chronic. Injury mechanism: Chronic use. The pain is present in the left knee. The pain is at a severity of 5/10. The symptoms are aggravated by weight bearing. She has tried NSAIDs and rest for the symptoms. The treatment provided no relief (Plan for TKA).   Earache    There is pain in both ears. This is a new problem. The current episode started in the past 7 days. The problem occurs constantly. The problem has been gradually worsening. The maximum temperature recorded prior to her arrival was 100.4 - 100.9 F. The pain is at a severity of 5/10. The pain is moderate. Associated symptoms include coughing and a sore throat. Pertinent negatives include no abdominal pain, diarrhea, headaches, neck pain, rash, rhinorrhea or vomiting. She has tried acetaminophen for the symptoms. The treatment provided mild relief.        The following portions of the patient's history were reviewed and updated as appropriate: allergies, current medications, past family history, past medical history, past social history, past surgical history and problem list.    Review of Systems   Constitutional: Positive for chills and fever. Negative for fatigue.   HENT: Positive for ear pain and sore throat. Negative for congestion, postnasal drip, rhinorrhea, sinus pressure, sinus pain ( mild) and sneezing.    Eyes: Negative.    Respiratory: Positive for cough. Negative for shortness of breath and wheezing.    Cardiovascular: Negative for chest pain and palpitations.   Gastrointestinal: Negative for abdominal pain, diarrhea, nausea and vomiting.   Endocrine: Negative.    Genitourinary: Negative for dysuria.   Musculoskeletal: Positive for arthralgias and joint swelling. Negative for myalgias and neck pain.        L knee still painful after TKA   Skin: Negative for  rash.   Allergic/Immunologic: Positive for environmental allergies.   Neurological: Negative for weakness and headaches.   Hematological: Negative.    Psychiatric/Behavioral: Negative.        Objective   Physical Exam   Constitutional: She is oriented to person, place, and time. She appears well-developed and well-nourished. No distress.   HENT:   Head: Normocephalic and atraumatic.   Right Ear: External ear normal.   Left Ear: External ear normal.   Nose: Nose normal.   Mouth/Throat: Oropharynx is clear and moist. No oropharyngeal exudate.   Has PND   Can't clear ET  Mild erythema of Nasopharynx.   Eyes: Conjunctivae and EOM are normal. Pupils are equal, round, and reactive to light. Right eye exhibits no discharge. Left eye exhibits no discharge. No scleral icterus.   Neck: Normal range of motion. Neck supple. No JVD present. No tracheal deviation present. No thyromegaly present.   Cardiovascular: Normal rate, regular rhythm, normal heart sounds and intact distal pulses. Exam reveals no gallop and no friction rub.   No murmur heard.  Pulmonary/Chest: Effort normal and breath sounds normal. No respiratory distress. She has no wheezes. She has no rales. She exhibits no tenderness.   Abdominal: Soft. Bowel sounds are normal. She exhibits no distension and no mass. There is no tenderness. No hernia.   Musculoskeletal: She exhibits no edema, tenderness or deformity.   WB 4 with ROM L knee   Lymphadenopathy:     She has no cervical adenopathy.   Neurological: She is alert and oriented to person, place, and time. She has normal reflexes. She displays normal reflexes. No cranial nerve deficit. She exhibits normal muscle tone. Coordination normal.   Skin: Skin is warm and dry. No rash noted. She is not diaphoretic. No erythema. No pallor.   Psoriasis   Psychiatric: She has a normal mood and affect. Her behavior is normal. Judgment and thought content normal.   Nursing note and vitals reviewed.      Assessment/Plan    Meyers was seen today for hypertension, ear problem, uri, headache and cough.    Diagnoses and all orders for this visit:    Encounter for screening mammogram for malignant neoplasm of breast  -     Mammo Screening Digital Tomosynthesis Bilateral With CAD; Future    Acquired hypothyroidism  -     TSH; Future    Essential hypertension    Earache    Acute recurrent pansinusitis    Other orders  -     cephalexin (KEFLEX) 500 MG capsule; Take 1 capsule by mouth 3 (Three) Times a Day.  -     predniSONE (DELTASONE) 10 MG tablet; Take 1 tablet by mouth See Admin Instructions. Take 1 Tab Tid x3 days, Then 1 Tab Bid x 2 days Then 1 Tab QD x4 days,then D/C  -     cetirizine (zyrTEC) 10 MG tablet; Take 1 tablet by mouth Daily.      Discussed exam, sinus problem, earache , ETD. Discussed meds indications. Discussed pain L TKA F/U with specialist. Discussed F/U here.    Patient's Body mass index is 38.37 kg/m². BMI is above normal parameters. Recommendations include: educational material, exercise counseling, nutrition counseling and referral to primary care.          This document has been electronically signed by Lloyd Varghese MD

## 2018-11-14 RX ORDER — LISINOPRIL 20 MG/1
TABLET ORAL
Qty: 30 TABLET | Refills: 5 | Status: SHIPPED | OUTPATIENT
Start: 2018-11-14 | End: 2019-05-28 | Stop reason: SDUPTHER

## 2018-11-15 RX ORDER — LEVOTHYROXINE SODIUM 0.1 MG/1
100 TABLET ORAL DAILY
Qty: 30 TABLET | Refills: 5 | Status: SHIPPED | OUTPATIENT
Start: 2018-11-15 | End: 2019-05-28 | Stop reason: SDUPTHER

## 2018-11-17 PROBLEM — H92.09 EARACHE: Status: ACTIVE | Noted: 2018-11-17

## 2018-11-27 ENCOUNTER — OFFICE VISIT (OUTPATIENT)
Dept: FAMILY MEDICINE CLINIC | Facility: CLINIC | Age: 63
End: 2018-11-27

## 2018-11-27 VITALS
HEIGHT: 57 IN | SYSTOLIC BLOOD PRESSURE: 140 MMHG | OXYGEN SATURATION: 98 % | HEART RATE: 81 BPM | DIASTOLIC BLOOD PRESSURE: 78 MMHG | WEIGHT: 181.2 LBS | TEMPERATURE: 98.3 F | BODY MASS INDEX: 39.09 KG/M2

## 2018-11-27 DIAGNOSIS — E66.9 OBESITY (BMI 30-39.9): ICD-10-CM

## 2018-11-27 DIAGNOSIS — Q89.01 ASPLENIA: Primary | ICD-10-CM

## 2018-11-27 DIAGNOSIS — G89.29 CHRONIC PAIN OF LEFT KNEE: ICD-10-CM

## 2018-11-27 DIAGNOSIS — I10 ESSENTIAL HYPERTENSION: ICD-10-CM

## 2018-11-27 DIAGNOSIS — M25.562 CHRONIC PAIN OF LEFT KNEE: ICD-10-CM

## 2018-11-27 DIAGNOSIS — Z23 NEED FOR VACCINATION: ICD-10-CM

## 2018-11-27 DIAGNOSIS — E03.9 ACQUIRED HYPOTHYROIDISM: ICD-10-CM

## 2018-11-27 PROCEDURE — 90471 IMMUNIZATION ADMIN: CPT | Performed by: FAMILY MEDICINE

## 2018-11-27 PROCEDURE — 99214 OFFICE O/P EST MOD 30 MIN: CPT | Performed by: FAMILY MEDICINE

## 2018-11-27 PROCEDURE — 90732 PPSV23 VACC 2 YRS+ SUBQ/IM: CPT | Performed by: FAMILY MEDICINE

## 2018-11-27 NOTE — PROGRESS NOTES
Subjective    Mira Deal is a 63 y.o. obese  female former smoker with HTN, Hypothyroidism, Hyperinsulinar obesity, Hyperlipidemia, H/O Recurrent OM, Eustachian tube dysfunction, Allergies, Chronic knee pain, Past meniscus repair, Osteoarthritis, H/O C-diff colitis after Clindamycin, and other health problems see PMH/PSH. Pt presents for exam, to discuss health problems Tx and F/u plans     ' Earaches and chest congestion resolved after Abx. B/P has been good. Trying to watch diet to lose weight. Back and Knee pain tolerable'.    Hypertension   This is a chronic problem. The current episode started more than 1 year ago. The problem is unchanged. The problem is controlled. Pertinent negatives include no chest pain, headaches, neck pain, palpitations or shortness of breath. Risk factors for coronary artery disease include obesity and post-menopausal state. Current antihypertension treatment includes ACE inhibitors. The current treatment provides significant improvement.   Hypothyroidism   This is a chronic problem. The current episode started more than 1 year ago. The problem occurs daily. The problem has been gradually improving. Associated symptoms include arthralgias and joint swelling. Pertinent negatives include no abdominal pain, chest pain, chills, congestion, coughing, fatigue, fever, headaches, myalgias, nausea, neck pain, rash, sore throat, vomiting or weakness. Nothing aggravates the symptoms. Treatments tried: Levothyroxine. The treatment provided significant relief.   Obesity   This is a chronic problem. The current episode started more than 1 year ago. The problem occurs daily. The problem has been gradually improving. Associated symptoms include arthralgias and joint swelling. Pertinent negatives include no abdominal pain, chest pain, chills, congestion, coughing, fatigue, fever, headaches, myalgias, nausea, neck pain, rash, sore throat, vomiting or weakness. The symptoms are  aggravated by eating. Treatments tried: Diet, exercise. The treatment provided no relief.   Knee Pain    Incident onset: Chronic. Injury mechanism: Chronic use. The pain is present in the left knee. The pain is at a severity of 5/10. The pain is mild. The symptoms are aggravated by weight bearing. She has tried NSAIDs and rest for the symptoms. The treatment provided no relief (Plan for TKA).        The following portions of the patient's history were reviewed and updated as appropriate: allergies, current medications, past family history, past medical history, past social history, past surgical history and problem list.    Review of Systems   Constitutional: Negative for chills, fatigue and fever.   HENT: Negative for congestion, ear pain, postnasal drip, rhinorrhea, sinus pressure, sinus pain ( mild), sneezing and sore throat.    Eyes: Negative.    Respiratory: Negative for cough, shortness of breath and wheezing.    Cardiovascular: Negative for chest pain and palpitations.   Gastrointestinal: Negative for abdominal pain, diarrhea, nausea and vomiting.   Endocrine: Negative.    Genitourinary: Negative for dysuria.   Musculoskeletal: Positive for arthralgias and joint swelling. Negative for myalgias and neck pain.        L knee worse than R   Skin: Negative for rash.   Allergic/Immunologic: Positive for environmental allergies.   Neurological: Negative for weakness and headaches.   Hematological: Negative.    Psychiatric/Behavioral: Negative.        Objective   Physical Exam   Constitutional: She is oriented to person, place, and time. She appears well-developed and well-nourished. No distress.   HENT:   Head: Normocephalic and atraumatic.   Right Ear: External ear normal.   Left Ear: External ear normal.   Nose: Nose normal.   Mouth/Throat: Oropharynx is clear and moist. No oropharyngeal exudate.   Eyes: Conjunctivae and EOM are normal. Pupils are equal, round, and reactive to light. Right eye exhibits no  discharge. Left eye exhibits no discharge. No scleral icterus.   Neck: Normal range of motion. Neck supple. No JVD present. No tracheal deviation present. No thyromegaly present.   Cardiovascular: Normal rate, regular rhythm, normal heart sounds and intact distal pulses. Exam reveals no gallop and no friction rub.   No murmur heard.  Pulmonary/Chest: Effort normal and breath sounds normal. No respiratory distress. She has no wheezes. She has no rales. She exhibits no tenderness.   Abdominal: Soft. Bowel sounds are normal. She exhibits no distension and no mass. There is no tenderness. No hernia.   Musculoskeletal: She exhibits no edema, tenderness or deformity.   WB 4 with ROM L knee   Lymphadenopathy:     She has no cervical adenopathy.   Neurological: She is alert and oriented to person, place, and time. She has normal reflexes. She displays normal reflexes. No cranial nerve deficit. She exhibits normal muscle tone. Coordination normal.   Skin: Skin is warm and dry. No rash noted. She is not diaphoretic. No erythema. No pallor.   Psoriasis   Psychiatric: She has a normal mood and affect. Her behavior is normal. Judgment and thought content normal.   Nursing note and vitals reviewed.      Assessment/Plan   Mira was seen today for earache and cough.    Diagnoses and all orders for this visit:    Asplenia    Essential hypertension    Need for vaccination  -     Pneumococcal Polysaccharide Vaccine 23-Valent Greater Than or Equal To 3yo Subcutaneous / IM    Obesity (BMI 30-39.9)    Acquired hypothyroidism    Chronic pain of left knee      Discussed exam, meds, indications, tx plan, rationale. Discussed USPSTF recommendations. Discussed Pneumonia vaccine again, Pt agrees today. Discussed f/u plan.   Patient's Body mass index is 39.21 kg/m². BMI is above normal parameters. Recommendations include: educational material, exercise counseling, nutrition counseling and referral to primary care.          This document has  been electronically signed by Lloyd Varghese MD

## 2019-04-18 ENCOUNTER — OFFICE VISIT (OUTPATIENT)
Dept: FAMILY MEDICINE CLINIC | Facility: CLINIC | Age: 64
End: 2019-04-18

## 2019-04-18 ENCOUNTER — APPOINTMENT (OUTPATIENT)
Dept: LAB | Facility: HOSPITAL | Age: 64
End: 2019-04-18

## 2019-04-18 VITALS
DIASTOLIC BLOOD PRESSURE: 90 MMHG | WEIGHT: 188 LBS | OXYGEN SATURATION: 94 % | HEIGHT: 57 IN | SYSTOLIC BLOOD PRESSURE: 158 MMHG | TEMPERATURE: 94 F | HEART RATE: 60 BPM | BODY MASS INDEX: 40.56 KG/M2

## 2019-04-18 DIAGNOSIS — N30.01 ACUTE CYSTITIS WITH HEMATURIA: Primary | ICD-10-CM

## 2019-04-18 DIAGNOSIS — E03.9 ACQUIRED HYPOTHYROIDISM: ICD-10-CM

## 2019-04-18 DIAGNOSIS — I10 ESSENTIAL HYPERTENSION: ICD-10-CM

## 2019-04-18 DIAGNOSIS — R30.0 DYSURIA: ICD-10-CM

## 2019-04-18 LAB
BILIRUB BLD-MCNC: ABNORMAL MG/DL
CLARITY, POC: ABNORMAL
COLOR UR: ABNORMAL
GLUCOSE UR STRIP-MCNC: NEGATIVE MG/DL
KETONES UR QL: NEGATIVE
LEUKOCYTE EST, POC: ABNORMAL
NITRITE UR-MCNC: NEGATIVE MG/ML
PH UR: 5 [PH] (ref 5–8)
PROT UR STRIP-MCNC: ABNORMAL MG/DL
RBC # UR STRIP: ABNORMAL /UL
SP GR UR: 1.03 (ref 1–1.03)
UROBILINOGEN UR QL: NORMAL

## 2019-04-18 PROCEDURE — 99214 OFFICE O/P EST MOD 30 MIN: CPT | Performed by: NURSE PRACTITIONER

## 2019-04-18 PROCEDURE — 81002 URINALYSIS NONAUTO W/O SCOPE: CPT | Performed by: NURSE PRACTITIONER

## 2019-04-18 PROCEDURE — 87086 URINE CULTURE/COLONY COUNT: CPT | Performed by: NURSE PRACTITIONER

## 2019-04-18 PROCEDURE — 84443 ASSAY THYROID STIM HORMONE: CPT | Performed by: NURSE PRACTITIONER

## 2019-04-18 RX ORDER — FLUCONAZOLE 150 MG/1
TABLET ORAL
Qty: 2 TABLET | Refills: 0 | Status: SHIPPED | OUTPATIENT
Start: 2019-04-18 | End: 2019-05-17

## 2019-04-18 RX ORDER — HYDROCHLOROTHIAZIDE 12.5 MG/1
12.5 TABLET ORAL DAILY
Qty: 30 TABLET | Refills: 1 | Status: SHIPPED | OUTPATIENT
Start: 2019-04-18 | End: 2019-07-08 | Stop reason: ALTCHOICE

## 2019-04-18 RX ORDER — NITROFURANTOIN 25; 75 MG/1; MG/1
100 CAPSULE ORAL EVERY 12 HOURS SCHEDULED
Qty: 14 CAPSULE | Refills: 0 | Status: SHIPPED | OUTPATIENT
Start: 2019-04-18 | End: 2019-04-25

## 2019-04-18 NOTE — PATIENT INSTRUCTIONS
Obesity, Adult  Obesity is having too much body fat. If you have a BMI of 30 or more, you are obese. BMI is a number that explains how much body fat you have. Obesity is often caused by taking in (consuming) more calories than your body uses.  Obesity can cause serious health problems. Changing your lifestyle can help to treat obesity.  Follow these instructions at home:  Eating and drinking    · Follow advice from your doctor about what to eat and drink. Your doctor may tell you to:  ? Cut down on (limit) fast foods, sweets, and processed snack foods.  ? Choose low-fat options. For example, choose low-fat milk instead of whole milk.  ? Eat 5 or more servings of fruits or vegetables every day.  ? Eat at home more often. This gives you more control over what you eat.  ? Choose healthy foods when you eat out.  ? Learn what a healthy portion size is. A portion size is the amount of a certain food that is healthy for you to eat at one time. This is different for each person.  ? Keep low-fat snacks available.  ? Avoid sugary drinks. These include soda, fruit juice, iced tea that is sweetened with sugar, and flavored milk.  ? Eat a healthy breakfast.  · Drink enough water to keep your pee (urine) clear or pale yellow.  · Do not go without eating for long periods of time (do not fast).  · Do not go on popular or trendy diets (fad diets).  Physical Activity  · Exercise often, as told by your doctor. Ask your doctor:  ? What types of exercise are safe for you.  ? How often you should exercise.  · Warm up and stretch before being active.  · Do slow stretching after being active (cool down).  · Rest between times of being active.  Lifestyle  · Limit how much time you spend in front of your TV, computer, or video game system (be less sedentary).  · Find ways to reward yourself that do not involve food.  · Limit alcohol intake to no more than 1 drink a day for nonpregnant women and 2 drinks a day for men. One drink equals 12 oz  of beer, 5 oz of wine, or 1½ oz of hard liquor.  General instructions  · Keep a weight loss journal. This can help you keep track of:  ? The food that you eat.  ? The exercise that you do.  · Take over-the-counter and prescription medicines only as told by your doctor.  · Take vitamins and supplements only as told by your doctor.  · Think about joining a support group. Your doctor may be able to help with this.  · Keep all follow-up visits as told by your doctor. This is important.  Contact a doctor if:  · You cannot meet your weight loss goal after you have changed your diet and lifestyle for 6 weeks.  This information is not intended to replace advice given to you by your health care provider. Make sure you discuss any questions you have with your health care provider.  Document Released: 03/11/2013 Document Revised: 05/25/2017 Document Reviewed: 10/05/2016  [employer] Interactive Patient Education © 2019 [employer]    BMI for Adults  Body mass index (BMI) is a number that is calculated from a person's weight and height. In most adults, the number is used to find how much of an adult's weight is made up of fat. BMI is not as accurate as a direct measure of body fat.  How is BMI calculated?  BMI is calculated by dividing weight in kilograms by height in meters squared. It can also be calculated by dividing weight in pounds by height in inches squared, then multiplying the resulting number by 703. Charts are available to help you find your BMI quickly and easily without doing this calculation.  How is BMI interpreted?  Health care professionals use BMI charts to identify whether an adult is underweight, at a normal weight, or overweight based on the following guidelines:  · Underweight: BMI less than 18.5.  · Normal weight: BMI between 18.5 and 24.9.  · Overweight: BMI between 25 and 29.9.  · Obese: BMI of 30 and above.    BMI is usually interpreted the same for males and females.  Weight includes both fat and  muscle, so someone with a muscular build, such as an athlete, may have a BMI that is higher than 24.9. In cases like these, BMI may not accurately depict body fat. To determine if excess body fat is the cause of a BMI of 25 or higher, further assessments may need to be done by a health care provider.  Why is BMI a useful tool?  BMI is used to identify a possible weight problem that may be related to a medical problem or may increase the risk for medical problems. BMI can also be used to promote changes to reach a healthy weight.  This information is not intended to replace advice given to you by your health care provider. Make sure you discuss any questions you have with your health care provider.  Document Released: 08/29/2005 Document Revised: 04/27/2017 Document Reviewed: 05/15/2015  [employer] Interactive Patient Education © 2018 [employer]      Urinary Tract Infection, Adult  A urinary tract infection (UTI) is an infection of any part of the urinary tract, which includes the kidneys, ureters, bladder, and urethra. These organs make, store, and get rid of urine in the body. UTI can be a bladder infection (cystitis) or kidney infection (pyelonephritis).  What are the causes?  This infection may be caused by fungi, viruses, or bacteria. Bacteria are the most common cause of UTIs. This condition can also be caused by repeated incomplete emptying of the bladder during urination.  What increases the risk?  This condition is more likely to develop if:  · You ignore your need to urinate or hold urine for long periods of time.  · You do not empty your bladder completely during urination.  · You wipe back to front after urinating or having a bowel movement, if you are female.  · You are uncircumcised, if you are male.  · You are constipated.  · You have a urinary catheter that stays in place (indwelling).  · You have a weak defense (immune) system.  · You have a medical condition that affects your bowels, kidneys, or  bladder.  · You have diabetes.  · You take antibiotic medicines frequently or for long periods of time, and the antibiotics no longer work well against certain types of infections (antibiotic resistance).  · You take medicines that irritate your urinary tract.  · You are exposed to chemicals that irritate your urinary tract.  · You are female.    What are the signs or symptoms?  Symptoms of this condition include:  · Fever.  · Frequent urination or passing small amounts of urine frequently.  · Needing to urinate urgently.  · Pain or burning with urination.  · Urine that smells bad or unusual.  · Cloudy urine.  · Pain in the lower abdomen or back.  · Trouble urinating.  · Blood in the urine.  · Vomiting or being less hungry than normal.  · Diarrhea or abdominal pain.  · Vaginal discharge, if you are female.    How is this diagnosed?  This condition is diagnosed with a medical history and physical exam. You will also need to provide a urine sample to test your urine. Other tests may be done, including:  · Blood tests.  · Sexually transmitted disease (STD) testing.    If you have had more than one UTI, a cystoscopy or imaging studies may be done to determine the cause of the infections.  How is this treated?  Treatment for this condition often includes a combination of two or more of the following:  · Antibiotic medicine.  · Other medicines to treat less common causes of UTI.  · Over-the-counter medicines to treat pain.  · Drinking enough water to stay hydrated.    Follow these instructions at home:  · Take over-the-counter and prescription medicines only as told by your health care provider.  · If you were prescribed an antibiotic, take it as told by your health care provider. Do not stop taking the antibiotic even if you start to feel better.  · Avoid alcohol, caffeine, tea, and carbonated beverages. They can irritate your bladder.  · Drink enough fluid to keep your urine clear or pale yellow.  · Keep all follow-up  visits as told by your health care provider. This is important.  · Make sure to:  ? Empty your bladder often and completely. Do not hold urine for long periods of time.  ? Empty your bladder before and after sex.  ? Wipe from front to back after a bowel movement if you are female. Use each tissue one time when you wipe.  Contact a health care provider if:  · You have back pain.  · You have a fever.  · You feel nauseous or vomit.  · Your symptoms do not get better after 3 days.  · Your symptoms go away and then return.  Get help right away if:  · You have severe back pain or lower abdominal pain.  · You are vomiting and cannot keep down any medicines or water.  This information is not intended to replace advice given to you by your health care provider. Make sure you discuss any questions you have with your health care provider.  Document Released: 09/27/2006 Document Revised: 06/12/2018 Document Reviewed: 11/07/2016  [employer] Interactive Patient Education © 2019 [employer]

## 2019-04-18 NOTE — PROGRESS NOTES
"Subjective   Mira Deal is a 63 y.o. female.     [facility] Visit    PCP: Lloyd Varghese MD--has appt for routine f/u next month    CC: \"blood pressure running high; fever, dysuria\"    C/O increasing b/p readings, 160-170s/70-80's at home over the last week. Has also felt fatigued, tired.  Had fever and dysuria 48 hours ago.      Has had some problems with her thyroid over the last several months.  Had last been instructed by PCP to hold one day weekly, but reports she has been taking daily because she forgets to hold medication.       Urinary Tract Infection    This is a new problem. Episode onset: x 48 hours. Episode frequency: really bad at onset, has eased somewhat now. The problem has been waxing and waning. The quality of the pain is described as burning. The pain is mild. There has been no fever. Associated symptoms include frequency. Pertinent negatives include no chills, discharge, flank pain, hematuria, hesitancy, nausea, possible pregnancy, sweats, urgency or vomiting. She has tried nothing for the symptoms. There is no history of recurrent UTIs.   Hypertension   This is a chronic problem. The current episode started more than 1 year ago. The problem has been gradually worsening since onset. Condition status: been running high for over a week. Associated symptoms include blurred vision ( off/on), headaches, malaise/fatigue and peripheral edema (trace). Pertinent negatives include no anxiety, chest pain, neck pain, orthopnea, palpitations, PND, shortness of breath or sweats. Agents associated with hypertension include thyroid hormones. Risk factors for coronary artery disease include post-menopausal state. Past treatments include ACE inhibitors. Current antihypertension treatment includes ACE inhibitors. The current treatment provides mild improvement. Compliance problems include diet and exercise.         The following portions of the patient's history were reviewed and updated as " "appropriate: allergies, current medications, past medical history, past social history, past surgical history and problem list.    Review of Systems   Constitutional: Positive for malaise/fatigue and unexpected weight gain (has gained 7 pounds since November ). Negative for appetite change, chills, fatigue and fever.   Eyes: Positive for blurred vision ( off/on). Negative for pain and itching.   Respiratory: Negative for cough, chest tightness, shortness of breath and wheezing.    Cardiovascular: Positive for leg swelling (trace edema, L>R, history of knee surgery on left). Negative for chest pain, palpitations, orthopnea and PND.   Gastrointestinal: Negative for abdominal pain, diarrhea, nausea and vomiting.   Genitourinary: Positive for dysuria and frequency. Negative for flank pain, hematuria, hesitancy, urgency and vaginal discharge.   Musculoskeletal: Negative for neck pain.   Skin: Negative for rash.   Neurological: Positive for dizziness and headache.     /90 (BP Location: Left arm, Patient Position: Sitting, Cuff Size: Adult)   Pulse 60   Temp 94 °F (34.4 °C) (Tympanic)   Ht 144.8 cm (57\")   Wt 85.3 kg (188 lb)   SpO2 94%   BMI 40.68 kg/m²     Objective   Physical Exam   Constitutional: She is oriented to person, place, and time. She appears well-developed and well-nourished. No distress.   HENT:   Head: Normocephalic and atraumatic.   Right Ear: Tympanic membrane and ear canal normal.   Left Ear: Tympanic membrane and ear canal normal.   Nose: Nose normal. Right sinus exhibits no maxillary sinus tenderness and no frontal sinus tenderness. Left sinus exhibits no maxillary sinus tenderness and no frontal sinus tenderness.   Mouth/Throat: Uvula is midline, oropharynx is clear and moist and mucous membranes are normal.   Eyes: Conjunctivae are normal. Right eye exhibits no discharge. Left eye exhibits no discharge.   Neck: Neck supple.   Cardiovascular: Normal rate and regular rhythm.   Trace " edema     Pulmonary/Chest: Effort normal and breath sounds normal. She has no wheezes. She has no rales.   Abdominal: Soft. Bowel sounds are normal. There is no tenderness. There is no rebound and no guarding.   Genitourinary:   Genitourinary Comments: No flank pain     Lymphadenopathy:     She has no cervical adenopathy.   Neurological: She is alert and oriented to person, place, and time.   Nursing note and vitals reviewed.    Recent Results (from the past 24 hour(s))   POC Urinalysis Dipstick    Collection Time: 04/18/19 10:14 AM   Result Value Ref Range    Color Darline Yellow, Straw, Dark Yellow, Darline    Clarity, UA Cloudy (A) Clear    Glucose, UA Negative Negative, 1000 mg/dL (3+) mg/dL    Bilirubin Small (1+) (A) Negative    Ketones, UA Negative Negative    Specific Gravity  1.030 1.005 - 1.030    Blood, UA Trace (A) Negative    pH, Urine 5.0 5.0 - 8.0    Protein, POC Trace (A) Negative mg/dL    Urobilinogen, UA Normal Normal    Leukocytes Trace (A) Negative    Nitrite, UA Negative Negative     No Images in the past 120 days found..      Assessment/Plan   Mira was seen today for hypertension.    Diagnoses and all orders for this visit:    Acute cystitis with hematuria  -     nitrofurantoin, macrocrystal-monohydrate, (MACROBID) 100 MG capsule; Take 1 capsule by mouth Every 12 (Twelve) Hours for 7 days.  -     Urine Culture - Urine, Urine, Clean Catch    Dysuria  -     POC Urinalysis Dipstick    Acquired hypothyroidism  -     TSH    Essential hypertension  -     hydrochlorothiazide (HYDRODIURIL) 12.5 MG tablet; Take 1 tablet by mouth Daily.    Other orders  -     fluconazole (DIFLUCAN) 150 MG tablet; 1 tab po x 1 now, may repeat in 4 days prn yeast      Increase water, decrease caffeine.  Empty bladder frequently.  Tylenol as needed  Rx for Macrobid.  Urine C&S pending--will call if antibiotic needs to be changed.      Add HCTZ 12.5 mg daily.  Continue to monitor b/p readings at home, if continues to run  high, notify me or PCP.    Repeat TSH today--will call with results.      BMI>30: educational materials provided    See PCP or RTC if symptoms persist/worsen  See PCP for routine f/u visit and management of chronic medical conditions    RTW: 4-22-19

## 2019-04-19 LAB — TSH SERPL DL<=0.05 MIU/L-ACNC: 2.04 MIU/ML (ref 0.27–4.2)

## 2019-04-20 LAB — BACTERIA SPEC AEROBE CULT: NORMAL

## 2019-05-17 ENCOUNTER — OFFICE VISIT (OUTPATIENT)
Dept: FAMILY MEDICINE CLINIC | Facility: CLINIC | Age: 64
End: 2019-05-17

## 2019-05-17 VITALS
BODY MASS INDEX: 40.82 KG/M2 | HEART RATE: 81 BPM | TEMPERATURE: 97.3 F | DIASTOLIC BLOOD PRESSURE: 76 MMHG | HEIGHT: 57 IN | RESPIRATION RATE: 18 BRPM | SYSTOLIC BLOOD PRESSURE: 130 MMHG | WEIGHT: 189.2 LBS | OXYGEN SATURATION: 95 %

## 2019-05-17 DIAGNOSIS — I10 ESSENTIAL HYPERTENSION: ICD-10-CM

## 2019-05-17 DIAGNOSIS — J06.9 ACUTE URI: Primary | ICD-10-CM

## 2019-05-17 DIAGNOSIS — R05.9 COUGH: ICD-10-CM

## 2019-05-17 PROCEDURE — 99214 OFFICE O/P EST MOD 30 MIN: CPT | Performed by: NURSE PRACTITIONER

## 2019-05-17 RX ORDER — BENZONATATE 100 MG/1
100 CAPSULE ORAL 3 TIMES DAILY PRN
Qty: 30 CAPSULE | Refills: 0 | Status: SHIPPED | OUTPATIENT
Start: 2019-05-17 | End: 2019-07-08

## 2019-05-17 RX ORDER — IBUPROFEN 800 MG/1
800 TABLET ORAL EVERY 6 HOURS PRN
COMMUNITY
End: 2019-12-20

## 2019-05-17 RX ORDER — ALBUTEROL SULFATE 90 UG/1
2 AEROSOL, METERED RESPIRATORY (INHALATION) EVERY 4 HOURS PRN
Qty: 18 G | Refills: 0 | Status: SHIPPED | OUTPATIENT
Start: 2019-05-17 | End: 2020-08-01

## 2019-05-17 RX ORDER — AZITHROMYCIN 250 MG/1
TABLET, FILM COATED ORAL
Qty: 6 TABLET | Refills: 0 | Status: SHIPPED | OUTPATIENT
Start: 2019-05-17 | End: 2019-05-28

## 2019-05-17 NOTE — PROGRESS NOTES
"Subjective   Meyers Moreno Deal is a 63 y.o. female.     [facility] Visit    PCP: Lloyd Varghese MD--appt for f/u on 5-28-19    CC: \"coughing, weak, high blood pressure\"    Reports she had a headache, felt flushed and weak yesterday and last night and was getting high b/p readings: 171/79, 162/80, 168/72, 177/79.  Feeling some better today, has not checked b/p at home this AM.  Did leave work early last night.        URI    This is a new problem. Episode onset: x 3-4 days. The problem has been gradually worsening. There has been no fever. Associated symptoms include congestion, coughing, a sore throat and wheezing ( ? ). Pertinent negatives include no abdominal pain, chest pain, diarrhea, dysuria, ear pain, headaches, joint pain, joint swelling, nausea, neck pain, plugged ear sensation, rash, rhinorrhea, sinus pain, sneezing, swollen glands or vomiting. She has tried nothing for the symptoms.        The following portions of the patient's history were reviewed and updated as appropriate: allergies, current medications, past medical history, past social history, past surgical history and problem list.    Review of Systems   Constitutional: Negative for appetite change, fatigue and fever.   HENT: Positive for congestion and sore throat. Negative for ear discharge, ear pain, postnasal drip, rhinorrhea, sinus pressure and sneezing.    Eyes: Negative for discharge and itching.   Respiratory: Positive for cough and wheezing ( ? ). Negative for chest tightness.    Cardiovascular: Negative for chest pain.   Gastrointestinal: Negative for abdominal pain, diarrhea, nausea and vomiting.   Genitourinary: Negative for dysuria.   Musculoskeletal: Negative for joint pain and neck pain.   Skin: Negative for rash.   Neurological: Positive for weakness ( yesterday) and headache ( yesterday). Negative for dizziness.     /76 (BP Location: Right arm, Patient Position: Sitting, Cuff Size: Adult)   Pulse 81   Temp 97.3 " "°F (36.3 °C)   Resp 18   Ht 144.8 cm (57\")   Wt 85.8 kg (189 lb 3.2 oz)   SpO2 95%   BMI 40.94 kg/m²     Objective   Physical Exam   Constitutional: She is oriented to person, place, and time. She appears well-developed and well-nourished. No distress.   HENT:   Head: Normocephalic and atraumatic.   Right Ear: Ear canal normal. Tympanic membrane is not erythematous ( dull).   Left Ear: Ear canal normal. Tympanic membrane is not erythematous ( dull).   Nose: Mucosal edema and congestion present. Right sinus exhibits no maxillary sinus tenderness and no frontal sinus tenderness. Left sinus exhibits no maxillary sinus tenderness and no frontal sinus tenderness.   Mouth/Throat: Uvula is midline and mucous membranes are normal. Posterior oropharyngeal erythema ( mild injection) present. No oropharyngeal exudate.   Eyes: Conjunctivae and EOM are normal. Pupils are equal, round, and reactive to light. Right eye exhibits no discharge. Left eye exhibits no discharge.   Neck: Normal range of motion. Neck supple.   Cardiovascular: Normal rate and regular rhythm.   Pulmonary/Chest: Effort normal. She has no wheezes. She has no rales.   Slightly tight cough noted     Lymphadenopathy:     She has no cervical adenopathy.   Neurological: She is alert and oriented to person, place, and time. No cranial nerve deficit or sensory deficit. Coordination normal.   Nursing note and vitals reviewed.    No results found for this or any previous visit (from the past 24 hour(s)).  No Images in the past 120 days found..      Assessment/Plan   Bledsoe was seen today for cough, fatigue and hypertension.    Diagnoses and all orders for this visit:    Acute URI  -     azithromycin (ZITHROMAX Z-JUVENTINO) 250 MG tablet; Take 2 tablets the first day, then 1 tablet daily for 4 days.    Cough  -     benzonatate (TESSALON PERLES) 100 MG capsule; Take 1 capsule by mouth 3 (Three) Times a Day As Needed for Cough.  -     albuterol sulfate  (90 Base) " MCG/ACT inhaler; Inhale 2 puffs Every 4 (Four) Hours As Needed for Wheezing or Shortness of Air.    Essential hypertension    Push fluids  Rest  Tylenol as needed  Rx for Zithromax, Tessalon perles, Albuterol as needed for URI/cough/wheezing    B/P currently normal--continue with current meds as prescribed    Patient's Body mass index is 40.94 kg/m². BMI is above normal parameters. Recommendations include: referral to primary care     See PCP or RTC if symptoms persist/worsen  See PCP for routine f/u visit and management of chronic medical conditions    RTW: 5-20-19.

## 2019-05-17 NOTE — PATIENT INSTRUCTIONS

## 2019-05-28 ENCOUNTER — OFFICE VISIT (OUTPATIENT)
Dept: FAMILY MEDICINE CLINIC | Facility: CLINIC | Age: 64
End: 2019-05-28

## 2019-05-28 VITALS
HEART RATE: 94 BPM | SYSTOLIC BLOOD PRESSURE: 128 MMHG | WEIGHT: 192 LBS | DIASTOLIC BLOOD PRESSURE: 78 MMHG | BODY MASS INDEX: 41.42 KG/M2 | HEIGHT: 57 IN | TEMPERATURE: 98.3 F | OXYGEN SATURATION: 95 %

## 2019-05-28 DIAGNOSIS — Z12.31 ENCOUNTER FOR SCREENING MAMMOGRAM FOR MALIGNANT NEOPLASM OF BREAST: ICD-10-CM

## 2019-05-28 DIAGNOSIS — J22 LOWER RESPIRATORY INFECTION: Primary | ICD-10-CM

## 2019-05-28 DIAGNOSIS — J01.41 ACUTE RECURRENT PANSINUSITIS: ICD-10-CM

## 2019-05-28 DIAGNOSIS — I10 ESSENTIAL HYPERTENSION: ICD-10-CM

## 2019-05-28 PROCEDURE — 99214 OFFICE O/P EST MOD 30 MIN: CPT | Performed by: FAMILY MEDICINE

## 2019-05-28 RX ORDER — PREDNISONE 10 MG/1
10 TABLET ORAL SEE ADMIN INSTRUCTIONS
Qty: 17 TABLET | Refills: 0 | Status: SHIPPED | OUTPATIENT
Start: 2019-05-28 | End: 2019-07-08

## 2019-05-28 RX ORDER — CETIRIZINE HYDROCHLORIDE 10 MG/1
10 TABLET ORAL DAILY
Qty: 30 TABLET | Refills: 5 | Status: SHIPPED | OUTPATIENT
Start: 2019-05-28 | End: 2019-08-12

## 2019-05-28 RX ORDER — CEFDINIR 300 MG/1
300 CAPSULE ORAL 2 TIMES DAILY
Qty: 14 CAPSULE | Refills: 0 | Status: SHIPPED | OUTPATIENT
Start: 2019-05-28 | End: 2019-07-08

## 2019-05-28 NOTE — PATIENT INSTRUCTIONS
"DASH Eating Plan  DASH stands for \"Dietary Approaches to Stop Hypertension.\" The DASH eating plan is a healthy eating plan that has been shown to reduce high blood pressure (hypertension). It may also reduce your risk for type 2 diabetes, heart disease, and stroke. The DASH eating plan may also help with weight loss.  What are tips for following this plan?  General guidelines  · Avoid eating more than 2,300 mg (milligrams) of salt (sodium) a day. If you have hypertension, you may need to reduce your sodium intake to 1,500 mg a day.  · Limit alcohol intake to no more than 1 drink a day for nonpregnant women and 2 drinks a day for men. One drink equals 12 oz of beer, 5 oz of wine, or 1½ oz of hard liquor.  · Work with your health care provider to maintain a healthy body weight or to lose weight. Ask what an ideal weight is for you.  · Get at least 30 minutes of exercise that causes your heart to beat faster (aerobic exercise) most days of the week. Activities may include walking, swimming, or biking.  · Work with your health care provider or diet and nutrition specialist (dietitian) to adjust your eating plan to your individual calorie needs.  Reading food labels  · Check food labels for the amount of sodium per serving. Choose foods with less than 5 percent of the Daily Value of sodium. Generally, foods with less than 300 mg of sodium per serving fit into this eating plan.  · To find whole grains, look for the word \"whole\" as the first word in the ingredient list.  Shopping  · Buy products labeled as \"low-sodium\" or \"no salt added.\"  · Buy fresh foods. Avoid canned foods and premade or frozen meals.  Cooking  · Avoid adding salt when cooking. Use salt-free seasonings or herbs instead of table salt or sea salt. Check with your health care provider or pharmacist before using salt substitutes.  · Do not weems foods. Cook foods using healthy methods such as baking, boiling, grilling, and broiling instead.  · Cook with " heart-healthy oils, such as olive, canola, soybean, or sunflower oil.  Meal planning    · Eat a balanced diet that includes:  ? 5 or more servings of fruits and vegetables each day. At each meal, try to fill half of your plate with fruits and vegetables.  ? Up to 6-8 servings of whole grains each day.  ? Less than 6 oz of lean meat, poultry, or fish each day. A 3-oz serving of meat is about the same size as a deck of cards. One egg equals 1 oz.  ? 2 servings of low-fat dairy each day.  ? A serving of nuts, seeds, or beans 5 times each week.  ? Heart-healthy fats. Healthy fats called Omega-3 fatty acids are found in foods such as flaxseeds and coldwater fish, like sardines, salmon, and mackerel.  · Limit how much you eat of the following:  ? Canned or prepackaged foods.  ? Food that is high in trans fat, such as fried foods.  ? Food that is high in saturated fat, such as fatty meat.  ? Sweets, desserts, sugary drinks, and other foods with added sugar.  ? Full-fat dairy products.  · Do not salt foods before eating.  · Try to eat at least 2 vegetarian meals each week.  · Eat more home-cooked food and less restaurant, buffet, and fast food.  · When eating at a restaurant, ask that your food be prepared with less salt or no salt, if possible.  What foods are recommended?  The items listed may not be a complete list. Talk with your dietitian about what dietary choices are best for you.  Grains  Whole-grain or whole-wheat bread. Whole-grain or whole-wheat pasta. Brown rice. Oatmeal. Quinoa. Bulgur. Whole-grain and low-sodium cereals. Lindsay bread. Low-fat, low-sodium crackers. Whole-wheat flour tortillas.  Vegetables  Fresh or frozen vegetables (raw, steamed, roasted, or grilled). Low-sodium or reduced-sodium tomato and vegetable juice. Low-sodium or reduced-sodium tomato sauce and tomato paste. Low-sodium or reduced-sodium canned vegetables.  Fruits  All fresh, dried, or frozen fruit. Canned fruit in natural juice (without  added sugar).  Meat and other protein foods  Skinless chicken or turkey. Ground chicken or turkey. Pork with fat trimmed off. Fish and seafood. Egg whites. Dried beans, peas, or lentils. Unsalted nuts, nut butters, and seeds. Unsalted canned beans. Lean cuts of beef with fat trimmed off. Low-sodium, lean deli meat.  Dairy  Low-fat (1%) or fat-free (skim) milk. Fat-free, low-fat, or reduced-fat cheeses. Nonfat, low-sodium ricotta or cottage cheese. Low-fat or nonfat yogurt. Low-fat, low-sodium cheese.  Fats and oils  Soft margarine without trans fats. Vegetable oil. Low-fat, reduced-fat, or light mayonnaise and salad dressings (reduced-sodium). Canola, safflower, olive, soybean, and sunflower oils. Avocado.  Seasoning and other foods  Herbs. Spices. Seasoning mixes without salt. Unsalted popcorn and pretzels. Fat-free sweets.  What foods are not recommended?  The items listed may not be a complete list. Talk with your dietitian about what dietary choices are best for you.  Grains  Baked goods made with fat, such as croissants, muffins, or some breads. Dry pasta or rice meal packs.  Vegetables  Creamed or fried vegetables. Vegetables in a cheese sauce. Regular canned vegetables (not low-sodium or reduced-sodium). Regular canned tomato sauce and paste (not low-sodium or reduced-sodium). Regular tomato and vegetable juice (not low-sodium or reduced-sodium). Pickles. Olives.  Fruits  Canned fruit in a light or heavy syrup. Fried fruit. Fruit in cream or butter sauce.  Meat and other protein foods  Fatty cuts of meat. Ribs. Fried meat. Gooden. Sausage. Bologna and other processed lunch meats. Salami. Fatback. Hotdogs. Bratwurst. Salted nuts and seeds. Canned beans with added salt. Canned or smoked fish. Whole eggs or egg yolks. Chicken or turkey with skin.  Dairy  Whole or 2% milk, cream, and half-and-half. Whole or full-fat cream cheese. Whole-fat or sweetened yogurt. Full-fat cheese. Nondairy creamers. Whipped toppings.  Processed cheese and cheese spreads.  Fats and oils  Butter. Stick margarine. Lard. Shortening. Ghee. Gooden fat. Tropical oils, such as coconut, palm kernel, or palm oil.  Seasoning and other foods  Salted popcorn and pretzels. Onion salt, garlic salt, seasoned salt, table salt, and sea salt. Worcestershire sauce. Tartar sauce. Barbecue sauce. Teriyaki sauce. Soy sauce, including reduced-sodium. Steak sauce. Canned and packaged gravies. Fish sauce. Oyster sauce. Cocktail sauce. Horseradish that you find on the shelf. Ketchup. Mustard. Meat flavorings and tenderizers. Bouillon cubes. Hot sauce and Tabasco sauce. Premade or packaged marinades. Premade or packaged taco seasonings. Relishes. Regular salad dressings.  Where to find more information:  · National Heart, Lung, [employer] [place]: www.nhlbi.nih.gov  · American Heart Association: www.heart.org  Summary  · The DASH eating plan is a healthy eating plan that has been shown to reduce high blood pressure (hypertension). It may also reduce your risk for type 2 diabetes, heart disease, and stroke.  · With the DASH eating plan, you should limit salt (sodium) intake to 2,300 mg a day. If you have hypertension, you may need to reduce your sodium intake to 1,500 mg a day.  · When on the DASH eating plan, aim to eat more fresh fruits and vegetables, whole grains, lean proteins, low-fat dairy, and heart-healthy fats.  · Work with your health care provider or diet and nutrition specialist (dietitian) to adjust your eating plan to your individual calorie needs.  This information is not intended to replace advice given to you by your health care provider. Make sure you discuss any questions you have with your health care provider.  Document Released: 12/06/2012 Document Revised: 12/11/2017 Document Reviewed: 12/11/2017  [employer] Patient Education © 2019 [employer]

## 2019-05-29 PROBLEM — E03.9 HYPOTHYROIDISM: Status: ACTIVE | Noted: 2019-05-29

## 2019-05-29 RX ORDER — LISINOPRIL 20 MG/1
20 TABLET ORAL DAILY
Qty: 30 TABLET | Refills: 5 | Status: SHIPPED | OUTPATIENT
Start: 2019-05-29 | End: 2019-12-30 | Stop reason: SDUPTHER

## 2019-05-29 RX ORDER — LEVOTHYROXINE SODIUM 0.1 MG/1
100 TABLET ORAL DAILY
Qty: 30 TABLET | Refills: 5 | Status: SHIPPED | OUTPATIENT
Start: 2019-05-29 | End: 2019-12-10 | Stop reason: SDUPTHER

## 2019-05-29 NOTE — PROGRESS NOTES
Subjective    Mira Deal is a 63 y.o. obese  female former smoker with HTN, Hypothyroidism, Hyperinsulinar obesity, Hyperlipidemia, Asplenia, H/O Recurrent OM, Eustachian tube dysfunction, Allergies, Chronic knee pain, S/P L TKA, Osteoarthritis, H/O C-diff colitis after Clindamycin, and other health problems see PMH/PSH. Pt presents for exam, to discuss acute and chronic health problems Tx and F/u plans.       ' Have on going Sinus drainage, cough, chest congestion, x 2-3 wks, has not improved with OTC Cold meds. Seems to be getting worse. B/P has been OK at checks taking meds routinely, need refill. Arthritic pain flares at times, currently doing OK'.    Hypertension   This is a chronic problem. The current episode started more than 1 year ago. The problem is unchanged. The problem is controlled. Pertinent negatives include no chest pain, headaches, neck pain, palpitations or shortness of breath. Risk factors for coronary artery disease include obesity and post-menopausal state. Current antihypertension treatment includes ACE inhibitors. The current treatment provides significant improvement.   Hypothyroidism   This is a chronic problem. The current episode started more than 1 year ago. The problem occurs daily. The problem has been gradually improving. Associated symptoms include arthralgias and chills. Pertinent negatives include no abdominal pain, chest pain, congestion, coughing, fatigue, fever, headaches, myalgias, nausea, neck pain, rash, sore throat, vomiting or weakness. Nothing aggravates the symptoms. Treatments tried: Levothyroxine. The treatment provided significant relief.   Obesity   This is a chronic problem. The current episode started more than 1 year ago. The problem occurs daily. The problem has been gradually improving. Associated symptoms include arthralgias and chills. Pertinent negatives include no abdominal pain, chest pain, congestion, coughing, fatigue, fever, headaches,  myalgias, nausea, neck pain, rash, sore throat, vomiting or weakness. The symptoms are aggravated by eating. Treatments tried: Diet, exercise. The treatment provided no relief.   Arthritis   Presents for follow-up visit. She complains of stiffness. The symptoms have been stable. Affected location: Multiple joints. Her pain is at a severity of 2/10. Pertinent negatives include no fatigue, fever or rash.   Cough   This is a recurrent problem. The current episode started 1 to 4 weeks ago. The problem has been gradually worsening. The problem occurs every few minutes. The cough is productive of sputum. Associated symptoms include chills and postnasal drip. Pertinent negatives include no chest pain, fever, headaches, myalgias, rash, sore throat or shortness of breath. She has tried OTC cough suppressant for the symptoms. The treatment provided no relief. Her past medical history is significant for bronchitis and environmental allergies.        The following portions of the patient's history were reviewed and updated as appropriate: allergies, current medications, past family history, past medical history, past social history, past surgical history and problem list.    Review of Systems   Constitutional: Positive for chills. Negative for fatigue and fever.   HENT: Positive for postnasal drip. Negative for congestion and sore throat.    Respiratory: Negative for cough and shortness of breath.    Cardiovascular: Negative for chest pain and palpitations.   Gastrointestinal: Negative for abdominal pain, nausea and vomiting.   Musculoskeletal: Positive for arthralgias, arthritis and stiffness. Negative for myalgias and neck pain.   Skin: Negative for rash.   Allergic/Immunologic: Positive for environmental allergies.   Neurological: Negative for weakness and headaches.       Objective   Physical Exam   Constitutional: She is oriented to person, place, and time. She appears well-developed and well-nourished. No distress.    HENT:   Head: Normocephalic and atraumatic.   Right Ear: External ear normal.   Left Ear: External ear normal.   Nose: Nose normal.   Mouth/Throat: Oropharynx is clear and moist. No oropharyngeal exudate.   Velásquez PND, Dark circles under eyes.   Eyes: Conjunctivae and EOM are normal. Pupils are equal, round, and reactive to light. Right eye exhibits no discharge. Left eye exhibits no discharge. No scleral icterus.   Neck: Normal range of motion. Neck supple. No JVD present. No tracheal deviation present. No thyromegaly present.   Cardiovascular: Normal rate, regular rhythm, normal heart sounds and intact distal pulses. Exam reveals no gallop and no friction rub.   No murmur heard.  Pulmonary/Chest: Effort normal and breath sounds normal. No respiratory distress. She has no wheezes. She has no rales. She exhibits no tenderness.   Coarse BBS   Abdominal: Soft. Bowel sounds are normal. She exhibits no distension and no mass. There is no tenderness. There is no rebound and no guarding. No hernia.   Musculoskeletal: She exhibits no edema, tenderness or deformity.   L Knee improved S/P TKA   Lymphadenopathy:     She has no cervical adenopathy.   Neurological: She is alert and oriented to person, place, and time. She has normal reflexes. She displays normal reflexes. No cranial nerve deficit. She exhibits normal muscle tone. Coordination normal.   Skin: Skin is warm and dry. No rash noted. She is not diaphoretic. No erythema. No pallor.   Psoriasis   Psychiatric: She has a normal mood and affect. Her behavior is normal. Judgment and thought content normal.   Nursing note and vitals reviewed.      Assessment/Plan   Mira was seen today for hypertension, hypothyroidism and uri.    Diagnoses and all orders for this visit:    Lower respiratory infection    Acute recurrent pansinusitis    BMI 40.0-44.9, adult (CMS/HCC)    Essential hypertension    Other orders  -     predniSONE (DELTASONE) 10 MG tablet; Take 1 tablet by mouth  See Admin Instructions. Take 1 Tab Tid x3 days, Then 1 Tab Bid x 2 days Then 1 Tab QD x 4days,then D/C  -     cefdinir (OMNICEF) 300 MG capsule; Take 1 capsule by mouth 2 (Two) Times a Day.  -     cetirizine (zyrTEC) 10 MG tablet; Take 1 tablet by mouth Daily.  -     levothyroxine (SYNTHROID, LEVOTHROID) 100 MCG tablet; Take 1 tablet by mouth Daily.  -     lisinopril (PRINIVIL,ZESTRIL) 20 MG tablet; Take 1 tablet by mouth Daily.      Discussed exam, health problems, meds, indications, Tx plan, rationale. Discussed USPSTF recommendations. BMI, BEE, diet, exercise. Discussed F/U plan.    Patient's Body mass index is 41.55 kg/m². BMI is above normal parameters. Recommendations include: educational material, exercise counseling, nutrition counseling and referral to primary care.          This document has been electronically signed by lLoyd Varghese MD

## 2019-07-08 ENCOUNTER — LAB (OUTPATIENT)
Dept: LAB | Facility: HOSPITAL | Age: 64
End: 2019-07-08

## 2019-07-08 ENCOUNTER — OFFICE VISIT (OUTPATIENT)
Dept: FAMILY MEDICINE CLINIC | Facility: CLINIC | Age: 64
End: 2019-07-08

## 2019-07-08 VITALS
OXYGEN SATURATION: 96 % | DIASTOLIC BLOOD PRESSURE: 78 MMHG | HEART RATE: 82 BPM | HEIGHT: 57 IN | BODY MASS INDEX: 41.29 KG/M2 | WEIGHT: 191.4 LBS | TEMPERATURE: 97.8 F | SYSTOLIC BLOOD PRESSURE: 134 MMHG

## 2019-07-08 DIAGNOSIS — E03.9 ACQUIRED HYPOTHYROIDISM: ICD-10-CM

## 2019-07-08 DIAGNOSIS — L40.9 PSORIASIS: ICD-10-CM

## 2019-07-08 DIAGNOSIS — I10 ESSENTIAL HYPERTENSION: ICD-10-CM

## 2019-07-08 DIAGNOSIS — Q89.01 ASPLENIA: ICD-10-CM

## 2019-07-08 DIAGNOSIS — Z00.00 ROUTINE MEDICAL EXAM: ICD-10-CM

## 2019-07-08 DIAGNOSIS — Z00.00 ROUTINE MEDICAL EXAM: Primary | ICD-10-CM

## 2019-07-08 PROCEDURE — 84443 ASSAY THYROID STIM HORMONE: CPT

## 2019-07-08 PROCEDURE — 81001 URINALYSIS AUTO W/SCOPE: CPT

## 2019-07-08 PROCEDURE — 80061 LIPID PANEL: CPT

## 2019-07-08 PROCEDURE — 85025 COMPLETE CBC W/AUTO DIFF WBC: CPT

## 2019-07-08 PROCEDURE — 87086 URINE CULTURE/COLONY COUNT: CPT

## 2019-07-08 PROCEDURE — 99214 OFFICE O/P EST MOD 30 MIN: CPT | Performed by: FAMILY MEDICINE

## 2019-07-08 PROCEDURE — 80053 COMPREHEN METABOLIC PANEL: CPT

## 2019-07-08 RX ORDER — HYDROCHLOROTHIAZIDE 12.5 MG/1
12.5 CAPSULE, GELATIN COATED ORAL DAILY
Qty: 30 CAPSULE | Refills: 5 | Status: SHIPPED | OUTPATIENT
Start: 2019-07-08 | End: 2020-01-10

## 2019-07-08 RX ORDER — HYDROCHLOROTHIAZIDE 12.5 MG/1
1 CAPSULE, GELATIN COATED ORAL DAILY
COMMUNITY
End: 2019-07-08 | Stop reason: SDUPTHER

## 2019-07-08 NOTE — PROGRESS NOTES
Subjective   Mira Deal is a 63 y.o. obese  female former smoker with HTN, Hypothyroidism, Hyperinsulinar obesity, Hyperlipidemia, Asplenia, H/O Recurrent OM, Eustachian tube dysfunction, Allergies, Chronic knee pain, S/P L TKA, Osteoarthritis, H/O C-diff colitis after Clindamycin, and other health problems see PMH/PSH. Pt presents for exam, to discuss acute and chronic health problems Tx and F/u plans.       ' Sinus infection resolved after Abx. Psoriasis has flared up. B/P has been good at checks'.      Hypertension   This is a chronic problem. The current episode started more than 1 year ago. The problem is unchanged. The problem is controlled. Pertinent negatives include no chest pain, headaches, neck pain, palpitations or shortness of breath. Risk factors for coronary artery disease include obesity and post-menopausal state. Current antihypertension treatment includes ACE inhibitors. The current treatment provides significant improvement.   Hypothyroidism   This is a chronic problem. The current episode started more than 1 year ago. The problem occurs daily. The problem has been gradually improving. Associated symptoms include arthralgias. Pertinent negatives include no abdominal pain, chest pain, chills, congestion, coughing, fatigue, fever, headaches, joint swelling, myalgias, nausea, neck pain, rash, sore throat, vomiting or weakness. Nothing aggravates the symptoms. Treatments tried: Levothyroxine. The treatment provided significant relief.   Obesity   This is a chronic problem. The current episode started more than 1 year ago. The problem occurs daily. The problem has been gradually improving. Associated symptoms include arthralgias. Pertinent negatives include no abdominal pain, chest pain, chills, congestion, coughing, fatigue, fever, headaches, joint swelling, myalgias, nausea, neck pain, rash, sore throat, vomiting or weakness. The symptoms are aggravated by eating. Treatments tried:  Diet, exercise. The treatment provided no relief.   Arthritis   Presents for follow-up visit. She complains of stiffness. She reports no joint swelling. The symptoms have been stable. Affected location: Multiple joints. Her pain is at a severity of 2/10. Pertinent negatives include no diarrhea, dysuria, fatigue, fever or rash.        The following portions of the patient's history were reviewed and updated as appropriate: allergies, current medications, past family history, past medical history, past social history, past surgical history and problem list.    Review of Systems   Constitutional: Negative for chills, fatigue and fever.   HENT: Negative for congestion, ear pain, postnasal drip, rhinorrhea, sinus pressure, sinus pain ( mild), sneezing and sore throat.    Eyes: Negative.    Respiratory: Negative for cough, shortness of breath and wheezing.    Cardiovascular: Negative for chest pain and palpitations.   Gastrointestinal: Negative for abdominal pain, diarrhea, nausea and vomiting.   Endocrine: Negative.    Genitourinary: Negative for dyspareunia, dysuria, enuresis, menstrual problem and vaginal bleeding.   Musculoskeletal: Positive for arthralgias, arthritis and stiffness. Negative for joint swelling, myalgias and neck pain.        L TKA    Skin: Negative for rash.   Allergic/Immunologic: Positive for environmental allergies.   Neurological: Negative for weakness and headaches.   Hematological: Negative.    Psychiatric/Behavioral: Negative.        Objective   Physical Exam   Constitutional: She is oriented to person, place, and time. She appears well-developed and well-nourished. No distress.   HENT:   Head: Normocephalic and atraumatic.   Right Ear: External ear normal.   Left Ear: External ear normal.   Nose: Nose normal.   Mouth/Throat: Oropharynx is clear and moist. No oropharyngeal exudate.   Eyes: Conjunctivae and EOM are normal. Pupils are equal, round, and reactive to light. Right eye exhibits no  discharge. Left eye exhibits no discharge. No scleral icterus.   Neck: Normal range of motion. Neck supple. No JVD present. No tracheal deviation present. No thyromegaly present.   Cardiovascular: Normal rate, regular rhythm, normal heart sounds and intact distal pulses. Exam reveals no gallop and no friction rub.   No murmur heard.  Pulmonary/Chest: Effort normal and breath sounds normal. No stridor. No respiratory distress. She has no wheezes. She has no rales. She exhibits no tenderness.   Abdominal: Soft. Bowel sounds are normal. She exhibits no distension and no mass. There is no tenderness. There is no rebound and no guarding. No hernia.   Musculoskeletal: She exhibits no edema, tenderness or deformity.   L Knee improved S/P TKA   Lymphadenopathy:     She has no cervical adenopathy.   Neurological: She is alert and oriented to person, place, and time. She has normal reflexes. She displays normal reflexes. No cranial nerve deficit. She exhibits normal muscle tone. Coordination normal.   Skin: Skin is warm and dry. Capillary refill takes 2 to 3 seconds. No rash noted. She is not diaphoretic. No erythema. No pallor.   Psoriasis   Psychiatric: She has a normal mood and affect. Her behavior is normal. Judgment and thought content normal.   Nursing note and vitals reviewed.      Assessment/Plan   Mira was seen today for follow-up and skin problem.    Diagnoses and all orders for this visit:    Routine medical exam  -     triamcinolone (KENALOG) 0.1 % ointment; Apply  topically to the appropriate area as directed 2 (Two) Times a Day.  -     TSH; Future  -     CBC & Differential; Future  -     Comprehensive metabolic panel; Future  -     Urinalysis With Culture If Indicated - Urine, Clean Catch; Future  -     Lipid Panel; Future    Acquired hypothyroidism    Asplenia    BMI 40.0-44.9, adult (CMS/[facility])    Essential hypertension    Psoriasis    Other orders  -     hydrochlorothiazide (MICROZIDE) 12.5 MG capsule; Take  1 capsule by mouth Daily.    Discussed exam, health problems, meds, indications, tx plan, rationale. Discussed checking routine labs, discussed F/U plan.          This document has been electronically signed by Lloyd Varghese MD

## 2019-07-09 LAB
ALBUMIN SERPL-MCNC: 4.2 G/DL (ref 3.5–5.2)
ALBUMIN/GLOB SERPL: 1.4 G/DL
ALP SERPL-CCNC: 112 U/L (ref 39–117)
ALT SERPL W P-5'-P-CCNC: 40 U/L (ref 1–33)
ANION GAP SERPL CALCULATED.3IONS-SCNC: 11.6 MMOL/L (ref 5–15)
AST SERPL-CCNC: 36 U/L (ref 1–32)
BACTERIA UR QL AUTO: ABNORMAL /HPF
BASOPHILS # BLD AUTO: 0.04 10*3/MM3 (ref 0–0.2)
BASOPHILS NFR BLD AUTO: 0.4 % (ref 0–1.5)
BILIRUB SERPL-MCNC: 0.4 MG/DL (ref 0.2–1.2)
BILIRUB UR QL STRIP: NEGATIVE
BUN BLD-MCNC: 15 MG/DL (ref 8–23)
BUN/CREAT SERPL: 16.5 (ref 7–25)
CALCIUM SPEC-SCNC: 9.5 MG/DL (ref 8.6–10.5)
CHLORIDE SERPL-SCNC: 113 MMOL/L (ref 98–107)
CHOLEST SERPL-MCNC: 201 MG/DL (ref 0–200)
CLARITY UR: ABNORMAL
CO2 SERPL-SCNC: 24.4 MMOL/L (ref 22–29)
COLOR UR: YELLOW
CREAT BLD-MCNC: 0.91 MG/DL (ref 0.57–1)
DEPRECATED RDW RBC AUTO: 51.4 FL (ref 37–54)
EOSINOPHIL # BLD AUTO: 0.4 10*3/MM3 (ref 0–0.4)
EOSINOPHIL NFR BLD AUTO: 4 % (ref 0.3–6.2)
ERYTHROCYTE [DISTWIDTH] IN BLOOD BY AUTOMATED COUNT: 14.4 % (ref 12.3–15.4)
GFR SERPL CREATININE-BSD FRML MDRD: 62 ML/MIN/1.73
GFR SERPL CREATININE-BSD FRML MDRD: 76 ML/MIN/1.73
GLOBULIN UR ELPH-MCNC: 3.1 GM/DL
GLUCOSE BLD-MCNC: 87 MG/DL (ref 65–99)
GLUCOSE UR STRIP-MCNC: NEGATIVE MG/DL
HCT VFR BLD AUTO: 43.9 % (ref 34–46.6)
HDLC SERPL-MCNC: 43 MG/DL (ref 40–60)
HGB BLD-MCNC: 13.9 G/DL (ref 12–15.9)
HGB UR QL STRIP.AUTO: NEGATIVE
HYALINE CASTS UR QL AUTO: ABNORMAL /LPF
IMM GRANULOCYTES # BLD AUTO: 0.02 10*3/MM3 (ref 0–0.05)
IMM GRANULOCYTES NFR BLD AUTO: 0.2 % (ref 0–0.5)
KETONES UR QL STRIP: ABNORMAL
LDLC SERPL CALC-MCNC: 135 MG/DL (ref 0–100)
LDLC/HDLC SERPL: 3.14 {RATIO}
LEUKOCYTE ESTERASE UR QL STRIP.AUTO: ABNORMAL
LYMPHOCYTES # BLD AUTO: 3.22 10*3/MM3 (ref 0.7–3.1)
LYMPHOCYTES NFR BLD AUTO: 32.2 % (ref 19.6–45.3)
MCH RBC QN AUTO: 30.9 PG (ref 26.6–33)
MCHC RBC AUTO-ENTMCNC: 31.7 G/DL (ref 31.5–35.7)
MCV RBC AUTO: 97.6 FL (ref 79–97)
MONOCYTES # BLD AUTO: 0.74 10*3/MM3 (ref 0.1–0.9)
MONOCYTES NFR BLD AUTO: 7.4 % (ref 5–12)
NEUTROPHILS # BLD AUTO: 5.59 10*3/MM3 (ref 1.7–7)
NEUTROPHILS NFR BLD AUTO: 55.8 % (ref 42.7–76)
NITRITE UR QL STRIP: NEGATIVE
NRBC BLD AUTO-RTO: 0 /100 WBC (ref 0–0.2)
PH UR STRIP.AUTO: 6 [PH] (ref 5–8)
PLATELET # BLD AUTO: 342 10*3/MM3 (ref 140–450)
PMV BLD AUTO: 12.4 FL (ref 6–12)
POTASSIUM BLD-SCNC: 4.9 MMOL/L (ref 3.5–5.2)
PROT SERPL-MCNC: 7.3 G/DL (ref 6–8.5)
PROT UR QL STRIP: ABNORMAL
RBC # BLD AUTO: 4.5 10*6/MM3 (ref 3.77–5.28)
RBC # UR: ABNORMAL /HPF
REF LAB TEST METHOD: ABNORMAL
SODIUM BLD-SCNC: 149 MMOL/L (ref 136–145)
SP GR UR STRIP: 1.03 (ref 1–1.03)
SQUAMOUS #/AREA URNS HPF: ABNORMAL /HPF
TRIGL SERPL-MCNC: 114 MG/DL (ref 0–150)
TSH SERPL DL<=0.05 MIU/L-ACNC: 0.69 MIU/ML (ref 0.27–4.2)
UROBILINOGEN UR QL STRIP: ABNORMAL
VLDLC SERPL-MCNC: 22.8 MG/DL (ref 5–40)
WBC NRBC COR # BLD: 10.01 10*3/MM3 (ref 3.4–10.8)
WBC UR QL AUTO: ABNORMAL /HPF

## 2019-07-10 LAB — BACTERIA SPEC AEROBE CULT: NORMAL

## 2019-07-11 PROBLEM — G89.29 CHRONIC PAIN OF LEFT KNEE: Status: RESOLVED | Noted: 2018-05-27 | Resolved: 2019-07-11

## 2019-07-11 PROBLEM — H66.92 LEFT OTITIS MEDIA: Status: RESOLVED | Noted: 2017-09-14 | Resolved: 2019-07-11

## 2019-07-11 PROBLEM — M25.562 CHRONIC PAIN OF LEFT KNEE: Status: RESOLVED | Noted: 2018-05-27 | Resolved: 2019-07-11

## 2019-07-11 PROBLEM — J22 LOWER RESPIRATORY INFECTION: Status: RESOLVED | Noted: 2019-05-28 | Resolved: 2019-07-11

## 2019-07-11 PROBLEM — Z11.59 NEED FOR HEPATITIS C SCREENING TEST: Status: RESOLVED | Noted: 2017-05-05 | Resolved: 2019-07-11

## 2019-07-11 PROBLEM — Z00.00 ROUTINE MEDICAL EXAM: Status: ACTIVE | Noted: 2019-07-11

## 2019-07-11 PROBLEM — J01.41 ACUTE RECURRENT PANSINUSITIS: Status: RESOLVED | Noted: 2017-11-30 | Resolved: 2019-07-11

## 2019-07-11 PROBLEM — N30.01 ACUTE CYSTITIS WITH HEMATURIA: Status: RESOLVED | Noted: 2019-04-18 | Resolved: 2019-07-11

## 2019-07-11 PROBLEM — L40.9 PSORIASIS: Status: ACTIVE | Noted: 2019-07-11

## 2019-07-11 PROBLEM — Z01.818 PRE-OPERATIVE CLEARANCE: Status: RESOLVED | Noted: 2018-07-10 | Resolved: 2019-07-11

## 2019-07-11 PROBLEM — A04.72 CLOSTRIDIUM DIFFICILE COLITIS: Status: RESOLVED | Noted: 2017-01-05 | Resolved: 2019-07-11

## 2019-07-12 ENCOUNTER — TELEPHONE (OUTPATIENT)
Dept: FAMILY MEDICINE CLINIC | Facility: CLINIC | Age: 64
End: 2019-07-12

## 2019-07-12 NOTE — TELEPHONE ENCOUNTER
----- Message from Lloyd Varghese MD sent at 7/12/2019 12:49 PM CDT -----  Labs stable from last check, will go over at next visit.

## 2019-07-30 ENCOUNTER — OFFICE VISIT (OUTPATIENT)
Dept: FAMILY MEDICINE CLINIC | Facility: CLINIC | Age: 64
End: 2019-07-30

## 2019-07-30 VITALS
BODY MASS INDEX: 41.83 KG/M2 | TEMPERATURE: 98.2 F | WEIGHT: 193.9 LBS | OXYGEN SATURATION: 98 % | HEART RATE: 95 BPM | DIASTOLIC BLOOD PRESSURE: 72 MMHG | SYSTOLIC BLOOD PRESSURE: 130 MMHG | HEIGHT: 57 IN

## 2019-07-30 DIAGNOSIS — M25.561 ACUTE PAIN OF RIGHT KNEE: ICD-10-CM

## 2019-07-30 DIAGNOSIS — M25.551 HIP PAIN, ACUTE, RIGHT: ICD-10-CM
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
## 2019-07-30 PROCEDURE — 99214 OFFICE O/P EST MOD 30 MIN: CPT | Performed by: FAMILY MEDICINE

## 2019-07-30 RX ORDER — TRAMADOL HYDROCHLORIDE 50 MG/1
50 TABLET ORAL EVERY 6 HOURS PRN
Qty: 28 TABLET | Refills: 1 | Status: SHIPPED | OUTPATIENT
Start: 2019-07-30 | End: 2019-08-12 | Stop reason: SDUPTHER

## 2019-08-01 ENCOUNTER — TELEPHONE (OUTPATIENT)
Dept: FAMILY MEDICINE CLINIC | Facility: CLINIC | Age: 64
End: 2019-08-01

## 2019-08-01 NOTE — TELEPHONE ENCOUNTER
----- Message from Lloyd Varghese MD sent at 7/31/2019  8:03 AM CDT -----  X-rays of R Knee and hip show no fracture. Arthritis in R knee as known

## 2019-08-02 PROBLEM — M25.561 ACUTE PAIN OF RIGHT KNEE: Status: ACTIVE | Noted: 2019-08-02

## 2019-08-02 PROBLEM — M25.551 HIP PAIN, ACUTE, RIGHT: Status: ACTIVE | Noted: 2019-08-02
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
## 2019-08-12 ENCOUNTER — OFFICE VISIT (OUTPATIENT)
Dept: FAMILY MEDICINE CLINIC | Facility: CLINIC | Age: 64
End: 2019-08-12

## 2019-08-12 VITALS
DIASTOLIC BLOOD PRESSURE: 88 MMHG | HEART RATE: 72 BPM | TEMPERATURE: 98 F | SYSTOLIC BLOOD PRESSURE: 132 MMHG | HEIGHT: 57 IN | WEIGHT: 193.8 LBS | BODY MASS INDEX: 41.81 KG/M2 | OXYGEN SATURATION: 98 %

## 2019-08-12 DIAGNOSIS — G89.29 CHRONIC PAIN OF RIGHT KNEE: ICD-10-CM

## 2019-08-12 DIAGNOSIS — Q89.01 ASPLENIA: ICD-10-CM

## 2019-08-12 DIAGNOSIS — M25.561 ACUTE PAIN OF RIGHT KNEE: ICD-10-CM

## 2019-08-12 DIAGNOSIS — E03.9 ACQUIRED HYPOTHYROIDISM: Primary | ICD-10-CM
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
## 2019-08-12 DIAGNOSIS — M25.561 CHRONIC PAIN OF RIGHT KNEE: ICD-10-CM

## 2019-08-12 PROCEDURE — 99214 OFFICE O/P EST MOD 30 MIN: CPT | Performed by: FAMILY MEDICINE

## 2019-08-12 RX ORDER — TRAMADOL HYDROCHLORIDE 50 MG/1
100 TABLET ORAL EVERY 6 HOURS PRN
Qty: 56 TABLET | Refills: 1 | Status: SHIPPED | OUTPATIENT
Start: 2019-08-12 | End: 2019-08-27

## 2019-08-12 NOTE — PROGRESS NOTES
Subjective   Mira Deal is a 63 y.o. obese  female former smoker with HTN, Hypothyroidism, Hyperinsulinar obesity, Hyperlipidemia, Asplenia, H/O Recurrent OM, Eustachian tube dysfunction, Allergies, Chronic knee pain, S/P L TKA, Osteoarthritis, H/O C-diff colitis after Clindamycin, and other health problems see PMH/PSH. Pt presents for exam, to discuss acute and chronic health problems Tx and F/u plans.   Last visit  ' Last week fell, hurt R hip, and R inside knee is still hurting bad 6-8/10. Tylenol, Motrin don't help much'.   Today  ' R knee still painful'    Fall   The accident occurred 5 to 7 days ago. The fall occurred while walking. She landed on hard floor. The point of impact was the right knee and right hip. The pain is present in the right knee and right hip. The pain is at a severity of 4/10. The pain is moderate. The symptoms are aggravated by standing, use of injured limb and movement. Pertinent negatives include no abdominal pain, fever, headaches, nausea or vomiting. She has tried acetaminophen, NSAID and rest for the symptoms. The treatment provided no relief.        The following portions of the patient's history were reviewed and updated as appropriate: allergies, current medications, past family history, past medical history, past social history, past surgical history and problem list.    Review of Systems   Constitutional: Negative for chills, fatigue and fever.   HENT: Negative for congestion, ear pain, postnasal drip, rhinorrhea, sinus pressure, sinus pain ( mild), sneezing and sore throat.    Eyes: Negative.    Respiratory: Negative for cough, shortness of breath and wheezing.    Cardiovascular: Negative for chest pain and palpitations.   Gastrointestinal: Negative for abdominal pain, diarrhea, nausea and vomiting.   Endocrine: Negative.    Genitourinary: Negative for dyspareunia, dysuria, enuresis, menstrual problem and vaginal bleeding.   Musculoskeletal: Positive for  arthralgias. Negative for joint swelling, myalgias and neck pain.        L TKA   Fell hurt R Hip, and R knee   R Knee most painful.   Skin: Negative for rash.   Allergic/Immunologic: Positive for environmental allergies.   Neurological: Negative for weakness and headaches.   Hematological: Negative.    Psychiatric/Behavioral: Negative.        Objective   Physical Exam   Constitutional: She is oriented to person, place, and time. She appears well-developed and well-nourished. No distress.   HENT:   Head: Normocephalic and atraumatic.   Right Ear: External ear normal.   Left Ear: External ear normal.   Nose: Nose normal.   Mouth/Throat: Oropharynx is clear and moist. No oropharyngeal exudate.   Eyes: Conjunctivae and EOM are normal. Pupils are equal, round, and reactive to light. Right eye exhibits no discharge. Left eye exhibits no discharge. No scleral icterus.   Neck: Normal range of motion. Neck supple. No JVD present. No tracheal deviation present. No thyromegaly present.   Cardiovascular: Normal rate, regular rhythm, normal heart sounds and intact distal pulses. Exam reveals no gallop and no friction rub.   No murmur heard.  Pulmonary/Chest: Effort normal and breath sounds normal. No stridor. No respiratory distress. She has no wheezes. She has no rales. She exhibits no tenderness.   Abdominal: Soft. Bowel sounds are normal. She exhibits no distension and no mass. There is no tenderness. There is no rebound and no guarding. No hernia.   Musculoskeletal: She exhibits no edema, tenderness or deformity.   L Knee improved S/P TKA  WB 6 with ROM R hip, and R knee.   Lymphadenopathy:     She has no cervical adenopathy.   Neurological: She is alert and oriented to person, place, and time. She has normal reflexes. She displays normal reflexes. No cranial nerve deficit. She exhibits normal muscle tone. Coordination normal.   Skin: Skin is warm and dry. Capillary refill takes 2 to 3 seconds. No rash noted. She is not  diaphoretic. No erythema. No pallor.   Psoriasis   Psychiatric: She has a normal mood and affect. Her behavior is normal. Judgment and thought content normal.   Nursing note and vitals reviewed.      Assessment/Plan   Mira was seen today for knee pain and hip pain.    Diagnoses and all orders for this visit:    Acquired hypothyroidism    Fall, subsequent encounter  -     MRI Knee Right Without Contrast; Future    Acute pain of right knee  -     MRI Knee Right Without Contrast; Future    Chronic pain of right knee  Comments:  Acute on Chronic.   Orders:  -     traMADol (ULTRAM) 50 MG tablet; Take 2 tablets by mouth Every 6 (Six) Hours As Needed for Moderate Pain .  -     MRI Knee Right Without Contrast; Future    Asplenia    BMI 40.0-44.9, adult (CMS/HCC)    Other orders  -     diclofenac (VOLTAREN) 1 % gel gel; Apply 4 g topically to the appropriate area as directed 3 (Three) Times a Day.    Discussed exam, health problems, checking MRI, R knee. Discussed safety with controlled med. Discussed F/U plan.          This document has been electronically signed by Lloyd Varghese MD

## 2019-08-26 DIAGNOSIS — M25.561 ACUTE PAIN OF RIGHT KNEE: Primary | ICD-10-CM

## 2019-08-27 ENCOUNTER — OFFICE VISIT (OUTPATIENT)
Dept: ORTHOPEDIC SURGERY | Facility: CLINIC | Age: 64
End: 2019-08-27

## 2019-08-27 VITALS — WEIGHT: 191 LBS | BODY MASS INDEX: 41.21 KG/M2 | HEIGHT: 57 IN

## 2019-08-27 DIAGNOSIS — M23.91 INTERNAL DERANGEMENT OF RIGHT KNEE: ICD-10-CM

## 2019-08-27 DIAGNOSIS — M25.561 ACUTE PAIN OF RIGHT KNEE: Primary | ICD-10-CM

## 2019-08-27 DIAGNOSIS — L40.9 PSORIASIS: ICD-10-CM

## 2019-08-27 PROBLEM — G89.29 CHRONIC PAIN OF RIGHT KNEE: Status: ACTIVE | Noted: 2019-08-02

## 2019-08-27 PROCEDURE — 99214 OFFICE O/P EST MOD 30 MIN: CPT | Performed by: NURSE PRACTITIONER

## 2019-08-27 NOTE — PROGRESS NOTES
Mira Deal is a 63 y.o. female   Primary provider:  Lloyd Varghese MD       Chief Complaint   Patient presents with   • Right Knee - Knee Pain       HISTORY OF PRESENT ILLNESS:    Knee Pain    The incident occurred more than 1 week ago (pain started about one month ago, no known injury. ). There was no injury mechanism. The pain is present in the right knee. The quality of the pain is described as aching and stabbing. The pain is at a severity of 8/10. The pain is severe. The pain has been intermittent since onset. She reports no foreign bodies present. Exacerbated by: standing, sitting, walking.         CONCURRENT MEDICAL HISTORY:    Past Medical History:   Diagnosis Date   • Acute suppurative otitis media without spontaneous rupture of eardrum     right ear   • Benign essential hypertension    • Chronic osteoarthritis    • Diverticular disease of colon    • Dysfunction of eustachian tube     Will start OTC Nasal steroid      • Encounter for gynecological examination (general) (routine) without abnormal findings    • Encounter for screening for malignant neoplasm of breast    • Encounter for screening mammogram for malignant neoplasm of breast     02/04/16 negative      • Gastrointestinal hemorrhage    • Hypercholesterolemia    • Hyperlipidemia    • Hypothyroidism    • Left lower quadrant pain    • Mild depression (CMS/HCC)    • Nausea    • Obesity     Hyperinsulinar    • Obesity, unspecified    • Vertigo        Allergies   Allergen Reactions   • Sulfa Antibiotics Angioedema   • Lortab [Hydrocodone-Acetaminophen] GI Intolerance         Current Outpatient Medications:   •  albuterol sulfate  (90 Base) MCG/ACT inhaler, Inhale 2 puffs Every 4 (Four) Hours As Needed for Wheezing or Shortness of Air., Disp: 18 g, Rfl: 0  •  diclofenac (VOLTAREN) 1 % gel gel, Apply 4 g topically to the appropriate area as directed 3 (Three) Times a Day., Disp: 100 tube, Rfl: 2  •  hydrochlorothiazide (MICROZIDE)  "12.5 MG capsule, Take 1 capsule by mouth Daily., Disp: 30 capsule, Rfl: 5  •  ibuprofen (ADVIL,MOTRIN) 800 MG tablet, Take 800 mg by mouth Every 6 (Six) Hours As Needed for Mild Pain ., Disp: , Rfl:   •  levothyroxine (SYNTHROID, LEVOTHROID) 100 MCG tablet, Take 1 tablet by mouth Daily., Disp: 30 tablet, Rfl: 5  •  lisinopril (PRINIVIL,ZESTRIL) 20 MG tablet, Take 1 tablet by mouth Daily., Disp: 30 tablet, Rfl: 5  •  triamcinolone (KENALOG) 0.1 % ointment, Apply  topically to the appropriate area as directed 2 (Two) Times a Day., Disp: 453.6 g, Rfl: 1    Past Surgical History:   Procedure Laterality Date   • CARPAL TUNNEL RELEASE  1991   • COLONOSCOPY  2013    Dr Maria   • FOREARM SURGERY  2011    /wrist   • INJECTION OF MEDICATION  05/12/2016    Kenalog (1)      • INJECTION OF MEDICATION  05/12/2016    Rocephin (1)      • INJECTION OF MEDICATION  05/12/2016    Zofran (1)      • KNEE CARTILAGE SURGERY  2012   • SPLENECTOMY  1972   • TOTAL KNEE ARTHROPLASTY Left 07/16/2018   • VAGINAL HYSTERECTOMY SALPINGO OOPHORECTOMY  1995    cerv cancer       Family History   Problem Relation Age of Onset   • Cancer Other    • Diabetes Other    • Heart disease Other    • Hypertension Other        Social History     Socioeconomic History   • Marital status:      Spouse name: Not on file   • Number of children: Not on file   • Years of education: Not on file   • Highest education level: Not on file   Tobacco Use   • Smoking status: Former Smoker   • Smokeless tobacco: Never Used   Substance and Sexual Activity   • Alcohol use: No   • Drug use: No   • Sexual activity: Defer        Review of Systems   Psychiatric/Behavioral: Positive for sleep disturbance.   All other systems reviewed and are negative.      PHYSICAL EXAMINATION:       Ht 144.8 cm (57\")   Wt 86.6 kg (191 lb)   BMI 41.33 kg/m²     Physical Exam   Constitutional: She is oriented to person, place, and time. Vital signs are normal. She appears well-developed and " well-nourished. She is cooperative.   HENT:   Head: Normocephalic and atraumatic.   Neck: Trachea normal and phonation normal.   Pulmonary/Chest: Effort normal. No respiratory distress.   Abdominal: Soft. Normal appearance. She exhibits no distension.   Musculoskeletal:        Right knee: She exhibits effusion.   Neurological: She is alert and oriented to person, place, and time. GCS eye subscore is 4. GCS verbal subscore is 5. GCS motor subscore is 6.   Skin: Skin is warm, dry and intact. Capillary refill takes less than 2 seconds.   Psychiatric: She has a normal mood and affect. Her speech is normal and behavior is normal. Judgment and thought content normal. Cognition and memory are normal.   Vitals reviewed.      GAIT:     []  Normal  [x]  Antalgic    Assistive device: [x]  None  []  Walker     []  Crutches  []  Cane     []  Wheelchair  []  Stretcher    Right Knee Exam     Tenderness   The patient is experiencing tenderness in the medial joint line and pes anserinus.    Range of Motion   Extension: normal   Right knee flexion: 90.     Tests   Parviz:  Medial - positive     Other   Erythema: absent  Scars: absent  Sensation: normal  Pulse: present  Swelling: mild  Effusion: effusion present    Comments:  Psoriasis plaque      Left Knee Exam   Left knee exam is normal.    Muscle Strength   The patient has normal left knee strength.                  Xr Knee 4+ Vw Right    Result Date: 7/30/2019  Narrative: PROCEDURE: XR KNEE 4+ VW RIGHT VIEWS:   4 INDICATION: Pain COMPARISON: None FINDINGS:   - fracture: None   - alignment: Within normal limits   - misc: There are small osteophytes in all three joint compartments. No joint effusion or other significant soft tissue abnormality identified.     Impression: Mild degenerative changes without acute osseous abnormality, as above. Note:  if pain or symptoms persist beyond reasonable expectations and follow-up imaging is anticipated,  cross sectional imaging  (CT and/or  MRI) is suggested, as is deemed clinically appropriate. Electronically signed by:  Sobeida Jaramillo MD  7/30/2019 6:04 PM CDT Workstation: [id number]    Xr Knee Bilateral Ap Standing    Result Date: 8/27/2019  Narrative: Ordering Provider:  Fernando Fischer APRN Ordering Diagnosis/Indication:  Acute pain of right knee Procedure:  XR KNEE BILATERAL AP STANDING Exam Date:  8/27/19 COMPARISON:  Not applicable, no relevant images available.     Impression:  Standing AP of both knees reveal no evidence of fracture, dislocation or other acute radiological abnormality noted. KELVIN Douglass 8/27/19     Xr Hip With Or Without Pelvis 2 - 3 View Right    Result Date: 7/30/2019  Narrative: PROCEDURE: XR HIP W OR WO PELVIS 2-3 VIEW RIGHT VIEWS: 3 INDICATION: Pain since fall on 7/10/2019 COMPARISON: None FINDINGS:   - fracture: None   - alignment: Within normal limits   - misc: There are numerous metallic clips in both lower quadrants and the pelvis bilaterally     Impression: No acute abnormality identified. Note:  if pain or symptoms persist beyond reasonable expectations and follow-up imaging is anticipated,  cross sectional imaging  (CT and/or MRI) is suggested, as is deemed clinically appropriate. Electronically signed by:  Sobeida Jaramillo MD  7/30/2019 6:07 PM CDT Workstation: [id number]          ASSESSMENT:    Diagnoses and all orders for this visit:    Acute pain of right knee  -     MRI Knee Right Without Contrast; Future    Internal derangement of right knee  -     MRI Knee Right Without Contrast; Future    Psoriasis  -     MRI Knee Right Without Contrast; Future          PLAN  Locking catching, pain with pivoting pain with squatting with a positive Parviz and mild effusion.  Recommend an MRI of the right knee to rule out a meniscus tear  No Follow-up on file.    KELVIN Douglass

## 2019-08-28 ENCOUNTER — OFFICE VISIT (OUTPATIENT)
Dept: FAMILY MEDICINE CLINIC | Facility: CLINIC | Age: 64
End: 2019-08-28

## 2019-08-28 VITALS
HEIGHT: 57 IN | DIASTOLIC BLOOD PRESSURE: 52 MMHG | SYSTOLIC BLOOD PRESSURE: 118 MMHG | TEMPERATURE: 98.1 F | BODY MASS INDEX: 41.17 KG/M2 | WEIGHT: 190.8 LBS | HEART RATE: 68 BPM | OXYGEN SATURATION: 97 %

## 2019-08-28 DIAGNOSIS — H60.501 ACUTE OTITIS EXTERNA OF RIGHT EAR, UNSPECIFIED TYPE: Primary | ICD-10-CM

## 2019-08-28 PROCEDURE — 99213 OFFICE O/P EST LOW 20 MIN: CPT | Performed by: NURSE PRACTITIONER

## 2019-08-28 RX ORDER — AMOXICILLIN AND CLAVULANATE POTASSIUM 875; 125 MG/1; MG/1
1 TABLET, FILM COATED ORAL 2 TIMES DAILY
Qty: 20 TABLET | Refills: 0 | Status: SHIPPED | OUTPATIENT
Start: 2019-08-28 | End: 2019-09-10

## 2019-08-28 RX ORDER — CIPROFLOXACIN AND DEXAMETHASONE 3; 1 MG/ML; MG/ML
4 SUSPENSION/ DROPS AURICULAR (OTIC) 2 TIMES DAILY
Qty: 7.5 ML | Refills: 0 | Status: SHIPPED | OUTPATIENT
Start: 2019-08-28 | End: 2019-09-04

## 2019-08-28 NOTE — PROGRESS NOTES
"Subjective   Meyers Moreno Deal is a 63 y.o. female.     [facility] Visit    PCP: Lloyd Varghese MD    CC: \"extreme earache on right\"    Denies injury to ear, recent swimming or wearing ear buds.  Does use q-tips.       Earache    There is pain in the right ear. This is a new problem. Episode onset: 8-23-19. The problem occurs constantly. The problem has been gradually worsening. There has been no fever. The pain is at a severity of 9/10. Associated symptoms include hearing loss (muffled). Pertinent negatives include no abdominal pain, coughing, diarrhea, ear discharge, headaches, neck pain, rash, rhinorrhea, sore throat or vomiting. She has tried NSAIDs (otc earache relief) for the symptoms. The treatment provided no relief.        The following portions of the patient's history were reviewed and updated as appropriate: allergies, current medications, past medical history, past social history, past surgical history and problem list.    Review of Systems   Constitutional: Negative.    HENT: Positive for ear pain and hearing loss (muffled). Negative for congestion, ear discharge, postnasal drip, rhinorrhea, sneezing, sore throat and tinnitus.    Eyes: Negative.    Respiratory: Negative.  Negative for cough.    Cardiovascular: Negative.    Gastrointestinal: Negative for abdominal pain, diarrhea, nausea and vomiting.   Genitourinary: Negative for difficulty urinating.   Musculoskeletal: Positive for arthralgias (knee pain--seeing ortho). Negative for neck pain.   Skin: Negative for rash.   Neurological: Negative for dizziness and headache.     /52 (BP Location: Right arm, Patient Position: Sitting, Cuff Size: Adult)   Pulse 68   Temp 98.1 °F (36.7 °C) (Oral)   Ht 144.8 cm (57\")   Wt 86.5 kg (190 lb 12.8 oz)   SpO2 97%   BMI 41.29 kg/m²     Objective   Physical Exam   Constitutional: She is oriented to person, place, and time. She appears well-developed and well-nourished. No distress.   HENT: "   Right Ear: There is drainage, swelling and tenderness.   Left Ear: Ear canal normal. Tympanic membrane is scarred ( mild). Tympanic membrane is not perforated and not erythematous.   Mouth/Throat: Uvula is midline and oropharynx is clear and moist.   Unable to visualize Right TM   Cardiovascular: Normal rate and regular rhythm.   Pulmonary/Chest: Effort normal and breath sounds normal.   Lymphadenopathy:        Head (right side): Posterior auricular adenopathy present.     She has cervical adenopathy ( right anterior).   Neurological: She is alert and oriented to person, place, and time.   Nursing note and vitals reviewed.    No results found for this or any previous visit (from the past 24 hour(s)).      Assessment/Plan   Mira was seen today for earache.    Diagnoses and all orders for this visit:    Acute otitis externa of right ear, unspecified type  -     amoxicillin-clavulanate (AUGMENTIN) 875-125 MG per tablet; Take 1 tablet by mouth 2 (Two) Times a Day.  -     ciprofloxacin-dexamethasone (CIPRODEX) 0.3-0.1 % otic suspension; Administer 4 drops to the right ear 2 (Two) Times a Day for 7 days.      Rx for Augmentin, Ciprodex provided  Continue with Motrin 800 as needed  Moist heat to outer ear as needed    If no significant improvement with above measures in 24-48 hours, may RTC for Rocephin and steroid injections    Patient's Body mass index is 41.29 kg/m². BMI is above normal parameters. Recommendations include: referral to primary care     See PCP or RTC if symptoms persist/worsen  See PCP for routine f/u visit and management of chronic medical conditions    Declines RTW note.

## 2019-08-28 NOTE — PATIENT INSTRUCTIONS
"Otitis Externa    Otitis externa is an infection of the outer ear canal. The outer ear canal is the area between the outside of the ear and the eardrum. Otitis externa is sometimes called \"swimmer's ear.\"  What are the causes?  This condition may be caused by:  · Swimming in dirty water.  · Moisture in the ear.  · An injury to the inside of the ear.  · An object stuck in the ear.  · A cut or scrape on the outside of the ear.  What increases the risk?  This condition is more likely to develop in swimmers.  What are the signs or symptoms?  The first symptom of this condition is often itching in the ear. Later signs and symptoms include:  · Swelling of the ear.  · Redness in the ear.  · Ear pain. The pain may get worse when you pull on your ear.  · Pus coming from the ear.  How is this diagnosed?  This condition may be diagnosed by examining the ear and testing fluid from the ear for bacteria and funguses.  How is this treated?  This condition may be treated with:  · Antibiotic ear drops. These are often given for 10-14 days.  · Medicine to reduce itching and swelling.  Follow these instructions at home:  · If you were prescribed antibiotic ear drops, apply them as told by your health care provider. Do not stop using the antibiotic even if your condition improves.  · Take over-the-counter and prescription medicines only as told by your health care provider.  · Keep all follow-up visits as told by your health care provider. This is important.  How is this prevented?  · Keep your ear dry. Use the corner of a towel to dry your ear after you swim or bathe.  · Avoid scratching or putting things in your ear. Doing these things can damage the ear canal or remove the protective wax that lines it, which makes it easier for bacteria and funguses to grow.  · Avoid swimming in lakes, polluted water, or pools that may not have the right amount of chlorine.  · Consider making ear drops and putting 3 or 4 drops in each ear after you " swim. Ask your health care provider about how you can make ear drops.  Contact a health care provider if:  · You have a fever.  · After 3 days your ear is still red, swollen, painful, or draining pus.  · Your redness, swelling, or pain gets worse.  · You have a severe headache.  · You have redness, swelling, pain, or tenderness in the area behind your ear.  This information is not intended to replace advice given to you by your health care provider. Make sure you discuss any questions you have with your health care provider.  Document Released: 12/18/2006 Document Revised: 01/24/2017 Document Reviewed: 09/26/2016  [employer] Interactive Patient Education © 2019 [employer]

## 2019-08-29 RX ORDER — CIPROFLOXACIN 500 MG/1
500 TABLET, FILM COATED ORAL 2 TIMES DAILY
Qty: 10 TABLET | Refills: 0 | Status: SHIPPED | OUTPATIENT
Start: 2019-08-29 | End: 2019-09-10

## 2019-09-03 RX ORDER — METRONIDAZOLE 500 MG/1
500 TABLET ORAL 3 TIMES DAILY
Qty: 21 TABLET | Refills: 0 | Status: SHIPPED | OUTPATIENT
Start: 2019-09-03 | End: 2019-09-10

## 2019-09-10 ENCOUNTER — OFFICE VISIT (OUTPATIENT)
Dept: ORTHOPEDIC SURGERY | Facility: CLINIC | Age: 64
End: 2019-09-10

## 2019-09-10 VITALS — HEIGHT: 57 IN | WEIGHT: 191 LBS | BODY MASS INDEX: 41.21 KG/M2

## 2019-09-10 DIAGNOSIS — M25.561 ACUTE PAIN OF RIGHT KNEE: ICD-10-CM

## 2019-09-10 DIAGNOSIS — M23.306 DEGENERATIVE TEAR OF MENISCUS, RIGHT: Primary | ICD-10-CM

## 2019-09-10 DIAGNOSIS — M25.461 EFFUSION OF RIGHT KNEE: ICD-10-CM

## 2019-09-10 PROBLEM — M23.91 INTERNAL DERANGEMENT OF RIGHT KNEE: Status: ACTIVE | Noted: 2019-09-10

## 2019-09-10 PROCEDURE — 99213 OFFICE O/P EST LOW 20 MIN: CPT | Performed by: NURSE PRACTITIONER

## 2019-09-10 NOTE — PROGRESS NOTES
"Mira Deal is a 63 y.o. female returns for     Chief Complaint   Patient presents with   • Right Knee - Follow-up, Pain   • Results     mri right knee done on 9/6/2019 @ [facility].        HISTORY OF PRESENT ILLNESS:  Patient into the office today for follow up after MRI of the right knee.   Pain has not improved.        CONCURRENT MEDICAL HISTORY:    The following portions of the patient's history were reviewed and updated as appropriate: allergies, current medications, past family history, past medical history, past social history, past surgical history and problem list.     ROS  No fevers or chills.  No chest pain or shortness of air.  No GI or  disturbances.    PHYSICAL EXAMINATION:       Ht 144.8 cm (57\")   Wt 86.6 kg (191 lb)   BMI 41.33 kg/m²     Physical Exam   Constitutional: She is oriented to person, place, and time. Vital signs are normal. She appears well-developed and well-nourished. She is cooperative.   HENT:   Head: Normocephalic and atraumatic.   Neck: Trachea normal and phonation normal.   Pulmonary/Chest: Effort normal. No respiratory distress.   Abdominal: Soft. Normal appearance. She exhibits no distension.   Musculoskeletal:        Right knee: She exhibits effusion.   Neurological: She is alert and oriented to person, place, and time. GCS eye subscore is 4. GCS verbal subscore is 5. GCS motor subscore is 6.   Skin: Skin is warm, dry and intact. Capillary refill takes less than 2 seconds.   Psychiatric: She has a normal mood and affect. Her speech is normal and behavior is normal. Judgment and thought content normal. Cognition and memory are normal.   Vitals reviewed.      GAIT:     []  Normal  [x]  Antalgic    Assistive device: [x]  None  []  Walker     []  Crutches  []  Cane     []  Wheelchair  []  Stretcher    Right Knee Exam     Tenderness   The patient is experiencing tenderness in the medial joint line and pes anserinus.    Range of Motion   Extension: normal   Right " knee flexion: 90.     Tests   Parviz:  Medial - positive     Other   Erythema: absent  Scars: absent  Sensation: normal  Pulse: present  Swelling: mild  Effusion: effusion present    Comments:  Psoriasis plaque      Left Knee Exam   Left knee exam is normal.    Muscle Strength   The patient has normal left knee strength.              Xr Knee Bilateral Ap Standing    Result Date: 8/27/2019  Narrative: Ordering Provider:  Fernando Fischer APRN Ordering Diagnosis/Indication:  Acute pain of right knee Procedure:  XR KNEE BILATERAL AP STANDING Exam Date:  8/27/19 COMPARISON:  Not applicable, no relevant images available.     Impression:  Standing AP of both knees reveal no evidence of fracture, dislocation or other acute radiological abnormality noted. KELVIN Douglass 8/27/19             ASSESSMENT:    Diagnoses and all orders for this visit:    Degenerative tear of meniscus, right  -     Ambulatory Referral to Physical Therapy Evaluate and treat    Acute pain of right knee  -     Ambulatory Referral to Physical Therapy Evaluate and treat    Effusion of right knee  -     Ambulatory Referral to Physical Therapy Evaluate and treat          PLAN  Recommend referral to PT and follow up with Dr. Quinonez in four to six weeks for recheck.     No Follow-up on file.    KELVIN Douglass

## 2019-09-11 DIAGNOSIS — M23.91 INTERNAL DERANGEMENT OF RIGHT KNEE: ICD-10-CM

## 2019-09-11 DIAGNOSIS — L40.9 PSORIASIS: ICD-10-CM

## 2019-09-11 DIAGNOSIS — M25.561 ACUTE PAIN OF RIGHT KNEE: ICD-10-CM

## 2019-09-17 ENCOUNTER — OFFICE VISIT (OUTPATIENT)
Dept: FAMILY MEDICINE CLINIC | Facility: CLINIC | Age: 64
End: 2019-09-17

## 2019-09-17 VITALS
BODY MASS INDEX: 41.34 KG/M2 | WEIGHT: 191.6 LBS | DIASTOLIC BLOOD PRESSURE: 82 MMHG | SYSTOLIC BLOOD PRESSURE: 138 MMHG | TEMPERATURE: 98 F | OXYGEN SATURATION: 96 % | HEART RATE: 77 BPM | HEIGHT: 57 IN
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
## 2019-09-17 DIAGNOSIS — H60.501 ACUTE OTITIS EXTERNA OF RIGHT EAR, UNSPECIFIED TYPE: ICD-10-CM

## 2019-09-17 DIAGNOSIS — E66.01 CLASS 3 SEVERE OBESITY DUE TO EXCESS CALORIES WITH SERIOUS COMORBIDITY AND BODY MASS INDEX (BMI) OF 40.0 TO 44.9 IN ADULT (HCC): ICD-10-CM

## 2019-09-17 DIAGNOSIS — M25.561 ACUTE PAIN OF RIGHT KNEE: ICD-10-CM

## 2019-09-17 DIAGNOSIS — E03.9 ACQUIRED HYPOTHYROIDISM: ICD-10-CM

## 2019-09-17 DIAGNOSIS — I10 ESSENTIAL HYPERTENSION: ICD-10-CM

## 2019-09-17 DIAGNOSIS — H93.8X1 SENSATION OF FULLNESS IN RIGHT EAR: ICD-10-CM

## 2019-09-17 PROCEDURE — 99214 OFFICE O/P EST MOD 30 MIN: CPT | Performed by: FAMILY MEDICINE

## 2019-09-17 NOTE — PROGRESS NOTES
Subjective   Mira Deal is a 63 y.o. obese  female former smoker with HTN, Hypothyroidism, Hyperinsulinar obesity, Hyperlipidemia, Asplenia, H/O Recurrent OM, Eustachian tube dysfunction, Allergies, Chronic knee pain, S/P L TKA, Osteoarthritis, H/O C-diff colitis after Clindamycin, and other health problems see PMH/PSH. Pt presents for exam, to discuss acute and chronic health problems Tx and F/u plans.      'Told has R Meniscus tear, will see Dr. Quinonez. R Ear pain has gone, but ear has full feeling, and hearing has decreased. B/P has been OK. B/P has been OK'.      Knee Pain    The incident occurred more than 1 week ago. Injury mechanism: Fall. The pain is present in the left knee. The quality of the pain is described as burning and aching. The pain is at a severity of 5/10. The pain is moderate. The pain has been fluctuating since onset. The symptoms are aggravated by weight bearing. She has tried ice, NSAIDs and acetaminophen for the symptoms. The treatment provided mild relief.   Hypertension   This is a chronic problem. The current episode started more than 1 year ago. The problem is unchanged. The problem is controlled. Pertinent negatives include no chest pain, headaches, neck pain, palpitations or shortness of breath. Risk factors for coronary artery disease include obesity and post-menopausal state. Current antihypertension treatment includes ACE inhibitors. The current treatment provides significant improvement.   Hypothyroidism   This is a chronic problem. The current episode started more than 1 year ago. The problem occurs daily. The problem has been gradually improving. Associated symptoms include arthralgias. Pertinent negatives include no abdominal pain, chest pain, chills, congestion, coughing, fatigue, fever, headaches, joint swelling, myalgias, nausea, neck pain, rash, sore throat, vomiting or weakness. Nothing aggravates the symptoms. Treatments tried: Levothyroxine. The  treatment provided significant relief.   Obesity   This is a chronic problem. The current episode started more than 1 year ago. The problem occurs daily. The problem has been gradually improving. Associated symptoms include arthralgias. Pertinent negatives include no abdominal pain, chest pain, chills, congestion, coughing, fatigue, fever, headaches, joint swelling, myalgias, nausea, neck pain, rash, sore throat, vomiting or weakness. The symptoms are aggravated by eating. Treatments tried: Diet, exercise. The treatment provided no relief.   Arthritis   Presents for follow-up visit. She complains of stiffness. She reports no joint swelling. The symptoms have been stable. Affected location: Multiple joints. Her pain is at a severity of 2/10. Pertinent negatives include no diarrhea, dysuria, fatigue, fever or rash.   Earache    Pertinent negatives include no abdominal pain, coughing, diarrhea, headaches, neck pain, rash, sore throat or vomiting.   Fall   The accident occurred 5 to 7 days ago. The fall occurred while walking. She landed on hard floor. The point of impact was the right knee and right hip. The pain is present in the right knee and right hip. The symptoms are aggravated by standing, use of injured limb and movement. Pertinent negatives include no abdominal pain, fever, headaches, nausea or vomiting. She has tried acetaminophen, NSAID and rest for the symptoms. The treatment provided no relief.   Cough   This is a recurrent problem. The current episode started 1 to 4 weeks ago. The problem has been gradually worsening. The problem occurs every few minutes. The cough is productive of sputum. Associated symptoms include postnasal drip. Pertinent negatives include no chest pain, chills, fever, headaches, myalgias, rash, sore throat or shortness of breath. She has tried OTC cough suppressant for the symptoms. The treatment provided no relief. Her past medical history is significant for bronchitis and  environmental allergies.        The following portions of the patient's history were reviewed and updated as appropriate: allergies, current medications, past family history, past medical history, past social history, past surgical history and problem list.    Review of Systems   Constitutional: Negative for chills, fatigue and fever.   HENT: Positive for postnasal drip. Negative for congestion and sore throat.         Ear fullness, decreased hearing   Eyes: Negative.    Respiratory: Negative for cough and shortness of breath.    Cardiovascular: Negative for chest pain and palpitations.   Gastrointestinal: Negative for abdominal pain, diarrhea, nausea and vomiting.   Endocrine: Negative for heat intolerance.   Genitourinary: Negative for breast pain and dysuria.   Musculoskeletal: Positive for arthralgias, arthritis and stiffness. Negative for joint swelling, myalgias and neck pain.        Fell hurt R knee and R Hip.    Skin: Negative for rash.   Allergic/Immunologic: Positive for environmental allergies.   Neurological: Negative for seizures, weakness and light-headedness.   Hematological: Negative.    Psychiatric/Behavioral: Negative.        Objective   Physical Exam   Constitutional: She is oriented to person, place, and time. She appears well-developed.   HENT:   Head: Normocephalic.   Left Ear: External ear normal.   Nose: Nose normal.   Mouth/Throat: Oropharynx is clear and moist. No oropharyngeal exudate.   R TM retracted boggy grey , can't tell if ruptured.    LTMI WNL   Eyes: Conjunctivae and EOM are normal. Pupils are equal, round, and reactive to light. Right eye exhibits no discharge. Left eye exhibits no discharge. No scleral icterus.   Neck: Normal range of motion. No JVD present. No tracheal deviation present. No thyromegaly present.   Cardiovascular: Normal rate, regular rhythm, normal heart sounds and intact distal pulses. Exam reveals no gallop and no friction rub.   No murmur  heard.  Pulmonary/Chest: Effort normal and breath sounds normal. No stridor. No respiratory distress. She has no wheezes. She has no rales. She exhibits no tenderness.   Abdominal: Soft. Bowel sounds are normal. She exhibits no distension and no mass. There is no tenderness. There is no rebound and no guarding. No hernia.   Musculoskeletal: She exhibits tenderness.   Has WB 6 with ROM R knee.   Lymphadenopathy:     She has no cervical adenopathy.   Neurological: She is alert and oriented to person, place, and time. She displays normal reflexes. No cranial nerve deficit or sensory deficit. She exhibits normal muscle tone. Coordination normal.   Skin: Capillary refill takes 2 to 3 seconds. No rash noted. No erythema. No pallor.   Psychiatric: She has a normal mood and affect. Her behavior is normal. Judgment and thought content normal.   Nursing note and vitals reviewed.        Assessment/Plan   Mira was seen today for knee pain.    Diagnoses and all orders for this visit:    Essential hypertension    Acute otitis externa of right ear, unspecified type  Comments:  Fullness after tx for infection.   Orders:  -     Ambulatory Referral to ENT (Otolaryngology)    Sensation of fullness in right ear  -     Ambulatory Referral to Audiology    Acquired hypothyroidism    Acute pain of right knee    Fall, subsequent encounter    Discussed exam, health problems, meds, indications, Tx plan. Discussed USPSTF recommendations, Discussed F/U with specialist. Discussed referral to ENT for  Evaluation of R ear. Discussed F/U plan.    Discussed exam, health problems, meds, indications, Tx plan, rationale. Discussed recent R earache, hearing loss, referral to ENT.  Discussed R knee pain, referral to Orthopedic surgeon, Discussed F/U exam.    Patient's Body mass index is 41.46 kg/m². BMI is above normal parameters. Recommendations include: educational material, exercise counseling, nutrition counseling and referral to primary  care.          This document has been electronically signed by Lloyd Varghese MD

## 2019-09-26 PROBLEM — E66.01 CLASS 3 SEVERE OBESITY DUE TO EXCESS CALORIES WITH SERIOUS COMORBIDITY AND BODY MASS INDEX (BMI) OF 40.0 TO 44.9 IN ADULT: Status: ACTIVE | Noted: 2019-09-26

## 2019-09-26 NOTE — PATIENT INSTRUCTIONS
Meniscus Tear    A meniscus tear is a knee injury in which a piece of the meniscus is torn. The meniscus is a thick, rubbery, wedge-shaped cartilage in the knee. Two menisci are located in each knee. They sit between the upper bone (femur) and lower bone (tibia) that make up the knee joint. Each meniscus acts as a shock absorber for the knee.  A torn meniscus is one of the most common types of knee injuries. This injury can range from mild to severe. Surgery may be needed for a severe tear.  What are the causes?  This injury may be caused by any squatting, twisting, or pivoting movement. Sports-related injuries are the most common cause. These often occur from:  · Running and stopping suddenly.  · Changing direction.  · Being tackled or knocked off your feet.  As people get older, their meniscus gets thinner and weaker. In these people, tears can happen more easily, such as from climbing stairs.  What increases the risk?  This injury is more likely to happen to:  · People who play contact sports.  · Males.  · People who are 30?40 years of age.  What are the signs or symptoms?  Symptoms of this injury include:  · Knee pain, especially at the side of the knee joint. You may feel pain when the injury occurs, or you may only hear a pop and feel pain later.  · A feeling that your knee is clicking, catching, locking, or giving way.  · Not being able to fully bend or extend your knee.  · Bruising or swelling in your knee.  How is this diagnosed?  This injury may be diagnosed based on your symptoms and a physical exam. The physical exam may include:  · Moving your knee in different ways.  · Feeling for tenderness.  · Listening for a clicking sound.  · Checking if your knee locks or catches.  You may also have tests, such as:  · X-rays.  · MRI.  · A procedure to look inside your knee with a narrow surgical telescope (arthroscopy).  You may be referred to a knee specialist (orthopedic surgeon).  How is this  treated?  Treatment for this injury depends on the severity of the tear. Treatment for a mild tear may include:  · Rest.  · Medicine to reduce pain and swelling. This is usually a nonsteroidal anti-inflammatory drug (NSAID).  · A knee brace or an elastic sleeve or wrap.  · Using crutches or a walker to keep weight off your knee and to help you walk.  · Exercises to strengthen your knee (physical therapy).  You may need surgery if you have a severe tear or if other treatments are not working.  Follow these instructions at home:  Managing pain and swelling  · Take over-the-counter and prescription medicines only as told by your health care provider.  · If directed, apply ice to the injured area:  ? Put ice in a plastic bag.  ? Place a towel between your skin and the bag.  ? Leave the ice on for 20 minutes, 2-3 times per day.  · Raise (elevate) the injured area above the level of your heart while you are sitting or lying down.  Activity  · Do not use the injured limb to support your body weight until your health care provider says that you can. Use crutches or a walker as told by your health care provider.  · Return to your normal activities as told by your health care provider. Ask your health care provider what activities are safe for you.  · Perform range-of-motion exercises only as told by your health care provider.  · Begin doing exercises to strengthen your knee and leg muscles only as told by your health care provider. After you recover, your health care provider may recommend these exercises to help prevent another injury.  General instructions  · Use a knee brace or elastic wrap as told by your health care provider.  · Keep all follow-up visits as told by your health care provider. This is important.  Contact a health care provider if:  · You have a fever.  · Your knee becomes red, tender, or swollen.  · Your pain medicine is not helping.  · Your symptoms get worse or do not improve after 2 weeks of home  care.  This information is not intended to replace advice given to you by your health care provider. Make sure you discuss any questions you have with your health care provider.  Document Released: 03/09/2004 Document Revised: 05/25/2017 Document Reviewed: 04/11/2016  [employer] Interactive Patient Education © 2019 [employer]  Calorie Counting for Weight Loss  Calories are units of energy. Your body needs a certain amount of calories from food to keep you going throughout the day. When you eat more calories than your body needs, your body stores the extra calories as fat. When you eat fewer calories than your body needs, your body burns fat to get the energy it needs.  Calorie counting means keeping track of how many calories you eat and drink each day. Calorie counting can be helpful if you need to lose weight. If you make sure to eat fewer calories than your body needs, you should lose weight. Ask your health care provider what a healthy weight is for you.  For calorie counting to work, you will need to eat the right number of calories in a day in order to lose a healthy amount of weight per week. A dietitian can help you determine how many calories you need in a day and will give you suggestions on how to reach your calorie goal.  · A healthy amount of weight to lose per week is usually 1-2 lb (0.5-0.9 kg). This usually means that your daily calorie intake should be reduced by 500-750 calories.  · Eating 1,200 - 1,500 calories per day can help most women lose weight.  · Eating 1,500 - 1,800 calories per day can help most men lose weight.  What is my plan?  My goal is to have __________ calories per day.  If I have this many calories per day, I should lose around __________ pounds per week.  What do I need to know about calorie counting?  In order to meet your daily calorie goal, you will need to:  · Find out how many calories are in each food you would like to eat. Try to do this before you eat.  · Decide how  much of the food you plan to eat.  · Write down what you ate and how many calories it had. Doing this is called keeping a food log.  To successfully lose weight, it is important to balance calorie counting with a healthy lifestyle that includes regular activity. Aim for 150 minutes of moderate exercise (such as walking) or 75 minutes of vigorous exercise (such as running) each week.  Where do I find calorie information?    The number of calories in a food can be found on a Nutrition Facts label. If a food does not have a Nutrition Facts label, try to look up the calories online or ask your dietitian for help.  Remember that calories are listed per serving. If you choose to have more than one serving of a food, you will have to multiply the calories per serving by the amount of servings you plan to eat. For example, the label on a package of bread might say that a serving size is 1 slice and that there are 90 calories in a serving. If you eat 1 slice, you will have eaten 90 calories. If you eat 2 slices, you will have eaten 180 calories.  How do I keep a food log?  Immediately after each meal, record the following information in your food log:  · What you ate. Don't forget to include toppings, sauces, and other extras on the food.  · How much you ate. This can be measured in cups, ounces, or number of items.  · How many calories each food and drink had.  · The total number of calories in the meal.  Keep your food log near you, such as in a small notebook in your pocket, or use a mobile herminia or website. Some programs will calculate calories for you and show you how many calories you have left for the day to meet your goal.  What are some calorie counting tips?    · Use your calories on foods and drinks that will fill you up and not leave you hungry:  ? Some examples of foods that fill you up are nuts and nut butters, vegetables, lean proteins, and high-fiber foods like whole grains. High-fiber foods are foods with more  "than 5 g fiber per serving.  ? Drinks such as sodas, specialty coffee drinks, alcohol, and juices have a lot of calories, yet do not fill you up.  · Eat nutritious foods and avoid empty calories. Empty calories are calories you get from foods or beverages that do not have many vitamins or protein, such as candy, sweets, and soda. It is better to have a nutritious high-calorie food (such as an avocado) than a food with few nutrients (such as a bag of chips).  · Know how many calories are in the foods you eat most often. This will help you calculate calorie counts faster.  · Pay attention to calories in drinks. Low-calorie drinks include water and unsweetened drinks.  · Pay attention to nutrition labels for \"low fat\" or \"fat free\" foods. These foods sometimes have the same amount of calories or more calories than the full fat versions. They also often have added sugar, starch, or salt, to make up for flavor that was removed with the fat.  · Find a way of tracking calories that works for you. Get creative. Try different apps or programs if writing down calories does not work for you.  What are some portion control tips?  · Know how many calories are in a serving. This will help you know how many servings of a certain food you can have.  · Use a measuring cup to measure serving sizes. You could also try weighing out portions on a kitchen scale. With time, you will be able to estimate serving sizes for some foods.  · Take some time to put servings of different foods on your favorite plates, bowls, and cups so you know what a serving looks like.  · Try not to eat straight from a bag or box. Doing this can lead to overeating. Put the amount you would like to eat in a cup or on a plate to make sure you are eating the right portion.  · Use smaller plates, glasses, and bowls to prevent overeating.  · Try not to multitask (for example, watch TV or use your computer) while eating. If it is time to eat, sit down at a table and " enjoy your food. This will help you to know when you are full. It will also help you to be aware of what you are eating and how much you are eating.  What are tips for following this plan?  Reading food labels  · Check the calorie count compared to the serving size. The serving size may be smaller than what you are used to eating.  · Check the source of the calories. Make sure the food you are eating is high in vitamins and protein and low in saturated and trans fats.  Shopping  · Read nutrition labels while you shop. This will help you make healthy decisions before you decide to purchase your food.  · Make a grocery list and stick to it.  Cooking  · Try to cook your favorite foods in a healthier way. For example, try baking instead of frying.  · Use low-fat dairy products.  Meal planning  · Use more fruits and vegetables. Half of your plate should be fruits and vegetables.  · Include lean proteins like poultry and fish.  How do I count calories when eating out?  · Ask for smaller portion sizes.  · Consider sharing an entree and sides instead of getting your own entree.  · If you get your own entree, eat only half. Ask for a box at the beginning of your meal and put the rest of your entree in it so you are not tempted to eat it.  · If calories are listed on the menu, choose the lower calorie options.  · Choose dishes that include vegetables, fruits, whole grains, low-fat dairy products, and lean protein.  · Choose items that are boiled, broiled, grilled, or steamed. Stay away from items that are buttered, battered, fried, or served with cream sauce. Items labeled “crispy” are usually fried, unless stated otherwise.  · Choose water, low-fat milk, unsweetened iced tea, or other drinks without added sugar. If you want an alcoholic beverage, choose a lower calorie option such as a glass of wine or light beer.  · Ask for dressings, sauces, and syrups on the side. These are usually high in calories, so you should limit  the amount you eat.  · If you want a salad, choose a garden salad and ask for grilled meats. Avoid extra toppings like randall, cheese, or fried items. Ask for the dressing on the side, or ask for olive oil and vinegar or lemon to use as dressing.  · Estimate how many servings of a food you are given. For example, a serving of cooked rice is ½ cup or about the size of half a baseball. Knowing serving sizes will help you be aware of how much food you are eating at restaurants. The list below tells you how big or small some common portion sizes are based on everyday objects:  ? 1 oz--4 stacked dice.  ? 3 oz--1 deck of cards.  ? 1 tsp--1 die.  ? 1 Tbsp--½ a ping-pong ball.  ? 2 Tbsp--1 ping-pong ball.  ? ½ cup--½ baseball.  ? 1 cup--1 baseball.  Summary  · Calorie counting means keeping track of how many calories you eat and drink each day. If you eat fewer calories than your body needs, you should lose weight.  · A healthy amount of weight to lose per week is usually 1-2 lb (0.5-0.9 kg). This usually means reducing your daily calorie intake by 500-750 calories.  · The number of calories in a food can be found on a Nutrition Facts label. If a food does not have a Nutrition Facts label, try to look up the calories online or ask your dietitian for help.  · Use your calories on foods and drinks that will fill you up, and not on foods and drinks that will leave you hungry.  · Use smaller plates, glasses, and bowls to prevent overeating.  This information is not intended to replace advice given to you by your health care provider. Make sure you discuss any questions you have with your health care provider.  Document Released: 12/18/2006 Document Revised: 11/17/2017 Document Reviewed: 11/17/2017  [employer] Patient Education © 2019 [employer]  Otitis Media, Adult    Otitis media means that the middle ear is red and swollen (inflamed) and full of fluid. The condition usually goes away on its own.  Follow these  instructions at home:  · Take over-the-counter and prescription medicines only as told by your doctor.  · If you were prescribed an antibiotic medicine, take it as told by your doctor. Do not stop taking the antibiotic even if you start to feel better.  · Keep all follow-up visits as told by your doctor. This is important.  Contact a doctor if:  · You have bleeding from your nose.  · There is a lump on your neck.  · You are not getting better in 5 days.  · You feel worse instead of better.  Get help right away if:  · You have pain that is not helped with medicine.  · You have swelling, redness, or pain around your ear.  · You get a stiff neck.  · You cannot move part of your face (paralyzed).  · You notice that the bone behind your ear hurts when you touch it.  · You get a very bad headache.  Summary  · Otitis media means that the middle ear is red, swollen, and full of fluid.  · This condition usually goes away on its own. In some cases, treatment may be needed.  · If you were prescribed an antibiotic medicine, take it as told by your doctor.  This information is not intended to replace advice given to you by your health care provider. Make sure you discuss any questions you have with your health care provider.  Document Released: 06/05/2009 Document Revised: 01/08/2018 Document Reviewed: 01/08/2018  [employer] Interactive Patient Education © 2019 [employer]

## 2019-09-27 ENCOUNTER — OFFICE VISIT (OUTPATIENT)
Dept: ORTHOPEDIC SURGERY | Facility: CLINIC | Age: 64
End: 2019-09-27

## 2019-09-27 VITALS — BODY MASS INDEX: 41.21 KG/M2 | HEIGHT: 57 IN | WEIGHT: 191 LBS

## 2019-09-27 DIAGNOSIS — M25.561 ACUTE PAIN OF RIGHT KNEE: ICD-10-CM

## 2019-09-27 DIAGNOSIS — M17.10 PRIMARY OSTEOARTHRITIS OF KNEE, UNSPECIFIED LATERALITY: ICD-10-CM

## 2019-09-27 DIAGNOSIS — M23.91 INTERNAL DERANGEMENT OF RIGHT KNEE: Primary | ICD-10-CM

## 2019-09-27 PROCEDURE — 99213 OFFICE O/P EST LOW 20 MIN: CPT | Performed by: ORTHOPAEDIC SURGERY

## 2019-09-27 NOTE — PROGRESS NOTES
Mira Deal is a 64 y.o. female returns for     Chief Complaint   Patient presents with   • Results     MRI RIGHT KNEE       HISTORY OF PRESENT ILLNESS: Patient is here today for MRI results of right knee. Patient brought disc from [facility]. Patient states that her pain is 4/10.  She has been seen by Fernando and he has tried steroid injection several times and she is had multiple steroid injections in the past and that did not help her.  She had her opposite knee replaced by Dr. Frank in [place] several years ago and she has not been very pleased with the result according to her history.  It hurts her on a daily basis and limits her activities.  She had a recent MRI scan that shows and confirms osteoarthritis of the knee especially medial patellofemoral compartments.  In comparison with her x-rays she does have medial joint space narrowing and it appears more severe on the MRI scan than on her films.       CONCURRENT MEDICAL HISTORY:    Past Medical History:   Diagnosis Date   • Acute suppurative otitis media without spontaneous rupture of eardrum     right ear   • Benign essential hypertension    • Chronic osteoarthritis    • Diverticular disease of colon    • Dysfunction of eustachian tube     Will start OTC Nasal steroid      • Encounter for gynecological examination (general) (routine) without abnormal findings    • Encounter for screening for malignant neoplasm of breast    • Encounter for screening mammogram for malignant neoplasm of breast     02/04/16 negative      • Gastrointestinal hemorrhage    • Hypercholesterolemia    • Hyperlipidemia    • Hypothyroidism    • Left lower quadrant pain    • Mild depression (CMS/HCC)    • Nausea    • Obesity     Hyperinsulinar    • Obesity, unspecified    • Vertigo        Allergies   Allergen Reactions   • Sulfa Antibiotics Angioedema   • Lortab [Hydrocodone-Acetaminophen] GI Intolerance   • Tramadol Dizziness         Current Outpatient Medications:   •   "albuterol sulfate  (90 Base) MCG/ACT inhaler, Inhale 2 puffs Every 4 (Four) Hours As Needed for Wheezing or Shortness of Air., Disp: 18 g, Rfl: 0  •  diclofenac (VOLTAREN) 1 % gel gel, Apply 4 g topically to the appropriate area as directed 3 (Three) Times a Day., Disp: 100 tube, Rfl: 2  •  hydrochlorothiazide (MICROZIDE) 12.5 MG capsule, Take 1 capsule by mouth Daily., Disp: 30 capsule, Rfl: 5  •  ibuprofen (ADVIL,MOTRIN) 800 MG tablet, Take 800 mg by mouth Every 6 (Six) Hours As Needed for Mild Pain ., Disp: , Rfl:   •  levothyroxine (SYNTHROID, LEVOTHROID) 100 MCG tablet, Take 1 tablet by mouth Daily., Disp: 30 tablet, Rfl: 5  •  lisinopril (PRINIVIL,ZESTRIL) 20 MG tablet, Take 1 tablet by mouth Daily., Disp: 30 tablet, Rfl: 5  •  triamcinolone (KENALOG) 0.1 % ointment, Apply  topically to the appropriate area as directed 2 (Two) Times a Day., Disp: 453.6 g, Rfl: 1    Past Surgical History:   Procedure Laterality Date   • CARPAL TUNNEL RELEASE  1991   • COLONOSCOPY  2013    Dr Maria   • FOREARM SURGERY  2011    /wrist   • INJECTION OF MEDICATION  05/12/2016    Kenalog (1)      • INJECTION OF MEDICATION  05/12/2016    Rocephin (1)      • INJECTION OF MEDICATION  05/12/2016    Zofran (1)      • KNEE CARTILAGE SURGERY  2012   • SPLENECTOMY  1972   • TOTAL KNEE ARTHROPLASTY Left 07/16/2018   • VAGINAL HYSTERECTOMY SALPINGO OOPHORECTOMY  1995    cerv cancer           ROS: Review of systems has been updated as of today's date.  All other systems are negative except as noted previously.    PHYSICAL EXAMINATION:       Ht 144.8 cm (57\")   Wt 86.6 kg (191 lb)   BMI 41.33 kg/m²     Physical Exam   Constitutional: She is oriented to person, place, and time. She appears well-developed.   HENT:   Head: Normocephalic and atraumatic.   Eyes: EOM are normal. Pupils are equal, round, and reactive to light.   Neck: Neck supple.   Pulmonary/Chest: Effort normal.   Musculoskeletal: She exhibits tenderness.   Neurological: " She is alert and oriented to person, place, and time.   Skin: Skin is warm and dry.   Psychiatric: She has a normal mood and affect.       GAIT:     []  Normal  []  Antalgic    Assistive device: [x]  None  []  Walker     []  Crutches  []  Cane     []  Wheelchair  []  Stretcher    Ortho Exam  Left knee has well-healed scar no real swelling motion is fairly well-maintained.  Grossly neurovascular intact.  Right knee is more swollen mildly tender tender over the medial joint line ligaments are grossly stable motion is well-preserved mild patellofemoral crepitus neurovascular intact distally.    IMPRESSION: MRI Right knee-Changes of osteoarthritis with osteophytosis and cartilage thinning. There appears to be a tear involving the anterior horn of the lateral meniscus. Moderate joint effusion and a small Baker's cyst.        Body mass index is 41.33 kg/m².   ASSESSMENT:    Diagnoses and all orders for this visit:    Internal derangement of right knee    Acute pain of right knee    Primary osteoarthritis of knee, unspecified laterality          PLAN we discussed options at this point and gone over x-rays and discussed her MRI scan.  We will attempt Visco supplementation which she has not had.  She would like to try something conservative as opposed to getting her knee replaced.  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  I will see her back with her Visco supplementation and will attempt further conservative care.  Again if not better consider knee replacement    No Follow-up on file.      This document has been electronically signed by Chang Solorzano MD on September 27, 2019 7:13 PM

## 2019-10-30 ENCOUNTER — OFFICE VISIT (OUTPATIENT)
Dept: OTOLARYNGOLOGY | Facility: CLINIC | Age: 64
End: 2019-10-30

## 2019-10-30 ENCOUNTER — CLINICAL SUPPORT (OUTPATIENT)
Dept: AUDIOLOGY | Facility: CLINIC | Age: 64
End: 2019-10-30

## 2019-10-30 VITALS — HEIGHT: 57 IN | BODY MASS INDEX: 41.64 KG/M2 | TEMPERATURE: 98.7 F | WEIGHT: 193 LBS

## 2019-10-30 DIAGNOSIS — H90.3 SENSORINEURAL HEARING LOSS, BILATERAL: ICD-10-CM

## 2019-10-30 DIAGNOSIS — Z86.69 HISTORY OF ACUTE OTITIS EXTERNA: ICD-10-CM

## 2019-10-30 DIAGNOSIS — H90.3 SENSORINEURAL HEARING LOSS (SNHL) OF BOTH EARS: ICD-10-CM

## 2019-10-30 DIAGNOSIS — H93.13 TINNITUS OF BOTH EARS: Primary | ICD-10-CM

## 2019-10-30 DIAGNOSIS — H61.21 IMPACTED CERUMEN OF RIGHT EAR: Primary | ICD-10-CM

## 2019-10-30 PROCEDURE — 69210 REMOVE IMPACTED EAR WAX UNI: CPT | Performed by: OTOLARYNGOLOGY

## 2019-10-30 PROCEDURE — 99203 OFFICE O/P NEW LOW 30 MIN: CPT | Performed by: OTOLARYNGOLOGY

## 2019-10-30 PROCEDURE — 92557 COMPREHENSIVE HEARING TEST: CPT | Performed by: AUDIOLOGIST

## 2019-10-30 PROCEDURE — 92567 TYMPANOMETRY: CPT | Performed by: AUDIOLOGIST

## 2019-10-30 NOTE — PATIENT INSTRUCTIONS

## 2019-10-30 NOTE — PROGRESS NOTES
STANDARD AUDIOMETRIC EVALUATION      Name:  Mira Deal  :  1955  Age:  64 y.o.  Date of Evaluation:  10/30/2019      HISTORY    Reason for visit:  Mira Deal was seen today for a hearing evaluation at the request of Dr. Jack Lynn.   She presented with complaints of fullness in the right ear following a severe ear ache and swelling in the right ear approximately six weeks ago. Ms. Deal reported her hearing sensitivity was decreased at the time, but it is slowly returning to her baseline. She reported the presence of constant ringing/buzzing tinnitus that is worse in the left ear. She expressed that she has had the tinnitus for many years. Ms. Deal reported that her baseline hearing sensitivity has decreased and she has difficulty hearing her coworkers. She denied vertigo, noise exposure, recurrent otitis media and prior ear surgeries. No other significant audiologic information was reported.      EVALUATION    See Audiogram    RESULTS        Otoscopy and Tympanometry 226 Hz :  Right Ear:  Otoscopy:  Visible ear drum          Tympanometry:  Shallow eardrum mobility    Left Ear:   Otoscopy:  Visible ear drum        Tympanometry:  Middle ear function within normal limits    Test technique:  Standard Audiometry     Pure Tone Audiometry:   Patient responded to pure tones at  dB for 250-8000 Hz in right ear, and at  dB for 250-8000 Hz in left ear.       Speech Audiometry:        Right Ear:  Speech Reception Threshold (SRT) was obtained at 30 dBHL                 Speech Discrimination scores were 100% in quiet when words were presented at 70 dBHL       Left Ear:  Speech Reception Threshold (SRT) was obtained at 30 dBHL                 Speech Discrimination scores were 100% in quiet when words were presented at 70 dBHL    Reliability:   excellent    IMPRESSIONS:  1.  Tympanometry results are consistent with Shallow eardrum mobility in right ear, and Middle ear function within  normal limits in left ear.  2.  Pure tone results are consistent with a mild sloping to profound sensorineural hearing loss bilaterally.      RECOMMENDATIONS:  Patient is seeing the Ear Nose and Throat physician immediately following this examination.  Ms. Dela reported that she has never thought about hearing aids, but she would like to look into amplification. She was given vocational rehabilitation's contact information. It was a pleasure seeing Mira Deal in Audiology today.  We would be happy to do further testing or discuss these test as necessary.              This document has been electronically signed by JOSE Moreno on October 30, 2019 1:58 PM

## 2019-10-30 NOTE — PROGRESS NOTES
Subjective   Mira Deal is a 64 y.o. female.   Problems with her ears  History of Present Illness     Patient states she is had ear infection on the right side treated multiple times different drops and made it worse she said hearing loss she is attempted Valsalva and that did not help her symptoms still feel stopped up and is getting worse again though she is having some mild pain no drainage no fever chills    The following portions of the patient's history were reviewed and updated as appropriate: allergies, current medications, past family history, past medical history, past social history, past surgical history and problem list.      Mira Deal reports that she quit smoking about 5 years ago. She started smoking about 33 years ago. She has a 0.50 pack-year smoking history. She has never used smokeless tobacco. She reports that she does not drink alcohol or use drugs.  Patient is not a tobacco user and has been counseled for use of tobacco products    Family History   Problem Relation Age of Onset   • Cancer Other    • Diabetes Other    • Heart disease Other    • Hypertension Other    • Arthritis Daughter    • Hyperlipidemia Daughter          Current Outpatient Medications:   •  albuterol sulfate  (90 Base) MCG/ACT inhaler, Inhale 2 puffs Every 4 (Four) Hours As Needed for Wheezing or Shortness of Air., Disp: 18 g, Rfl: 0  •  diclofenac (VOLTAREN) 1 % gel gel, Apply 4 g topically to the appropriate area as directed 3 (Three) Times a Day., Disp: 100 tube, Rfl: 2  •  hydrochlorothiazide (MICROZIDE) 12.5 MG capsule, Take 1 capsule by mouth Daily., Disp: 30 capsule, Rfl: 5  •  ibuprofen (ADVIL,MOTRIN) 800 MG tablet, Take 800 mg by mouth Every 6 (Six) Hours As Needed for Mild Pain ., Disp: , Rfl:   •  levothyroxine (SYNTHROID, LEVOTHROID) 100 MCG tablet, Take 1 tablet by mouth Daily., Disp: 30 tablet, Rfl: 5  •  lisinopril (PRINIVIL,ZESTRIL) 20 MG tablet, Take 1 tablet by mouth Daily.,  Disp: 30 tablet, Rfl: 5  •  triamcinolone (KENALOG) 0.1 % ointment, Apply  topically to the appropriate area as directed 2 (Two) Times a Day., Disp: 453.6 g, Rfl: 1    Allergies   Allergen Reactions   • Sulfa Antibiotics Angioedema   • Lortab [Hydrocodone-Acetaminophen] GI Intolerance   • Tramadol Dizziness       Past Medical History:   Diagnosis Date   • Acute suppurative otitis media without spontaneous rupture of eardrum     right ear   • Benign essential hypertension    • Chronic osteoarthritis    • Diverticular disease of colon    • Dysfunction of eustachian tube     Will start OTC Nasal steroid      • Encounter for gynecological examination (general) (routine) without abnormal findings    • Encounter for screening for malignant neoplasm of breast    • Encounter for screening mammogram for malignant neoplasm of breast     02/04/16 negative      • Gastrointestinal hemorrhage    • Hypercholesterolemia    • Hyperlipidemia    • Hypothyroidism    • Left lower quadrant pain    • Mild depression (CMS/HCC)    • Nausea    • Obesity     Hyperinsulinar    • Obesity, unspecified    • Vertigo        Past Surgical History:   Procedure Laterality Date   • CARPAL TUNNEL RELEASE  1991   • COLONOSCOPY  2013    Dr Maria   • FOREARM SURGERY  2011    /wrist   • INJECTION OF MEDICATION  05/12/2016    Kenalog (1)      • INJECTION OF MEDICATION  05/12/2016    Rocephin (1)      • INJECTION OF MEDICATION  05/12/2016    Zofran (1)      • KNEE CARTILAGE SURGERY  2012   • SPLENECTOMY  1972   • TOTAL KNEE ARTHROPLASTY Left 07/16/2018   • VAGINAL HYSTERECTOMY SALPINGO OOPHORECTOMY  1995    cerv cancer       Review of Systems   HENT: Positive for ear pain and hearing loss. Negative for ear discharge, rhinorrhea and sore throat.    Respiratory: Negative for cough.    Gastrointestinal: Negative for abdominal pain, diarrhea and vomiting.   Musculoskeletal: Negative for neck pain.   Skin: Negative for rash.   Neurological: Negative for headaches.    All other systems reviewed and are negative.          Objective   Physical Exam   Constitutional: She appears well-developed and well-nourished.   HENT:   Head: Normocephalic.   Right Ear: External ear normal.   Left Ear: External ear normal.   Ears:    Mouth/Throat: Oropharynx is clear and moist.   Eyes: Conjunctivae are normal.   Neck: Normal range of motion.   Pulmonary/Chest: Effort normal.   Neurological: She is alert.   Skin: Skin is warm.   Psychiatric: She has a normal mood and affect. Thought content normal.   Nursing note and vitals reviewed.     Audiology testing was shown to the patient with the actual tracing shown to her was reviewed with the patient normal tympanograms    Procedure note after getting consent the patient ears examined use the microscope forceps suction curettes were used to remove a large amount of cerumen she is noted to have scarred eardrums but otherwise normal ear canal    Assessment/Plan   Mira was seen today for ear problem.    Diagnoses and all orders for this visit:    Impacted cerumen of right ear    History of acute otitis externa    Sensorineural hearing loss (SNHL) of both ears    She has hearing loss of talked about noise exposure issues she is had some noise when she was a teenager but really none recently and there is no family history she is going to talk to someone about possible hearing aid evaluation  Discussed strategies to prevent earwax buildup and acute otitis externa  To follow-up in 3 months to recheck the wax situation if her ear gets causing problems meantime she can let us know but otherwise we will try conservative approach  Call further problems

## 2019-12-02 RX ORDER — AMLODIPINE BESYLATE 5 MG/1
5 TABLET ORAL DAILY
Qty: 30 TABLET | Refills: 3 | Status: SHIPPED | OUTPATIENT
Start: 2019-12-02 | End: 2020-08-01

## 2019-12-10 RX ORDER — LEVOTHYROXINE SODIUM 0.1 MG/1
TABLET ORAL
Qty: 30 TABLET | Refills: 5 | Status: SHIPPED | OUTPATIENT
Start: 2019-12-10 | End: 2020-06-03 | Stop reason: SDUPTHER

## 2019-12-18 ENCOUNTER — OFFICE VISIT (OUTPATIENT)
Dept: OTOLARYNGOLOGY | Facility: CLINIC | Age: 64
End: 2019-12-18

## 2019-12-18 VITALS — WEIGHT: 193 LBS | TEMPERATURE: 98.5 F | BODY MASS INDEX: 41.64 KG/M2 | HEIGHT: 57 IN

## 2019-12-18 DIAGNOSIS — H90.3 SENSORINEURAL HEARING LOSS (SNHL) OF BOTH EARS: ICD-10-CM

## 2019-12-18 DIAGNOSIS — H61.21 IMPACTED CERUMEN OF RIGHT EAR: Primary | ICD-10-CM

## 2019-12-18 DIAGNOSIS — Z86.69 HISTORY OF ACUTE OTITIS EXTERNA: ICD-10-CM

## 2019-12-18 PROCEDURE — 99212 OFFICE O/P EST SF 10 MIN: CPT | Performed by: OTOLARYNGOLOGY

## 2019-12-18 NOTE — PROGRESS NOTES
Subjective   Mira Deal is a 64 y.o. female.   Follow-up ear problems    History of Present Illness   Patient is not having any new ear pain drainage does not feel like her hearing is changed      The following portions of the patient's history were reviewed and updated as appropriate: allergies, current medications, past family history, past medical history, past social history, past surgical history and problem list.      Current Outpatient Medications:   •  albuterol sulfate  (90 Base) MCG/ACT inhaler, Inhale 2 puffs Every 4 (Four) Hours As Needed for Wheezing or Shortness of Air., Disp: 18 g, Rfl: 0  •  amLODIPine (NORVASC) 5 MG tablet, Take 1 tablet by mouth Daily., Disp: 30 tablet, Rfl: 3  •  diclofenac (VOLTAREN) 1 % gel gel, Apply 4 g topically to the appropriate area as directed 3 (Three) Times a Day., Disp: 100 tube, Rfl: 2  •  ibuprofen (ADVIL,MOTRIN) 800 MG tablet, Take 800 mg by mouth Every 6 (Six) Hours As Needed for Mild Pain ., Disp: , Rfl:   •  levothyroxine (SYNTHROID, LEVOTHROID) 100 MCG tablet, TAKE 1 TABLET BY MOUTH ONCE DAILY, Disp: 30 tablet, Rfl: 5  •  lisinopril (PRINIVIL,ZESTRIL) 20 MG tablet, Take 1 tablet by mouth Daily., Disp: 30 tablet, Rfl: 5  •  triamcinolone (KENALOG) 0.1 % ointment, Apply  topically to the appropriate area as directed 2 (Two) Times a Day., Disp: 453.6 g, Rfl: 1  •  hydrochlorothiazide (MICROZIDE) 12.5 MG capsule, Take 1 capsule by mouth Daily., Disp: 30 capsule, Rfl: 5    Allergies   Allergen Reactions   • Sulfa Antibiotics Angioedema   • Lortab [Hydrocodone-Acetaminophen] GI Intolerance   • Tramadol Dizziness             Review of Systems   Constitutional: Negative for fever.   HENT: Negative for ear discharge and ear pain.    Hematological: Negative for adenopathy.           Objective   Physical Exam   Constitutional: She appears well-developed and well-nourished.   HENT:   Head: Normocephalic.   Right Ear: External ear normal.   Left Ear:  External ear normal.   Mouth/Throat: Oropharynx is clear and moist.   Eyes: Conjunctivae are normal.   Neck: Neck supple.   Pulmonary/Chest: Effort normal.   Skin: Skin is warm.   Psychiatric: She has a normal mood and affect.   Vitals reviewed.    No evidence of wax in the ears      Assessment/Plan   Mira was seen today for ear problem and headache.    Diagnoses and all orders for this visit:    Impacted cerumen of right ear    Sensorineural hearing loss (SNHL) of both ears    History of acute otitis externa    Patient comes in with no evidence of any recurrent wax buildup told her we can recheck in 6 months      I reviewed the importance using peroxide in her ears    We gave her information how to get in touch with vocational rehabilitation regarding hearing aids she is to call any question problems prior to follow-up

## 2019-12-20 ENCOUNTER — OFFICE VISIT (OUTPATIENT)
Dept: FAMILY MEDICINE CLINIC | Facility: CLINIC | Age: 64
End: 2019-12-20

## 2019-12-20 VITALS
WEIGHT: 189.5 LBS | SYSTOLIC BLOOD PRESSURE: 112 MMHG | HEART RATE: 83 BPM | TEMPERATURE: 97.8 F | DIASTOLIC BLOOD PRESSURE: 68 MMHG | HEIGHT: 57 IN | RESPIRATION RATE: 20 BRPM | OXYGEN SATURATION: 96 % | BODY MASS INDEX: 40.88 KG/M2

## 2019-12-20 DIAGNOSIS — J02.9 SORE THROAT: ICD-10-CM

## 2019-12-20 DIAGNOSIS — H66.93 ACUTE BILATERAL OTITIS MEDIA: ICD-10-CM

## 2019-12-20 DIAGNOSIS — J06.9 URI WITH COUGH AND CONGESTION: Primary | ICD-10-CM

## 2019-12-20 DIAGNOSIS — R68.83 CHILLS: ICD-10-CM

## 2019-12-20 LAB
EXPIRATION DATE: NORMAL
EXPIRATION DATE: NORMAL
FLUAV AG NPH QL: NEGATIVE
FLUBV AG NPH QL: NEGATIVE
INTERNAL CONTROL: NORMAL
INTERNAL CONTROL: NORMAL
Lab: NORMAL
Lab: NORMAL
S PYO AG THROAT QL: NEGATIVE

## 2019-12-20 PROCEDURE — 87804 INFLUENZA ASSAY W/OPTIC: CPT | Performed by: NURSE PRACTITIONER

## 2019-12-20 PROCEDURE — 87880 STREP A ASSAY W/OPTIC: CPT | Performed by: NURSE PRACTITIONER

## 2019-12-20 PROCEDURE — 99214 OFFICE O/P EST MOD 30 MIN: CPT | Performed by: NURSE PRACTITIONER

## 2019-12-20 RX ORDER — AMOXICILLIN 500 MG/1
500 CAPSULE ORAL 3 TIMES DAILY
Qty: 30 CAPSULE | Refills: 0 | Status: SHIPPED | OUTPATIENT
Start: 2019-12-20 | End: 2019-12-30

## 2019-12-20 RX ORDER — BROMPHENIRAMINE MALEATE, PSEUDOEPHEDRINE HYDROCHLORIDE, AND DEXTROMETHORPHAN HYDROBROMIDE 2; 30; 10 MG/5ML; MG/5ML; MG/5ML
SYRUP ORAL
Qty: 180 ML | Refills: 0 | Status: SHIPPED | OUTPATIENT
Start: 2019-12-20 | End: 2020-05-12

## 2019-12-20 NOTE — PROGRESS NOTES
"Subjective   Mira Deal is a 64 y.o. female.     [facility]    PCP: Lloyd Varghese MD    CC: \"I need a doctor statement for work.  My health is not up to par to be able to work\"    Has had flu vaccine.     Cough   This is a new problem. The current episode started yesterday. The problem has been unchanged. The problem occurs every few minutes. The cough is non-productive and productive of sputum. Associated symptoms include chills, ear pain, headaches, postnasal drip, rhinorrhea and a sore throat. Pertinent negatives include no chest pain, ear congestion, fever, heartburn, hemoptysis, myalgias, nasal congestion, rash, shortness of breath, sweats, weight loss or wheezing. Nothing aggravates the symptoms. Treatments tried: otc cold/cough. The treatment provided mild relief.        The following portions of the patient's history were reviewed and updated as appropriate: allergies, current medications, past medical history, past social history, past surgical history and problem list.    Review of Systems   Constitutional: Positive for appetite change, chills and fatigue. Negative for fever and unexpected weight loss.   HENT: Positive for congestion, ear pain, postnasal drip, rhinorrhea, sinus pressure, sneezing and sore throat. Negative for ear discharge.    Eyes: Negative.    Respiratory: Positive for cough. Negative for hemoptysis, chest tightness, shortness of breath and wheezing.    Cardiovascular: Negative for chest pain.   Gastrointestinal: Negative for diarrhea, nausea and vomiting.   Genitourinary: Negative for difficulty urinating.   Musculoskeletal: Negative for myalgias.   Skin: Negative for rash.   Neurological: Negative for headache.     /68 (BP Location: Right arm, Patient Position: Sitting, Cuff Size: Adult)   Pulse 83   Temp 97.8 °F (36.6 °C) (Oral)   Resp 20   Ht 144.8 cm (57\")   Wt 86 kg (189 lb 8 oz)   SpO2 96%   Breastfeeding No   BMI 41.01 kg/m²     Objective "   Physical Exam   Constitutional: She is oriented to person, place, and time. She appears well-developed and well-nourished. No distress ( no distress, but does not appear to feel well).   HENT:   Head: Normocephalic and atraumatic.   Right Ear: Ear canal normal. Tympanic membrane is injected.   Left Ear: Ear canal normal. Tympanic membrane is injected.   Nose: Mucosal edema and congestion present. Right sinus exhibits no maxillary sinus tenderness and no frontal sinus tenderness. Left sinus exhibits no maxillary sinus tenderness and no frontal sinus tenderness.   Mouth/Throat: Uvula is midline and mucous membranes are normal. Posterior oropharyngeal erythema (mild injection) present. No oropharyngeal exudate.   Generalized sinus pressure, but no localized pain     Eyes: Conjunctivae are normal. Right eye exhibits no discharge. Left eye exhibits no discharge.   Neck: Neck supple.   Cardiovascular: Normal rate and regular rhythm.   Pulmonary/Chest: Effort normal and breath sounds normal. She has no wheezes. She has no rales.   +loose cough   Lymphadenopathy:     She has no cervical adenopathy.   Neurological: She is alert and oriented to person, place, and time.   Nursing note and vitals reviewed.    Recent Results (from the past 24 hour(s))   POC Influenza A / B    Collection Time: 12/20/19 11:08 AM   Result Value Ref Range    Rapid Influenza A Ag Negative Negative    Rapid Influenza B Ag Negative Negative    Internal Control Passed Passed    Lot Number [id number]     Expiration Date 11-    POC Rapid Strep A    Collection Time: 12/20/19 11:08 AM   Result Value Ref Range    Rapid Strep A Screen Negative Negative, VALID, INVALID, Not Performed    Internal Control Passed Passed    Lot Number [id number]     Expiration Date 03-          Assessment/Plan   Meyers was seen today for cough, sore throat and chills.    Diagnoses and all orders for this visit:    URI with cough and congestion  -      brompheniramine-pseudoephedrine-DM 30-2-10 MG/5ML syrup; 10 ml po QID prn cough/congestion    Chills  -     POC Influenza A / B  -     POC Rapid Strep A    Sore throat  -     POC Influenza A / B  -     POC Rapid Strep A    Acute bilateral otitis media  -     amoxicillin (AMOXIL) 500 MG capsule; Take 1 capsule by mouth 3 (Three) Times a Day for 10 days.      Push fluids  Rest  Tylenol or Motrin as needed  Rx for Amoxil for OM  Rx for Bromfed DM for for uri/cough symptoms    Patient's Body mass index is 41.01 kg/m². BMI is above normal parameters. Recommendations include: referral to primary care.    See PCP or RTC if symptoms persist/worsen  See PCP for routine f/u visit and management of chronic medical conditions    RTW: 12-24-19

## 2019-12-30 RX ORDER — LISINOPRIL 20 MG/1
20 TABLET ORAL DAILY
Qty: 30 TABLET | Refills: 5 | Status: SHIPPED | OUTPATIENT
Start: 2019-12-30 | End: 2020-07-06 | Stop reason: SDUPTHER

## 2020-01-10 ENCOUNTER — OFFICE VISIT (OUTPATIENT)
Dept: FAMILY MEDICINE CLINIC | Facility: CLINIC | Age: 65
End: 2020-01-10

## 2020-01-10 VITALS
HEART RATE: 82 BPM | HEIGHT: 57 IN | BODY MASS INDEX: 41.03 KG/M2 | WEIGHT: 190.2 LBS | SYSTOLIC BLOOD PRESSURE: 152 MMHG | DIASTOLIC BLOOD PRESSURE: 84 MMHG | TEMPERATURE: 98.2 F | OXYGEN SATURATION: 96 %

## 2020-01-10 DIAGNOSIS — J06.9 UPPER RESPIRATORY TRACT INFECTION, UNSPECIFIED TYPE: ICD-10-CM

## 2020-01-10 DIAGNOSIS — I10 ESSENTIAL HYPERTENSION: ICD-10-CM

## 2020-01-10 DIAGNOSIS — F41.9 ANXIETY: ICD-10-CM

## 2020-01-10 DIAGNOSIS — R05.9 COUGH: ICD-10-CM

## 2020-01-10 PROCEDURE — 99214 OFFICE O/P EST MOD 30 MIN: CPT | Performed by: FAMILY MEDICINE

## 2020-01-10 RX ORDER — PAROXETINE 10 MG/1
10 TABLET, FILM COATED ORAL EVERY MORNING
Qty: 30 TABLET | Refills: 2 | Status: SHIPPED | OUTPATIENT
Start: 2020-01-10 | End: 2020-06-03 | Stop reason: SDUPTHER

## 2020-01-10 RX ORDER — GUAIFENESIN AND CODEINE PHOSPHATE 100; 10 MG/5ML; MG/5ML
5 SOLUTION ORAL 3 TIMES DAILY PRN
Qty: 118 ML | Refills: 1 | Status: SHIPPED | OUTPATIENT
Start: 2020-01-10 | End: 2020-05-12

## 2020-01-10 RX ORDER — BENZONATATE 100 MG/1
100 CAPSULE ORAL 3 TIMES DAILY PRN
Qty: 90 CAPSULE | Refills: 1 | Status: SHIPPED | OUTPATIENT
Start: 2020-01-10 | End: 2020-05-12

## 2020-01-11 NOTE — PROGRESS NOTES
Subjective   Mira Deal is a 64 y.o. obese  female former smoker with HTN, Hypothyroidism, Hyperinsulinar obesity, Hyperlipidemia, Asplenia, H/O Recurrent OM, Eustachian tube dysfunction, Allergies, Chronic knee pain, S/P L TKA, Osteoarthritis, H/O C-diff colitis after Clindamycin, and other health problems see PMH/PSH. Pt presents for exam, to discuss acute and chronic health problems Tx and F/u plans.    ' Went to Walk-in Clinic with Cough and congestion, took Rx of Amoxicillin, cough has not resolved. Have been taking Cold/Flu medicine, B/P has been up some, had been OK at home. Having increased anxiety, especially when leaving home '    Hypertension   This is a chronic problem. The current episode started more than 1 year ago. The problem is unchanged. The problem is uncontrolled. Associated symptoms include anxiety. Pertinent negatives include no chest pain, headaches, neck pain, palpitations or shortness of breath. Agents associated with hypertension include decongestants. Risk factors for coronary artery disease include obesity and post-menopausal state. Current antihypertension treatment includes ACE inhibitors. The current treatment provides significant improvement.   Cough   This is a recurrent problem. The current episode started 1 to 4 weeks ago. The problem has been gradually worsening. The problem occurs every few minutes. The cough is productive of sputum. Associated symptoms include postnasal drip. Pertinent negatives include no chest pain, chills, ear pain, fever, headaches, myalgias, rash, rhinorrhea, sore throat, shortness of breath or wheezing. She has tried OTC cough suppressant for the symptoms. The treatment provided no relief. Her past medical history is significant for bronchitis and environmental allergies.   Anxiety   Presents for initial visit. Onset was 1 to 6 months ago. The problem has been gradually worsening. Symptoms include nervous/anxious behavior. Patient  reports no chest pain, nausea, palpitations or shortness of breath. Symptoms occur occasionally. The severity of symptoms is mild and interfering with daily activities. The symptoms are aggravated by family issues. The quality of sleep is fair. Nighttime awakenings: occasional.     Past treatments include nothing.        The following portions of the patient's history were reviewed and updated as appropriate: allergies, current medications, past family history, past medical history, past social history, past surgical history and problem list.    Review of Systems   Constitutional: Negative for chills, fatigue and fever.   HENT: Positive for postnasal drip. Negative for congestion, ear pain, rhinorrhea, sinus pressure, sinus pain ( mild), sneezing and sore throat.    Eyes: Negative.    Respiratory: Positive for cough. Negative for shortness of breath and wheezing.    Cardiovascular: Negative for chest pain and palpitations.   Gastrointestinal: Negative for abdominal pain, diarrhea, nausea and vomiting.   Endocrine: Negative.    Genitourinary: Negative for dysuria.   Musculoskeletal: Positive for arthralgias and joint swelling. Negative for myalgias and neck pain.        S/P TKA   Skin: Negative for rash.   Allergic/Immunologic: Positive for environmental allergies.   Neurological: Negative for weakness and headaches.   Hematological: Negative.    Psychiatric/Behavioral: The patient is nervous/anxious.        Objective   Physical Exam   Constitutional: She is oriented to person, place, and time. She appears well-developed and well-nourished. No distress.   HENT:   Head: Normocephalic and atraumatic.   Right Ear: External ear normal.   Left Ear: External ear normal.   Nose: Nose normal.   Mouth/Throat: Oropharynx is clear and moist. No oropharyngeal exudate.   Eyes: Pupils are equal, round, and reactive to light. Conjunctivae and EOM are normal. Right eye exhibits no discharge. Left eye exhibits no discharge. No  scleral icterus.   Neck: Normal range of motion. Neck supple. No JVD present. No tracheal deviation present. No thyromegaly present.   Cardiovascular: Normal rate, regular rhythm, normal heart sounds and intact distal pulses. Exam reveals no gallop and no friction rub.   No murmur heard.  Pulmonary/Chest: Effort normal. No stridor. No respiratory distress. She has no wheezes. She has no rales. She exhibits no tenderness.   Few scattered Rhonchi   Abdominal: Soft. Bowel sounds are normal. She exhibits no distension and no mass. There is no tenderness. There is no rebound and no guarding. No hernia.   Musculoskeletal: She exhibits no edema, tenderness or deformity.   Lymphadenopathy:     She has no cervical adenopathy.   Neurological: She is alert and oriented to person, place, and time. She has normal reflexes. She displays normal reflexes. No cranial nerve deficit. She exhibits normal muscle tone. Coordination normal.   Skin: Skin is warm and dry. Capillary refill takes 2 to 3 seconds. No rash noted. She is not diaphoretic. No erythema. No pallor.   Psoriasis   Psychiatric: She has a normal mood and affect. Her behavior is normal. Judgment and thought content normal.   Nursing note and vitals reviewed.      Assessment/Plan   Mira was seen today for hypertension, hypothyroidism, arthritis and cough.    Diagnoses and all orders for this visit:    Upper respiratory tract infection, unspecified type  -     benzonatate (TESSALON PERLES) 100 MG capsule; Take 1 capsule by mouth 3 (Three) Times a Day As Needed for Cough.  -     guaiFENesin-codeine (GUAIFENESIN AC) 100-10 MG/5ML liquid; Take 5 mL by mouth 3 (Three) Times a Day As Needed for Cough.    Anxiety    Essential hypertension    Cough    Other orders  -     PARoxetine (PAXIL) 10 MG tablet; Take 1 tablet by mouth Every Morning.    Discussed exam, health problems, Anxiety, meds, indications, Tx plan. Discussed safety with controlled med. Discussed HTN, Pt will stop  current med with decongestant.  Discussed F/U plan.         This document has been electronically signed by Lloyd Varghese MD

## 2020-05-12 ENCOUNTER — OFFICE VISIT (OUTPATIENT)
Dept: FAMILY MEDICINE CLINIC | Facility: CLINIC | Age: 65
End: 2020-05-12

## 2020-05-12 VITALS
OXYGEN SATURATION: 97 % | WEIGHT: 187.6 LBS | HEIGHT: 57 IN | DIASTOLIC BLOOD PRESSURE: 78 MMHG | HEART RATE: 80 BPM | BODY MASS INDEX: 40.47 KG/M2 | TEMPERATURE: 97.8 F | SYSTOLIC BLOOD PRESSURE: 134 MMHG

## 2020-05-12 DIAGNOSIS — R07.9 CHEST PAIN, UNSPECIFIED TYPE: Primary | ICD-10-CM

## 2020-05-12 DIAGNOSIS — I10 ESSENTIAL HYPERTENSION: ICD-10-CM

## 2020-05-12 PROCEDURE — 93010 ELECTROCARDIOGRAM REPORT: CPT | Performed by: INTERNAL MEDICINE

## 2020-05-12 PROCEDURE — 93005 ELECTROCARDIOGRAM TRACING: CPT | Performed by: FAMILY MEDICINE

## 2020-05-12 PROCEDURE — 99214 OFFICE O/P EST MOD 30 MIN: CPT | Performed by: FAMILY MEDICINE

## 2020-05-12 NOTE — PATIENT INSTRUCTIONS
"DASH Eating Plan  DASH stands for \"Dietary Approaches to Stop Hypertension.\" The DASH eating plan is a healthy eating plan that has been shown to reduce high blood pressure (hypertension). It may also reduce your risk for type 2 diabetes, heart disease, and stroke. The DASH eating plan may also help with weight loss.  What are tips for following this plan?    General guidelines  · Avoid eating more than 2,300 mg (milligrams) of salt (sodium) a day. If you have hypertension, you may need to reduce your sodium intake to 1,500 mg a day.  · Limit alcohol intake to no more than 1 drink a day for nonpregnant women and 2 drinks a day for men. One drink equals 12 oz of beer, 5 oz of wine, or 1½ oz of hard liquor.  · Work with your health care provider to maintain a healthy body weight or to lose weight. Ask what an ideal weight is for you.  · Get at least 30 minutes of exercise that causes your heart to beat faster (aerobic exercise) most days of the week. Activities may include walking, swimming, or biking.  · Work with your health care provider or diet and nutrition specialist (dietitian) to adjust your eating plan to your individual calorie needs.  Reading food labels    · Check food labels for the amount of sodium per serving. Choose foods with less than 5 percent of the Daily Value of sodium. Generally, foods with less than 300 mg of sodium per serving fit into this eating plan.  · To find whole grains, look for the word \"whole\" as the first word in the ingredient list.  Shopping  · Buy products labeled as \"low-sodium\" or \"no salt added.\"  · Buy fresh foods. Avoid canned foods and premade or frozen meals.  Cooking  · Avoid adding salt when cooking. Use salt-free seasonings or herbs instead of table salt or sea salt. Check with your health care provider or pharmacist before using salt substitutes.  · Do not weems foods. Cook foods using healthy methods such as baking, boiling, grilling, and broiling instead.  · Cook with " heart-healthy oils, such as olive, canola, soybean, or sunflower oil.  Meal planning  · Eat a balanced diet that includes:  ? 5 or more servings of fruits and vegetables each day. At each meal, try to fill half of your plate with fruits and vegetables.  ? Up to 6-8 servings of whole grains each day.  ? Less than 6 oz of lean meat, poultry, or fish each day. A 3-oz serving of meat is about the same size as a deck of cards. One egg equals 1 oz.  ? 2 servings of low-fat dairy each day.  ? A serving of nuts, seeds, or beans 5 times each week.  ? Heart-healthy fats. Healthy fats called Omega-3 fatty acids are found in foods such as flaxseeds and coldwater fish, like sardines, salmon, and mackerel.  · Limit how much you eat of the following:  ? Canned or prepackaged foods.  ? Food that is high in trans fat, such as fried foods.  ? Food that is high in saturated fat, such as fatty meat.  ? Sweets, desserts, sugary drinks, and other foods with added sugar.  ? Full-fat dairy products.  · Do not salt foods before eating.  · Try to eat at least 2 vegetarian meals each week.  · Eat more home-cooked food and less restaurant, buffet, and fast food.  · When eating at a restaurant, ask that your food be prepared with less salt or no salt, if possible.  What foods are recommended?  The items listed may not be a complete list. Talk with your dietitian about what dietary choices are best for you.  Grains  Whole-grain or whole-wheat bread. Whole-grain or whole-wheat pasta. Brown rice. Oatmeal. Quinoa. Bulgur. Whole-grain and low-sodium cereals. Lindsay bread. Low-fat, low-sodium crackers. Whole-wheat flour tortillas.  Vegetables  Fresh or frozen vegetables (raw, steamed, roasted, or grilled). Low-sodium or reduced-sodium tomato and vegetable juice. Low-sodium or reduced-sodium tomato sauce and tomato paste. Low-sodium or reduced-sodium canned vegetables.  Fruits  All fresh, dried, or frozen fruit. Canned fruit in natural juice (without  added sugar).  Meat and other protein foods  Skinless chicken or turkey. Ground chicken or turkey. Pork with fat trimmed off. Fish and seafood. Egg whites. Dried beans, peas, or lentils. Unsalted nuts, nut butters, and seeds. Unsalted canned beans. Lean cuts of beef with fat trimmed off. Low-sodium, lean deli meat.  Dairy  Low-fat (1%) or fat-free (skim) milk. Fat-free, low-fat, or reduced-fat cheeses. Nonfat, low-sodium ricotta or cottage cheese. Low-fat or nonfat yogurt. Low-fat, low-sodium cheese.  Fats and oils  Soft margarine without trans fats. Vegetable oil. Low-fat, reduced-fat, or light mayonnaise and salad dressings (reduced-sodium). Canola, safflower, olive, soybean, and sunflower oils. Avocado.  Seasoning and other foods  Herbs. Spices. Seasoning mixes without salt. Unsalted popcorn and pretzels. Fat-free sweets.  What foods are not recommended?  The items listed may not be a complete list. Talk with your dietitian about what dietary choices are best for you.  Grains  Baked goods made with fat, such as croissants, muffins, or some breads. Dry pasta or rice meal packs.  Vegetables  Creamed or fried vegetables. Vegetables in a cheese sauce. Regular canned vegetables (not low-sodium or reduced-sodium). Regular canned tomato sauce and paste (not low-sodium or reduced-sodium). Regular tomato and vegetable juice (not low-sodium or reduced-sodium). Pickles. Olives.  Fruits  Canned fruit in a light or heavy syrup. Fried fruit. Fruit in cream or butter sauce.  Meat and other protein foods  Fatty cuts of meat. Ribs. Fried meat. Gooden. Sausage. Bologna and other processed lunch meats. Salami. Fatback. Hotdogs. Bratwurst. Salted nuts and seeds. Canned beans with added salt. Canned or smoked fish. Whole eggs or egg yolks. Chicken or turkey with skin.  Dairy  Whole or 2% milk, cream, and half-and-half. Whole or full-fat cream cheese. Whole-fat or sweetened yogurt. Full-fat cheese. Nondairy creamers. Whipped toppings.  Processed cheese and cheese spreads.  Fats and oils  Butter. Stick margarine. Lard. Shortening. Ghee. Gooden fat. Tropical oils, such as coconut, palm kernel, or palm oil.  Seasoning and other foods  Salted popcorn and pretzels. Onion salt, garlic salt, seasoned salt, table salt, and sea salt. Worcestershire sauce. Tartar sauce. Barbecue sauce. Teriyaki sauce. Soy sauce, including reduced-sodium. Steak sauce. Canned and packaged gravies. Fish sauce. Oyster sauce. Cocktail sauce. Horseradish that you find on the shelf. Ketchup. Mustard. Meat flavorings and tenderizers. Bouillon cubes. Hot sauce and Tabasco sauce. Premade or packaged marinades. Premade or packaged taco seasonings. Relishes. Regular salad dressings.  Where to find more information:  · National Heart, Lung, and Blood [place]: www.nhlbi.nih.gov  · American Heart Association: www.heart.org  Summary  · The DASH eating plan is a healthy eating plan that has been shown to reduce high blood pressure (hypertension). It may also reduce your risk for type 2 diabetes, heart disease, and stroke.  · With the DASH eating plan, you should limit salt (sodium) intake to 2,300 mg a day. If you have hypertension, you may need to reduce your sodium intake to 1,500 mg a day.  · When on the DASH eating plan, aim to eat more fresh fruits and vegetables, whole grains, lean proteins, low-fat dairy, and heart-healthy fats.  · Work with your health care provider or diet and nutrition specialist (dietitian) to adjust your eating plan to your individual calorie needs.  This information is not intended to replace advice given to you by your health care provider. Make sure you discuss any questions you have with your health care provider.  Document Released: 12/06/2012 Document Revised: 12/11/2017 Document Reviewed: 12/11/2017  [employer] Interactive Patient Education © 2020 [employer]      Preventive Care 40-64 Years Old, Female  Preventive care refers to visits with your health  care provider and lifestyle choices that can promote health and wellness. This includes:  · A yearly physical exam. This may also be called an annual well check.  · Regular dental visits and eye exams.  · Immunizations.  · Screening for certain conditions.  · Healthy lifestyle choices, such as eating a healthy diet, getting regular exercise, not using drugs or products that contain nicotine and tobacco, and limiting alcohol use.  What can I expect for my preventive care visit?  Physical exam  Your health care provider will check your:  · Height and weight. This may be used to calculate body mass index (BMI), which tells if you are at a healthy weight.  · Heart rate and blood pressure.  · Skin for abnormal spots.  Counseling  Your health care provider may ask you questions about your:  · Alcohol, tobacco, and drug use.  · Emotional well-being.  · Home and relationship well-being.  · Sexual activity.  · Eating habits.  · Work and work environment.  · Method of birth control.  · Menstrual cycle.  · Pregnancy history.  What immunizations do I need?    Influenza (flu) vaccine  · This is recommended every year.  Tetanus, diphtheria, and pertussis (Tdap) vaccine  · You may need a Td booster every 10 years.  Varicella (chickenpox) vaccine  · You may need this if you have not been vaccinated.  Zoster (shingles) vaccine  · You may need this after age 60.  Measles, mumps, and rubella (MMR) vaccine  · You may need at least one dose of MMR if you were born in 1957 or later. You may also need a second dose.  Pneumococcal conjugate (PCV13) vaccine  · You may need this if you have certain conditions and were not previously vaccinated.  Pneumococcal polysaccharide (PPSV23) vaccine  · You may need one or two doses if you smoke cigarettes or if you have certain conditions.  Meningococcal conjugate (MenACWY) vaccine  · You may need this if you have certain conditions.  Hepatitis A vaccine  · You may need this if you have certain  conditions or if you travel or work in places where you may be exposed to hepatitis A.  Hepatitis B vaccine  · You may need this if you have certain conditions or if you travel or work in places where you may be exposed to hepatitis B.  Haemophilus influenzae type b (Hib) vaccine  · You may need this if you have certain conditions.  Human papillomavirus (HPV) vaccine  · If recommended by your health care provider, you may need three doses over 6 months.  You may receive vaccines as individual doses or as more than one vaccine together in one shot (combination vaccines). Talk with your health care provider about the risks and benefits of combination vaccines.  What tests do I need?  Blood tests  · Lipid and cholesterol levels. These may be checked every 5 years, or more frequently if you are over 50 years old.  · Hepatitis C test.  · Hepatitis B test.  Screening  · Lung cancer screening. You may have this screening every year starting at age 55 if you have a 30-pack-year history of smoking and currently smoke or have quit within the past 15 years.  · Colorectal cancer screening. All adults should have this screening starting at age 50 and continuing until age 75. Your health care provider may recommend screening at age 45 if you are at increased risk. You will have tests every 1-10 years, depending on your results and the type of screening test.  · Diabetes screening. This is done by checking your blood sugar (glucose) after you have not eaten for a while (fasting). You may have this done every 1-3 years.  · Mammogram. This may be done every 1-2 years. Talk with your health care provider about when you should start having regular mammograms. This may depend on whether you have a family history of breast cancer.  · BRCA-related cancer screening. This may be done if you have a family history of breast, ovarian, tubal, or peritoneal cancers.  · Pelvic exam and Pap test. This may be done every 3 years starting at age 21.  Starting at age 30, this may be done every 5 years if you have a Pap test in combination with an HPV test.  Other tests  · Sexually transmitted disease (STD) testing.  · Bone density scan. This is done to screen for osteoporosis. You may have this scan if you are at high risk for osteoporosis.  Follow these instructions at home:  Eating and drinking  · Eat a diet that includes fresh fruits and vegetables, whole grains, lean protein, and low-fat dairy.  · Take vitamin and mineral supplements as recommended by your health care provider.  · Do not drink alcohol if:  ? Your health care provider tells you not to drink.  ? You are pregnant, may be pregnant, or are planning to become pregnant.  · If you drink alcohol:  ? Limit how much you have to 0-1 drink a day.  ? Be aware of how much alcohol is in your drink. In the [place] one drink equals one 12 oz bottle of beer (355 mL), one 5 oz glass of wine (148 mL), or one 1½ oz glass of hard liquor (44 mL).  Lifestyle  · Take daily care of your teeth and gums.  · Stay active. Exercise for at least 30 minutes on 5 or more days each week.  · Do not use any products that contain nicotine or tobacco, such as cigarettes, e-cigarettes, and chewing tobacco. If you need help quitting, ask your health care provider.  · If you are sexually active, practice safe sex. Use a condom or other form of birth control (contraception) in order to prevent pregnancy and STIs (sexually transmitted infections).  · If told by your health care provider, take low-dose aspirin daily starting at age 50.  What's next?  · Visit your health care provider once a year for a well check visit.  · Ask your health care provider how often you should have your eyes and teeth checked.  · Stay up to date on all vaccines.  This information is not intended to replace advice given to you by your health care provider. Make sure you discuss any questions you have with your health care provider.  Document Released: 01/13/2017  Document Revised: 08/29/2019 Document Reviewed: 08/29/2019  [employer] Interactive Patient Education © 2020 [employer]      Nonspecific Chest Pain  Chest pain can be caused by many different conditions. Some causes of chest pain can be life-threatening. These will require treatment right away. Serious causes of chest pain include:  · Heart attack.  · A tear in the body's main blood vessel.  · Redness and swelling (inflammation) around your heart.  · Blood clot in your lungs.  Other causes of chest pain may not be so serious. These include:  · Heartburn.  · Anxiety or stress.  · Damage to bones or muscles in your chest.  · Lung infections.  Chest pain can feel like:  · Pain or discomfort in your chest.  · Crushing, pressure, aching, or squeezing pain.  · Burning or tingling.  · Dull or sharp pain that is worse when you move, cough, or take a deep breath.  · Pain or discomfort that is also felt in your back, neck, jaw, shoulder, or arm, or pain that spreads to any of these areas.  It is hard to know whether your pain is caused by something that is serious or something that is not so serious. So it is important to see your doctor right away if you have chest pain.  Follow these instructions at home:  Medicines  · Take over-the-counter and prescription medicines only as told by your doctor.  · If you were prescribed an antibiotic medicine, take it as told by your doctor. Do not stop taking the antibiotic even if you start to feel better.  Lifestyle    · Rest as told by your doctor.  · Do not use any products that contain nicotine or tobacco, such as cigarettes, e-cigarettes, and chewing tobacco. If you need help quitting, ask your doctor.  · Do not drink alcohol.  · Make lifestyle changes as told by your doctor. These may include:  ? Getting regular exercise. Ask your doctor what activities are safe for you.  ? Eating a heart-healthy diet. A diet and nutrition specialist (dietitian) can help you to learn healthy eating  options.  ? Staying at a healthy weight.  ? Treating diabetes or high blood pressure, if needed.  ? Lowering your stress. Activities such as yoga and relaxation techniques can help.  General instructions  · Pay attention to any changes in your symptoms. Tell your doctor about them or any new symptoms.  · Avoid any activities that cause chest pain.  · Keep all follow-up visits as told by your doctor. This is important. You may need more testing if your chest pain does not go away.  Contact a doctor if:  · Your chest pain does not go away.  · You feel depressed.  · You have a fever.  Get help right away if:  · Your chest pain is worse.  · You have a cough that gets worse, or you cough up blood.  · You have very bad (severe) pain in your belly (abdomen).  · You pass out (faint).  · You have either of these for no clear reason:  ? Sudden chest discomfort.  ? Sudden discomfort in your arms, back, neck, or jaw.  · You have shortness of breath at any time.  · You suddenly start to sweat, or your skin gets clammy.  · You feel sick to your stomach (nauseous).  · You throw up (vomit).  · You suddenly feel lightheaded or dizzy.  · You feel very weak or tired.  · Your heart starts to beat fast, or it feels like it is skipping beats.  These symptoms may be an emergency. Do not wait to see if the symptoms will go away. Get medical help right away. Call your local emergency services (911 in the [place] Do not drive yourself to the hospital.  Summary  · Chest pain can be caused by many different conditions. The cause may be serious and need treatment right away. If you have chest pain, see your doctor right away.  · Follow your doctor's instructions for taking medicines and making lifestyle changes.  · Keep all follow-up visits as told by your doctor. This includes visits for any further testing if your chest pain does not go away.  · Be sure to know the signs that show that your condition has become worse. Get help right away if  you have these symptoms.  This information is not intended to replace advice given to you by your health care provider. Make sure you discuss any questions you have with your health care provider.  Document Released: 06/05/2009 Document Revised: 06/20/2019 Document Reviewed: 06/20/2019  [employer] Interactive Patient Education © 2020 [employer]

## 2020-05-12 NOTE — PROGRESS NOTES
Subjective  Answers for HPI/ROS submitted by the patient on 5/11/2020   Hypertension  What is the primary reason for your visit?: High Blood Pressure    Mira Deal is a 64 y.o. obese  female former smoker with HTN, Hypothyroidism, Hyperinsulinar obesity, Hyperlipidemia, Asplenia, H/O Recurrent OM, Eustachian tube dysfunction, Allergies, Chronic knee pain, S/P L TKA, Osteoarthritis, H/O C-diff colitis after Clindamycin, and other health problems see PMH/PSH. Pt presents for exam, to discuss acute and chronic health problems Tx and F/u plans.    ' Last Friday had spell of L chest pain 10/10 lasted a few Hrs, had N/V, no sweating, no SOA. Then L chest was feeling sore went to work Friday and Sat night, but feeling drained, and fatigued. 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 Started taking ASA, x 3 days, B/P was up some at work. Today pain is better 2-3 /10.'      Hypertension   This is a chronic problem. The current episode started more than 1 year ago. The problem has been waxing and waning since onset. The problem is resistant. Associated symptoms include anxiety, chest pain and headaches. Pertinent negatives include no blurred vision, malaise/fatigue, neck pain, orthopnea, palpitations, peripheral edema, PND, shortness of breath or sweats. Agents associated with hypertension include thyroid hormones. Risk factors for coronary artery disease include dyslipidemia, family history and stress. Current antihypertension treatment includes calcium channel blockers, lifestyle changes and ACE inhibitors. The current treatment provides significant improvement. Compliance problems include diet and exercise.         The following portions of the patient's history were reviewed and updated as appropriate: allergies, current medications, past family history, past medical history, past social history, past surgical history and problem list.    Review of Systems   Constitutional: Negative for chills,  fatigue, fever and malaise/fatigue.   HENT: Negative for congestion, ear pain, postnasal drip, rhinorrhea, sinus pressure, sinus pain ( mild), sneezing and sore throat.    Eyes: Negative.  Negative for blurred vision.   Respiratory: Negative for cough, shortness of breath and wheezing.    Cardiovascular: Positive for chest pain. Negative for palpitations, orthopnea and PND.   Gastrointestinal: Negative for abdominal pain, diarrhea, nausea and vomiting.   Endocrine: Negative.    Genitourinary: Negative for dysuria.   Musculoskeletal: Negative for arthralgias, joint swelling, myalgias and neck pain.        S/P TKA   Skin: Negative for rash.   Allergic/Immunologic: Positive for environmental allergies.   Neurological: Positive for headaches. Negative for weakness.   Hematological: Negative.    Psychiatric/Behavioral: The patient is nervous/anxious.        Objective   Physical Exam   Constitutional: She is oriented to person, place, and time. She appears well-developed and well-nourished. No distress.   HENT:   Head: Normocephalic and atraumatic.   Right Ear: External ear normal.   Left Ear: External ear normal.   Nose: Nose normal.   Mouth/Throat: Oropharynx is clear and moist. No oropharyngeal exudate.   Eyes: Pupils are equal, round, and reactive to light. Conjunctivae and EOM are normal. Right eye exhibits no discharge. Left eye exhibits no discharge. No scleral icterus.   Neck: Normal range of motion. Neck supple. No JVD present. No tracheal deviation present. No thyromegaly present.   Cardiovascular: Normal rate, regular rhythm, normal heart sounds and intact distal pulses. Exam reveals no gallop and no friction rub.   No murmur heard.  Pulmonary/Chest: Effort normal and breath sounds normal. No stridor. No respiratory distress. She has no wheezes. She has no rales. She exhibits no tenderness.   Pain not increased by deep breathing  Ribcage not tender.   Abdominal: Soft. Bowel sounds are normal. She exhibits no  distension and no mass. There is no tenderness. There is no rebound and no guarding. No hernia.   Musculoskeletal: Normal range of motion. She exhibits no edema, tenderness or deformity.   Lymphadenopathy:     She has no cervical adenopathy.   Neurological: She is alert and oriented to person, place, and time. She has normal reflexes. She displays normal reflexes. No cranial nerve deficit. She exhibits normal muscle tone. Coordination normal.   Skin: Skin is warm and dry. Capillary refill takes 2 to 3 seconds. No rash noted. She is not diaphoretic. No erythema. No pallor.   Psoriasis   Psychiatric: She has a normal mood and affect. Her behavior is normal. Judgment and thought content normal.   Nursing note and vitals reviewed.      Assessment/Plan   Mira was seen today for pain.    Diagnoses and all orders for this visit:    Chest pain, unspecified type  -     Ambulatory Referral to Cardiology  -     CBC & Differential; Future  -     Comprehensive metabolic panel; Future  -     XR Chest PA & Lateral (In Office)  -     Troponin I; Future  -     ECG 12 Lead    Essential hypertension    Discussed health problem, episode of chest pain, recommend presentation to ED for cardiac enzymes, Pt declines, 4 days out, pain improving. Agrees if symptoms return will present to ED. Discussed checking labs, closed today, check EKG ( NSR, no acute findings). Discussed referral to Cardiology, request Dr. Beltran. Pt will C/W ASA 81 mg daily, c/W meds for HTN. Discussed F/U plan.        This document has been electronically signed by Lloyd Varghese MD on May 12, 2020

## 2020-05-13 ENCOUNTER — LAB (OUTPATIENT)
Dept: LAB | Facility: HOSPITAL | Age: 65
End: 2020-05-13

## 2020-05-13 ENCOUNTER — TELEPHONE (OUTPATIENT)
Dept: FAMILY MEDICINE CLINIC | Facility: CLINIC | Age: 65
End: 2020-05-13

## 2020-05-13 DIAGNOSIS — R07.9 CHEST PAIN, UNSPECIFIED TYPE: ICD-10-CM

## 2020-05-13 LAB
ALBUMIN SERPL-MCNC: 3.8 G/DL (ref 3.5–5.2)
ALBUMIN/GLOB SERPL: 1.2 G/DL
ALP SERPL-CCNC: 111 U/L (ref 39–117)
ALT SERPL W P-5'-P-CCNC: 48 U/L (ref 1–33)
ANION GAP SERPL CALCULATED.3IONS-SCNC: 10.3 MMOL/L (ref 5–15)
AST SERPL-CCNC: 38 U/L (ref 1–32)
BASOPHILS # BLD AUTO: 0.04 10*3/MM3 (ref 0–0.2)
BASOPHILS NFR BLD AUTO: 0.4 % (ref 0–1.5)
BILIRUB SERPL-MCNC: 0.4 MG/DL (ref 0.2–1.2)
BUN BLD-MCNC: 15 MG/DL (ref 8–23)
BUN/CREAT SERPL: 21.1 (ref 7–25)
CALCIUM SPEC-SCNC: 9.5 MG/DL (ref 8.6–10.5)
CHLORIDE SERPL-SCNC: 105 MMOL/L (ref 98–107)
CO2 SERPL-SCNC: 27.7 MMOL/L (ref 22–29)
CREAT BLD-MCNC: 0.71 MG/DL (ref 0.57–1)
DEPRECATED RDW RBC AUTO: 49.3 FL (ref 37–54)
EOSINOPHIL # BLD AUTO: 0.5 10*3/MM3 (ref 0–0.4)
EOSINOPHIL NFR BLD AUTO: 4.9 % (ref 0.3–6.2)
ERYTHROCYTE [DISTWIDTH] IN BLOOD BY AUTOMATED COUNT: 13.3 % (ref 12.3–15.4)
GFR SERPL CREATININE-BSD FRML MDRD: 100 ML/MIN/1.73
GFR SERPL CREATININE-BSD FRML MDRD: 83 ML/MIN/1.73
GLOBULIN UR ELPH-MCNC: 3.3 GM/DL
GLUCOSE BLD-MCNC: 100 MG/DL (ref 65–99)
HCT VFR BLD AUTO: 37.5 % (ref 34–46.6)
HGB BLD-MCNC: 13.7 G/DL (ref 12–15.9)
IMM GRANULOCYTES # BLD AUTO: 0.03 10*3/MM3 (ref 0–0.05)
IMM GRANULOCYTES NFR BLD AUTO: 0.3 % (ref 0–0.5)
LYMPHOCYTES # BLD AUTO: 4.35 10*3/MM3 (ref 0.7–3.1)
LYMPHOCYTES NFR BLD AUTO: 42.7 % (ref 19.6–45.3)
MCH RBC QN AUTO: 36.8 PG (ref 26.6–33)
MCHC RBC AUTO-ENTMCNC: 36.5 G/DL (ref 31.5–35.7)
MCV RBC AUTO: 100.8 FL (ref 79–97)
MONOCYTES # BLD AUTO: 0.71 10*3/MM3 (ref 0.1–0.9)
MONOCYTES NFR BLD AUTO: 7 % (ref 5–12)
NEUTROPHILS # BLD AUTO: 4.55 10*3/MM3 (ref 1.7–7)
NEUTROPHILS NFR BLD AUTO: 44.7 % (ref 42.7–76)
NRBC BLD AUTO-RTO: 0.1 /100 WBC (ref 0–0.2)
PLATELET # BLD AUTO: 351 10*3/MM3 (ref 140–450)
PMV BLD AUTO: 11.6 FL (ref 6–12)
POTASSIUM BLD-SCNC: 4.4 MMOL/L (ref 3.5–5.2)
PROT SERPL-MCNC: 7.1 G/DL (ref 6–8.5)
RBC # BLD AUTO: 3.72 10*6/MM3 (ref 3.77–5.28)
SODIUM BLD-SCNC: 143 MMOL/L (ref 136–145)
WBC NRBC COR # BLD: 10.18 10*3/MM3 (ref 3.4–10.8)

## 2020-05-13 PROCEDURE — 80053 COMPREHEN METABOLIC PANEL: CPT

## 2020-05-13 PROCEDURE — 85025 COMPLETE CBC W/AUTO DIFF WBC: CPT

## 2020-05-13 PROCEDURE — 84484 ASSAY OF TROPONIN QUANT: CPT

## 2020-05-13 NOTE — TELEPHONE ENCOUNTER
----- Message from Lloyd Varghese MD sent at 5/13/2020  3:10 PM CDT -----  CXR shows no acute problem, will go over at next visit.

## 2020-05-14 LAB — TROPONIN I SERPL-MCNC: <0.01 NG/ML (ref 0–0.04)

## 2020-06-04 RX ORDER — LEVOTHYROXINE SODIUM 0.1 MG/1
100 TABLET ORAL DAILY
Qty: 30 TABLET | Refills: 5 | Status: SHIPPED | OUTPATIENT
Start: 2020-06-04 | End: 2020-11-30

## 2020-06-04 RX ORDER — PAROXETINE 10 MG/1
10 TABLET, FILM COATED ORAL EVERY MORNING
Qty: 30 TABLET | Refills: 5 | Status: SHIPPED | OUTPATIENT
Start: 2020-06-04 | End: 2021-01-06 | Stop reason: SDUPTHER

## 2020-07-06 RX ORDER — LISINOPRIL 20 MG/1
20 TABLET ORAL DAILY
Qty: 30 TABLET | Refills: 5 | Status: SHIPPED | OUTPATIENT
Start: 2020-07-06 | End: 2021-01-06 | Stop reason: SDUPTHER

## 2020-07-29 ENCOUNTER — OFFICE VISIT (OUTPATIENT)
Dept: FAMILY MEDICINE CLINIC | Facility: CLINIC | Age: 65
End: 2020-07-29

## 2020-07-29 ENCOUNTER — LAB (OUTPATIENT)
Dept: LAB | Facility: HOSPITAL | Age: 65
End: 2020-07-29

## 2020-07-29 VITALS
TEMPERATURE: 97.6 F | SYSTOLIC BLOOD PRESSURE: 140 MMHG | OXYGEN SATURATION: 98 % | HEART RATE: 82 BPM | BODY MASS INDEX: 39.68 KG/M2 | WEIGHT: 183.9 LBS | DIASTOLIC BLOOD PRESSURE: 80 MMHG | HEIGHT: 57 IN

## 2020-07-29 DIAGNOSIS — Z00.00 ROUTINE MEDICAL EXAM: ICD-10-CM

## 2020-07-29 DIAGNOSIS — L30.9 DERMATITIS: ICD-10-CM

## 2020-07-29 DIAGNOSIS — L40.9 PSORIASIS: ICD-10-CM

## 2020-07-29 DIAGNOSIS — R21 FACIAL RASH: ICD-10-CM

## 2020-07-29 DIAGNOSIS — E03.9 ACQUIRED HYPOTHYROIDISM: ICD-10-CM

## 2020-07-29 DIAGNOSIS — Q89.01 SPLEEN ABSENT: ICD-10-CM

## 2020-07-29 DIAGNOSIS — D75.89 MACROCYTOSIS WITHOUT ANEMIA: ICD-10-CM

## 2020-07-29 DIAGNOSIS — I10 ESSENTIAL HYPERTENSION: ICD-10-CM

## 2020-07-29 DIAGNOSIS — Z23 NEED FOR PROPHYLACTIC VACCINATION AND INOCULATION AGAINST MENINGOCOCCUS: ICD-10-CM

## 2020-07-29 DIAGNOSIS — R07.9 CHEST PAIN, UNSPECIFIED TYPE: ICD-10-CM

## 2020-07-29 DIAGNOSIS — R42 DIZZINESS: ICD-10-CM

## 2020-07-29 PROBLEM — J06.9 UPPER RESPIRATORY TRACT INFECTION: Status: RESOLVED | Noted: 2017-09-14 | Resolved: 2020-07-29

## 2020-07-29 PROBLEM — M79.672 LEFT FOOT PAIN: Status: RESOLVED | Noted: 2017-05-09 | Resolved: 2020-07-29

## 2020-07-29 PROBLEM — Z12.11 SCREENING FOR COLON CANCER: Status: RESOLVED | Noted: 2017-05-05 | Resolved: 2020-07-29

## 2020-07-29 LAB
ALBUMIN SERPL-MCNC: 4 G/DL (ref 3.5–5.2)
ALBUMIN/GLOB SERPL: 1.3 G/DL
ALP SERPL-CCNC: 105 U/L (ref 39–117)
ALT SERPL W P-5'-P-CCNC: 52 U/L (ref 1–33)
ANION GAP SERPL CALCULATED.3IONS-SCNC: 10.5 MMOL/L (ref 5–15)
AST SERPL-CCNC: 44 U/L (ref 1–32)
BACTERIA UR QL AUTO: ABNORMAL /HPF
BASOPHILS # BLD AUTO: 0.03 10*3/MM3 (ref 0–0.2)
BASOPHILS NFR BLD AUTO: 0.3 % (ref 0–1.5)
BILIRUB SERPL-MCNC: 0.4 MG/DL (ref 0–1.2)
BILIRUB UR QL STRIP: NEGATIVE
BUN SERPL-MCNC: 16 MG/DL (ref 8–23)
BUN/CREAT SERPL: 22.2 (ref 7–25)
CALCIUM SPEC-SCNC: 9.3 MG/DL (ref 8.6–10.5)
CHLORIDE SERPL-SCNC: 105 MMOL/L (ref 98–107)
CHOLEST SERPL-MCNC: 199 MG/DL (ref 0–200)
CLARITY UR: ABNORMAL
CO2 SERPL-SCNC: 27.5 MMOL/L (ref 22–29)
COLOR UR: YELLOW
CREAT SERPL-MCNC: 0.72 MG/DL (ref 0.57–1)
DEPRECATED RDW RBC AUTO: 48.8 FL (ref 37–54)
EOSINOPHIL # BLD AUTO: 0.35 10*3/MM3 (ref 0–0.4)
EOSINOPHIL NFR BLD AUTO: 3.9 % (ref 0.3–6.2)
ERYTHROCYTE [DISTWIDTH] IN BLOOD BY AUTOMATED COUNT: 13.2 % (ref 12.3–15.4)
GFR SERPL CREATININE-BSD FRML MDRD: 82 ML/MIN/1.73
GFR SERPL CREATININE-BSD FRML MDRD: 99 ML/MIN/1.73
GLOBULIN UR ELPH-MCNC: 3.1 GM/DL
GLUCOSE SERPL-MCNC: 79 MG/DL (ref 65–99)
GLUCOSE UR STRIP-MCNC: NEGATIVE MG/DL
HCT VFR BLD AUTO: 40.5 % (ref 34–46.6)
HDLC SERPL-MCNC: 41 MG/DL (ref 40–60)
HGB BLD-MCNC: 14 G/DL (ref 12–15.9)
HGB UR QL STRIP.AUTO: NEGATIVE
HYALINE CASTS UR QL AUTO: ABNORMAL /LPF
IMM GRANULOCYTES # BLD AUTO: 0.03 10*3/MM3 (ref 0–0.05)
IMM GRANULOCYTES NFR BLD AUTO: 0.3 % (ref 0–0.5)
KETONES UR QL STRIP: ABNORMAL
LDLC SERPL CALC-MCNC: 131 MG/DL (ref 0–100)
LDLC/HDLC SERPL: 3.2 {RATIO}
LEUKOCYTE ESTERASE UR QL STRIP.AUTO: ABNORMAL
LYMPHOCYTES # BLD AUTO: 3.01 10*3/MM3 (ref 0.7–3.1)
LYMPHOCYTES NFR BLD AUTO: 33.4 % (ref 19.6–45.3)
MCH RBC QN AUTO: 35.2 PG (ref 26.6–33)
MCHC RBC AUTO-ENTMCNC: 34.6 G/DL (ref 31.5–35.7)
MCV RBC AUTO: 101.8 FL (ref 79–97)
MONOCYTES # BLD AUTO: 0.67 10*3/MM3 (ref 0.1–0.9)
MONOCYTES NFR BLD AUTO: 7.4 % (ref 5–12)
NEUTROPHILS NFR BLD AUTO: 4.93 10*3/MM3 (ref 1.7–7)
NEUTROPHILS NFR BLD AUTO: 54.7 % (ref 42.7–76)
NITRITE UR QL STRIP: NEGATIVE
NRBC BLD AUTO-RTO: 0.3 /100 WBC (ref 0–0.2)
PH UR STRIP.AUTO: 6 [PH] (ref 5–8)
PLATELET # BLD AUTO: 287 10*3/MM3 (ref 140–450)
PMV BLD AUTO: 12.3 FL (ref 6–12)
POTASSIUM SERPL-SCNC: 3.9 MMOL/L (ref 3.5–5.2)
PROT SERPL-MCNC: 7.1 G/DL (ref 6–8.5)
PROT UR QL STRIP: NEGATIVE
RBC # BLD AUTO: 3.98 10*6/MM3 (ref 3.77–5.28)
RBC # UR: ABNORMAL /HPF
REF LAB TEST METHOD: ABNORMAL
SODIUM SERPL-SCNC: 143 MMOL/L (ref 136–145)
SP GR UR STRIP: >=1.03 (ref 1–1.03)
SQUAMOUS #/AREA URNS HPF: ABNORMAL /HPF
TRIGL SERPL-MCNC: 135 MG/DL (ref 0–150)
TSH SERPL DL<=0.05 MIU/L-ACNC: 0.01 UIU/ML (ref 0.27–4.2)
UROBILINOGEN UR QL STRIP: ABNORMAL
VIT B12 BLD-MCNC: <150 PG/ML (ref 211–946)
VLDLC SERPL-MCNC: 27 MG/DL (ref 5–40)
WBC # BLD AUTO: 9.02 10*3/MM3 (ref 3.4–10.8)
WBC UR QL AUTO: ABNORMAL /HPF

## 2020-07-29 PROCEDURE — 86235 NUCLEAR ANTIGEN ANTIBODY: CPT

## 2020-07-29 PROCEDURE — 82607 VITAMIN B-12: CPT

## 2020-07-29 PROCEDURE — 85025 COMPLETE CBC W/AUTO DIFF WBC: CPT

## 2020-07-29 PROCEDURE — 90471 IMMUNIZATION ADMIN: CPT | Performed by: FAMILY MEDICINE

## 2020-07-29 PROCEDURE — 80061 LIPID PANEL: CPT

## 2020-07-29 PROCEDURE — 90734 MENACWYD/MENACWYCRM VACC IM: CPT | Performed by: FAMILY MEDICINE

## 2020-07-29 PROCEDURE — 81001 URINALYSIS AUTO W/SCOPE: CPT

## 2020-07-29 PROCEDURE — 99214 OFFICE O/P EST MOD 30 MIN: CPT | Performed by: FAMILY MEDICINE

## 2020-07-29 PROCEDURE — 86038 ANTINUCLEAR ANTIBODIES: CPT

## 2020-07-29 PROCEDURE — 86225 DNA ANTIBODY NATIVE: CPT

## 2020-07-29 PROCEDURE — 84443 ASSAY THYROID STIM HORMONE: CPT

## 2020-07-29 PROCEDURE — 80053 COMPREHEN METABOLIC PANEL: CPT

## 2020-07-31 ENCOUNTER — TELEPHONE (OUTPATIENT)
Dept: FAMILY MEDICINE CLINIC | Facility: CLINIC | Age: 65
End: 2020-07-31

## 2020-07-31 NOTE — TELEPHONE ENCOUNTER
----- Message from Lloyd Varghese MD sent at 7/30/2020 10:02 AM CDT -----  B 12 is very low should come in for B12 injection, to discuss labs. Next week.

## 2020-07-31 NOTE — TELEPHONE ENCOUNTER
Result letter sent to pt through her [facility] with appt date/time to come in to go over lab results.

## 2020-08-01 PROBLEM — R21 FACIAL RASH: Status: ACTIVE | Noted: 2020-08-01

## 2020-08-01 PROBLEM — Z23 NEED FOR PROPHYLACTIC VACCINATION AND INOCULATION AGAINST MENINGOCOCCUS: Status: ACTIVE | Noted: 2020-08-01

## 2020-08-01 PROBLEM — R42 DIZZINESS: Status: ACTIVE | Noted: 2020-08-01

## 2020-08-01 PROBLEM — R07.9 CHEST PAIN: Status: ACTIVE | Noted: 2020-08-01

## 2020-08-01 PROBLEM — D75.89 MACROCYTOSIS WITHOUT ANEMIA: Status: ACTIVE | Noted: 2020-08-01

## 2020-08-01 PROBLEM — Q89.01 SPLEEN ABSENT: Status: ACTIVE | Noted: 2020-08-01

## 2020-08-01 LAB
ANA HOMOGEN TITR SER: ABNORMAL {TITER}
ANA SER QL IA: POSITIVE
CENTROMERE B AB SER-ACNC: <0.2 AI (ref 0–0.9)
CHROMATIN AB SERPL-ACNC: <0.2 AI (ref 0–0.9)
DSDNA AB SER-ACNC: <1 IU/ML (ref 0–9)
ENA JO1 AB SER-ACNC: <0.2 AI (ref 0–0.9)
ENA RNP AB SER-ACNC: <0.2 AI (ref 0–0.9)
ENA SCL70 AB SER-ACNC: <0.2 AI (ref 0–0.9)
ENA SM AB SER-ACNC: <0.2 AI (ref 0–0.9)
ENA SS-A AB SER-ACNC: <0.2 AI (ref 0–0.9)
ENA SS-B AB SER-ACNC: <0.2 AI (ref 0–0.9)
Lab: ABNORMAL

## 2020-08-02 NOTE — PATIENT INSTRUCTIONS
Preventive Care 40-64 Years Old, Female  Preventive care refers to visits with your health care provider and lifestyle choices that can promote health and wellness. This includes:  · A yearly physical exam. This may also be called an annual well check.  · Regular dental visits and eye exams.  · Immunizations.  · Screening for certain conditions.  · Healthy lifestyle choices, such as eating a healthy diet, getting regular exercise, not using drugs or products that contain nicotine and tobacco, and limiting alcohol use.  What can I expect for my preventive care visit?  Physical exam  Your health care provider will check your:  · Height and weight. This may be used to calculate body mass index (BMI), which tells if you are at a healthy weight.  · Heart rate and blood pressure.  · Skin for abnormal spots.  Counseling  Your health care provider may ask you questions about your:  · Alcohol, tobacco, and drug use.  · Emotional well-being.  · Home and relationship well-being.  · Sexual activity.  · Eating habits.  · Work and work environment.  · Method of birth control.  · Menstrual cycle.  · Pregnancy history.  What immunizations do I need?    Influenza (flu) vaccine  · This is recommended every year.  Tetanus, diphtheria, and pertussis (Tdap) vaccine  · You may need a Td booster every 10 years.  Varicella (chickenpox) vaccine  · You may need this if you have not been vaccinated.  Zoster (shingles) vaccine  · You may need this after age 60.  Measles, mumps, and rubella (MMR) vaccine  · You may need at least one dose of MMR if you were born in 1957 or later. You may also need a second dose.  Pneumococcal conjugate (PCV13) vaccine  · You may need this if you have certain conditions and were not previously vaccinated.  Pneumococcal polysaccharide (PPSV23) vaccine  · You may need one or two doses if you smoke cigarettes or if you have certain conditions.  Meningococcal conjugate (MenACWY) vaccine  · You may need this if you  have certain conditions.  Hepatitis A vaccine  · You may need this if you have certain conditions or if you travel or work in places where you may be exposed to hepatitis A.  Hepatitis B vaccine  · You may need this if you have certain conditions or if you travel or work in places where you may be exposed to hepatitis B.  Haemophilus influenzae type b (Hib) vaccine  · You may need this if you have certain conditions.  Human papillomavirus (HPV) vaccine  · If recommended by your health care provider, you may need three doses over 6 months.  You may receive vaccines as individual doses or as more than one vaccine together in one shot (combination vaccines). Talk with your health care provider about the risks and benefits of combination vaccines.  What tests do I need?  Blood tests  · Lipid and cholesterol levels. These may be checked every 5 years, or more frequently if you are over 50 years old.  · Hepatitis C test.  · Hepatitis B test.  Screening  · Lung cancer screening. You may have this screening every year starting at age 55 if you have a 30-pack-year history of smoking and currently smoke or have quit within the past 15 years.  · Colorectal cancer screening. All adults should have this screening starting at age 50 and continuing until age 75. Your health care provider may recommend screening at age 45 if you are at increased risk. You will have tests every 1-10 years, depending on your results and the type of screening test.  · Diabetes screening. This is done by checking your blood sugar (glucose) after you have not eaten for a while (fasting). You may have this done every 1-3 years.  · Mammogram. This may be done every 1-2 years. Talk with your health care provider about when you should start having regular mammograms. This may depend on whether you have a family history of breast cancer.  · BRCA-related cancer screening. This may be done if you have a family history of breast, ovarian, tubal, or peritoneal  cancers.  · Pelvic exam and Pap test. This may be done every 3 years starting at age 21. Starting at age 30, this may be done every 5 years if you have a Pap test in combination with an HPV test.  Other tests  · Sexually transmitted disease (STD) testing.  · Bone density scan. This is done to screen for osteoporosis. You may have this scan if you are at high risk for osteoporosis.  Follow these instructions at home:  Eating and drinking  · Eat a diet that includes fresh fruits and vegetables, whole grains, lean protein, and low-fat dairy.  · Take vitamin and mineral supplements as recommended by your health care provider.  · Do not drink alcohol if:  ? Your health care provider tells you not to drink.  ? You are pregnant, may be pregnant, or are planning to become pregnant.  · If you drink alcohol:  ? Limit how much you have to 0-1 drink a day.  ? Be aware of how much alcohol is in your drink. In the [place] one drink equals one 12 oz bottle of beer (355 mL), one 5 oz glass of wine (148 mL), or one 1½ oz glass of hard liquor (44 mL).  Lifestyle  · Take daily care of your teeth and gums.  · Stay active. Exercise for at least 30 minutes on 5 or more days each week.  · Do not use any products that contain nicotine or tobacco, such as cigarettes, e-cigarettes, and chewing tobacco. If you need help quitting, ask your health care provider.  · If you are sexually active, practice safe sex. Use a condom or other form of birth control (contraception) in order to prevent pregnancy and STIs (sexually transmitted infections).  · If told by your health care provider, take low-dose aspirin daily starting at age 50.  What's next?  · Visit your health care provider once a year for a well check visit.  · Ask your health care provider how often you should have your eyes and teeth checked.  · Stay up to date on all vaccines.  This information is not intended to replace advice given to you by your health care provider. Make sure you  discuss any questions you have with your health care provider.  Document Released: 01/13/2017 Document Revised: 08/29/2019 Document Reviewed: 08/29/2019  [employer] Patient Education © 2020 [employer]      Exercising to Lose Weight  Exercise is structured, repetitive physical activity to improve fitness and health. Getting regular exercise is important for everyone. It is especially important if you are overweight. Being overweight increases your risk of heart disease, stroke, diabetes, high blood pressure, and several types of cancer. Reducing your calorie intake and exercising can help you lose weight.  Exercise is usually categorized as moderate or vigorous intensity. To lose weight, most people need to do a certain amount of moderate-intensity or vigorous-intensity exercise each week.  Moderate-intensity exercise    Moderate-intensity exercise is any activity that gets you moving enough to burn at least three times more energy (calories) than if you were sitting.  Examples of moderate exercise include:  · Walking a mile in 15 minutes.  · Doing light yard work.  · Biking at an easy pace.  Most people should get at least 150 minutes (2 hours and 30 minutes) a week of moderate-intensity exercise to maintain their body weight.  Vigorous-intensity exercise  Vigorous-intensity exercise is any activity that gets you moving enough to burn at least six times more calories than if you were sitting. When you exercise at this intensity, you should be working hard enough that you are not able to carry on a conversation.  Examples of vigorous exercise include:  · Running.  · Playing a team sport, such as football, basketball, and soccer.  · Jumping rope.  Most people should get at least 75 minutes (1 hour and 15 minutes) a week of vigorous-intensity exercise to maintain their body weight.  How can exercise affect me?  When you exercise enough to burn more calories than you eat, you lose weight. Exercise also reduces body fat  and builds muscle. The more muscle you have, the more calories you burn. Exercise also:  · Improves mood.  · Reduces stress and tension.  · Improves your overall fitness, flexibility, and endurance.  · Increases bone strength.  The amount of exercise you need to lose weight depends on:  · Your age.  · The type of exercise.  · Any health conditions you have.  · Your overall physical ability.  Talk to your health care provider about how much exercise you need and what types of activities are safe for you.  What actions can I take to lose weight?  Nutrition    · Make changes to your diet as told by your health care provider or diet and nutrition specialist (dietitian). This may include:  ? Eating fewer calories.  ? Eating more protein.  ? Eating less unhealthy fats.  ? Eating a diet that includes fresh fruits and vegetables, whole grains, low-fat dairy products, and lean protein.  ? Avoiding foods with added fat, salt, and sugar.  · Drink plenty of water while you exercise to prevent dehydration or heat stroke.  Activity  · Choose an activity that you enjoy and set realistic goals. Your health care provider can help you make an exercise plan that works for you.  · Exercise at a moderate or vigorous intensity most days of the week.  ? The intensity of exercise may vary from person to person. You can tell how intense a workout is for you by paying attention to your breathing and heartbeat. Most people will notice their breathing and heartbeat get faster with more intense exercise.  · Do resistance training twice each week, such as:  ? Push-ups.  ? Sit-ups.  ? Lifting weights.  ? Using resistance bands.  · Getting short amounts of exercise can be just as helpful as long structured periods of exercise. If you have trouble finding time to exercise, try to include exercise in your daily routine.  ? Get up, stretch, and walk around every 30 minutes throughout the day.  ? Go for a walk during your lunch break.  ? Park your car  farther away from your destination.  ? If you take public transportation, get off one stop early and walk the rest of the way.  ? Make phone calls while standing up and walking around.  ? Take the stairs instead of elevators or escalators.  · Wear comfortable clothes and shoes with good support.  · Do not exercise so much that you hurt yourself, feel dizzy, or get very short of breath.  Where to find more information  · [place] Department of Health and Human Services: www.hhs.gov  · Centers for Disease Control and Prevention (CDC): www.cdc.gov  Contact a health care provider:  · Before starting a new exercise program.  · If you have questions or concerns about your weight.  · If you have a medical problem that keeps you from exercising.  Get help right away if you have any of the following while exercising:  · Injury.  · Dizziness.  · Difficulty breathing or shortness of breath that does not go away when you stop exercising.  · Chest pain.  · Rapid heartbeat.  Summary  · Being overweight increases your risk of heart disease, stroke, diabetes, high blood pressure, and several types of cancer.  · Losing weight happens when you burn more calories than you eat.  · Reducing the amount of calories you eat in addition to getting regular moderate or vigorous exercise each week helps you lose weight.  This information is not intended to replace advice given to you by your health care provider. Make sure you discuss any questions you have with your health care provider.  Document Released: 01/20/2012 Document Revised: 12/31/2018 Document Reviewed: 12/31/2018  [employer] Patient Education © 2020 [employer]      Calorie Counting for Weight Loss  Calories are units of energy. Your body needs a certain amount of calories from food to keep you going throughout the day. When you eat more calories than your body needs, your body stores the extra calories as fat. When you eat fewer calories than your body needs, your body burns fat to  get the energy it needs.  Calorie counting means keeping track of how many calories you eat and drink each day. Calorie counting can be helpful if you need to lose weight. If you make sure to eat fewer calories than your body needs, you should lose weight. Ask your health care provider what a healthy weight is for you.  For calorie counting to work, you will need to eat the right number of calories in a day in order to lose a healthy amount of weight per week. A dietitian can help you determine how many calories you need in a day and will give you suggestions on how to reach your calorie goal.  · A healthy amount of weight to lose per week is usually 1-2 lb (0.5-0.9 kg). This usually means that your daily calorie intake should be reduced by 500-750 calories.  · Eating 1,200 - 1,500 calories per day can help most women lose weight.  · Eating 1,500 - 1,800 calories per day can help most men lose weight.  What is my plan?  My goal is to have __________ calories per day.  If I have this many calories per day, I should lose around __________ pounds per week.  What do I need to know about calorie counting?  In order to meet your daily calorie goal, you will need to:  · Find out how many calories are in each food you would like to eat. Try to do this before you eat.  · Decide how much of the food you plan to eat.  · Write down what you ate and how many calories it had. Doing this is called keeping a food log.  To successfully lose weight, it is important to balance calorie counting with a healthy lifestyle that includes regular activity. Aim for 150 minutes of moderate exercise (such as walking) or 75 minutes of vigorous exercise (such as running) each week.  Where do I find calorie information?    The number of calories in a food can be found on a Nutrition Facts label. If a food does not have a Nutrition Facts label, try to look up the calories online or ask your dietitian for help.  Remember that calories are listed per  serving. If you choose to have more than one serving of a food, you will have to multiply the calories per serving by the amount of servings you plan to eat. For example, the label on a package of bread might say that a serving size is 1 slice and that there are 90 calories in a serving. If you eat 1 slice, you will have eaten 90 calories. If you eat 2 slices, you will have eaten 180 calories.  How do I keep a food log?  Immediately after each meal, record the following information in your food log:  · What you ate. Don't forget to include toppings, sauces, and other extras on the food.  · How much you ate. This can be measured in cups, ounces, or number of items.  · How many calories each food and drink had.  · The total number of calories in the meal.  Keep your food log near you, such as in a small notebook in your pocket, or use a mobile herminia or website. Some programs will calculate calories for you and show you how many calories you have left for the day to meet your goal.  What are some calorie counting tips?    · Use your calories on foods and drinks that will fill you up and not leave you hungry:  ? Some examples of foods that fill you up are nuts and nut butters, vegetables, lean proteins, and high-fiber foods like whole grains. High-fiber foods are foods with more than 5 g fiber per serving.  ? Drinks such as sodas, specialty coffee drinks, alcohol, and juices have a lot of calories, yet do not fill you up.  · Eat nutritious foods and avoid empty calories. Empty calories are calories you get from foods or beverages that do not have many vitamins or protein, such as candy, sweets, and soda. It is better to have a nutritious high-calorie food (such as an avocado) than a food with few nutrients (such as a bag of chips).  · Know how many calories are in the foods you eat most often. This will help you calculate calorie counts faster.  · Pay attention to calories in drinks. Low-calorie drinks include water and  "unsweetened drinks.  · Pay attention to nutrition labels for \"low fat\" or \"fat free\" foods. These foods sometimes have the same amount of calories or more calories than the full fat versions. They also often have added sugar, starch, or salt, to make up for flavor that was removed with the fat.  · Find a way of tracking calories that works for you. Get creative. Try different apps or programs if writing down calories does not work for you.  What are some portion control tips?  · Know how many calories are in a serving. This will help you know how many servings of a certain food you can have.  · Use a measuring cup to measure serving sizes. You could also try weighing out portions on a kitchen scale. With time, you will be able to estimate serving sizes for some foods.  · Take some time to put servings of different foods on your favorite plates, bowls, and cups so you know what a serving looks like.  · Try not to eat straight from a bag or box. Doing this can lead to overeating. Put the amount you would like to eat in a cup or on a plate to make sure you are eating the right portion.  · Use smaller plates, glasses, and bowls to prevent overeating.  · Try not to multitask (for example, watch TV or use your computer) while eating. If it is time to eat, sit down at a table and enjoy your food. This will help you to know when you are full. It will also help you to be aware of what you are eating and how much you are eating.  What are tips for following this plan?  Reading food labels  · Check the calorie count compared to the serving size. The serving size may be smaller than what you are used to eating.  · Check the source of the calories. Make sure the food you are eating is high in vitamins and protein and low in saturated and trans fats.  Shopping  · Read nutrition labels while you shop. This will help you make healthy decisions before you decide to purchase your food.  · Make a grocery list and stick to " "it.  Cooking  · Try to cook your favorite foods in a healthier way. For example, try baking instead of frying.  · Use low-fat dairy products.  Meal planning  · Use more fruits and vegetables. Half of your plate should be fruits and vegetables.  · Include lean proteins like poultry and fish.  How do I count calories when eating out?  · Ask for smaller portion sizes.  · Consider sharing an entree and sides instead of getting your own entree.  · If you get your own entree, eat only half. Ask for a box at the beginning of your meal and put the rest of your entree in it so you are not tempted to eat it.  · If calories are listed on the menu, choose the lower calorie options.  · Choose dishes that include vegetables, fruits, whole grains, low-fat dairy products, and lean protein.  · Choose items that are boiled, broiled, grilled, or steamed. Stay away from items that are buttered, battered, fried, or served with cream sauce. Items labeled \"crispy\" are usually fried, unless stated otherwise.  · Choose water, low-fat milk, unsweetened iced tea, or other drinks without added sugar. If you want an alcoholic beverage, choose a lower calorie option such as a glass of wine or light beer.  · Ask for dressings, sauces, and syrups on the side. These are usually high in calories, so you should limit the amount you eat.  · If you want a salad, choose a garden salad and ask for grilled meats. Avoid extra toppings like randall, cheese, or fried items. Ask for the dressing on the side, or ask for olive oil and vinegar or lemon to use as dressing.  · Estimate how many servings of a food you are given. For example, a serving of cooked rice is ½ cup or about the size of half a baseball. Knowing serving sizes will help you be aware of how much food you are eating at restaurants. The list below tells you how big or small some common portion sizes are based on everyday objects:  ? 1 oz--4 stacked dice.  ? 3 oz--1 deck of cards.  ? 1 tsp--1 " die.  ? 1 Tbsp--½ a ping-pong ball.  ? 2 Tbsp--1 ping-pong ball.  ? ½ cup--½ baseball.  ? 1 cup--1 baseball.  Summary  · Calorie counting means keeping track of how many calories you eat and drink each day. If you eat fewer calories than your body needs, you should lose weight.  · A healthy amount of weight to lose per week is usually 1-2 lb (0.5-0.9 kg). This usually means reducing your daily calorie intake by 500-750 calories.  · The number of calories in a food can be found on a Nutrition Facts label. If a food does not have a Nutrition Facts label, try to look up the calories online or ask your dietitian for help.  · Use your calories on foods and drinks that will fill you up, and not on foods and drinks that will leave you hungry.  · Use smaller plates, glasses, and bowls to prevent overeating.  This information is not intended to replace advice given to you by your health care provider. Make sure you discuss any questions you have with your health care provider.  Document Released: 12/18/2006 Document Revised: 09/06/2019 Document Reviewed: 11/17/2017  [employer] Patient Education © 2020 [employer]

## 2020-08-06 ENCOUNTER — OFFICE VISIT (OUTPATIENT)
Dept: FAMILY MEDICINE CLINIC | Facility: CLINIC | Age: 65
End: 2020-08-06

## 2020-08-06 VITALS
WEIGHT: 184.9 LBS | BODY MASS INDEX: 39.89 KG/M2 | HEART RATE: 97 BPM | DIASTOLIC BLOOD PRESSURE: 80 MMHG | TEMPERATURE: 97.7 F | OXYGEN SATURATION: 98 % | HEIGHT: 57 IN | SYSTOLIC BLOOD PRESSURE: 142 MMHG

## 2020-08-06 DIAGNOSIS — E03.9 ACQUIRED HYPOTHYROIDISM: ICD-10-CM

## 2020-08-06 DIAGNOSIS — L40.9 PSORIASIS: ICD-10-CM

## 2020-08-06 DIAGNOSIS — R53.83 FATIGUE, UNSPECIFIED TYPE: ICD-10-CM

## 2020-08-06 DIAGNOSIS — E53.8 B12 DEFICIENCY: ICD-10-CM

## 2020-08-06 DIAGNOSIS — I10 ESSENTIAL HYPERTENSION: Primary | ICD-10-CM

## 2020-08-06 DIAGNOSIS — E66.01 CLASS 3 SEVERE OBESITY DUE TO EXCESS CALORIES WITH SERIOUS COMORBIDITY AND BODY MASS INDEX (BMI) OF 40.0 TO 44.9 IN ADULT (HCC): ICD-10-CM

## 2020-08-06 DIAGNOSIS — D75.89 MACROCYTOSIS WITHOUT ANEMIA: ICD-10-CM

## 2020-08-06 DIAGNOSIS — R21 FACIAL RASH: ICD-10-CM

## 2020-08-06 DIAGNOSIS — Q89.01 ASPLENIA: ICD-10-CM

## 2020-08-06 DIAGNOSIS — R42 DIZZINESS: ICD-10-CM

## 2020-08-06 PROCEDURE — 99214 OFFICE O/P EST MOD 30 MIN: CPT | Performed by: FAMILY MEDICINE

## 2020-08-06 PROCEDURE — 96372 THER/PROPH/DIAG INJ SC/IM: CPT | Performed by: FAMILY MEDICINE

## 2020-08-06 RX ORDER — CYANOCOBALAMIN 1000 UG/ML
1000 INJECTION, SOLUTION INTRAMUSCULAR; SUBCUTANEOUS ONCE
Status: COMPLETED | OUTPATIENT
Start: 2020-08-06 | End: 2020-08-06

## 2020-08-06 RX ORDER — ASCORBIC ACID 125 MG
1 TABLET,CHEWABLE ORAL EVERY OTHER DAY
Qty: 30 CAPSULE | Refills: 3 | Status: SHIPPED | OUTPATIENT
Start: 2020-08-06 | End: 2023-02-17 | Stop reason: SDDI

## 2020-08-06 RX ADMIN — CYANOCOBALAMIN 1000 MCG: 1000 INJECTION, SOLUTION INTRAMUSCULAR; SUBCUTANEOUS at 15:19

## 2020-08-08 NOTE — PROGRESS NOTES
Subjective   Mira Deal is a 64 y.o. obese  female former smoker with HTN, Hypothyroidism, Hyperinsulinar obesity, Hyperlipidemia, Asplenia, H/O Recurrent OM, Eustachian tube dysfunction, Allergies, Chronic knee pain, S/P L TKA, Osteoarthritis, H/O C-diff colitis after Clindamycin, and other health problems see PMH/PSH. Pt presents for exam, to discuss chronic health problems Tx and F/u plans.    ' Received Msg have started B12 dizziness has improved, fatigue about the same, rash improved some. B/p has been OK'     Hypertension   This is a chronic problem. The current episode started more than 1 year ago. The problem is unchanged. The problem is controlled. Associated symptoms include headaches. Pertinent negatives include no chest pain, neck pain, palpitations or shortness of breath. Risk factors for coronary artery disease include obesity and post-menopausal state. Current antihypertension treatment includes ACE inhibitors. The current treatment provides significant improvement.   Hypothyroidism   This is a chronic problem. The current episode started more than 1 year ago. The problem occurs daily. The problem has been gradually improving. Associated symptoms include arthralgias, headaches and a rash. Pertinent negatives include no abdominal pain, chest pain, chills, congestion, coughing, fatigue, fever, joint swelling, myalgias, nausea, neck pain, sore throat, vomiting or weakness. Nothing aggravates the symptoms. Treatments tried: Levothyroxine. The treatment provided significant relief.   Obesity   This is a chronic problem. The current episode started more than 1 year ago. The problem occurs daily. The problem has been gradually improving. Associated symptoms include arthralgias, headaches and a rash. Pertinent negatives include no abdominal pain, chest pain, chills, congestion, coughing, fatigue, fever, joint swelling, myalgias, nausea, neck pain, sore throat, vomiting or weakness. The  symptoms are aggravated by eating. Treatments tried: Diet, exercise. The treatment provided no relief.   Dizziness   This is a new problem. The current episode started more than 1 month ago. The problem occurs intermittently. The problem has been waxing and waning. Associated symptoms include arthralgias, headaches and a rash. Pertinent negatives include no abdominal pain, chest pain, chills, congestion, coughing, fatigue, fever, joint swelling, myalgias, nausea, neck pain, sore throat, vomiting or weakness. Nothing aggravates the symptoms. She has tried nothing for the symptoms. The treatment provided no relief.   Rash   This is a new problem. The affected locations include the face, neck, right hand and left hand. The rash is characterized by redness and scaling. Pertinent negatives include no congestion, cough, diarrhea, fatigue, fever, rhinorrhea, shortness of breath, sore throat or vomiting. Past treatments include topical steroids. The treatment provided no relief.   Illness   This is a new problem. The current episode started more than 1 month ago. The problem occurs daily. The problem has been gradually improving. Associated symptoms include arthralgias, headaches and a rash. Pertinent negatives include no abdominal pain, chest pain, chills, congestion, coughing, fatigue, fever, joint swelling, myalgias, nausea, neck pain, sore throat, vomiting or weakness. Exacerbated by: B12 def. Treatments tried: B12 supplementation. The treatment provided mild relief.        The following portions of the patient's history were reviewed and updated as appropriate: allergies, current medications, past family history, past medical history, past social history, past surgical history and problem list.    Review of Systems   Constitutional: Negative for chills, fatigue and fever.   HENT: Negative for congestion, ear pain, postnasal drip, rhinorrhea, sinus pressure, sinus pain ( mild), sneezing and sore throat.    Eyes:  Negative.    Respiratory: Negative for cough, shortness of breath and wheezing.    Cardiovascular: Negative for chest pain and palpitations.   Gastrointestinal: Negative for abdominal pain, diarrhea, nausea and vomiting.   Endocrine: Negative.    Genitourinary: Negative for dysuria.   Musculoskeletal: Positive for arthralgias. Negative for joint swelling, myalgias and neck pain.        S/P TKA   Skin: Positive for rash.   Allergic/Immunologic: Positive for environmental allergies.   Neurological: Positive for dizziness and headaches. Negative for weakness.   Hematological: Negative.    Psychiatric/Behavioral: The patient is nervous/anxious.        Objective   Physical Exam   Constitutional: She is oriented to person, place, and time. She appears well-developed and well-nourished. No distress.   HENT:   Head: Normocephalic and atraumatic.   Right Ear: External ear normal.   Left Ear: External ear normal.   Nose: Nose normal.   Mouth/Throat: Oropharynx is clear and moist. No oropharyngeal exudate.   Eyes: Pupils are equal, round, and reactive to light. Conjunctivae and EOM are normal. Right eye exhibits no discharge. Left eye exhibits no discharge. No scleral icterus.   Neck: Normal range of motion. Neck supple. No JVD present. No tracheal deviation present. No thyromegaly present.   Cardiovascular: Normal rate, regular rhythm, normal heart sounds and intact distal pulses. Exam reveals no gallop and no friction rub.   No murmur heard.  Pulmonary/Chest: Effort normal and breath sounds normal. No stridor. No respiratory distress. She has no wheezes. She has no rales. She exhibits no tenderness.   Abdominal: Soft. Bowel sounds are normal. She exhibits no distension and no mass. There is no tenderness. There is no rebound and no guarding. No hernia.   Musculoskeletal: Normal range of motion. She exhibits no edema, tenderness or deformity.   Lymphadenopathy:     She has no cervical adenopathy.   Neurological: She is alert  and oriented to person, place, and time. She has normal reflexes. She displays normal reflexes. No cranial nerve deficit. She exhibits normal muscle tone. Coordination normal.   Skin: Skin is warm and dry. Capillary refill takes 2 to 3 seconds. No rash noted. She is not diaphoretic. No erythema. No pallor.   Psoriasis  Facial rash   Psychiatric: She has a normal mood and affect. Her behavior is normal. Judgment and thought content normal.   Nursing note and vitals reviewed.      Assessment/Plan   Mira was seen today for discuss lab results.    Diagnoses and all orders for this visit:    Essential hypertension    B12 deficiency  -     cyanocobalamin injection 1,000 mcg    Psoriasis    Macrocytosis without anemia    Acquired hypothyroidism    Dizziness    Class 3 severe obesity due to excess calories with serious comorbidity and body mass index (BMI) of 40.0 to 44.9 in adult (CMS/[facility])    Asplenia    Fatigue, unspecified type    Facial rash    Other orders  -     Cyanocobalamin (B-12) 5000 MCG capsule; Take 1 dose by mouth Every Other Day.      This SmartLink has not been configured with any valid records.      Lab Results   Component Value Date    FURBDHBA46 <150 (L) 07/29/2020        Discussed exam, health problems, meds, indications, tx plan. Discussed BMI, BEE, diet, exercise. Discussed F/u plan.        This document has been electronically signed by Lloyd Varghese MD               Answers for HPI/ROS submitted by the patient on 8/1/2020   What is the primary reason for your visit?: Other  Please describe your symptoms.: Was told my B12 was low. Suppose to discuss this with the doctor  Have you had these symptoms before?: No  How long have you been having these symptoms?: 1-2 weeks

## 2020-08-08 NOTE — PATIENT INSTRUCTIONS
Preventive Care 40-64 Years Old, Female  Preventive care refers to visits with your health care provider and lifestyle choices that can promote health and wellness. This includes:  · A yearly physical exam. This may also be called an annual well check.  · Regular dental visits and eye exams.  · Immunizations.  · Screening for certain conditions.  · Healthy lifestyle choices, such as eating a healthy diet, getting regular exercise, not using drugs or products that contain nicotine and tobacco, and limiting alcohol use.  What can I expect for my preventive care visit?  Physical exam  Your health care provider will check your:  · Height and weight. This may be used to calculate body mass index (BMI), which tells if you are at a healthy weight.  · Heart rate and blood pressure.  · Skin for abnormal spots.  Counseling  Your health care provider may ask you questions about your:  · Alcohol, tobacco, and drug use.  · Emotional well-being.  · Home and relationship well-being.  · Sexual activity.  · Eating habits.  · Work and work environment.  · Method of birth control.  · Menstrual cycle.  · Pregnancy history.  What immunizations do I need?    Influenza (flu) vaccine  · This is recommended every year.  Tetanus, diphtheria, and pertussis (Tdap) vaccine  · You may need a Td booster every 10 years.  Varicella (chickenpox) vaccine  · You may need this if you have not been vaccinated.  Zoster (shingles) vaccine  · You may need this after age 60.  Measles, mumps, and rubella (MMR) vaccine  · You may need at least one dose of MMR if you were born in 1957 or later. You may also need a second dose.  Pneumococcal conjugate (PCV13) vaccine  · You may need this if you have certain conditions and were not previously vaccinated.  Pneumococcal polysaccharide (PPSV23) vaccine  · You may need one or two doses if you smoke cigarettes or if you have certain conditions.  Meningococcal conjugate (MenACWY) vaccine  · You may need this if you  have certain conditions.  Hepatitis A vaccine  · You may need this if you have certain conditions or if you travel or work in places where you may be exposed to hepatitis A.  Hepatitis B vaccine  · You may need this if you have certain conditions or if you travel or work in places where you may be exposed to hepatitis B.  Haemophilus influenzae type b (Hib) vaccine  · You may need this if you have certain conditions.  Human papillomavirus (HPV) vaccine  · If recommended by your health care provider, you may need three doses over 6 months.  You may receive vaccines as individual doses or as more than one vaccine together in one shot (combination vaccines). Talk with your health care provider about the risks and benefits of combination vaccines.  What tests do I need?  Blood tests  · Lipid and cholesterol levels. These may be checked every 5 years, or more frequently if you are over 50 years old.  · Hepatitis C test.  · Hepatitis B test.  Screening  · Lung cancer screening. You may have this screening every year starting at age 55 if you have a 30-pack-year history of smoking and currently smoke or have quit within the past 15 years.  · Colorectal cancer screening. All adults should have this screening starting at age 50 and continuing until age 75. Your health care provider may recommend screening at age 45 if you are at increased risk. You will have tests every 1-10 years, depending on your results and the type of screening test.  · Diabetes screening. This is done by checking your blood sugar (glucose) after you have not eaten for a while (fasting). You may have this done every 1-3 years.  · Mammogram. This may be done every 1-2 years. Talk with your health care provider about when you should start having regular mammograms. This may depend on whether you have a family history of breast cancer.  · BRCA-related cancer screening. This may be done if you have a family history of breast, ovarian, tubal, or peritoneal  cancers.  · Pelvic exam and Pap test. This may be done every 3 years starting at age 21. Starting at age 30, this may be done every 5 years if you have a Pap test in combination with an HPV test.  Other tests  · Sexually transmitted disease (STD) testing.  · Bone density scan. This is done to screen for osteoporosis. You may have this scan if you are at high risk for osteoporosis.  Follow these instructions at home:  Eating and drinking  · Eat a diet that includes fresh fruits and vegetables, whole grains, lean protein, and low-fat dairy.  · Take vitamin and mineral supplements as recommended by your health care provider.  · Do not drink alcohol if:  ? Your health care provider tells you not to drink.  ? You are pregnant, may be pregnant, or are planning to become pregnant.  · If you drink alcohol:  ? Limit how much you have to 0-1 drink a day.  ? Be aware of how much alcohol is in your drink. In the [place] one drink equals one 12 oz bottle of beer (355 mL), one 5 oz glass of wine (148 mL), or one 1½ oz glass of hard liquor (44 mL).  Lifestyle  · Take daily care of your teeth and gums.  · Stay active. Exercise for at least 30 minutes on 5 or more days each week.  · Do not use any products that contain nicotine or tobacco, such as cigarettes, e-cigarettes, and chewing tobacco. If you need help quitting, ask your health care provider.  · If you are sexually active, practice safe sex. Use a condom or other form of birth control (contraception) in order to prevent pregnancy and STIs (sexually transmitted infections).  · If told by your health care provider, take low-dose aspirin daily starting at age 50.  What's next?  · Visit your health care provider once a year for a well check visit.  · Ask your health care provider how often you should have your eyes and teeth checked.  · Stay up to date on all vaccines.  This information is not intended to replace advice given to you by your health care provider. Make sure you  discuss any questions you have with your health care provider.  Document Released: 01/13/2017 Document Revised: 08/29/2019 Document Reviewed: 08/29/2019  [employer] Patient Education © 2020 [employer]      Exercising to Lose Weight  Exercise is structured, repetitive physical activity to improve fitness and health. Getting regular exercise is important for everyone. It is especially important if you are overweight. Being overweight increases your risk of heart disease, stroke, diabetes, high blood pressure, and several types of cancer. Reducing your calorie intake and exercising can help you lose weight.  Exercise is usually categorized as moderate or vigorous intensity. To lose weight, most people need to do a certain amount of moderate-intensity or vigorous-intensity exercise each week.  Moderate-intensity exercise    Moderate-intensity exercise is any activity that gets you moving enough to burn at least three times more energy (calories) than if you were sitting.  Examples of moderate exercise include:  · Walking a mile in 15 minutes.  · Doing light yard work.  · Biking at an easy pace.  Most people should get at least 150 minutes (2 hours and 30 minutes) a week of moderate-intensity exercise to maintain their body weight.  Vigorous-intensity exercise  Vigorous-intensity exercise is any activity that gets you moving enough to burn at least six times more calories than if you were sitting. When you exercise at this intensity, you should be working hard enough that you are not able to carry on a conversation.  Examples of vigorous exercise include:  · Running.  · Playing a team sport, such as football, basketball, and soccer.  · Jumping rope.  Most people should get at least 75 minutes (1 hour and 15 minutes) a week of vigorous-intensity exercise to maintain their body weight.  How can exercise affect me?  When you exercise enough to burn more calories than you eat, you lose weight. Exercise also reduces body fat  and builds muscle. The more muscle you have, the more calories you burn. Exercise also:  · Improves mood.  · Reduces stress and tension.  · Improves your overall fitness, flexibility, and endurance.  · Increases bone strength.  The amount of exercise you need to lose weight depends on:  · Your age.  · The type of exercise.  · Any health conditions you have.  · Your overall physical ability.  Talk to your health care provider about how much exercise you need and what types of activities are safe for you.  What actions can I take to lose weight?  Nutrition    · Make changes to your diet as told by your health care provider or diet and nutrition specialist (dietitian). This may include:  ? Eating fewer calories.  ? Eating more protein.  ? Eating less unhealthy fats.  ? Eating a diet that includes fresh fruits and vegetables, whole grains, low-fat dairy products, and lean protein.  ? Avoiding foods with added fat, salt, and sugar.  · Drink plenty of water while you exercise to prevent dehydration or heat stroke.  Activity  · Choose an activity that you enjoy and set realistic goals. Your health care provider can help you make an exercise plan that works for you.  · Exercise at a moderate or vigorous intensity most days of the week.  ? The intensity of exercise may vary from person to person. You can tell how intense a workout is for you by paying attention to your breathing and heartbeat. Most people will notice their breathing and heartbeat get faster with more intense exercise.  · Do resistance training twice each week, such as:  ? Push-ups.  ? Sit-ups.  ? Lifting weights.  ? Using resistance bands.  · Getting short amounts of exercise can be just as helpful as long structured periods of exercise. If you have trouble finding time to exercise, try to include exercise in your daily routine.  ? Get up, stretch, and walk around every 30 minutes throughout the day.  ? Go for a walk during your lunch break.  ? Park your car  farther away from your destination.  ? If you take public transportation, get off one stop early and walk the rest of the way.  ? Make phone calls while standing up and walking around.  ? Take the stairs instead of elevators or escalators.  · Wear comfortable clothes and shoes with good support.  · Do not exercise so much that you hurt yourself, feel dizzy, or get very short of breath.  Where to find more information  · [place] Department of Health and Human Services: www.hhs.gov  · Centers for Disease Control and Prevention (CDC): www.cdc.gov  Contact a health care provider:  · Before starting a new exercise program.  · If you have questions or concerns about your weight.  · If you have a medical problem that keeps you from exercising.  Get help right away if you have any of the following while exercising:  · Injury.  · Dizziness.  · Difficulty breathing or shortness of breath that does not go away when you stop exercising.  · Chest pain.  · Rapid heartbeat.  Summary  · Being overweight increases your risk of heart disease, stroke, diabetes, high blood pressure, and several types of cancer.  · Losing weight happens when you burn more calories than you eat.  · Reducing the amount of calories you eat in addition to getting regular moderate or vigorous exercise each week helps you lose weight.  This information is not intended to replace advice given to you by your health care provider. Make sure you discuss any questions you have with your health care provider.  Document Released: 01/20/2012 Document Revised: 12/31/2018 Document Reviewed: 12/31/2018  [employer] Patient Education © 2020 [employer]      Calorie Counting for Weight Loss  Calories are units of energy. Your body needs a certain amount of calories from food to keep you going throughout the day. When you eat more calories than your body needs, your body stores the extra calories as fat. When you eat fewer calories than your body needs, your body burns fat to  get the energy it needs.  Calorie counting means keeping track of how many calories you eat and drink each day. Calorie counting can be helpful if you need to lose weight. If you make sure to eat fewer calories than your body needs, you should lose weight. Ask your health care provider what a healthy weight is for you.  For calorie counting to work, you will need to eat the right number of calories in a day in order to lose a healthy amount of weight per week. A dietitian can help you determine how many calories you need in a day and will give you suggestions on how to reach your calorie goal.  · A healthy amount of weight to lose per week is usually 1-2 lb (0.5-0.9 kg). This usually means that your daily calorie intake should be reduced by 500-750 calories.  · Eating 1,200 - 1,500 calories per day can help most women lose weight.  · Eating 1,500 - 1,800 calories per day can help most men lose weight.  What is my plan?  My goal is to have __________ calories per day.  If I have this many calories per day, I should lose around __________ pounds per week.  What do I need to know about calorie counting?  In order to meet your daily calorie goal, you will need to:  · Find out how many calories are in each food you would like to eat. Try to do this before you eat.  · Decide how much of the food you plan to eat.  · Write down what you ate and how many calories it had. Doing this is called keeping a food log.  To successfully lose weight, it is important to balance calorie counting with a healthy lifestyle that includes regular activity. Aim for 150 minutes of moderate exercise (such as walking) or 75 minutes of vigorous exercise (such as running) each week.  Where do I find calorie information?    The number of calories in a food can be found on a Nutrition Facts label. If a food does not have a Nutrition Facts label, try to look up the calories online or ask your dietitian for help.  Remember that calories are listed per  serving. If you choose to have more than one serving of a food, you will have to multiply the calories per serving by the amount of servings you plan to eat. For example, the label on a package of bread might say that a serving size is 1 slice and that there are 90 calories in a serving. If you eat 1 slice, you will have eaten 90 calories. If you eat 2 slices, you will have eaten 180 calories.  How do I keep a food log?  Immediately after each meal, record the following information in your food log:  · What you ate. Don't forget to include toppings, sauces, and other extras on the food.  · How much you ate. This can be measured in cups, ounces, or number of items.  · How many calories each food and drink had.  · The total number of calories in the meal.  Keep your food log near you, such as in a small notebook in your pocket, or use a mobile herminia or website. Some programs will calculate calories for you and show you how many calories you have left for the day to meet your goal.  What are some calorie counting tips?    · Use your calories on foods and drinks that will fill you up and not leave you hungry:  ? Some examples of foods that fill you up are nuts and nut butters, vegetables, lean proteins, and high-fiber foods like whole grains. High-fiber foods are foods with more than 5 g fiber per serving.  ? Drinks such as sodas, specialty coffee drinks, alcohol, and juices have a lot of calories, yet do not fill you up.  · Eat nutritious foods and avoid empty calories. Empty calories are calories you get from foods or beverages that do not have many vitamins or protein, such as candy, sweets, and soda. It is better to have a nutritious high-calorie food (such as an avocado) than a food with few nutrients (such as a bag of chips).  · Know how many calories are in the foods you eat most often. This will help you calculate calorie counts faster.  · Pay attention to calories in drinks. Low-calorie drinks include water and  "unsweetened drinks.  · Pay attention to nutrition labels for \"low fat\" or \"fat free\" foods. These foods sometimes have the same amount of calories or more calories than the full fat versions. They also often have added sugar, starch, or salt, to make up for flavor that was removed with the fat.  · Find a way of tracking calories that works for you. Get creative. Try different apps or programs if writing down calories does not work for you.  What are some portion control tips?  · Know how many calories are in a serving. This will help you know how many servings of a certain food you can have.  · Use a measuring cup to measure serving sizes. You could also try weighing out portions on a kitchen scale. With time, you will be able to estimate serving sizes for some foods.  · Take some time to put servings of different foods on your favorite plates, bowls, and cups so you know what a serving looks like.  · Try not to eat straight from a bag or box. Doing this can lead to overeating. Put the amount you would like to eat in a cup or on a plate to make sure you are eating the right portion.  · Use smaller plates, glasses, and bowls to prevent overeating.  · Try not to multitask (for example, watch TV or use your computer) while eating. If it is time to eat, sit down at a table and enjoy your food. This will help you to know when you are full. It will also help you to be aware of what you are eating and how much you are eating.  What are tips for following this plan?  Reading food labels  · Check the calorie count compared to the serving size. The serving size may be smaller than what you are used to eating.  · Check the source of the calories. Make sure the food you are eating is high in vitamins and protein and low in saturated and trans fats.  Shopping  · Read nutrition labels while you shop. This will help you make healthy decisions before you decide to purchase your food.  · Make a grocery list and stick to " "it.  Cooking  · Try to cook your favorite foods in a healthier way. For example, try baking instead of frying.  · Use low-fat dairy products.  Meal planning  · Use more fruits and vegetables. Half of your plate should be fruits and vegetables.  · Include lean proteins like poultry and fish.  How do I count calories when eating out?  · Ask for smaller portion sizes.  · Consider sharing an entree and sides instead of getting your own entree.  · If you get your own entree, eat only half. Ask for a box at the beginning of your meal and put the rest of your entree in it so you are not tempted to eat it.  · If calories are listed on the menu, choose the lower calorie options.  · Choose dishes that include vegetables, fruits, whole grains, low-fat dairy products, and lean protein.  · Choose items that are boiled, broiled, grilled, or steamed. Stay away from items that are buttered, battered, fried, or served with cream sauce. Items labeled \"crispy\" are usually fried, unless stated otherwise.  · Choose water, low-fat milk, unsweetened iced tea, or other drinks without added sugar. If you want an alcoholic beverage, choose a lower calorie option such as a glass of wine or light beer.  · Ask for dressings, sauces, and syrups on the side. These are usually high in calories, so you should limit the amount you eat.  · If you want a salad, choose a garden salad and ask for grilled meats. Avoid extra toppings like randall, cheese, or fried items. Ask for the dressing on the side, or ask for olive oil and vinegar or lemon to use as dressing.  · Estimate how many servings of a food you are given. For example, a serving of cooked rice is ½ cup or about the size of half a baseball. Knowing serving sizes will help you be aware of how much food you are eating at restaurants. The list below tells you how big or small some common portion sizes are based on everyday objects:  ? 1 oz--4 stacked dice.  ? 3 oz--1 deck of cards.  ? 1 tsp--1 " die.  ? 1 Tbsp--½ a ping-pong ball.  ? 2 Tbsp--1 ping-pong ball.  ? ½ cup--½ baseball.  ? 1 cup--1 baseball.  Summary  · Calorie counting means keeping track of how many calories you eat and drink each day. If you eat fewer calories than your body needs, you should lose weight.  · A healthy amount of weight to lose per week is usually 1-2 lb (0.5-0.9 kg). This usually means reducing your daily calorie intake by 500-750 calories.  · The number of calories in a food can be found on a Nutrition Facts label. If a food does not have a Nutrition Facts label, try to look up the calories online or ask your dietitian for help.  · Use your calories on foods and drinks that will fill you up, and not on foods and drinks that will leave you hungry.  · Use smaller plates, glasses, and bowls to prevent overeating.  This information is not intended to replace advice given to you by your health care provider. Make sure you discuss any questions you have with your health care provider.  Document Released: 12/18/2006 Document Revised: 09/06/2019 Document Reviewed: 11/17/2017  [employer] Patient Education © 2020 [employer]

## 2020-09-04 ENCOUNTER — OFFICE VISIT (OUTPATIENT)
Dept: FAMILY MEDICINE CLINIC | Facility: CLINIC | Age: 65
End: 2020-09-04

## 2020-09-04 ENCOUNTER — LAB (OUTPATIENT)
Dept: LAB | Facility: HOSPITAL | Age: 65
End: 2020-09-04

## 2020-09-04 VITALS
HEART RATE: 85 BPM | TEMPERATURE: 95.8 F | DIASTOLIC BLOOD PRESSURE: 82 MMHG | WEIGHT: 183.8 LBS | HEIGHT: 57 IN | SYSTOLIC BLOOD PRESSURE: 144 MMHG | OXYGEN SATURATION: 97 % | BODY MASS INDEX: 39.65 KG/M2

## 2020-09-04 DIAGNOSIS — E03.9 ACQUIRED HYPOTHYROIDISM: ICD-10-CM

## 2020-09-04 DIAGNOSIS — E53.8 B12 DEFICIENCY: ICD-10-CM

## 2020-09-04 DIAGNOSIS — E66.01 CLASS 2 SEVERE OBESITY DUE TO EXCESS CALORIES WITH SERIOUS COMORBIDITY AND BODY MASS INDEX (BMI) OF 39.0 TO 39.9 IN ADULT (HCC): ICD-10-CM

## 2020-09-04 DIAGNOSIS — I10 ESSENTIAL HYPERTENSION: ICD-10-CM

## 2020-09-04 DIAGNOSIS — L40.9 PSORIASIS: ICD-10-CM

## 2020-09-04 DIAGNOSIS — D75.89 MACROCYTOSIS WITHOUT ANEMIA: ICD-10-CM

## 2020-09-04 PROCEDURE — 84443 ASSAY THYROID STIM HORMONE: CPT

## 2020-09-04 PROCEDURE — 99214 OFFICE O/P EST MOD 30 MIN: CPT | Performed by: FAMILY MEDICINE

## 2020-09-04 PROCEDURE — 82607 VITAMIN B-12: CPT

## 2020-09-04 RX ORDER — TRAZODONE HYDROCHLORIDE 100 MG/1
100 TABLET ORAL NIGHTLY
Qty: 30 TABLET | Refills: 2 | Status: SHIPPED | OUTPATIENT
Start: 2020-09-04 | End: 2021-06-04

## 2020-09-05 LAB
TSH SERPL DL<=0.05 MIU/L-ACNC: 0.01 UIU/ML (ref 0.27–4.2)
VIT B12 BLD-MCNC: >2000 PG/ML (ref 211–946)

## 2020-09-07 PROBLEM — E53.8 B12 DEFICIENCY: Status: ACTIVE | Noted: 2020-09-07

## 2020-09-07 NOTE — PROGRESS NOTES
Subjective    Mira Deal is a 64 y.o. obese  female former smoker with HTN, Hypothyroidism, Hyperinsulinar obesity, Hyperlipidemia, Asplenia, H/O Recurrent OM, Eustachian tube dysfunction, Allergies, Chronic knee pain, S/P L TKA, Osteoarthritis, H/O C-diff colitis after Clindamycin, and other health problems see PMH/PSH. Pt presents for exam, to discuss chronic health problems Tx and F/u plans.    ' Taking sublingual B 12 feeling some improvement. B/P has been OK. Working FT. Arthritic pain tolerable'.    Fatigue   This is a new problem. The current episode started more than 1 month ago. The problem occurs intermittently. The problem has been gradually improving. Associated symptoms include arthralgias, fatigue, headaches and a rash. Pertinent negatives include no abdominal pain, chest pain, chills, congestion, coughing, fever, joint swelling, myalgias, nausea, neck pain, sore throat, vomiting or weakness. Exacerbated by: Low B12. Treatments tried: B12 supplement. The treatment provided moderate relief.   Hypertension   This is a chronic problem. The current episode started more than 1 year ago. The problem is unchanged. The problem is controlled. Associated symptoms include headaches. Pertinent negatives include no chest pain, neck pain, palpitations or shortness of breath. Risk factors for coronary artery disease include obesity and post-menopausal state. Current antihypertension treatment includes ACE inhibitors. The current treatment provides significant improvement.   Dizziness   This is a new problem. The current episode started more than 1 month ago. The problem occurs intermittently. The problem has been gradually improving. Associated symptoms include arthralgias, fatigue, headaches and a rash. Pertinent negatives include no abdominal pain, chest pain, chills, congestion, coughing, fever, joint swelling, myalgias, nausea, neck pain, sore throat, vomiting or weakness. Nothing aggravates  the symptoms. Treatments tried: Balance exercise. The treatment provided moderate relief.   Hypothyroidism   This is a chronic problem. The current episode started more than 1 year ago. The problem occurs daily. The problem has been gradually improving. Associated symptoms include arthralgias, fatigue, headaches and a rash. Pertinent negatives include no abdominal pain, chest pain, chills, congestion, coughing, fever, joint swelling, myalgias, nausea, neck pain, sore throat, vomiting or weakness. Nothing aggravates the symptoms. Treatments tried: Levothyroxine. The treatment provided significant relief.   Obesity   This is a chronic problem. The current episode started more than 1 year ago. The problem occurs daily. The problem has been gradually improving. Associated symptoms include arthralgias, fatigue, headaches and a rash. Pertinent negatives include no abdominal pain, chest pain, chills, congestion, coughing, fever, joint swelling, myalgias, nausea, neck pain, sore throat, vomiting or weakness. The symptoms are aggravated by eating. Treatments tried: Diet, exercise. The treatment provided no relief.   Rash   This is a chronic problem. The affected locations include the face, neck, right hand and left hand. The rash is characterized by redness and scaling. Associated symptoms include fatigue. Pertinent negatives include no congestion, cough, diarrhea, fever, rhinorrhea, shortness of breath, sore throat or vomiting. Past treatments include topical steroids. The treatment provided moderate relief. (Psoriasis)        The following portions of the patient's history were reviewed and updated as appropriate: allergies, current medications, past family history, past medical history, past social history, past surgical history and problem list.    Review of Systems   Constitutional: Positive for fatigue. Negative for chills and fever.   HENT: Negative for congestion, ear pain, postnasal drip, rhinorrhea, sinus pressure,  sinus pain ( mild), sneezing and sore throat.    Eyes: Negative.    Respiratory: Negative for cough, shortness of breath and wheezing.    Cardiovascular: Negative for chest pain and palpitations.        S/P Cardiac work- up Neg   Gastrointestinal: Negative for abdominal pain, diarrhea, nausea and vomiting.   Endocrine: Negative.    Genitourinary: Negative for dysuria.   Musculoskeletal: Positive for arthralgias. Negative for joint swelling, myalgias and neck pain.        S/P TKA   Skin: Positive for rash.        Psoriasis   Allergic/Immunologic: Positive for environmental allergies.   Neurological: Positive for dizziness and headaches. Negative for weakness.   Hematological: Negative.    Psychiatric/Behavioral: The patient is nervous/anxious.        Objective   Physical Exam   Constitutional: She is oriented to person, place, and time. She appears well-developed and well-nourished. No distress.   HENT:   Head: Normocephalic and atraumatic.   Right Ear: External ear normal.   Left Ear: External ear normal.   Nose: Nose normal.   Mouth/Throat: Oropharynx is clear and moist. No oropharyngeal exudate.   Eyes: Pupils are equal, round, and reactive to light. Conjunctivae and EOM are normal. Right eye exhibits no discharge. Left eye exhibits no discharge. No scleral icterus.   Neck: Normal range of motion. Neck supple. No JVD present. No tracheal deviation present. No thyromegaly present.   Cardiovascular: Normal rate, regular rhythm, normal heart sounds and intact distal pulses. Exam reveals no gallop and no friction rub.   No murmur heard.  Pulmonary/Chest: Effort normal and breath sounds normal. No stridor. No respiratory distress. She has no wheezes. She has no rales. She exhibits no tenderness.   Abdominal: Soft. Bowel sounds are normal. She exhibits no distension and no mass. There is no tenderness. There is no rebound and no guarding. No hernia.   Musculoskeletal: Normal range of motion. She exhibits no edema,  tenderness or deformity.   Lymphadenopathy:     She has no cervical adenopathy.   Neurological: She is alert and oriented to person, place, and time. She has normal reflexes. She displays normal reflexes. No cranial nerve deficit or sensory deficit. She exhibits normal muscle tone. Coordination normal.   Skin: Skin is warm and dry. Capillary refill takes 2 to 3 seconds. No rash noted. She is not diaphoretic. No erythema. No pallor.   Psoriasis  Facial rash   Psychiatric: She has a normal mood and affect. Her behavior is normal. Judgment and thought content normal.   Nursing note and vitals reviewed.      Assessment/Plan   Mira was seen today for fatigue, hypertension, dizziness and blurred vision.    Diagnoses and all orders for this visit:    Acquired hypothyroidism  -     TSH; Future    B12 deficiency  -     Vitamin B12; Future    Psoriasis    Macrocytosis without anemia    Essential hypertension    Class 2 severe obesity due to excess calories with serious comorbidity and body mass index (BMI) of 39.0 to 39.9 in adult (CMS/[facility])    Other orders  -     traZODone (DESYREL) 100 MG tablet; Take 1 tablet by mouth Every Night.    Discussed exam, health problems, Tx plan, rationale. Discussed F/U plan.         This document has been electronically signed by Lloyd Varghese MD

## 2020-09-09 ENCOUNTER — TELEPHONE (OUTPATIENT)
Dept: FAMILY MEDICINE CLINIC | Facility: CLINIC | Age: 65
End: 2020-09-09

## 2020-09-09 NOTE — TELEPHONE ENCOUNTER
----- Message from Lloyd Varghese MD sent at 9/7/2020  5:38 PM CDT -----  B12 has improved, will go over labs at next visit.

## 2020-11-30 RX ORDER — LEVOTHYROXINE SODIUM 100 UG/1
TABLET ORAL
Qty: 30 TABLET | Refills: 5 | Status: SHIPPED | OUTPATIENT
Start: 2020-11-30 | End: 2021-06-04 | Stop reason: SDUPTHER

## 2020-12-03 ENCOUNTER — TELEPHONE (OUTPATIENT)
Dept: FAMILY MEDICINE CLINIC | Facility: CLINIC | Age: 65
End: 2020-12-03

## 2020-12-03 NOTE — TELEPHONE ENCOUNTER
Tried calling to confirm appointment remind to wear a mask and prescreen.  No answer left voice message HUB to read

## 2020-12-04 ENCOUNTER — OFFICE VISIT (OUTPATIENT)
Dept: FAMILY MEDICINE CLINIC | Facility: CLINIC | Age: 65
End: 2020-12-04

## 2020-12-04 VITALS
HEART RATE: 95 BPM | OXYGEN SATURATION: 97 % | BODY MASS INDEX: 39.72 KG/M2 | DIASTOLIC BLOOD PRESSURE: 80 MMHG | TEMPERATURE: 96.8 F | HEIGHT: 57 IN | SYSTOLIC BLOOD PRESSURE: 124 MMHG | WEIGHT: 184.1 LBS

## 2020-12-04 DIAGNOSIS — T14.8XXA MUSCLE STRAIN: ICD-10-CM

## 2020-12-04 DIAGNOSIS — E03.9 ACQUIRED HYPOTHYROIDISM: ICD-10-CM

## 2020-12-04 DIAGNOSIS — Z12.31 ENCOUNTER FOR SCREENING MAMMOGRAM FOR MALIGNANT NEOPLASM OF BREAST: ICD-10-CM

## 2020-12-04 DIAGNOSIS — D75.89 MACROCYTOSIS WITHOUT ANEMIA: ICD-10-CM

## 2020-12-04 DIAGNOSIS — I10 ESSENTIAL HYPERTENSION: ICD-10-CM

## 2020-12-04 PROCEDURE — 99214 OFFICE O/P EST MOD 30 MIN: CPT | Performed by: FAMILY MEDICINE

## 2020-12-06 PROBLEM — T14.8XXA MUSCLE STRAIN: Status: ACTIVE | Noted: 2020-12-06

## 2020-12-06 NOTE — PROGRESS NOTES
Subjective   Mira Deal is a 65 y.o. obese  female former smoker with HTN, Hypothyroidism, Hyperinsulinar obesity, Hyperlipidemia, Asplenia, H/O Recurrent OM, Eustachian tube dysfunction, Allergies, Chronic knee pain, S/P L TKA, Osteoarthritis, H/O C-diff colitis after Clindamycin, B12 def, and other health problems see PMH/PSH. Pt presents for exam, to discuss chronic health problems Tx and F/u plans.    ' B/P has been controlled. Fatigue has improved taking B12. Arthritic pain tolerable. Have R shoulder blade pain since giving dog a bath. Working Full time'.    Hypertension  This is a chronic problem. The current episode started more than 1 year ago. The problem is unchanged. The problem is controlled. Associated symptoms include headaches. Pertinent negatives include no chest pain, neck pain, palpitations or shortness of breath. Risk factors for coronary artery disease include obesity and post-menopausal state. Current antihypertension treatment includes ACE inhibitors. The current treatment provides significant improvement.   Hypothyroidism  This is a chronic problem. The current episode started more than 1 year ago. The problem occurs daily. The problem has been gradually improving. Associated symptoms include arthralgias, fatigue, headaches and a rash. Pertinent negatives include no abdominal pain, chest pain, chills, congestion, coughing, fever, joint swelling, myalgias, nausea, neck pain, sore throat, vomiting or weakness. Nothing aggravates the symptoms. Treatments tried: Levothyroxine. The treatment provided significant relief.   Fatigue  This is a chronic problem. The current episode started more than 1 month ago. The problem occurs intermittently. The problem has been gradually improving. Associated symptoms include arthralgias, fatigue, headaches and a rash. Pertinent negatives include no abdominal pain, chest pain, chills, congestion, coughing, fever, joint swelling, myalgias,  nausea, neck pain, sore throat, vomiting or weakness. Exacerbated by: Low B12. Treatments tried: B12 supplement. The treatment provided moderate relief.   Dizziness  This is a chronic problem. The current episode started more than 1 month ago. The problem occurs intermittently. The problem has been gradually improving. Associated symptoms include arthralgias, fatigue, headaches and a rash. Pertinent negatives include no abdominal pain, chest pain, chills, congestion, coughing, fever, joint swelling, myalgias, nausea, neck pain, sore throat, vomiting or weakness. Nothing aggravates the symptoms. Treatments tried: Balance exercise. The treatment provided moderate relief.   Obesity  This is a chronic problem. The current episode started more than 1 year ago. The problem occurs daily. The problem has been gradually improving. Associated symptoms include arthralgias, fatigue, headaches and a rash. Pertinent negatives include no abdominal pain, chest pain, chills, congestion, coughing, fever, joint swelling, myalgias, nausea, neck pain, sore throat, vomiting or weakness. The symptoms are aggravated by eating. Treatments tried: Diet, exercise. The treatment provided mild relief.   Rash  This is a chronic problem. The affected locations include the face, neck, right hand and left hand. The rash is characterized by redness and scaling. Associated symptoms include fatigue. Pertinent negatives include no congestion, cough, diarrhea, fever, rhinorrhea, shortness of breath, sore throat or vomiting. Past treatments include topical steroids. The treatment provided moderate relief. (Psoriasis)        The following portions of the patient's history were reviewed and updated as appropriate: allergies, current medications, past family history, past medical history, past social history, past surgical history and problem list.    Review of Systems   Constitutional: Positive for fatigue. Negative for chills and fever.   HENT: Negative  for congestion, ear pain, postnasal drip, rhinorrhea, sinus pressure, sinus pain ( mild), sneezing and sore throat.    Eyes: Negative.    Respiratory: Negative for cough, shortness of breath and wheezing.    Cardiovascular: Negative for chest pain and palpitations.        S/P Cardiac work- up Neg   Gastrointestinal: Negative for abdominal pain, diarrhea, nausea and vomiting.   Endocrine: Negative.    Genitourinary: Negative for dysuria.   Musculoskeletal: Positive for arthralgias. Negative for joint swelling, myalgias and neck pain.        S/P TKA    R Shoulder blade pain   Skin: Positive for rash.        Psoriasis   Allergic/Immunologic: Positive for environmental allergies.   Neurological: Positive for dizziness and headaches. Negative for weakness.   Hematological: Negative.    Psychiatric/Behavioral: The patient is nervous/anxious.        Objective   Physical Exam  Vitals signs and nursing note reviewed.   Constitutional:       Appearance: Normal appearance. She is well-developed.   HENT:      Head: Normocephalic and atraumatic.      Right Ear: Tympanic membrane, ear canal and external ear normal.      Left Ear: Tympanic membrane, ear canal and external ear normal.      Nose: Nose normal.      Mouth/Throat:      Mouth: Mucous membranes are moist.      Pharynx: Oropharynx is clear.   Eyes:      Extraocular Movements: Extraocular movements intact.      Conjunctiva/sclera: Conjunctivae normal.      Pupils: Pupils are equal, round, and reactive to light.   Neck:      Musculoskeletal: Neck supple.      Comments: R mild trapezius spasm  Cardiovascular:      Rate and Rhythm: Normal rate and regular rhythm.      Heart sounds: Normal heart sounds.   Pulmonary:      Effort: Pulmonary effort is normal.      Breath sounds: Normal breath sounds.   Abdominal:      General: Bowel sounds are normal. There is no distension.      Palpations: Abdomen is soft.      Tenderness: There is no abdominal tenderness.   Musculoskeletal:  Normal range of motion.   Skin:     General: Skin is warm and dry.   Neurological:      Mental Status: She is alert and oriented to person, place, and time.   Psychiatric:         Mood and Affect: Mood normal.         Speech: Speech normal.         Behavior: Behavior normal.         Thought Content: Thought content normal.         Judgment: Judgment normal.         Assessment/Plan   Diagnoses and all orders for this visit:    1. Encounter for screening mammogram for malignant neoplasm of breast  -     Mammo Screening Digital Tomosynthesis Bilateral With CAD; Future    2. Macrocytosis without anemia  -     CBC & Differential; Future    3. Acquired hypothyroidism    4. Essential hypertension    5. BMI 39.0-39.9,adult    6. Muscle strain    Discussed exam, health problems, meds, indications, tx plan, rationale. Discussed BMI, BEE, diet exercise for weight loss. Discussed rechecking lab. Discussed F/U plan.          This document has been electronically signed by Lloyd Varghese MD            Answers for HPI/ROS submitted by the patient on 11/27/2020   What is the primary reason for your visit?: Other  Please describe your symptoms.: Checking on the status of taking the B 12 medication.  I also have been having trouble with my back right shoulder blade area. It has been painful since the first part of October.  Have you had these symptoms before?: No  How long have you been having these symptoms?: Greater than 2 weeks  Please list any medications you are currently taking for this condition.: None  Please describe any probable cause for these symptoms. : This symptom first occurred when I gave the dog a bath. But it hasn't went away.

## 2020-12-08 ENCOUNTER — LAB (OUTPATIENT)
Dept: LAB | Facility: HOSPITAL | Age: 65
End: 2020-12-08

## 2020-12-08 DIAGNOSIS — D75.89 MACROCYTOSIS WITHOUT ANEMIA: ICD-10-CM

## 2020-12-08 LAB
BASOPHILS # BLD AUTO: 0.05 10*3/MM3 (ref 0–0.2)
BASOPHILS NFR BLD AUTO: 0.5 % (ref 0–1.5)
DEPRECATED RDW RBC AUTO: 39.1 FL (ref 37–54)
EOSINOPHIL # BLD AUTO: 0.4 10*3/MM3 (ref 0–0.4)
EOSINOPHIL NFR BLD AUTO: 4 % (ref 0.3–6.2)
ERYTHROCYTE [DISTWIDTH] IN BLOOD BY AUTOMATED COUNT: 12.9 % (ref 12.3–15.4)
HCT VFR BLD AUTO: 42.5 % (ref 34–46.6)
HGB BLD-MCNC: 14.7 G/DL (ref 12–15.9)
IMM GRANULOCYTES # BLD AUTO: 0.02 10*3/MM3 (ref 0–0.05)
IMM GRANULOCYTES NFR BLD AUTO: 0.2 % (ref 0–0.5)
LYMPHOCYTES # BLD AUTO: 4.17 10*3/MM3 (ref 0.7–3.1)
LYMPHOCYTES NFR BLD AUTO: 41.7 % (ref 19.6–45.3)
MCH RBC QN AUTO: 29.4 PG (ref 26.6–33)
MCHC RBC AUTO-ENTMCNC: 34.6 G/DL (ref 31.5–35.7)
MCV RBC AUTO: 85 FL (ref 79–97)
MONOCYTES # BLD AUTO: 0.88 10*3/MM3 (ref 0.1–0.9)
MONOCYTES NFR BLD AUTO: 8.8 % (ref 5–12)
NEUTROPHILS NFR BLD AUTO: 4.49 10*3/MM3 (ref 1.7–7)
NEUTROPHILS NFR BLD AUTO: 44.8 % (ref 42.7–76)
NRBC BLD AUTO-RTO: 0 /100 WBC (ref 0–0.2)
PLATELET # BLD AUTO: 340 10*3/MM3 (ref 140–450)
PMV BLD AUTO: 11.8 FL (ref 6–12)
RBC # BLD AUTO: 5 10*6/MM3 (ref 3.77–5.28)
WBC # BLD AUTO: 10.01 10*3/MM3 (ref 3.4–10.8)

## 2020-12-08 PROCEDURE — 85025 COMPLETE CBC W/AUTO DIFF WBC: CPT

## 2020-12-14 ENCOUNTER — TELEPHONE (OUTPATIENT)
Dept: FAMILY MEDICINE CLINIC | Facility: CLINIC | Age: 65
End: 2020-12-14

## 2020-12-14 NOTE — TELEPHONE ENCOUNTER
----- Message from Lloyd Vraghese MD sent at 12/11/2020  1:00 PM CST -----  Lab has greatly improved, will need to stay on B12 lifelong.

## 2021-01-06 DIAGNOSIS — F41.9 ANXIETY: ICD-10-CM

## 2021-01-06 DIAGNOSIS — I10 ESSENTIAL HYPERTENSION: Primary | ICD-10-CM

## 2021-01-06 RX ORDER — PAROXETINE 10 MG/1
10 TABLET, FILM COATED ORAL EVERY MORNING
Qty: 30 TABLET | Refills: 5 | Status: SHIPPED | OUTPATIENT
Start: 2021-01-06 | End: 2021-06-04 | Stop reason: SDUPTHER

## 2021-01-06 RX ORDER — LISINOPRIL 20 MG/1
20 TABLET ORAL DAILY
Qty: 30 TABLET | Refills: 5 | Status: SHIPPED | OUTPATIENT
Start: 2021-01-06 | End: 2021-06-04

## 2021-01-29 DIAGNOSIS — Z12.31 ENCOUNTER FOR SCREENING MAMMOGRAM FOR MALIGNANT NEOPLASM OF BREAST: ICD-10-CM

## 2021-02-01 ENCOUNTER — TELEPHONE (OUTPATIENT)
Dept: FAMILY MEDICINE CLINIC | Facility: CLINIC | Age: 66
End: 2021-02-01

## 2021-06-03 ENCOUNTER — TELEPHONE (OUTPATIENT)
Dept: FAMILY MEDICINE CLINIC | Facility: CLINIC | Age: 66
End: 2021-06-03

## 2021-06-04 ENCOUNTER — OFFICE VISIT (OUTPATIENT)
Dept: FAMILY MEDICINE CLINIC | Facility: CLINIC | Age: 66
End: 2021-06-04

## 2021-06-04 VITALS
HEART RATE: 71 BPM | BODY MASS INDEX: 41.96 KG/M2 | HEIGHT: 57 IN | TEMPERATURE: 96.6 F | SYSTOLIC BLOOD PRESSURE: 144 MMHG | DIASTOLIC BLOOD PRESSURE: 88 MMHG | WEIGHT: 194.5 LBS | OXYGEN SATURATION: 95 %

## 2021-06-04 DIAGNOSIS — I10 ESSENTIAL HYPERTENSION: Primary | ICD-10-CM

## 2021-06-04 DIAGNOSIS — Q89.01 ASPLENIA: ICD-10-CM

## 2021-06-04 DIAGNOSIS — F41.9 ANXIETY: ICD-10-CM

## 2021-06-04 DIAGNOSIS — L40.9 PSORIASIS: ICD-10-CM

## 2021-06-04 DIAGNOSIS — Z00.00 ROUTINE MEDICAL EXAM: ICD-10-CM

## 2021-06-04 DIAGNOSIS — Z01.419 WOMEN'S ANNUAL ROUTINE GYNECOLOGICAL EXAMINATION: ICD-10-CM

## 2021-06-04 DIAGNOSIS — E53.8 B12 DEFICIENCY: ICD-10-CM

## 2021-06-04 DIAGNOSIS — E03.9 ACQUIRED HYPOTHYROIDISM: ICD-10-CM

## 2021-06-04 DIAGNOSIS — Z13.820 ENCOUNTER FOR OSTEOPOROSIS SCREENING IN ASYMPTOMATIC POSTMENOPAUSAL PATIENT: ICD-10-CM

## 2021-06-04 DIAGNOSIS — Z78.0 ENCOUNTER FOR OSTEOPOROSIS SCREENING IN ASYMPTOMATIC POSTMENOPAUSAL PATIENT: ICD-10-CM

## 2021-06-04 PROCEDURE — 99214 OFFICE O/P EST MOD 30 MIN: CPT | Performed by: FAMILY MEDICINE

## 2021-06-04 RX ORDER — LISINOPRIL 40 MG/1
40 TABLET ORAL DAILY
Qty: 30 TABLET | Refills: 5 | Status: SHIPPED | OUTPATIENT
Start: 2021-06-04 | End: 2021-12-06 | Stop reason: SDUPTHER

## 2021-06-04 NOTE — PROGRESS NOTES
Chief Complaint  Hypothyroidism, Hypertension, and Osteoarthritis  ' Conjunctival hemorrhage L lateral eye'   Subjective          Review of Systems   Constitutional: Positive for fatigue. Negative for chills and fever.   HENT: Negative for congestion, ear pain, postnasal drip, rhinorrhea, sinus pressure, sinus pain ( mild), sneezing and sore throat.    Eyes: Negative.    Respiratory: Negative for cough, shortness of breath and wheezing.    Cardiovascular: Negative for chest pain and palpitations.        S/P Cardiac work- up Neg   Gastrointestinal: Negative for abdominal pain, diarrhea, nausea and vomiting.   Endocrine: Negative.    Genitourinary: Negative for dysuria.   Musculoskeletal: Positive for arthralgias. Negative for joint swelling, myalgias and neck pain.        S/P TKA    R Shoulder blade pain   Skin: Positive for rash.        Psoriasis   Allergic/Immunologic: Positive for environmental allergies.   Neurological: Positive for dizziness and headaches. Negative for weakness.   Hematological: Negative.    Psychiatric/Behavioral: The patient is nervous/anxious.        Mira Deal presents to [facility]  Hypertension  This is a chronic problem. The current episode started more than 1 year ago. The problem is unchanged. The problem is controlled. Associated symptoms include headaches. Pertinent negatives include no chest pain, neck pain, palpitations or shortness of breath. Risk factors for coronary artery disease include obesity and post-menopausal state. Current antihypertension treatment includes ACE inhibitors. The current treatment provides significant improvement.   Hypothyroidism  This is a chronic problem. The current episode started more than 1 year ago. The problem occurs daily. The problem has been gradually improving. Associated symptoms include arthralgias, fatigue, headaches and a rash. Pertinent negatives include no abdominal pain, chest pain, chills,  congestion, coughing, fever, joint swelling, myalgias, nausea, neck pain, sore throat, vomiting or weakness. Nothing aggravates the symptoms. Treatments tried: Levothyroxine. The treatment provided significant relief.   Fatigue  This is a chronic problem. The current episode started more than 1 month ago. The problem occurs intermittently. The problem has been gradually improving. Associated symptoms include arthralgias, fatigue, headaches and a rash. Pertinent negatives include no abdominal pain, chest pain, chills, congestion, coughing, fever, joint swelling, myalgias, nausea, neck pain, sore throat, vomiting or weakness. Exacerbated by: Low B12. Treatments tried: B12 supplement. The treatment provided moderate relief.   Dizziness  This is a chronic problem. The current episode started more than 1 year ago. The problem occurs intermittently. The problem has been gradually improving. Associated symptoms include arthralgias, fatigue, headaches and a rash. Pertinent negatives include no abdominal pain, chest pain, chills, congestion, coughing, fever, joint swelling, myalgias, nausea, neck pain, sore throat, vomiting or weakness. Nothing aggravates the symptoms. Treatments tried: Balance exercise. The treatment provided moderate relief.   Obesity  This is a chronic problem. The current episode started more than 1 year ago. The problem occurs daily. The problem has been gradually improving. Associated symptoms include arthralgias, fatigue, headaches and a rash. Pertinent negatives include no abdominal pain, chest pain, chills, congestion, coughing, fever, joint swelling, myalgias, nausea, neck pain, sore throat, vomiting or weakness. The symptoms are aggravated by eating. Treatments tried: Diet, exercise. The treatment provided mild relief.   Rash  This is a chronic problem. The affected locations include the face, neck, right hand and left hand. The rash is characterized by redness and scaling. Associated symptoms  "include fatigue. Pertinent negatives include no congestion, cough, diarrhea, fever, rhinorrhea, shortness of breath, sore throat or vomiting. Past treatments include topical steroids. The treatment provided moderate relief. (Psoriasis)       Objective   Vital Signs:   /88 (BP Location: Right arm, Patient Position: Sitting, Cuff Size: Adult)   Pulse 71   Temp 96.6 °F (35.9 °C) (Temporal)   Ht 144.8 cm (57\")   Wt 88.2 kg (194 lb 8 oz)   SpO2 95%   BMI 42.09 kg/m²     Physical Exam  Vitals and nursing note reviewed.   Constitutional:       Appearance: Normal appearance. She is well-developed.   HENT:      Head: Normocephalic and atraumatic.      Right Ear: Tympanic membrane, ear canal and external ear normal.      Left Ear: Tympanic membrane, ear canal and external ear normal.      Nose: Nose normal.      Mouth/Throat:      Mouth: Mucous membranes are moist.      Pharynx: Oropharynx is clear.   Eyes:      Extraocular Movements: Extraocular movements intact.      Conjunctiva/sclera: Conjunctivae normal.      Pupils: Pupils are equal, round, and reactive to light.   Cardiovascular:      Rate and Rhythm: Normal rate and regular rhythm.      Heart sounds: Normal heart sounds.   Pulmonary:      Effort: Pulmonary effort is normal.      Breath sounds: Normal breath sounds.   Abdominal:      General: Bowel sounds are normal. There is no distension.      Palpations: Abdomen is soft.      Tenderness: There is no abdominal tenderness.   Musculoskeletal:         General: Normal range of motion.      Cervical back: Neck supple.   Skin:     General: Skin is warm and dry.   Neurological:      Mental Status: She is alert and oriented to person, place, and time.   Psychiatric:         Mood and Affect: Mood normal.         Speech: Speech normal.         Behavior: Behavior normal.         Thought Content: Thought content normal.         Judgment: Judgment normal.        Result Review :                 Assessment and Plan  "   Diagnoses and all orders for this visit:    1. Essential hypertension (Primary)  -     CBC & Differential; Future  -     TSH; Future  -     Comprehensive metabolic panel; Future  -     Lipid Panel; Future  -     Urinalysis With Culture If Indicated -; Future  -     lisinopril (PRINIVIL,ZESTRIL) 40 MG tablet; Take 1 tablet by mouth Daily.  Dispense: 30 tablet; Refill: 5    2. B12 deficiency  -     Comprehensive metabolic panel; Future  -     Lipid Panel; Future  -     Urinalysis With Culture If Indicated -; Future  -     Vitamin B12; Future    3. Routine medical exam  -     TSH; Future  -     Comprehensive metabolic panel; Future  -     Lipid Panel; Future  -     Urinalysis With Culture If Indicated -; Future    4. Acquired hypothyroidism  -     CBC & Differential; Future  -     TSH; Future  -     Comprehensive metabolic panel; Future  -     Lipid Panel; Future  -     Urinalysis With Culture If Indicated -; Future  -     levothyroxine (Euthyrox) 100 MCG tablet; Take 1 tablet by mouth Daily.  Dispense: 30 tablet; Refill: 5    5. Women's annual routine gynecological examination  -     Ambulatory Referral to Obstetrics / Gynecology    6. Encounter for osteoporosis screening in asymptomatic postmenopausal patient  -     DEXA Bone Density Axial    7. Anxiety  -     PARoxetine (Paxil) 10 MG tablet; Take 1 tablet by mouth Every Morning.  Dispense: 30 tablet; Refill: 5    8. BMI 39.0-39.9,adult    9. Asplenia    10. Psoriasis        Follow Up   Return in about 6 months (around 12/4/2021).  Patient was given instructions and counseling regarding her condition or for health maintenance advice. Please see specific information pulled into the AVS if appropriate.         This document has been electronically signed by Lloyd Varghese MD

## 2021-06-04 NOTE — PATIENT INSTRUCTIONS
Exercising to Lose Weight  Exercise is structured, repetitive physical activity to improve fitness and health. Getting regular exercise is important for everyone. It is especially important if you are overweight. Being overweight increases your risk of heart disease, stroke, diabetes, high blood pressure, and several types of cancer. Reducing your calorie intake and exercising can help you lose weight.  Exercise is usually categorized as moderate or vigorous intensity. To lose weight, most people need to do a certain amount of moderate-intensity or vigorous-intensity exercise each week.  Moderate-intensity exercise    Moderate-intensity exercise is any activity that gets you moving enough to burn at least three times more energy (calories) than if you were sitting.  Examples of moderate exercise include:  · Walking a mile in 15 minutes.  · Doing light yard work.  · Biking at an easy pace.  Most people should get at least 150 minutes (2 hours and 30 minutes) a week of moderate-intensity exercise to maintain their body weight.  Vigorous-intensity exercise  Vigorous-intensity exercise is any activity that gets you moving enough to burn at least six times more calories than if you were sitting. When you exercise at this intensity, you should be working hard enough that you are not able to carry on a conversation.  Examples of vigorous exercise include:  · Running.  · Playing a team sport, such as football, basketball, and soccer.  · Jumping rope.  Most people should get at least 75 minutes (1 hour and 15 minutes) a week of vigorous-intensity exercise to maintain their body weight.  How can exercise affect me?  When you exercise enough to burn more calories than you eat, you lose weight. Exercise also reduces body fat and builds muscle. The more muscle you have, the more calories you burn. Exercise also:  · Improves mood.  · Reduces stress and tension.  · Improves your overall fitness, flexibility, and  endurance.  · Increases bone strength.  The amount of exercise you need to lose weight depends on:  · Your age.  · The type of exercise.  · Any health conditions you have.  · Your overall physical ability.  Talk to your health care provider about how much exercise you need and what types of activities are safe for you.  What actions can I take to lose weight?  Nutrition    · Make changes to your diet as told by your health care provider or diet and nutrition specialist (dietitian). This may include:  ? Eating fewer calories.  ? Eating more protein.  ? Eating less unhealthy fats.  ? Eating a diet that includes fresh fruits and vegetables, whole grains, low-fat dairy products, and lean protein.  ? Avoiding foods with added fat, salt, and sugar.  · Drink plenty of water while you exercise to prevent dehydration or heat stroke.  Activity  · Choose an activity that you enjoy and set realistic goals. Your health care provider can help you make an exercise plan that works for you.  · Exercise at a moderate or vigorous intensity most days of the week.  ? The intensity of exercise may vary from person to person. You can tell how intense a workout is for you by paying attention to your breathing and heartbeat. Most people will notice their breathing and heartbeat get faster with more intense exercise.  · Do resistance training twice each week, such as:  ? Push-ups.  ? Sit-ups.  ? Lifting weights.  ? Using resistance bands.  · Getting short amounts of exercise can be just as helpful as long structured periods of exercise. If you have trouble finding time to exercise, try to include exercise in your daily routine.  ? Get up, stretch, and walk around every 30 minutes throughout the day.  ? Go for a walk during your lunch break.  ? Park your car farther away from your destination.  ? If you take public transportation, get off one stop early and walk the rest of the way.  ? Make phone calls while standing up and walking  around.  ? Take the stairs instead of elevators or escalators.  · Wear comfortable clothes and shoes with good support.  · Do not exercise so much that you hurt yourself, feel dizzy, or get very short of breath.  Where to find more information  · [place] Department of Health and Human Services: www.hhs.gov  · Centers for Disease Control and Prevention (CDC): www.cdc.gov  Contact a health care provider:  · Before starting a new exercise program.  · If you have questions or concerns about your weight.  · If you have a medical problem that keeps you from exercising.  Get help right away if you have any of the following while exercising:  · Injury.  · Dizziness.  · Difficulty breathing or shortness of breath that does not go away when you stop exercising.  · Chest pain.  · Rapid heartbeat.  Summary  · Being overweight increases your risk of heart disease, stroke, diabetes, high blood pressure, and several types of cancer.  · Losing weight happens when you burn more calories than you eat.  · Reducing the amount of calories you eat in addition to getting regular moderate or vigorous exercise each week helps you lose weight.  This information is not intended to replace advice given to you by your health care provider. Make sure you discuss any questions you have with your health care provider.  Document Revised: 12/31/2018 Document Reviewed: 12/31/2018  [email or web address] Patient Education © 2021 [employer]      Calorie Counting for Weight Loss  Calories are units of energy. Your body needs a certain amount of calories from food to keep you going throughout the day. When you eat more calories than your body needs, your body stores the extra calories as fat. When you eat fewer calories than your body needs, your body burns fat to get the energy it needs.  Calorie counting means keeping track of how many calories you eat and drink each day. Calorie counting can be helpful if you need to lose weight. If you make sure to eat fewer  calories than your body needs, you should lose weight. Ask your health care provider what a healthy weight is for you.  For calorie counting to work, you will need to eat the right number of calories in a day in order to lose a healthy amount of weight per week. A dietitian can help you determine how many calories you need in a day and will give you suggestions on how to reach your calorie goal.  · A healthy amount of weight to lose per week is usually 1-2 lb (0.5-0.9 kg). This usually means that your daily calorie intake should be reduced by 500-750 calories.  · Eating 1,200 - 1,500 calories per day can help most women lose weight.  · Eating 1,500 - 1,800 calories per day can help most men lose weight.  What is my plan?  My goal is to have __________ calories per day.  If I have this many calories per day, I should lose around __________ pounds per week.  What do I need to know about calorie counting?  In order to meet your daily calorie goal, you will need to:  · Find out how many calories are in each food you would like to eat. Try to do this before you eat.  · Decide how much of the food you plan to eat.  · Write down what you ate and how many calories it had. Doing this is called keeping a food log.  To successfully lose weight, it is important to balance calorie counting with a healthy lifestyle that includes regular activity. Aim for 150 minutes of moderate exercise (such as walking) or 75 minutes of vigorous exercise (such as running) each week.  Where do I find calorie information?    The number of calories in a food can be found on a Nutrition Facts label. If a food does not have a Nutrition Facts label, try to look up the calories online or ask your dietitian for help.  Remember that calories are listed per serving. If you choose to have more than one serving of a food, you will have to multiply the calories per serving by the amount of servings you plan to eat. For example, the label on a package of  "bread might say that a serving size is 1 slice and that there are 90 calories in a serving. If you eat 1 slice, you will have eaten 90 calories. If you eat 2 slices, you will have eaten 180 calories.  How do I keep a food log?  Immediately after each meal, record the following information in your food log:  · What you ate. Don't forget to include toppings, sauces, and other extras on the food.  · How much you ate. This can be measured in cups, ounces, or number of items.  · How many calories each food and drink had.  · The total number of calories in the meal.  Keep your food log near you, such as in a small notebook in your pocket, or use a mobile herminia or website. Some programs will calculate calories for you and show you how many calories you have left for the day to meet your goal.  What are some calorie counting tips?    · Use your calories on foods and drinks that will fill you up and not leave you hungry:  ? Some examples of foods that fill you up are nuts and nut butters, vegetables, lean proteins, and high-fiber foods like whole grains. High-fiber foods are foods with more than 5 g fiber per serving.  ? Drinks such as sodas, specialty coffee drinks, alcohol, and juices have a lot of calories, yet do not fill you up.  · Eat nutritious foods and avoid empty calories. Empty calories are calories you get from foods or beverages that do not have many vitamins or protein, such as candy, sweets, and soda. It is better to have a nutritious high-calorie food (such as an avocado) than a food with few nutrients (such as a bag of chips).  · Know how many calories are in the foods you eat most often. This will help you calculate calorie counts faster.  · Pay attention to calories in drinks. Low-calorie drinks include water and unsweetened drinks.  · Pay attention to nutrition labels for \"low fat\" or \"fat free\" foods. These foods sometimes have the same amount of calories or more calories than the full fat versions. They " also often have added sugar, starch, or salt, to make up for flavor that was removed with the fat.  · Find a way of tracking calories that works for you. Get creative. Try different apps or programs if writing down calories does not work for you.  What are some portion control tips?  · Know how many calories are in a serving. This will help you know how many servings of a certain food you can have.  · Use a measuring cup to measure serving sizes. You could also try weighing out portions on a kitchen scale. With time, you will be able to estimate serving sizes for some foods.  · Take some time to put servings of different foods on your favorite plates, bowls, and cups so you know what a serving looks like.  · Try not to eat straight from a bag or box. Doing this can lead to overeating. Put the amount you would like to eat in a cup or on a plate to make sure you are eating the right portion.  · Use smaller plates, glasses, and bowls to prevent overeating.  · Try not to multitask (for example, watch TV or use your computer) while eating. If it is time to eat, sit down at a table and enjoy your food. This will help you to know when you are full. It will also help you to be aware of what you are eating and how much you are eating.  What are tips for following this plan?  Reading food labels  · Check the calorie count compared to the serving size. The serving size may be smaller than what you are used to eating.  · Check the source of the calories. Make sure the food you are eating is high in vitamins and protein and low in saturated and trans fats.  Shopping  · Read nutrition labels while you shop. This will help you make healthy decisions before you decide to purchase your food.  · Make a grocery list and stick to it.  Cooking  · Try to cook your favorite foods in a healthier way. For example, try baking instead of frying.  · Use low-fat dairy products.  Meal planning  · Use more fruits and vegetables. Half of your  "plate should be fruits and vegetables.  · Include lean proteins like poultry and fish.  How do I count calories when eating out?  · Ask for smaller portion sizes.  · Consider sharing an entree and sides instead of getting your own entree.  · If you get your own entree, eat only half. Ask for a box at the beginning of your meal and put the rest of your entree in it so you are not tempted to eat it.  · If calories are listed on the menu, choose the lower calorie options.  · Choose dishes that include vegetables, fruits, whole grains, low-fat dairy products, and lean protein.  · Choose items that are boiled, broiled, grilled, or steamed. Stay away from items that are buttered, battered, fried, or served with cream sauce. Items labeled \"crispy\" are usually fried, unless stated otherwise.  · Choose water, low-fat milk, unsweetened iced tea, or other drinks without added sugar. If you want an alcoholic beverage, choose a lower calorie option such as a glass of wine or light beer.  · Ask for dressings, sauces, and syrups on the side. These are usually high in calories, so you should limit the amount you eat.  · If you want a salad, choose a garden salad and ask for grilled meats. Avoid extra toppings like randall, cheese, or fried items. Ask for the dressing on the side, or ask for olive oil and vinegar or lemon to use as dressing.  · Estimate how many servings of a food you are given. For example, a serving of cooked rice is ½ cup or about the size of half a baseball. Knowing serving sizes will help you be aware of how much food you are eating at restaurants. The list below tells you how big or small some common portion sizes are based on everyday objects:  ? 1 oz--4 stacked dice.  ? 3 oz--1 deck of cards.  ? 1 tsp--1 die.  ? 1 Tbsp--½ a ping-pong ball.  ? 2 Tbsp--1 ping-pong ball.  ? ½ cup--½ baseball.  ? 1 cup--1 baseball.  Summary  · Calorie counting means keeping track of how many calories you eat and drink each day. If " "you eat fewer calories than your body needs, you should lose weight.  · A healthy amount of weight to lose per week is usually 1-2 lb (0.5-0.9 kg). This usually means reducing your daily calorie intake by 500-750 calories.  · The number of calories in a food can be found on a Nutrition Facts label. If a food does not have a Nutrition Facts label, try to look up the calories online or ask your dietitian for help.  · Use your calories on foods and drinks that will fill you up, and not on foods and drinks that will leave you hungry.  · Use smaller plates, glasses, and bowls to prevent overeating.  This information is not intended to replace advice given to you by your health care provider. Make sure you discuss any questions you have with your health care provider.  Document Revised: 09/06/2019 Document Reviewed: 11/17/2017  [employer] Patient Education © 2020 [employer]      https://www.nhlbi.nih.gov/files/docs/public/heart/dash_brief.pdf\">   DASH Eating Plan  DASH stands for Dietary Approaches to Stop Hypertension. The DASH eating plan is a healthy eating plan that has been shown to:  · Reduce high blood pressure (hypertension).  · Reduce your risk for type 2 diabetes, heart disease, and stroke.  · Help with weight loss.  What are tips for following this plan?  Reading food labels  · Check food labels for the amount of salt (sodium) per serving. Choose foods with less than 5 percent of the Daily Value of sodium. Generally, foods with less than 300 milligrams (mg) of sodium per serving fit into this eating plan.  · To find whole grains, look for the word \"whole\" as the first word in the ingredient list.  Shopping  · Buy products labeled as \"low-sodium\" or \"no salt added.\"  · Buy fresh foods. Avoid canned foods and pre-made or frozen meals.  Cooking  · Avoid adding salt when cooking. Use salt-free seasonings or herbs instead of table salt or sea salt. Check with your health care provider or pharmacist before using " salt substitutes.  · Do not weems foods. Cook foods using healthy methods such as baking, boiling, grilling, roasting, and broiling instead.  · Cook with heart-healthy oils, such as olive, canola, avocado, soybean, or sunflower oil.  Meal planning    · Eat a balanced diet that includes:  ? 4 or more servings of fruits and 4 or more servings of vegetables each day. Try to fill one-half of your plate with fruits and vegetables.  ? 6-8 servings of whole grains each day.  ? Less than 6 oz (170 g) of lean meat, poultry, or fish each day. A 3-oz (85-g) serving of meat is about the same size as a deck of cards. One egg equals 1 oz (28 g).  ? 2-3 servings of low-fat dairy each day. One serving is 1 cup (237 mL).  ? 1 serving of nuts, seeds, or beans 5 times each week.  ? 2-3 servings of heart-healthy fats. Healthy fats called omega-3 fatty acids are found in foods such as walnuts, flaxseeds, fortified milks, and eggs. These fats are also found in cold-water fish, such as sardines, salmon, and mackerel.  · Limit how much you eat of:  ? Canned or prepackaged foods.  ? Food that is high in trans fat, such as some fried foods.  ? Food that is high in saturated fat, such as fatty meat.  ? Desserts and other sweets, sugary drinks, and other foods with added sugar.  ? Full-fat dairy products.  · Do not salt foods before eating.  · Do not eat more than 4 egg yolks a week.  · Try to eat at least 2 vegetarian meals a week.  · Eat more home-cooked food and less restaurant, buffet, and fast food.  Lifestyle  · When eating at a restaurant, ask that your food be prepared with less salt or no salt, if possible.  · If you drink alcohol:  ? Limit how much you use to:  § 0-1 drink a day for women who are not pregnant.  § 0-2 drinks a day for men.  ? Be aware of how much alcohol is in your drink. In the [place] one drink equals one 12 oz bottle of beer (355 mL), one 5 oz glass of wine (148 mL), or one 1½ oz glass of hard liquor (44 mL).  General  information  · Avoid eating more than 2,300 mg of salt a day. If you have hypertension, you may need to reduce your sodium intake to 1,500 mg a day.  · Work with your health care provider to maintain a healthy body weight or to lose weight. Ask what an ideal weight is for you.  · Get at least 30 minutes of exercise that causes your heart to beat faster (aerobic exercise) most days of the week. Activities may include walking, swimming, or biking.  · Work with your health care provider or dietitian to adjust your eating plan to your individual calorie needs.  What foods should I eat?  Fruits  All fresh, dried, or frozen fruit. Canned fruit in natural juice (without added sugar).  Vegetables  Fresh or frozen vegetables (raw, steamed, roasted, or grilled). Low-sodium or reduced-sodium tomato and vegetable juice. Low-sodium or reduced-sodium tomato sauce and tomato paste. Low-sodium or reduced-sodium canned vegetables.  Grains  Whole-grain or whole-wheat bread. Whole-grain or whole-wheat pasta. Brown rice. Oatmeal. Quinoa. Bulgur. Whole-grain and low-sodium cereals. Lindsay bread. Low-fat, low-sodium crackers. Whole-wheat flour tortillas.  Meats and other proteins  Skinless chicken or turkey. Ground chicken or turkey. Pork with fat trimmed off. Fish and seafood. Egg whites. Dried beans, peas, or lentils. Unsalted nuts, nut butters, and seeds. Unsalted canned beans. Lean cuts of beef with fat trimmed off. Low-sodium, lean precooked or cured meat, such as sausages or meat loaves.  Dairy  Low-fat (1%) or fat-free (skim) milk. Reduced-fat, low-fat, or fat-free cheeses. Nonfat, low-sodium ricotta or cottage cheese. Low-fat or nonfat yogurt. Low-fat, low-sodium cheese.  Fats and oils  Soft margarine without trans fats. Vegetable oil. Reduced-fat, low-fat, or light mayonnaise and salad dressings (reduced-sodium). Canola, safflower, olive, avocado, soybean, and sunflower oils. Avocado.  Seasonings and condiments  Herbs. Spices.  Seasoning mixes without salt.  Other foods  Unsalted popcorn and pretzels. Fat-free sweets.  The items listed above may not be a complete list of foods and beverages you can eat. Contact a dietitian for more information.  What foods should I avoid?  Fruits  Canned fruit in a light or heavy syrup. Fried fruit. Fruit in cream or butter sauce.  Vegetables  Creamed or fried vegetables. Vegetables in a cheese sauce. Regular canned vegetables (not low-sodium or reduced-sodium). Regular canned tomato sauce and paste (not low-sodium or reduced-sodium). Regular tomato and vegetable juice (not low-sodium or reduced-sodium). Pickles. Olives.  Grains  Baked goods made with fat, such as croissants, muffins, or some breads. Dry pasta or rice meal packs.  Meats and other proteins  Fatty cuts of meat. Ribs. Fried meat. Gooden. Bologna, salami, and other precooked or cured meats, such as sausages or meat loaves. Fat from the back of a pig (fatback). Bratwurst. Salted nuts and seeds. Canned beans with added salt. Canned or smoked fish. Whole eggs or egg yolks. Chicken or turkey with skin.  Dairy  Whole or 2% milk, cream, and half-and-half. Whole or full-fat cream cheese. Whole-fat or sweetened yogurt. Full-fat cheese. Nondairy creamers. Whipped toppings. Processed cheese and cheese spreads.  Fats and oils  Butter. Stick margarine. Lard. Shortening. Ghee. Gooden fat. Tropical oils, such as coconut, palm kernel, or palm oil.  Seasonings and condiments  Onion salt, garlic salt, seasoned salt, table salt, and sea salt. Worcestershire sauce. Tartar sauce. Barbecue sauce. Teriyaki sauce. Soy sauce, including reduced-sodium. Steak sauce. Canned and packaged gravies. Fish sauce. Oyster sauce. Cocktail sauce. Store-bought horseradish. Ketchup. Mustard. Meat flavorings and tenderizers. Bouillon cubes. Hot sauces. Pre-made or packaged marinades. Pre-made or packaged taco seasonings. Relishes. Regular salad dressings.  Other foods  Salted popcorn  and pretzels.  The items listed above may not be a complete list of foods and beverages you should avoid. Contact a dietitian for more information.  Where to find more information  · National Heart, Lung, and Blood [place]: www.nhlbi.nih.gov  · American Heart Association: www.heart.org  · Academy of Nutrition and Dietetics: www.eatright.org  · National Kidney Foundation: www.kidney.org  Summary  · The DASH eating plan is a healthy eating plan that has been shown to reduce high blood pressure (hypertension). It may also reduce your risk for type 2 diabetes, heart disease, and stroke.  · When on the DASH eating plan, aim to eat more fresh fruits and vegetables, whole grains, lean proteins, low-fat dairy, and heart-healthy fats.  · With the DASH eating plan, you should limit salt (sodium) intake to 2,300 mg a day. If you have hypertension, you may need to reduce your sodium intake to 1,500 mg a day.  · Work with your health care provider or dietitian to adjust your eating plan to your individual calorie needs.  This information is not intended to replace advice given to you by your health care provider. Make sure you discuss any questions you have with your health care provider.  Document Revised: 11/20/2020 Document Reviewed: 11/20/2020  [employer] Patient Education © 2021 [employer]      Health Maintenance After Age 65  After age 65, you are at a higher risk for certain long-term diseases and infections as well as injuries from falls. Falls are a major cause of broken bones and head injuries in people who are older than age 65. Getting regular preventive care can help to keep you healthy and well. Preventive care includes getting regular testing and making lifestyle changes as recommended by your health care provider. Talk with your health care provider about:  · Which screenings and tests you should have. A screening is a test that checks for a disease when you have no symptoms.  · A diet and exercise plan that  is right for you.  What should I know about screenings and tests to prevent falls?  Screening and testing are the best ways to find a health problem early. Early diagnosis and treatment give you the best chance of managing medical conditions that are common after age 65. Certain conditions and lifestyle choices may make you more likely to have a fall. Your health care provider may recommend:  · Regular vision checks. Poor vision and conditions such as cataracts can make you more likely to have a fall. If you wear glasses, make sure to get your prescription updated if your vision changes.  · Medicine review. Work with your health care provider to regularly review all of the medicines you are taking, including over-the-counter medicines. Ask your health care provider about any side effects that may make you more likely to have a fall. Tell your health care provider if any medicines that you take make you feel dizzy or sleepy.  · Osteoporosis screening. Osteoporosis is a condition that causes the bones to get weaker. This can make the bones weak and cause them to break more easily.  · Blood pressure screening. Blood pressure changes and medicines to control blood pressure can make you feel dizzy.  · Strength and balance checks. Your health care provider may recommend certain tests to check your strength and balance while standing, walking, or changing positions.  · Foot health exam. Foot pain and numbness, as well as not wearing proper footwear, can make you more likely to have a fall.  · Depression screening. You may be more likely to have a fall if you have a fear of falling, feel emotionally low, or feel unable to do activities that you used to do.  · Alcohol use screening. Using too much alcohol can affect your balance and may make you more likely to have a fall.  What actions can I take to lower my risk of falls?  General instructions  · Talk with your health care provider about your risks for falling. Tell your  health care provider if:  ? You fall. Be sure to tell your health care provider about all falls, even ones that seem minor.  ? You feel dizzy, sleepy, or off-balance.  · Take over-the-counter and prescription medicines only as told by your health care provider. These include any supplements.  · Eat a healthy diet and maintain a healthy weight. A healthy diet includes low-fat dairy products, low-fat (lean) meats, and fiber from whole grains, beans, and lots of fruits and vegetables.  Home safety  · Remove any tripping hazards, such as rugs, cords, and clutter.  · Install safety equipment such as grab bars in bathrooms and safety rails on stairs.  · Keep rooms and walkways well-lit.  Activity    · Follow a regular exercise program to stay fit. This will help you maintain your balance. Ask your health care provider what types of exercise are appropriate for you.  · If you need a cane or walker, use it as recommended by your health care provider.  · Wear supportive shoes that have nonskid soles.  Lifestyle  · Do not drink alcohol if your health care provider tells you not to drink.  · If you drink alcohol, limit how much you have:  ? 0-1 drink a day for women.  ? 0-2 drinks a day for men.  · Be aware of how much alcohol is in your drink. In the [place] one drink equals one typical bottle of beer (12 oz), one-half glass of wine (5 oz), or one shot of hard liquor (1½ oz).  · Do not use any products that contain nicotine or tobacco, such as cigarettes and e-cigarettes. If you need help quitting, ask your health care provider.  Summary  · Having a healthy lifestyle and getting preventive care can help to protect your health and wellness after age 65.  · Screening and testing are the best way to find a health problem early and help you avoid having a fall. Early diagnosis and treatment give you the best chance for managing medical conditions that are more common for people who are older than age 65.  · Falls are a major cause  of broken bones and head injuries in people who are older than age 65. Take precautions to prevent a fall at home.  · Work with your health care provider to learn what changes you can make to improve your health and wellness and to prevent falls.  This information is not intended to replace advice given to you by your health care provider. Make sure you discuss any questions you have with your health care provider.  Document Revised: 04/09/2020 Document Reviewed: 10/31/2018  [employer] Patient Education © 2021 [employer]    Have labs Drawn fasting in July.

## 2021-06-06 RX ORDER — PAROXETINE 10 MG/1
10 TABLET, FILM COATED ORAL EVERY MORNING
Qty: 30 TABLET | Refills: 5 | Status: SHIPPED | OUTPATIENT
Start: 2021-06-06 | End: 2021-12-06 | Stop reason: SDUPTHER

## 2021-06-06 RX ORDER — LEVOTHYROXINE SODIUM 0.1 MG/1
100 TABLET ORAL DAILY
Qty: 30 TABLET | Refills: 5 | Status: SHIPPED | OUTPATIENT
Start: 2021-06-06 | End: 2021-12-06 | Stop reason: SDUPTHER

## 2021-07-01 ENCOUNTER — TELEPHONE (OUTPATIENT)
Dept: OBSTETRICS AND GYNECOLOGY | Facility: CLINIC | Age: 66
End: 2021-07-01

## 2021-07-22 ENCOUNTER — OFFICE VISIT (OUTPATIENT)
Dept: OBSTETRICS AND GYNECOLOGY | Facility: CLINIC | Age: 66
End: 2021-07-22

## 2021-07-22 ENCOUNTER — LAB (OUTPATIENT)
Dept: LAB | Facility: HOSPITAL | Age: 66
End: 2021-07-22

## 2021-07-22 VITALS
DIASTOLIC BLOOD PRESSURE: 84 MMHG | SYSTOLIC BLOOD PRESSURE: 140 MMHG | BODY MASS INDEX: 41.21 KG/M2 | HEIGHT: 57 IN | WEIGHT: 191 LBS

## 2021-07-22 DIAGNOSIS — Z01.419 WELL WOMAN EXAM WITH ROUTINE GYNECOLOGICAL EXAM: Primary | ICD-10-CM

## 2021-07-22 DIAGNOSIS — L40.9 PSORIASIS: ICD-10-CM

## 2021-07-22 DIAGNOSIS — I10 ESSENTIAL HYPERTENSION: ICD-10-CM

## 2021-07-22 DIAGNOSIS — Z00.00 ROUTINE MEDICAL EXAM: ICD-10-CM

## 2021-07-22 DIAGNOSIS — E03.9 ACQUIRED HYPOTHYROIDISM: ICD-10-CM

## 2021-07-22 DIAGNOSIS — E53.8 B12 DEFICIENCY: ICD-10-CM

## 2021-07-22 PROBLEM — R21 FACIAL RASH: Status: RESOLVED | Noted: 2020-08-01 | Resolved: 2021-07-22

## 2021-07-22 PROBLEM — H92.09 EARACHE: Status: RESOLVED | Noted: 2018-11-17 | Resolved: 2021-07-22

## 2021-07-22 LAB
ALBUMIN SERPL-MCNC: 4.2 G/DL (ref 3.5–5.2)
ALBUMIN/GLOB SERPL: 1.3 G/DL
ALP SERPL-CCNC: 140 U/L (ref 39–117)
ALT SERPL W P-5'-P-CCNC: 27 U/L (ref 1–33)
ANION GAP SERPL CALCULATED.3IONS-SCNC: 10.5 MMOL/L (ref 5–15)
AST SERPL-CCNC: 24 U/L (ref 1–32)
BASOPHILS # BLD AUTO: 0.05 10*3/MM3 (ref 0–0.2)
BASOPHILS NFR BLD AUTO: 0.4 % (ref 0–1.5)
BILIRUB SERPL-MCNC: 0.3 MG/DL (ref 0–1.2)
BILIRUB UR QL STRIP: NEGATIVE
BUN SERPL-MCNC: 14 MG/DL (ref 8–23)
BUN/CREAT SERPL: 18.4 (ref 7–25)
CALCIUM SPEC-SCNC: 9.3 MG/DL (ref 8.6–10.5)
CHLORIDE SERPL-SCNC: 107 MMOL/L (ref 98–107)
CHOLEST SERPL-MCNC: 198 MG/DL (ref 0–200)
CLARITY UR: CLEAR
CO2 SERPL-SCNC: 26.5 MMOL/L (ref 22–29)
COLOR UR: YELLOW
CREAT SERPL-MCNC: 0.76 MG/DL (ref 0.57–1)
DEPRECATED RDW RBC AUTO: 40.9 FL (ref 37–54)
EOSINOPHIL # BLD AUTO: 0.4 10*3/MM3 (ref 0–0.4)
EOSINOPHIL NFR BLD AUTO: 3.5 % (ref 0.3–6.2)
ERYTHROCYTE [DISTWIDTH] IN BLOOD BY AUTOMATED COUNT: 13.2 % (ref 12.3–15.4)
GFR SERPL CREATININE-BSD FRML MDRD: 76 ML/MIN/1.73
GFR SERPL CREATININE-BSD FRML MDRD: 93 ML/MIN/1.73
GLOBULIN UR ELPH-MCNC: 3.2 GM/DL
GLUCOSE SERPL-MCNC: 86 MG/DL (ref 65–99)
GLUCOSE UR STRIP-MCNC: NEGATIVE MG/DL
HCT VFR BLD AUTO: 43 % (ref 34–46.6)
HDLC SERPL-MCNC: 41 MG/DL (ref 40–60)
HGB BLD-MCNC: 14.7 G/DL (ref 12–15.9)
HGB UR QL STRIP.AUTO: NEGATIVE
IMM GRANULOCYTES # BLD AUTO: 0.04 10*3/MM3 (ref 0–0.05)
IMM GRANULOCYTES NFR BLD AUTO: 0.4 % (ref 0–0.5)
KETONES UR QL STRIP: NEGATIVE
LDLC SERPL CALC-MCNC: 129 MG/DL (ref 0–100)
LDLC/HDLC SERPL: 3.05 {RATIO}
LEUKOCYTE ESTERASE UR QL STRIP.AUTO: NEGATIVE
LYMPHOCYTES # BLD AUTO: 3.78 10*3/MM3 (ref 0.7–3.1)
LYMPHOCYTES NFR BLD AUTO: 33.4 % (ref 19.6–45.3)
MCH RBC QN AUTO: 28.9 PG (ref 26.6–33)
MCHC RBC AUTO-ENTMCNC: 34.2 G/DL (ref 31.5–35.7)
MCV RBC AUTO: 84.5 FL (ref 79–97)
MONOCYTES # BLD AUTO: 1.05 10*3/MM3 (ref 0.1–0.9)
MONOCYTES NFR BLD AUTO: 9.3 % (ref 5–12)
NEUTROPHILS NFR BLD AUTO: 5.99 10*3/MM3 (ref 1.7–7)
NEUTROPHILS NFR BLD AUTO: 53 % (ref 42.7–76)
NITRITE UR QL STRIP: NEGATIVE
NRBC BLD AUTO-RTO: 0.1 /100 WBC (ref 0–0.2)
PH UR STRIP.AUTO: 6 [PH] (ref 5–8)
PLATELET # BLD AUTO: 322 10*3/MM3 (ref 140–450)
PMV BLD AUTO: 12.2 FL (ref 6–12)
POTASSIUM SERPL-SCNC: 3.9 MMOL/L (ref 3.5–5.2)
PROT SERPL-MCNC: 7.4 G/DL (ref 6–8.5)
PROT UR QL STRIP: ABNORMAL
RBC # BLD AUTO: 5.09 10*6/MM3 (ref 3.77–5.28)
SODIUM SERPL-SCNC: 144 MMOL/L (ref 136–145)
SP GR UR STRIP: 1.03 (ref 1–1.03)
TRIGL SERPL-MCNC: 159 MG/DL (ref 0–150)
TSH SERPL DL<=0.05 MIU/L-ACNC: 0.04 UIU/ML (ref 0.27–4.2)
UROBILINOGEN UR QL STRIP: ABNORMAL
VIT B12 BLD-MCNC: 876 PG/ML (ref 211–946)
VLDLC SERPL-MCNC: 28 MG/DL (ref 5–40)
WBC # BLD AUTO: 11.31 10*3/MM3 (ref 3.4–10.8)

## 2021-07-22 PROCEDURE — 80061 LIPID PANEL: CPT

## 2021-07-22 PROCEDURE — 85025 COMPLETE CBC W/AUTO DIFF WBC: CPT

## 2021-07-22 PROCEDURE — 99397 PER PM REEVAL EST PAT 65+ YR: CPT | Performed by: NURSE PRACTITIONER

## 2021-07-22 PROCEDURE — 81003 URINALYSIS AUTO W/O SCOPE: CPT

## 2021-07-22 PROCEDURE — 84443 ASSAY THYROID STIM HORMONE: CPT

## 2021-07-22 PROCEDURE — 82607 VITAMIN B-12: CPT

## 2021-07-22 PROCEDURE — 80053 COMPREHEN METABOLIC PANEL: CPT

## 2021-07-22 NOTE — PROGRESS NOTES
"Subjective   Mira Deal is a 65 y.o. here for gyn annual    Mira Deal ins a 65 yr old who presents today for her gyn annual. Denies any concerns. Hx of hysterectomy for \"cancer\" in 1985 but patient unsure of what type of cancer. Reports her vaginal pap smears after were all negative, last pap on file was in 2016 and it was normal. Pt states she has her ovaries left but one is \"misplaced\"; knows it was not surgically removed. Denies vaginal discharge or itching. 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 Last mammo 1/29/2021, normal.  Pt has psoriasis and need to refill her triamcinolone.   Patient reports that she is not currently experiencing any symptoms of urinary incontinence.      Gynecologic Exam  The patient's pertinent negatives include no pelvic pain or vaginal discharge. Pertinent negatives include no abdominal pain, chills, constipation, diarrhea, dysuria, fever, frequency, nausea, rash or sore throat.       The following portions of the patient's history were reviewed and updated as appropriate: allergies, current medications, past family history, past medical history, past social history, past surgical history and problem list.    Review of Systems   Constitutional: Negative for chills, fatigue, fever, unexpected weight gain and unexpected weight loss.   HENT: Negative for sneezing and sore throat.    Respiratory: Negative for shortness of breath.    Cardiovascular: Negative for chest pain and palpitations.   Gastrointestinal: Negative for abdominal pain, constipation, diarrhea and nausea.   Endocrine: Negative for cold intolerance and heat intolerance.   Genitourinary: Negative for breast discharge, breast lump, breast pain, difficulty urinating, dysuria, frequency, pelvic pain, pelvic pressure, urinary incontinence, vaginal bleeding, vaginal discharge and vaginal pain.   Skin: Negative for rash.   Neurological: Negative for weakness and headache.   Psychiatric/Behavioral: Negative for " sleep disturbance, depressed mood and stress.       Objective   Physical Exam  Vitals and nursing note reviewed.   Constitutional:       Appearance: She is well-developed.   HENT:      Head: Normocephalic and atraumatic.   Eyes:      Conjunctiva/sclera: Conjunctivae normal.   Neck:      Thyroid: No thyromegaly.   Cardiovascular:      Rate and Rhythm: Normal rate and regular rhythm.      Heart sounds: Normal heart sounds.   Pulmonary:      Effort: Pulmonary effort is normal. No respiratory distress.      Breath sounds: Normal breath sounds.   Chest:      Breasts:         Right: No skin change.            Comments: Multiple psoriasis patches underneath breasts.   Abdominal:      General: Bowel sounds are normal. There is no distension.      Palpations: Abdomen is soft.      Tenderness: There is no abdominal tenderness.       Genitourinary:     Labia:         Right: Rash present. No tenderness, lesion or injury.         Left: Rash present. No tenderness, lesion or injury.       Vagina: Normal.      Uterus: Absent.           Comments: Vaginal cuff intact. Pap smear not indicated.   Musculoskeletal:         General: Normal range of motion.      Cervical back: Normal range of motion and neck supple.   Skin:     General: Skin is warm and dry.   Neurological:      Mental Status: She is alert and oriented to person, place, and time.   Psychiatric:         Behavior: Behavior normal.           Assessment/Plan   Diagnoses and all orders for this visit:    1. Well woman exam with routine gynecological exam (Primary)    2. Psoriasis          Discussed psoriasis; pt will refill steroid cream. Discussed by concern for possible vulvar sclerosus but denies bothersome symptoms and declines medication at this time. Pt instructed to return if she develops bothersome symptoms to include itching. Encourage breast awareness and annual mammogram. Return in one year.

## 2021-07-23 ENCOUNTER — TELEPHONE (OUTPATIENT)
Dept: FAMILY MEDICINE CLINIC | Facility: CLINIC | Age: 66
End: 2021-07-23

## 2021-07-23 NOTE — TELEPHONE ENCOUNTER
----- Message from Lloyd Varghese MD sent at 7/23/2021 10:01 AM CDT -----  Labs OK, stable from last checks. Will go over at next visit.

## 2021-10-15 DIAGNOSIS — Z00.00 ROUTINE MEDICAL EXAM: ICD-10-CM

## 2021-12-03 ENCOUNTER — TELEPHONE (OUTPATIENT)
Dept: FAMILY MEDICINE CLINIC | Facility: CLINIC | Age: 66
End: 2021-12-03

## 2021-12-06 ENCOUNTER — OFFICE VISIT (OUTPATIENT)
Dept: FAMILY MEDICINE CLINIC | Facility: CLINIC | Age: 66
End: 2021-12-06

## 2021-12-06 VITALS
HEART RATE: 96 BPM | BODY MASS INDEX: 41.4 KG/M2 | SYSTOLIC BLOOD PRESSURE: 142 MMHG | DIASTOLIC BLOOD PRESSURE: 74 MMHG | TEMPERATURE: 97.1 F | HEIGHT: 57 IN | OXYGEN SATURATION: 95 % | WEIGHT: 191.9 LBS

## 2021-12-06 DIAGNOSIS — L40.9 PSORIASIS: ICD-10-CM

## 2021-12-06 DIAGNOSIS — M19.90 ARTHRITIS: ICD-10-CM

## 2021-12-06 DIAGNOSIS — F41.9 ANXIETY: ICD-10-CM

## 2021-12-06 DIAGNOSIS — I10 ESSENTIAL HYPERTENSION: ICD-10-CM

## 2021-12-06 DIAGNOSIS — E03.9 ACQUIRED HYPOTHYROIDISM: ICD-10-CM

## 2021-12-06 DIAGNOSIS — Z00.00 ROUTINE MEDICAL EXAM: ICD-10-CM

## 2021-12-06 PROCEDURE — 99214 OFFICE O/P EST MOD 30 MIN: CPT | Performed by: FAMILY MEDICINE

## 2021-12-06 RX ORDER — LISINOPRIL 40 MG/1
40 TABLET ORAL DAILY
Qty: 30 TABLET | Refills: 5 | Status: SHIPPED | OUTPATIENT
Start: 2021-12-06 | End: 2022-06-07 | Stop reason: SDUPTHER

## 2021-12-06 RX ORDER — LEVOTHYROXINE SODIUM 0.1 MG/1
100 TABLET ORAL DAILY
Qty: 30 TABLET | Refills: 5 | Status: SHIPPED | OUTPATIENT
Start: 2021-12-06 | End: 2022-06-07 | Stop reason: SDUPTHER

## 2021-12-06 RX ORDER — PAROXETINE 10 MG/1
10 TABLET, FILM COATED ORAL EVERY MORNING
Qty: 30 TABLET | Refills: 5 | Status: SHIPPED | OUTPATIENT
Start: 2021-12-06 | End: 2022-04-11 | Stop reason: SDUPTHER

## 2021-12-06 NOTE — PROGRESS NOTES
Chief Complaint  Hypertension, Hypothyroidism, Anxiety, and Osteoarthritis  ' L knee still hurts after TKA, but not as bad'.   Subjective          Review of Systems   HENT: Negative for ear pain, postnasal drip, rhinorrhea, sinus pressure, sinus pain ( mild) and sneezing.    Eyes: Negative.    Respiratory: Negative for shortness of breath and wheezing.    Cardiovascular: Negative for palpitations.        S/P Cardiac work- up Neg   Gastrointestinal: Negative for diarrhea.   Endocrine: Negative.    Genitourinary: Negative for dysuria.   Musculoskeletal:        S/P L TKA    R Shoulder blade pain   Skin: Positive for rash.        Psoriasis   Allergic/Immunologic: Positive for environmental allergies.   Hematological: Negative.    Psychiatric/Behavioral: The patient is nervous/anxious.        Mira Deal presents to [facility] [place]  Hypertension  This is a chronic problem. The current episode started more than 1 year ago. The problem is unchanged. The problem is controlled. Associated symptoms include anxiety. Pertinent negatives include no palpitations or shortness of breath. Risk factors for coronary artery disease include obesity and post-menopausal state. Current antihypertension treatment includes ACE inhibitors. The current treatment provides significant improvement.   Hypothyroidism  This is a chronic problem. The current episode started more than 1 year ago. The problem occurs daily. The problem has been gradually improving. Associated symptoms include a rash. Nothing aggravates the symptoms. Treatments tried: Levothyroxine. The treatment provided significant relief.   Anxiety  Presents for initial visit. Symptoms include nervous/anxious behavior. Patient reports no palpitations or shortness of breath. The quality of sleep is fair.     Her past medical history is significant for anxiety/panic attacks and depression. Past treatments include SSRIs. The  "treatment provided moderate relief. Compliance with prior treatments has been good.   Osteoarthritis  Presents for follow-up (? Psoriatric arthritis) visit. She complains of pain. Affected location: multiple joints. Her pain is at a severity of 5/10. Associated symptoms include rash. Pertinent negatives include no diarrhea or dysuria. Her past medical history is significant for osteoarthritis.   Fatigue  This is a chronic problem. The current episode started more than 1 month ago. The problem occurs intermittently. The problem has been gradually improving. Associated symptoms include a rash. Exacerbated by: Low B12. Treatments tried: B12 supplement. The treatment provided moderate relief.   Obesity  This is a chronic problem. The current episode started more than 1 year ago. The problem occurs daily. The problem has been gradually improving. Associated symptoms include a rash. The symptoms are aggravated by eating. Treatments tried: Diet, exercise. The treatment provided mild relief.   Rash  This is a chronic (Psoriasis) problem. The affected locations include the face, neck, right hand and left hand. The rash is characterized by redness and scaling. Pertinent negatives include no diarrhea, rhinorrhea or shortness of breath. Past treatments include topical steroids. The treatment provided moderate relief.       Objective   Vital Signs:   /74 (BP Location: Right arm, Patient Position: Sitting, Cuff Size: Adult)   Pulse 96   Temp 97.1 °F (36.2 °C) (Temporal)   Ht 144.8 cm (57\")   Wt 87 kg (191 lb 14.4 oz)   SpO2 95%   BMI 41.53 kg/m²     Physical Exam  Vitals and nursing note reviewed.   Constitutional:       Appearance: Normal appearance. She is well-developed. She is obese. She is not ill-appearing.   HENT:      Head: Normocephalic and atraumatic.      Right Ear: Tympanic membrane, ear canal and external ear normal. There is no impacted cerumen.      Left Ear: Tympanic membrane, ear canal and external " ear normal. There is impacted cerumen.      Nose: Nose normal. No congestion.      Mouth/Throat:      Mouth: Mucous membranes are moist.      Pharynx: Oropharynx is clear.   Eyes:      Extraocular Movements: Extraocular movements intact.      Conjunctiva/sclera: Conjunctivae normal.      Pupils: Pupils are equal, round, and reactive to light.   Cardiovascular:      Rate and Rhythm: Normal rate and regular rhythm.      Heart sounds: Normal heart sounds.   Pulmonary:      Effort: Pulmonary effort is normal.      Breath sounds: Normal breath sounds.   Abdominal:      General: Bowel sounds are normal. There is no distension.      Palpations: Abdomen is soft.      Tenderness: There is no abdominal tenderness. There is no right CVA tenderness, left CVA tenderness or guarding.   Musculoskeletal:      Cervical back: Neck supple.      Comments: Tenderness in multiple joints.   Skin:     General: Skin is warm and dry.      Capillary Refill: Capillary refill takes 2 to 3 seconds.   Neurological:      General: No focal deficit present.      Mental Status: She is alert and oriented to person, place, and time. Mental status is at baseline.      Cranial Nerves: No cranial nerve deficit.      Sensory: No sensory deficit.      Motor: No weakness.      Coordination: Coordination normal.      Gait: Gait normal.   Psychiatric:         Mood and Affect: Mood normal.         Speech: Speech normal.         Behavior: Behavior normal.         Thought Content: Thought content normal.         Judgment: Judgment normal.        Result Review :                 Assessment and Plan    Diagnoses and all orders for this visit:    1. Acquired hypothyroidism  -     levothyroxine (Euthyrox) 100 MCG tablet; Take 1 tablet by mouth Daily.  Dispense: 30 tablet; Refill: 5    2. Essential hypertension  -     lisinopril (PRINIVIL,ZESTRIL) 40 MG tablet; Take 1 tablet by mouth Daily.  Dispense: 30 tablet; Refill: 5    3. Anxiety  -     PARoxetine (Paxil) 10 MG  tablet; Take 1 tablet by mouth Every Morning.  Dispense: 30 tablet; Refill: 5    4. Routine medical exam  -     triamcinolone (KENALOG) 0.1 % ointment; Apply  topically to the appropriate area as directed 2 (Two) Times a Day As Needed (Psoriasis).  Dispense: 454 g; Refill: 0    5. Psoriasis    6. Arthritis  Comments:  ? Psoriatric arthritis        Follow Up   Return in about 6 months (around 6/6/2022).  Patient was given instructions and counseling regarding her condition or for health maintenance advice. Please see specific information pulled into the AVS if appropriate.         This document has been electronically signed by Lloyd Varghese MD on December 6, 2021

## 2022-02-15 ENCOUNTER — OFFICE VISIT (OUTPATIENT)
Dept: FAMILY MEDICINE CLINIC | Facility: CLINIC | Age: 67
End: 2022-02-15

## 2022-02-15 VITALS
OXYGEN SATURATION: 98 % | WEIGHT: 187 LBS | BODY MASS INDEX: 40.34 KG/M2 | TEMPERATURE: 97.5 F | DIASTOLIC BLOOD PRESSURE: 72 MMHG | HEIGHT: 57 IN | HEART RATE: 59 BPM | SYSTOLIC BLOOD PRESSURE: 138 MMHG

## 2022-02-15 DIAGNOSIS — M79.645 PAIN OF LEFT THUMB: Primary | ICD-10-CM

## 2022-02-15 DIAGNOSIS — S62.102A AVULSION FRACTURE OF LEFT WRIST: ICD-10-CM

## 2022-02-15 PROCEDURE — 99214 OFFICE O/P EST MOD 30 MIN: CPT | Performed by: NURSE PRACTITIONER

## 2022-02-15 NOTE — PROGRESS NOTES
Chief Complaint  Hand Pain    Subjective    History of Present Illness {CC  Problem List  Visit  Diagnosis   Encounters  Notes  Medications  Labs  Result Review Imaging  Media :23}     Mira Deal presents to [facility] [place] for   C/o left thumb pain x 1 week - denies known injury/trauma. Review of records does indicate hx of OA of knees - pt reports hx of gout in toes many years ago - denies swelling, warmth or redness of joint. Has tried OTC arthritis cream and essential oil to area with minimal relief. Voices no other c/o or concerns today.        Objective     Physical Exam  Vitals and nursing note reviewed.   Constitutional:       General: She is not in acute distress.     Appearance: Normal appearance. She is not ill-appearing.   HENT:      Head: Normocephalic and atraumatic.   Eyes:      Conjunctiva/sclera: Conjunctivae normal.      Pupils: Pupils are equal, round, and reactive to light.   Cardiovascular:      Rate and Rhythm: Normal rate and regular rhythm.      Pulses: Normal pulses.   Pulmonary:      Effort: Pulmonary effort is normal.   Musculoskeletal:         General: Tenderness (left thumb at MCP joint) present. No swelling or deformity. Normal range of motion.      Cervical back: Normal range of motion.   Skin:     General: Skin is warm and dry.      Capillary Refill: Capillary refill takes less than 2 seconds.      Findings: No erythema or lesion.   Neurological:      General: No focal deficit present.      Mental Status: She is alert and oriented to person, place, and time.   Psychiatric:         Mood and Affect: Mood normal.         Behavior: Behavior normal.        Result Review  Data Reviewed:{ Labs  Result Review  Imaging  Med Tab  Media :23}   The following data was reviewed by (Optional):[id number]}  Common labs    Common Labsle 7/22/21 7/22/21 7/22/21    1043 1043 1043   Glucose  86    BUN  14    Creatinine  0.76    eGFR  "Non  Am  76    eGFR  Am  93    Sodium  144    Potassium  3.9    Chloride  107    Calcium  9.3    Albumin  4.20    Total Bilirubin  0.3    Alkaline Phosphatase  140 (A)    AST (SGOT)  24    ALT (SGPT)  27    WBC 11.31 (A)     Hemoglobin 14.7     Hematocrit 43.0     Platelets 322     Total Cholesterol   198   Triglycerides   159 (A)   HDL Cholesterol   41   LDL Cholesterol    129 (A)   (A) Abnormal value          No Images in the past 120 days found..       Vital Signs:   /72 (BP Location: Left arm, Patient Position: Sitting, Cuff Size: Adult)   Pulse 59   Temp 97.5 °F (36.4 °C) (Temporal)   Ht 144.8 cm (57\")   Wt 84.8 kg (187 lb)   SpO2 98%   BMI 40.47 kg/m²          Assessment and Plan {CC Problem List  Visit Diagnosis  ROS  Review (Popup)  Health Maintenance  Quality  BestPractice  Medications  SmartSets  SnapShot Encounters  Media :23}   Problem List Items Addressed This Visit     None      Visit Diagnoses     Pain of left thumb    -  Primary    Relevant Medications    Diclofenac Sodium (VOLTAREN) 1 % gel gel    Other Relevant Orders    XR Hand 3+ View Left (Completed)    Ambulatory Referral to Orthopedic Surgery    Avulsion fracture of left wrist        Relevant Orders    Ambulatory Referral to Orthopedic Surgery         Diagnosis Plan   1. Pain of left thumb  XR Hand 3+ View Left    Diclofenac Sodium (VOLTAREN) 1 % gel gel    Ambulatory Referral to Orthopedic Surgery   2. Avulsion fracture of left wrist  Ambulatory Referral to Orthopedic Surgery     - Left thumb pain - Xray ordered and results discussed during visit. Evidence of OA present with noted avulsion fx of left wrist - pt has no pain, swelling or discoloration at wrist - it was decided not to splint wrist d/t no symptoms.  - RX for Voltaren gel prescribed - apply to thumb as directed.   - Referral submitted to ortho for further evaluation.  - RTC PRN or f/u with PCP, Dr. Varghese, as scheduled or PRN.    Follow Up " {Instructions Charge Capture  Follow-up Communications :23}   Return if symptoms worsen or fail to improve, for Next scheduled follow up.  Patient was given instructions and counseling regarding her condition or for health maintenance advice. Please see specific information pulled into the AVS if appropriate            .  This document has been electronically signed by KELVIN Rausch on February 15, 2022 21:25 CST

## 2022-02-21 ENCOUNTER — OFFICE VISIT (OUTPATIENT)
Dept: ORTHOPEDIC SURGERY | Facility: CLINIC | Age: 67
End: 2022-02-21

## 2022-02-21 VITALS — HEIGHT: 57 IN | HEART RATE: 80 BPM | OXYGEN SATURATION: 95 % | WEIGHT: 187 LBS | BODY MASS INDEX: 40.34 KG/M2

## 2022-02-21 DIAGNOSIS — M25.532 LEFT WRIST PAIN: Primary | ICD-10-CM

## 2022-02-21 DIAGNOSIS — M79.645 THUMB PAIN, LEFT: ICD-10-CM

## 2022-02-21 PROCEDURE — 99213 OFFICE O/P EST LOW 20 MIN: CPT | Performed by: ORTHOPAEDIC SURGERY

## 2022-02-21 NOTE — PROGRESS NOTES
Mira Deal is a 66 y.o. female   Primary provider:  Lloyd Varghese MD       Chief Complaint   Patient presents with   • Left Wrist - Pain, Initial Evaluation     X-rays done @ [facility]  HISTORY OF PRESENT ILLNESS:    66 RHD F with 2 months L thumb pain.  Pain at base of 1st CMC joint as well as volar aspect of the thumb over A1 pulley.  Denies active triggering of the thumb.  +NSAIDs.  No CSIs.  No brace.  No sx.    Pain  This is a new problem. The current episode started more than 1 month ago. The problem occurs constantly (moderate). The problem has been unchanged. Associated symptoms comments: Aching,clicking/popping/snapping. Nothing aggravates the symptoms. She has tried acetaminophen for the symptoms. The treatment provided no relief.        CONCURRENT MEDICAL HISTORY:    Past Medical History:   Diagnosis Date   • Acute suppurative otitis media without spontaneous rupture of eardrum     right ear   • Benign essential hypertension    • Chronic osteoarthritis    • Deep vein thrombosis (HCC) 1971   • Diverticular disease of colon    • Dysfunction of eustachian tube     Will start OTC Nasal steroid      • Encounter for gynecological examination (general) (routine) without abnormal findings    • Encounter for screening for malignant neoplasm of breast    • Encounter for screening mammogram for malignant neoplasm of breast     02/04/16 negative      • Gastrointestinal hemorrhage    • Gout 1979   • History of transfusion 1971   • HL (hearing loss) 1.5 months ago   • Hypercholesterolemia    • Hyperlipidemia    • Hypothyroidism    • Kidney stone 1984   • Left lower quadrant pain    • Mild depression (HCC)    • Nausea    • Obesity     Hyperinsulinar    • Obesity, unspecified    • Vertigo        Allergies   Allergen Reactions   • Sulfa Antibiotics Angioedema   • Lortab [Hydrocodone-Acetaminophen] GI Intolerance   • Tramadol Dizziness         Current Outpatient Medications:   •  ASPIRIN 81 PO, Take 1 tablet  by mouth Daily., Disp: , Rfl:   •  Cyanocobalamin (B-12) 5000 MCG capsule, Take 1 dose by mouth Every Other Day. (Patient taking differently: Take 1 dose by mouth Every Other Day. Gets OTC), Disp: 30 capsule, Rfl: 3  •  Diclofenac Sodium (VOLTAREN) 1 % gel gel, Apply 2 g topically to the appropriate area as directed 3 (Three) Times a Day As Needed (thumb pain)., Disp: 100 g, Rfl: 1  •  levothyroxine (Euthyrox) 100 MCG tablet, Take 1 tablet by mouth Daily., Disp: 30 tablet, Rfl: 5  •  lisinopril (PRINIVIL,ZESTRIL) 40 MG tablet, Take 1 tablet by mouth Daily., Disp: 30 tablet, Rfl: 5  •  PARoxetine (Paxil) 10 MG tablet, Take 1 tablet by mouth Every Morning., Disp: 30 tablet, Rfl: 5  •  triamcinolone (KENALOG) 0.1 % ointment, Apply  topically to the appropriate area as directed 2 (Two) Times a Day As Needed (Psoriasis)., Disp: 454 g, Rfl: 0    Past Surgical History:   Procedure Laterality Date   • CARPAL TUNNEL RELEASE     • COLONOSCOPY      Dr Maria   • FOREARM SURGERY  2011    /wrist   • INJECTION OF MEDICATION  2016    Kenalog (1)      • INJECTION OF MEDICATION  2016    Rocephin (1)      • INJECTION OF MEDICATION  2016    Zofran (1)      • JOINT REPLACEMENT  2018   • KNEE CARTILAGE SURGERY     • SPLENECTOMY  1972   • TOTAL KNEE ARTHROPLASTY Left 2018   • VAGINAL HYSTERECTOMY SALPINGO OOPHORECTOMY      cerv cancer       Family History   Problem Relation Age of Onset   • Cancer Other    • Diabetes Other    • Heart disease Other    • Hypertension Other    • Arthritis Daughter    • Hyperlipidemia Daughter    • Breast cancer Maternal Aunt    • Coronary artery disease Father         Social History     Socioeconomic History   • Marital status:    Tobacco Use   • Smoking status: Former Smoker     Packs/day: 0.25     Years: 2.00     Pack years: 0.50     Types: Cigarettes     Start date: 1986     Quit date: 2014     Years since quittin.0   • Smokeless  "tobacco: Never Used   • Tobacco comment: No longer a smoker for many years   Substance and Sexual Activity   • Alcohol use: No   • Drug use: No   • Sexual activity: Not Currently     Partners: Male     Birth control/protection: Abstinence        Review of Systems   Musculoskeletal:        Left wrist,thumb pain  '       PHYSICAL EXAMINATION:       Pulse 80   Ht 144.8 cm (57\")   Wt 84.8 kg (187 lb)   SpO2 95%   BMI 40.47 kg/m²     Physical Exam    GAIT:     [x]  Normal  []  Antalgic    Assistive device: [x]  None  []  Walker     []  Crutches  []  Cane     []  Wheelchair  []  Stretcher    Ortho Exam  NAD  L hand:  +Palpable nodule over A1 pulley of thumb.  +TTP over nodule.  No active triggering.  +TTP over 1st CMC joint.  Negative CMC grind.  No gross instability of thumb.  DNVI.    XR Hand 3+ View Left    Result Date: 2/15/2022  Narrative: PROCEDURE:Left  hand 3 views REASON FOR EXAM: left thumb pain - denies known trauma/injury, M79.645 Pain in left finger(s)  FINDINGS: Asymmetric joint space loss of the first carpal metacarpal joint space. Mild asymmetric joint space loss of the phalange interphalangeal joint space. Second digit and fifth digit DIP joint space mild asymmetric joint space loss. Triquetral chip avulsion fracture of uncertain age. No other evidence of fracture or dislocation is seen. Soft tissue structures are unremarkable.     Impression: 1.  Triquetral chip avulsion fracture of uncertain age. Recommend clinical correlation. 2.  Asymmetric joint space loss of the first carpal metacarpal joint space. Mild asymmetric joint space loss of the phalange interphalangeal joint space. Second digit and fifth digit DIP joint space mild asymmetric joint space loss. These findings are most consistent with osteoarthritic changes. 3.  Remainder of left hand exam is unremarkable. Electronically signed by:  Alex Mason MD  2/15/2022 10:13 AM CST Workstation: [id number]          ASSESSMENT:    Diagnoses and all " orders for this visit:    Left wrist pain    Thumb pain, left          PLAN  66 RHD F with 2 months L thumb pain.  She has thickening and tendinitis of the flexor tendon without actual triggering.  XRs also demonstrate 1st CMC OA.  After a detailed conversation with the patient regarding the nature of her conditions and possible treatments, she has elected to pursue a trial conservative mgmt with ice, NSAIDs, and a brace.  She was given a thumb spica splint.  She would like to hold off on an CSIs for now.  RTC prn.     No follow-ups on file.    Dee Dee Farias CSA    Answers for HPI/ROS submitted by the patient on 2/19/2022  Please describe your symptoms.: Pain with left thumb  Have you had these symptoms before?: No  How long have you been having these symptoms?: 1-2 weeks  Please list any medications you are currently taking for this condition.: None  Please describe any probable cause for these symptoms. : None.  What is the primary reason for your visit?: Other

## 2022-03-28 ENCOUNTER — OFFICE VISIT (OUTPATIENT)
Dept: ORTHOPEDIC SURGERY | Facility: CLINIC | Age: 67
End: 2022-03-28

## 2022-03-28 VITALS — WEIGHT: 191 LBS | BODY MASS INDEX: 41.21 KG/M2 | HEIGHT: 57 IN

## 2022-03-28 DIAGNOSIS — M25.532 LEFT WRIST PAIN: Primary | ICD-10-CM

## 2022-03-28 DIAGNOSIS — M79.645 THUMB PAIN, LEFT: ICD-10-CM

## 2022-03-28 PROCEDURE — 99212 OFFICE O/P EST SF 10 MIN: CPT | Performed by: ORTHOPAEDIC SURGERY

## 2022-03-28 NOTE — PROGRESS NOTES
"Mira Deal is a 66 y.o. female returns for     Chief Complaint   Patient presents with   • Left Wrist - Follow-up, Pain       HISTORY OF PRESENT ILLNESS:continued pain left wrist and thumb.  Pain today 6/10    66 RHD F returns to clinic regarding her L thumb pain.  Pt last seen in clinic 2/21/2022.  At that time, pt diagnosed with 1st CMC OA.  She also had some pain over the thumb A1 pulley without signs of active triggering of the thumb.  We recommended a trial of conservative mgmt.  Pt was given a thumb spica splint.  Ice and NSAIDs were also recommended.  She returns now and notes no significant improvement in her sxs.  She also notes some pain extending more distally over her thumb.     CONCURRENT MEDICAL HISTORY:    The following portions of the patient's history were reviewed and updated as appropriate: allergies, current medications, past family history, past medical history, past social history, past surgical history and problem list.     ROS  No fevers or chills.  No chest pain or shortness of air.  No GI or  disturbances.    PHYSICAL EXAMINATION:       Ht 144.8 cm (57\")   Wt 86.6 kg (191 lb)   BMI 41.33 kg/m²     Physical Exam    GAIT:     [x]  Normal  []  Antalgic    Assistive device: [x]  None  []  Walker     []  Crutches  []  Cane     []  Wheelchair  []  Stretcher    Ortho Exam  NAD  L hand:  +1st CMC grind test.  +TTP over thumb A1 pulley and TTP over 1st CMC joint.  Negative Finkelstein's test.  No TTP over 3rd dorsal extensor compartment.  DNVI.    No results found.          ASSESSMENT:    Diagnoses and all orders for this visit:    Left wrist pain    Thumb pain, left          PLAN  66 RHD F with L hand 1st CMC OA and possible L thumb flexor/extensor tendinitis.  She has failed conservative mgmt.  She may be a candidate for trapeziectomy +/- LRTI.  Will refer her to Hand Surgery for further eval and treatment.  RTC prn.  No follow-ups on file.    Dee Dee Farias, CSA  "

## 2022-04-11 DIAGNOSIS — F41.9 ANXIETY: ICD-10-CM

## 2022-04-11 RX ORDER — PAROXETINE 10 MG/1
10 TABLET, FILM COATED ORAL EVERY MORNING
Qty: 30 TABLET | Refills: 1 | Status: SHIPPED | OUTPATIENT
Start: 2022-04-11 | End: 2022-06-07 | Stop reason: SDUPTHER

## 2022-06-07 ENCOUNTER — LAB (OUTPATIENT)
Dept: LAB | Facility: HOSPITAL | Age: 67
End: 2022-06-07

## 2022-06-07 ENCOUNTER — OFFICE VISIT (OUTPATIENT)
Dept: FAMILY MEDICINE CLINIC | Facility: CLINIC | Age: 67
End: 2022-06-07

## 2022-06-07 VITALS
OXYGEN SATURATION: 95 % | WEIGHT: 193.9 LBS | SYSTOLIC BLOOD PRESSURE: 130 MMHG | DIASTOLIC BLOOD PRESSURE: 74 MMHG | BODY MASS INDEX: 41.83 KG/M2 | HEART RATE: 69 BPM | TEMPERATURE: 97.1 F | HEIGHT: 57 IN

## 2022-06-07 DIAGNOSIS — E03.9 ACQUIRED HYPOTHYROIDISM: ICD-10-CM

## 2022-06-07 DIAGNOSIS — F41.9 ANXIETY: ICD-10-CM

## 2022-06-07 DIAGNOSIS — M25.511 ACUTE PAIN OF RIGHT SHOULDER: ICD-10-CM

## 2022-06-07 DIAGNOSIS — E66.01 CLASS 3 SEVERE OBESITY DUE TO EXCESS CALORIES WITH SERIOUS COMORBIDITY AND BODY MASS INDEX (BMI) OF 40.0 TO 44.9 IN ADULT: ICD-10-CM

## 2022-06-07 DIAGNOSIS — Q89.01 ASPLENIA: ICD-10-CM

## 2022-06-07 DIAGNOSIS — I10 ESSENTIAL HYPERTENSION: ICD-10-CM

## 2022-06-07 DIAGNOSIS — L40.9 PSORIASIS: ICD-10-CM

## 2022-06-07 LAB
ALBUMIN SERPL-MCNC: 4.4 G/DL (ref 3.5–5.2)
ALBUMIN/GLOB SERPL: 1.6 G/DL
ALP SERPL-CCNC: 151 U/L (ref 39–117)
ALT SERPL W P-5'-P-CCNC: 24 U/L (ref 1–33)
ANION GAP SERPL CALCULATED.3IONS-SCNC: 10.1 MMOL/L (ref 5–15)
AST SERPL-CCNC: 25 U/L (ref 1–32)
BACTERIA UR QL AUTO: ABNORMAL /HPF
BASOPHILS # BLD AUTO: 0.06 10*3/MM3 (ref 0–0.2)
BASOPHILS NFR BLD AUTO: 0.5 % (ref 0–1.5)
BILIRUB SERPL-MCNC: 0.4 MG/DL (ref 0–1.2)
BILIRUB UR QL STRIP: NEGATIVE
BUN SERPL-MCNC: 11 MG/DL (ref 8–23)
BUN/CREAT SERPL: 13.9 (ref 7–25)
CALCIUM SPEC-SCNC: 9.2 MG/DL (ref 8.6–10.5)
CHLORIDE SERPL-SCNC: 108 MMOL/L (ref 98–107)
CHOLEST SERPL-MCNC: 213 MG/DL (ref 0–200)
CLARITY UR: ABNORMAL
CO2 SERPL-SCNC: 26.9 MMOL/L (ref 22–29)
COLOR UR: ABNORMAL
CREAT SERPL-MCNC: 0.79 MG/DL (ref 0.57–1)
DEPRECATED RDW RBC AUTO: 39.1 FL (ref 37–54)
EGFRCR SERPLBLD CKD-EPI 2021: 82.6 ML/MIN/1.73
EOSINOPHIL # BLD AUTO: 0.43 10*3/MM3 (ref 0–0.4)
EOSINOPHIL NFR BLD AUTO: 3.9 % (ref 0.3–6.2)
ERYTHROCYTE [DISTWIDTH] IN BLOOD BY AUTOMATED COUNT: 13 % (ref 12.3–15.4)
GLOBULIN UR ELPH-MCNC: 2.7 GM/DL
GLUCOSE SERPL-MCNC: 94 MG/DL (ref 65–99)
GLUCOSE UR STRIP-MCNC: NEGATIVE MG/DL
HCT VFR BLD AUTO: 39.4 % (ref 34–46.6)
HDLC SERPL-MCNC: 53 MG/DL (ref 40–60)
HGB BLD-MCNC: 13 G/DL (ref 12–15.9)
HGB UR QL STRIP.AUTO: NEGATIVE
HYALINE CASTS UR QL AUTO: ABNORMAL /LPF
IMM GRANULOCYTES # BLD AUTO: 0.03 10*3/MM3 (ref 0–0.05)
IMM GRANULOCYTES NFR BLD AUTO: 0.3 % (ref 0–0.5)
KETONES UR QL STRIP: ABNORMAL
LDLC SERPL CALC-MCNC: 135 MG/DL (ref 0–100)
LDLC/HDLC SERPL: 2.48 {RATIO}
LEUKOCYTE ESTERASE UR QL STRIP.AUTO: ABNORMAL
LYMPHOCYTES # BLD AUTO: 3.76 10*3/MM3 (ref 0.7–3.1)
LYMPHOCYTES NFR BLD AUTO: 34.2 % (ref 19.6–45.3)
MCH RBC QN AUTO: 27.8 PG (ref 26.6–33)
MCHC RBC AUTO-ENTMCNC: 33 G/DL (ref 31.5–35.7)
MCV RBC AUTO: 84.4 FL (ref 79–97)
MONOCYTES # BLD AUTO: 0.92 10*3/MM3 (ref 0.1–0.9)
MONOCYTES NFR BLD AUTO: 8.4 % (ref 5–12)
NEUTROPHILS NFR BLD AUTO: 5.81 10*3/MM3 (ref 1.7–7)
NEUTROPHILS NFR BLD AUTO: 52.7 % (ref 42.7–76)
NITRITE UR QL STRIP: NEGATIVE
NRBC BLD AUTO-RTO: 0 /100 WBC (ref 0–0.2)
PH UR STRIP.AUTO: 5.5 [PH] (ref 5–8)
PLATELET # BLD AUTO: 441 10*3/MM3 (ref 140–450)
PMV BLD AUTO: 11.8 FL (ref 6–12)
POTASSIUM SERPL-SCNC: 4.3 MMOL/L (ref 3.5–5.2)
PROT SERPL-MCNC: 7.1 G/DL (ref 6–8.5)
PROT UR QL STRIP: ABNORMAL
RBC # BLD AUTO: 4.67 10*6/MM3 (ref 3.77–5.28)
RBC # UR STRIP: ABNORMAL /HPF
REF LAB TEST METHOD: ABNORMAL
SODIUM SERPL-SCNC: 145 MMOL/L (ref 136–145)
SP GR UR STRIP: 1.02 (ref 1–1.03)
SQUAMOUS #/AREA URNS HPF: ABNORMAL /HPF
TRIGL SERPL-MCNC: 143 MG/DL (ref 0–150)
TSH SERPL DL<=0.05 MIU/L-ACNC: 0.11 UIU/ML (ref 0.27–4.2)
UROBILINOGEN UR QL STRIP: ABNORMAL
VLDLC SERPL-MCNC: 25 MG/DL (ref 5–40)
WBC # UR STRIP: ABNORMAL /HPF
WBC NRBC COR # BLD: 11.01 10*3/MM3 (ref 3.4–10.8)

## 2022-06-07 PROCEDURE — 81001 URINALYSIS AUTO W/SCOPE: CPT

## 2022-06-07 PROCEDURE — 80061 LIPID PANEL: CPT

## 2022-06-07 PROCEDURE — 80050 GENERAL HEALTH PANEL: CPT

## 2022-06-07 PROCEDURE — 99214 OFFICE O/P EST MOD 30 MIN: CPT | Performed by: FAMILY MEDICINE

## 2022-06-07 RX ORDER — PAROXETINE 10 MG/1
10 TABLET, FILM COATED ORAL EVERY MORNING
Qty: 30 TABLET | Refills: 1 | Status: SHIPPED | OUTPATIENT
Start: 2022-06-07 | End: 2022-08-20 | Stop reason: SDUPTHER

## 2022-06-07 RX ORDER — LISINOPRIL 40 MG/1
40 TABLET ORAL DAILY
Qty: 30 TABLET | Refills: 5 | Status: SHIPPED | OUTPATIENT
Start: 2022-06-07 | End: 2022-10-24 | Stop reason: SDUPTHER

## 2022-06-07 RX ORDER — LEVOTHYROXINE SODIUM 0.1 MG/1
100 TABLET ORAL DAILY
Qty: 30 TABLET | Refills: 5 | Status: SHIPPED | OUTPATIENT
Start: 2022-06-07 | End: 2022-10-24 | Stop reason: SDUPTHER

## 2022-06-07 NOTE — PROGRESS NOTES
Chief Complaint  Hypertension, Hypothyroidism, Anxiety, Osteoarthritis, Headache, and Arm Problem (Right upper arm, aches where received COVID vaccine )    Subjective          Review of Systems   Constitutional: Positive for fatigue.   HENT: Negative for ear pain, postnasal drip, rhinorrhea, sinus pressure, sinus pain ( mild) and sneezing.    Eyes: Negative.    Respiratory: Negative for shortness of breath and wheezing.    Cardiovascular: Negative for palpitations.        S/P Cardiac work- up Neg   Gastrointestinal: Negative for diarrhea.   Endocrine: Negative.    Genitourinary: Negative for dysuria.   Musculoskeletal:        S/P L TKA    R Shoulder pain since COVID injection at site   Skin:        Psoriasis   Allergic/Immunologic: Positive for environmental allergies.   Hematological: Negative.    Psychiatric/Behavioral: The patient is nervous/anxious.        Mira Deal presents to [facility] [place]  Hypertension  This is a chronic problem. The current episode started more than 1 year ago. The problem is unchanged. The problem is controlled. Associated symptoms include anxiety. Pertinent negatives include no palpitations or shortness of breath. Risk factors for coronary artery disease include obesity and post-menopausal state. Current antihypertension treatment includes ACE inhibitors. The current treatment provides significant improvement.   Hypothyroidism  This is a chronic problem. The current episode started more than 1 year ago. The problem occurs daily. The problem has been gradually improving. Associated symptoms include fatigue. Nothing aggravates the symptoms. Treatments tried: Levothyroxine. The treatment provided significant relief.   Anxiety  Presents for follow-up visit. Symptoms include nervous/anxious behavior. Patient reports no palpitations or shortness of breath. Symptoms occur occasionally. The severity of symptoms is mild. The quality  "of sleep is fair.       Osteoarthritis  Presents for follow-up visit. She complains of pain. Affected location: multiple joints. Her pain is at a severity of 6/10. Associated symptoms include fatigue. Pertinent negatives include no diarrhea or dysuria. Her past medical history is significant for osteoarthritis.   Headache  Headache pattern:  Headache sometimes there, sometimes not at all  Providers seen:  Primary care provider  Time of day symptoms are worse:  Morning  Pain severity:  5  Escalation timing:  Hours  Arm Problem  This is a new (R shoulder pain since COVID injection. ) problem. The current episode started 1 to 4 weeks ago. Associated symptoms include fatigue. She has tried nothing for the symptoms. The treatment provided no relief.   Fatigue  This is a chronic problem. The current episode started more than 1 month ago. The problem occurs intermittently. The problem has been waxing and waning. Associated symptoms include fatigue. Exacerbated by: Low B12. Treatments tried: B12 supplement. The treatment provided moderate relief.   Obesity  This is a chronic problem. The current episode started more than 1 year ago. The problem occurs daily. The problem has been gradually improving. Associated symptoms include fatigue. The symptoms are aggravated by eating. Treatments tried: Diet, exercise. The treatment provided mild relief.   Rash  This is a chronic (Psoriasis) problem. The affected locations include the face, neck, right hand and left hand. The rash is characterized by redness and scaling. Associated symptoms include fatigue. Pertinent negatives include no diarrhea, rhinorrhea or shortness of breath. Past treatments include topical steroids. The treatment provided moderate relief.       Objective   Vital Signs:   /74 (BP Location: Left arm, Patient Position: Sitting, Cuff Size: Adult)   Pulse 69   Temp 97.1 °F (36.2 °C) (Temporal)   Ht 144.8 cm (57\")   Wt 88 kg (193 lb 14.4 oz)   SpO2 95%   " BMI 41.96 kg/m²     Physical Exam  Vitals and nursing note reviewed.   Constitutional:       Appearance: Normal appearance. She is well-developed. She is obese. She is not ill-appearing.   HENT:      Head: Normocephalic and atraumatic.      Right Ear: Tympanic membrane, ear canal and external ear normal. There is no impacted cerumen.      Left Ear: Tympanic membrane, ear canal and external ear normal. There is no impacted cerumen.      Nose: Nose normal. No congestion.      Mouth/Throat:      Mouth: Mucous membranes are moist.      Pharynx: Oropharynx is clear.   Eyes:      Extraocular Movements: Extraocular movements intact.      Conjunctiva/sclera: Conjunctivae normal.      Pupils: Pupils are equal, round, and reactive to light.   Cardiovascular:      Rate and Rhythm: Normal rate and regular rhythm.      Heart sounds: Normal heart sounds.   Pulmonary:      Effort: Pulmonary effort is normal.      Breath sounds: Normal breath sounds.   Abdominal:      General: Bowel sounds are normal. There is no distension.      Palpations: Abdomen is soft.      Tenderness: There is no abdominal tenderness. There is no right CVA tenderness, left CVA tenderness or guarding.   Musculoskeletal:         General: Tenderness present.      Cervical back: Neck supple.      Comments: POMT R shoulder joint, no sign of infection. Suspect some bursitis   Tenderness in multiple joints.   Skin:     General: Skin is warm and dry.      Capillary Refill: Capillary refill takes 2 to 3 seconds.      Findings: Rash present.      Comments: Psoriasis   Neurological:      General: No focal deficit present.      Mental Status: She is alert and oriented to person, place, and time. Mental status is at baseline.      Cranial Nerves: No cranial nerve deficit.      Sensory: No sensory deficit.      Motor: No weakness.      Coordination: Coordination normal.      Gait: Gait normal.   Psychiatric:         Mood and Affect: Mood normal.         Speech: Speech  normal.         Behavior: Behavior normal.         Thought Content: Thought content normal.         Judgment: Judgment normal.        Result Review :                 Assessment and Plan    Diagnoses and all orders for this visit:    1. Essential hypertension  -     CBC & Differential; Future  -     Comprehensive metabolic panel; Future  -     TSH; Future  -     Urinalysis With Culture If Indicated -; Future  -     Lipid Panel; Future  -     lisinopril (PRINIVIL,ZESTRIL) 40 MG tablet; Take 1 tablet by mouth Daily.  Dispense: 30 tablet; Refill: 5    2. Acquired hypothyroidism  -     TSH; Future  -     levothyroxine (Euthyrox) 100 MCG tablet; Take 1 tablet by mouth Daily.  Dispense: 30 tablet; Refill: 5    3. Anxiety  -     PARoxetine (Paxil) 10 MG tablet; Take 1 tablet by mouth Every Morning.  Dispense: 30 tablet; Refill: 1    4. Acute pain of right shoulder  Comments:  S/P COVID vaccine    5. Psoriasis    6. Asplenia  Comments:  Will consider Pneumovax at next visit. Having Pain from COVID injection R shoulder.     7. Class 3 severe obesity due to excess calories with serious comorbidity and body mass index (BMI) of 40.0 to 44.9 in adult (HCC)    Discussed exam, health problems, meds, indications, tx plan, rationale. Discussed Asplenia rationale for vaccines.   Class 3 Severe Obesity (BMI >=40). Obesity-related health conditions include the following: hypertension and osteoarthritis. Obesity is unchanged. BMI is is above average; BMI management plan is completed. We discussed portion control and increasing exercise.      Follow Up {Instructions Charge Capture  Follow-up Communications :23}  Return in about 6 months (around 12/7/2022).  Patient was given instructions and counseling regarding her condition or for health maintenance advice. Please see specific information pulled into the AVS if appropriate.         This document has been electronically signed by Lloyd Varghese MD on June 7, 2022 12:01 CDT

## 2022-06-08 ENCOUNTER — TELEPHONE (OUTPATIENT)
Dept: FAMILY MEDICINE CLINIC | Facility: CLINIC | Age: 67
End: 2022-06-08

## 2022-06-08 NOTE — TELEPHONE ENCOUNTER
----- Message from Lloyd Varghese MD sent at 6/8/2022  7:49 AM CDT -----  Cholesterol remains elevated, other labs stable from last check, will go over at next visit.

## 2022-07-11 ENCOUNTER — OFFICE VISIT (OUTPATIENT)
Dept: FAMILY MEDICINE CLINIC | Facility: CLINIC | Age: 67
End: 2022-07-11

## 2022-07-11 VITALS
HEART RATE: 74 BPM | WEIGHT: 192 LBS | SYSTOLIC BLOOD PRESSURE: 120 MMHG | TEMPERATURE: 98.5 F | BODY MASS INDEX: 41.42 KG/M2 | HEIGHT: 57 IN | DIASTOLIC BLOOD PRESSURE: 82 MMHG | OXYGEN SATURATION: 97 %

## 2022-07-11 DIAGNOSIS — H66.92 LEFT OTITIS MEDIA, UNSPECIFIED OTITIS MEDIA TYPE: Primary | ICD-10-CM

## 2022-07-11 DIAGNOSIS — R05.9 COUGH: ICD-10-CM

## 2022-07-11 PROCEDURE — 96372 THER/PROPH/DIAG INJ SC/IM: CPT | Performed by: NURSE PRACTITIONER

## 2022-07-11 PROCEDURE — 99213 OFFICE O/P EST LOW 20 MIN: CPT | Performed by: NURSE PRACTITIONER

## 2022-07-11 RX ORDER — CEFTRIAXONE 1 G/1
1 INJECTION, POWDER, FOR SOLUTION INTRAMUSCULAR; INTRAVENOUS ONCE
Status: COMPLETED | OUTPATIENT
Start: 2022-07-11 | End: 2022-07-11

## 2022-07-11 RX ORDER — CEFDINIR 300 MG/1
300 CAPSULE ORAL 2 TIMES DAILY
Qty: 14 CAPSULE | Refills: 0 | Status: SHIPPED | OUTPATIENT
Start: 2022-07-11 | End: 2022-07-28

## 2022-07-11 RX ORDER — BENZONATATE 100 MG/1
CAPSULE ORAL
Qty: 60 CAPSULE | Refills: 0 | Status: SHIPPED | OUTPATIENT
Start: 2022-07-11 | End: 2022-08-25

## 2022-07-11 RX ADMIN — CEFTRIAXONE 1 G: 1 INJECTION, POWDER, FOR SOLUTION INTRAMUSCULAR; INTRAVENOUS at 17:08

## 2022-07-11 NOTE — PROGRESS NOTES
"Chief Complaint  Illness (Ear pain Lt worse, fever, back pain, and ha X 1 day)    Subjective          Mira Deal presents to [facility]    PCP: Dr. Varghese    CC: \"ear pain, fever, backache, headache\"    Sudden onset of symptoms this AM. Was exposed to Covid at work about 5 days ago.  Employer performed Covid PCR this AM which is pending.  Reports fever to 104 this AM, but resolved on its own.  Mild cough noted. Not taking any meds to assist.     Illness  This is a new problem. The current episode started today. The problem occurs constantly. The problem has been unchanged. Associated symptoms include chills, coughing, fatigue, a fever (104 this AM), headaches and myalgias. Pertinent negatives include no abdominal pain, anorexia, arthralgias, change in bowel habit, chest pain, congestion, diaphoresis, joint swelling, nausea, neck pain, numbness, rash, sore throat, swollen glands, urinary symptoms, vertigo, visual change, vomiting or weakness. Associated symptoms comments: +earache  . Nothing aggravates the symptoms. She has tried rest (fluids) for the symptoms. The treatment provided no relief.       Review of Systems   Constitutional: Positive for chills, fatigue and fever (104 this AM). Negative for diaphoresis.   HENT: Positive for ear pain (L>R). Negative for congestion, ear discharge, postnasal drip, rhinorrhea, sinus pressure, sinus pain, sneezing, sore throat and trouble swallowing.    Eyes: Negative.    Respiratory: Positive for cough. Negative for chest tightness, shortness of breath ( no change from normal) and wheezing.    Cardiovascular: Negative.  Negative for chest pain, palpitations and leg swelling.   Gastrointestinal: Negative.  Negative for abdominal pain, anorexia, change in bowel habit, nausea and vomiting.   Genitourinary: Negative.    Musculoskeletal: Positive for myalgias. Negative for arthralgias, " "joint swelling and neck pain.   Skin: Negative.  Negative for rash.   Neurological: Positive for headaches. Negative for dizziness, vertigo, weakness and numbness.        Objective   Vital Signs:   /82 (BP Location: Right arm, Patient Position: Sitting)   Pulse 74   Temp 98.5 °F (36.9 °C)   Ht 144.8 cm (57\")   Wt 87.1 kg (192 lb)   SpO2 97%   BMI 41.55 kg/m²       Physical Exam  Vitals and nursing note reviewed.   Constitutional:       General: She is not in acute distress.     Appearance: She is obese. She is ill-appearing.   HENT:      Head: Normocephalic and atraumatic.      Right Ear: Ear canal normal. A middle ear effusion is present. Tympanic membrane is not erythematous.      Left Ear: Ear canal normal. A middle ear effusion is present. Tympanic membrane is erythematous.      Nose: Congestion ( mild) present.      Right Sinus: No maxillary sinus tenderness or frontal sinus tenderness.      Left Sinus: No maxillary sinus tenderness or frontal sinus tenderness.      Mouth/Throat:      Mouth: Mucous membranes are moist.      Pharynx: Oropharynx is clear. No pharyngeal swelling, oropharyngeal exudate or posterior oropharyngeal erythema.   Eyes:      General:         Right eye: No discharge.         Left eye: No discharge.      Conjunctiva/sclera: Conjunctivae normal.   Cardiovascular:      Rate and Rhythm: Normal rate and regular rhythm.   Pulmonary:      Effort: Pulmonary effort is normal. No respiratory distress.      Breath sounds: No wheezing, rhonchi or rales.      Comments: Slightly tight cough  Musculoskeletal:      Cervical back: Neck supple. Tenderness present.   Lymphadenopathy:      Cervical: Cervical adenopathy present.   Skin:     General: Skin is warm and dry.   Neurological:      General: No focal deficit present.      Mental Status: She is alert and oriented to person, place, and time.   Psychiatric:         Mood and Affect: Mood normal.         Thought Content: Thought content normal. "          Result Review :     Common labs    Common Labsle 7/22/21 7/22/21 7/22/21 6/7/22 6/7/22 6/7/22    1043 1043 1043 1023 1023 1023   Glucose  86   94    BUN  14   11    Creatinine  0.76   0.79    eGFR Non  Am  76       eGFR African Am  93       Sodium  144   145    Potassium  3.9   4.3    Chloride  107   108 (A)    Calcium  9.3   9.2    Albumin  4.20   4.40    Total Bilirubin  0.3   0.4    Alkaline Phosphatase  140 (A)   151 (A)    AST (SGOT)  24   25    ALT (SGPT)  27   24    WBC 11.31 (A)   11.01 (A)     Hemoglobin 14.7   13.0     Hematocrit 43.0   39.4     Platelets 322   441     Total Cholesterol   198   213 (A)   Triglycerides   159 (A)   143   HDL Cholesterol   41   53   LDL Cholesterol    129 (A)   135 (A)   (A) Abnormal value                      Assessment and Plan    Diagnoses and all orders for this visit:    1. Left otitis media, unspecified otitis media type (Primary)  -     cefTRIAXone (ROCEPHIN) injection 1 g  -     cefdinir (OMNICEF) 300 MG capsule; Take 1 capsule by mouth 2 (Two) Times a Day.  Dispense: 14 capsule; Refill: 0    2. Cough  -     benzonatate (Tessalon Perles) 100 MG capsule; 1-2 caps po TID prn cough  Dispense: 60 capsule; Refill: 0      Covid PCR pending per her employer.     Push fluids  Rest  Tylenol or Motrin as needed  Rocephin 1 gm IM x 1 in office  Rx for Cefdinir to begin tomorrow.  Declines need for Diflucan.   Rx for Tessalon perles PRN cough  Encouraged immune boosters--Vit C, Zinc  Quarantine pending Covid results.     See PCP or RTC if symptoms persist/worsen  See PCP for routine f/u visit and management of chronic medical conditions      This document has been electronically signed by KELVIN Martinez on July 11, 2022 17:26 CDT,.

## 2022-07-28 ENCOUNTER — OFFICE VISIT (OUTPATIENT)
Dept: FAMILY MEDICINE CLINIC | Facility: CLINIC | Age: 67
End: 2022-07-28

## 2022-07-28 VITALS
DIASTOLIC BLOOD PRESSURE: 76 MMHG | TEMPERATURE: 97.3 F | OXYGEN SATURATION: 97 % | HEART RATE: 84 BPM | BODY MASS INDEX: 42.09 KG/M2 | SYSTOLIC BLOOD PRESSURE: 134 MMHG | HEIGHT: 57 IN | WEIGHT: 195.1 LBS

## 2022-07-28 DIAGNOSIS — M79.641 RIGHT HAND PAIN: Primary | ICD-10-CM

## 2022-07-28 PROCEDURE — 99213 OFFICE O/P EST LOW 20 MIN: CPT | Performed by: FAMILY MEDICINE

## 2022-07-28 RX ORDER — DIPHENOXYLATE HYDROCHLORIDE AND ATROPINE SULFATE 2.5; .025 MG/1; MG/1
TABLET ORAL DAILY
COMMUNITY
End: 2023-02-17

## 2022-07-28 RX ORDER — PREDNISONE 10 MG/1
10 TABLET ORAL SEE ADMIN INSTRUCTIONS
Qty: 17 TABLET | Refills: 0 | Status: SHIPPED | OUTPATIENT
Start: 2022-07-28 | End: 2022-08-25

## 2022-07-28 NOTE — PROGRESS NOTES
Answers for HPI/ROS submitted by the patient on 7/27/2022  Please describe your symptoms.: Pain in right ring finger. Sensitive to putting right hand in pocket.  Have you had these symptoms before?: No  How long have you been having these symptoms?: 5-7 days  Please list any medications you are currently taking for this condition.: Advil with no results  What is the primary reason for your visit?: Other

## 2022-07-28 NOTE — PROGRESS NOTES
"Chief Complaint  Pain (Right hand, ring finger, knuckle area/)  ' R knuckles swollen and painful x 2 days worsening, Motrin has not helped. No known injury'  Subjective          Review of Systems   Constitutional: Positive for fatigue.   HENT: Negative for ear pain, postnasal drip, rhinorrhea, sinus pressure, sinus pain ( mild) and sneezing.    Eyes: Negative.    Respiratory: Negative for shortness of breath and wheezing.    Cardiovascular: Negative for palpitations.        S/P Cardiac work- up Neg   Gastrointestinal: Negative for diarrhea.   Endocrine: Negative.    Genitourinary: Negative for dysuria.   Musculoskeletal: Positive for arthralgias.        R hand pain x 4 days    S/P L TKA    R Shoulder pain since COVID injection at site   Skin:        Psoriasis   Allergic/Immunologic: Positive for environmental allergies.   Hematological: Negative.    Psychiatric/Behavioral: The patient is nervous/anxious.        Mira Deal presents to [facility] [place]  Pain  This is a new (Pain 8/10) problem. The current episode started in the past 7 days. The problem occurs daily. The problem has been gradually worsening. Associated symptoms include arthralgias and fatigue. She has tried acetaminophen and NSAIDs for the symptoms. The treatment provided no relief.       Objective   Vital Signs:   /76 (BP Location: Right arm, Patient Position: Sitting, Cuff Size: Adult)   Pulse 84   Temp 97.3 °F (36.3 °C) (Temporal)   Ht 144.8 cm (57\")   Wt 88.5 kg (195 lb 1.6 oz)   SpO2 97%   BMI 42.22 kg/m²     Physical Exam  Vitals and nursing note reviewed.   Constitutional:       Appearance: Normal appearance. She is well-developed. She is obese. She is not ill-appearing.   HENT:      Head: Normocephalic and atraumatic.      Right Ear: Tympanic membrane, ear canal and external ear normal. There is no impacted cerumen.      Left Ear: Tympanic membrane, ear canal and external " ear normal. There is no impacted cerumen.      Nose: Nose normal. No congestion.      Mouth/Throat:      Mouth: Mucous membranes are moist.      Pharynx: Oropharynx is clear.   Eyes:      Extraocular Movements: Extraocular movements intact.      Conjunctiva/sclera: Conjunctivae normal.      Pupils: Pupils are equal, round, and reactive to light.   Cardiovascular:      Rate and Rhythm: Normal rate and regular rhythm.      Heart sounds: Normal heart sounds.   Pulmonary:      Effort: Pulmonary effort is normal.      Breath sounds: Normal breath sounds.   Abdominal:      General: Bowel sounds are normal. There is no distension.      Palpations: Abdomen is soft.      Tenderness: There is no abdominal tenderness. There is no right CVA tenderness, left CVA tenderness or guarding.   Musculoskeletal:         General: Tenderness present.      Cervical back: Neck supple.      Comments: Swelling and tenderness to touch across base of index middle and ring finger. No erythema   Skin:     General: Skin is warm and dry.      Capillary Refill: Capillary refill takes 2 to 3 seconds.      Findings: Rash present.      Comments: Psoriasis   Neurological:      General: No focal deficit present.      Mental Status: She is alert and oriented to person, place, and time. Mental status is at baseline.      Cranial Nerves: No cranial nerve deficit.      Sensory: No sensory deficit.      Motor: No weakness.      Coordination: Coordination normal.      Gait: Gait normal.   Psychiatric:         Mood and Affect: Mood normal.         Speech: Speech normal.         Behavior: Behavior normal.         Thought Content: Thought content normal.         Judgment: Judgment normal.        Result Review :                 Assessment and Plan    Diagnoses and all orders for this visit:    1. Right hand pain (Primary)  -     XR Hand 3+ View Right (In Office)  -     predniSONE (DELTASONE) 10 MG tablet; Take 1 tablet by mouth See Admin Instructions. Take 1 Tab  Tid x3 days, Then 1 Tab Bid x 2 days Then 1 Tab QD x4 days,then D/C  Dispense: 17 tablet; Refill: 0        Follow Up   Return in about 4 weeks (around 8/25/2022).  Patient was given instructions and counseling regarding her condition or for health maintenance advice. Please see specific information pulled into the AVS if appropriate.       Answers for HPI/ROS submitted by the patient on 7/27/2022  Please describe your symptoms.: Pain in right ring finger. Sensitive to putting right hand in pocket.  Have you had these symptoms before?: No  How long have you been having these symptoms?: 5-7 days  Please list any medications you are currently taking for this condition.: Advil with no results  What is the primary reason for your visit?: Other        This document has been electronically signed by Lloyd Varghese MD on July 28, 2022 12:25 CDT

## 2022-07-29 ENCOUNTER — TELEPHONE (OUTPATIENT)
Dept: FAMILY MEDICINE CLINIC | Facility: CLINIC | Age: 67
End: 2022-07-29

## 2022-07-29 NOTE — TELEPHONE ENCOUNTER
----- Message from Lloyd Varghese MD sent at 7/29/2022 12:44 PM CDT -----  X-ray didn't show any acute problem. Keep us updated on how hand is doing

## 2022-08-20 DIAGNOSIS — F41.9 ANXIETY: ICD-10-CM

## 2022-08-22 RX ORDER — PAROXETINE 10 MG/1
10 TABLET, FILM COATED ORAL EVERY MORNING
Qty: 30 TABLET | Refills: 0 | Status: SHIPPED | OUTPATIENT
Start: 2022-08-22 | End: 2022-09-21

## 2022-08-25 ENCOUNTER — OFFICE VISIT (OUTPATIENT)
Dept: FAMILY MEDICINE CLINIC | Facility: CLINIC | Age: 67
End: 2022-08-25

## 2022-08-25 VITALS
HEART RATE: 98 BPM | SYSTOLIC BLOOD PRESSURE: 144 MMHG | BODY MASS INDEX: 42.72 KG/M2 | WEIGHT: 198 LBS | TEMPERATURE: 97.6 F | DIASTOLIC BLOOD PRESSURE: 80 MMHG | OXYGEN SATURATION: 95 % | HEIGHT: 57 IN

## 2022-08-25 DIAGNOSIS — M79.644 PAIN IN FINGER OF RIGHT HAND: ICD-10-CM

## 2022-08-25 DIAGNOSIS — Z98.890 H/O CARPAL TUNNEL REPAIR: ICD-10-CM

## 2022-08-25 PROCEDURE — 99213 OFFICE O/P EST LOW 20 MIN: CPT | Performed by: FAMILY MEDICINE

## 2022-08-25 NOTE — PATIENT INSTRUCTIONS
Advance Care Planning and Advance Directives     You make decisions on a daily basis - decisions about where you want to live, your career, your home, your life. Perhaps one of the most important decisions you face is your choice for future medical care. Take time to talk with your family and your healthcare team and start planning today.  Advance Care Planning is a process that can help you:  Understand possible future healthcare decisions in light of your own experiences  Reflect on those decision in light of your goals and values  Discuss your decisions with those closest to you and the healthcare professionals that care for you  Make a plan by creating a document that reflects your wishes    Surrogate Decision Maker  In the event of a medical emergency, which has left you unable to communicate or to make your own decisions, you would need someone to make decisions for you.  It is important to discuss your preferences for medical treatment with this person while you are in good health.     Qualities of a surrogate decision maker:  Willing to take on this role and responsibility  Knows what you want for future medical care  Willing to follow your wishes even if they don't agree with them  Able to make difficult medical decisions under stressful circumstances    Advance Directives  These are legal documents you can create that will guide your healthcare team and decision maker(s) when needed. These documents can be stored in the electronic medical record.    Living Will - a legal document to guide your care if you have a terminal condition or a serious illness and are unable to communicate. States vary by statute in document names/types, but most forms may include one or more of the following:        -  Directions regarding life-prolonging treatments        -  Directions regarding artificially provided nutrition/hydration        -  Choosing a healthcare decision maker        -  Direction regarding organ/tissue  donation    Durable Power of  for Healthcare - this document names an -in-fact to make medical decisions for you, but it may also allow this person to make personal and financial decisions for you. Please seek the advice of an  if you need this type of document.    **Advance Directives are not required and no one may discriminate against you if you do not sign one.    Medical Orders  Many states allow specific forms/orders signed by your physician to record your wishes for medical treatment in your current state of health. This form, signed in personal communication with your physician, addresses resuscitation and other medical interventions that you may or may not want.      For more information or to schedule a time with a [facility] Advance Care Planning Facilitator contact: [email or web address] or call [phone] and someone will contact you directly.

## 2022-08-25 NOTE — PROGRESS NOTES
"Chief Complaint  Hand Pain (Right/)  ' Puffiness has improved, still hurts and interferes with work'  Subjective          Review of Systems   Constitutional: Positive for fatigue.   HENT: Negative for ear pain, postnasal drip, rhinorrhea, sinus pressure, sinus pain ( mild) and sneezing.    Eyes: Negative.    Respiratory: Negative for shortness of breath and wheezing.    Cardiovascular: Negative for palpitations.        S/P Cardiac work- up Neg   Gastrointestinal: Negative for diarrhea.   Endocrine: Negative.    Genitourinary: Negative for dysuria.   Musculoskeletal: Positive for arthralgias.        R Ring finger remains painful    S/P L TKA    R Shoulder pain since COVID injection at site   Skin:        Psoriasis   Allergic/Immunologic: Positive for environmental allergies.   Hematological: Negative.    Psychiatric/Behavioral: The patient is nervous/anxious.        Mira Deal presents to [facility] [place]  Pain  This is a new (Pain 8/10 R ring finger) problem. The current episode started 1 to 4 weeks ago. The problem occurs daily. The problem has been waxing and waning. Associated symptoms include arthralgias and fatigue. She has tried acetaminophen and NSAIDs (Voltaren gel. Steroid ) for the symptoms. The treatment provided mild relief.       Objective   Vital Signs:   /80 (BP Location: Right arm, Patient Position: Sitting, Cuff Size: Adult)   Pulse 98   Temp 97.6 °F (36.4 °C) (Temporal)   Ht 144.8 cm (57\")   Wt 89.8 kg (198 lb)   SpO2 95%   BMI 42.85 kg/m²     Physical Exam  Vitals and nursing note reviewed.   Constitutional:       Appearance: Normal appearance. She is well-developed. She is obese. She is not ill-appearing.   HENT:      Head: Normocephalic and atraumatic.      Right Ear: Tympanic membrane, ear canal and external ear normal. There is no impacted cerumen.      Left Ear: Tympanic membrane, ear canal and external ear normal. There " is no impacted cerumen.      Nose: Nose normal. No congestion.      Mouth/Throat:      Mouth: Mucous membranes are moist.      Pharynx: Oropharynx is clear.   Eyes:      Extraocular Movements: Extraocular movements intact.      Conjunctiva/sclera: Conjunctivae normal.      Pupils: Pupils are equal, round, and reactive to light.   Cardiovascular:      Rate and Rhythm: Normal rate and regular rhythm.      Heart sounds: Normal heart sounds.   Pulmonary:      Effort: Pulmonary effort is normal.      Breath sounds: Normal breath sounds.   Abdominal:      General: Bowel sounds are normal. There is no distension.      Palpations: Abdomen is soft.      Tenderness: There is no abdominal tenderness. There is no right CVA tenderness, left CVA tenderness or guarding.   Musculoskeletal:         General: Tenderness present.      Cervical back: Neck supple.      Comments: Swelling and tenderness to touch across base of index middle and ring finger. No erythema   Skin:     General: Skin is warm and dry.      Capillary Refill: Capillary refill takes 2 to 3 seconds.      Findings: Rash present.      Comments: Psoriasis   Neurological:      General: No focal deficit present.      Mental Status: She is alert and oriented to person, place, and time. Mental status is at baseline.      Cranial Nerves: No cranial nerve deficit.      Sensory: No sensory deficit.      Motor: No weakness.      Coordination: Coordination normal.      Gait: Gait normal.   Psychiatric:         Mood and Affect: Mood normal.         Speech: Speech normal.         Behavior: Behavior normal.         Thought Content: Thought content normal.         Judgment: Judgment normal.        Result Review :                 Assessment and Plan    Diagnoses and all orders for this visit:    1. Pain in finger of right hand  Comments:  R ring finger.   Orders:  -     Ambulatory Referral to Orthopedic Surgery  -     Diclofenac Sodium (VOLTAREN) 1 % gel gel; Apply 4 g topically to  the appropriate area as directed 4 (Four) Times a Day As Needed (Pain R fingers.).  Dispense: 150 g; Refill: 0    2. H/O carpal tunnel repair        Follow Up   Return in about 8 weeks (around 10/20/2022).  Patient was given instructions and counseling regarding her condition or for health maintenance advice. Please see specific information pulled into the AVS if appropriate.       Answers for HPI/ROS submitted by the patient on 8/24/2022  Please describe your symptoms.: Follow up on pain with right hand ring finger  Have you had these symptoms before?: No  How long have you been having these symptoms?: Greater than 2 weeks  Please list any medications you are currently taking for this condition.: Voltran  Please describe any probable cause for these symptoms. : Unknown  What is the primary reason for your visit?: Other        This document has been electronically signed by Lloyd Varghese MD on August 26, 2022 15:21 CDT

## 2022-08-25 NOTE — PROGRESS NOTES
Answers for HPI/ROS submitted by the patient on 8/24/2022  Please describe your symptoms.: Follow up on pain with right hand ring finger  Have you had these symptoms before?: No  How long have you been having these symptoms?: Greater than 2 weeks  Please list any medications you are currently taking for this condition.: Jeniffer  Please describe any probable cause for these symptoms. : Unknown  What is the primary reason for your visit?: Other

## 2022-09-21 DIAGNOSIS — F41.9 ANXIETY: ICD-10-CM

## 2022-09-21 RX ORDER — PAROXETINE 10 MG/1
TABLET, FILM COATED ORAL
Qty: 30 TABLET | Refills: 0 | Status: SHIPPED | OUTPATIENT
Start: 2022-09-21 | End: 2022-10-17 | Stop reason: SDUPTHER

## 2022-10-17 ENCOUNTER — OFFICE VISIT (OUTPATIENT)
Dept: ORTHOPEDIC SURGERY | Facility: CLINIC | Age: 67
End: 2022-10-17

## 2022-10-17 VITALS — BODY MASS INDEX: 42.72 KG/M2 | HEIGHT: 57 IN | WEIGHT: 198 LBS

## 2022-10-17 DIAGNOSIS — M79.641 RIGHT HAND PAIN: Primary | ICD-10-CM

## 2022-10-17 DIAGNOSIS — F41.9 ANXIETY: ICD-10-CM

## 2022-10-17 PROCEDURE — 99214 OFFICE O/P EST MOD 30 MIN: CPT | Performed by: NURSE PRACTITIONER

## 2022-10-17 RX ORDER — MELOXICAM 15 MG/1
15 TABLET ORAL DAILY
Qty: 60 TABLET | Refills: 0 | Status: SHIPPED | OUTPATIENT
Start: 2022-10-17 | End: 2022-12-16

## 2022-10-17 RX ORDER — PAROXETINE 10 MG/1
10 TABLET, FILM COATED ORAL EVERY MORNING
Qty: 30 TABLET | Refills: 0 | Status: SHIPPED | OUTPATIENT
Start: 2022-10-17 | End: 2022-10-24 | Stop reason: SDUPTHER

## 2022-10-17 RX ORDER — METHYLPREDNISOLONE 4 MG/1
TABLET ORAL
Qty: 21 TABLET | Refills: 0 | Status: SHIPPED | OUTPATIENT
Start: 2022-10-17 | End: 2022-10-24

## 2022-10-17 NOTE — PROGRESS NOTES
Mira Deal is a 67 y.o. female   Primary provider:  Lloyd Varghese MD       Chief Complaint   Patient presents with   • Right Hand - Initial Evaluation, Pain       HISTORY OF PRESENT ILLNESS:        Mrs. Deal is a 67-year-old female who presents today with complaints of pain at her MCP joint of her right ring finger.  Symptoms have been intermittent over the last 3 months.  She is not currently having pain today, but does report pain on Sunday.  She states pain seems to be worse with weather change.  She describes the pain as moderate, stabbing, aching, burning.  She reports swelling when pain first started, she does not have consistent swelling.  No redness.  No fever, nausea, vomiting.  No trauma or injury.  She has tried Voltaren gel and Aleve which do help with symptoms.  She has no complaints of burning, tingling, numbness.    Hand Pain   Incident onset: 3 months ago. There was no injury mechanism. The pain is present in the right fingers. The quality of the pain is described as aching, burning and stabbing. The pain is moderate. The pain has been intermittent since the incident. Associated symptoms comments: swelling. She has tried NSAIDs for the symptoms.        CONCURRENT MEDICAL HISTORY:    Past Medical History:   Diagnosis Date   • Acute suppurative otitis media without spontaneous rupture of eardrum     right ear   • Benign essential hypertension    • Chronic osteoarthritis    • Deep vein thrombosis (HCC) 1971   • Diverticular disease of colon    • Dysfunction of eustachian tube     Will start OTC Nasal steroid      • Encounter for gynecological examination (general) (routine) without abnormal findings    • Encounter for screening for malignant neoplasm of breast    • Encounter for screening mammogram for malignant neoplasm of breast     02/04/16 negative      • Gastrointestinal hemorrhage    • Gout 1979   • History of transfusion 1971   • HL (hearing loss) 1.5 months ago   •  Hypercholesterolemia    • Hyperlipidemia    • Hypothyroidism    • Kidney stone 1984   • Left lower quadrant pain    • Mild depression    • Nausea    • Obesity     Hyperinsulinar    • Obesity, unspecified    • Vertigo        Allergies   Allergen Reactions   • Sulfa Antibiotics Angioedema   • Lortab [Hydrocodone-Acetaminophen] GI Intolerance   • Tramadol Dizziness         Current Outpatient Medications:   •  ASPIRIN 81 PO, Take 1 tablet by mouth Daily., Disp: , Rfl:   •  Cyanocobalamin (B-12) 5000 MCG capsule, Take 1 dose by mouth Every Other Day. (Patient taking differently: Take 1 dose by mouth Every Other Day. Gets OTC), Disp: 30 capsule, Rfl: 3  •  Diclofenac Sodium (VOLTAREN) 1 % gel gel, Apply 4 g topically to the appropriate area as directed 4 (Four) Times a Day As Needed (Pain R fingers.)., Disp: 150 g, Rfl: 0  •  levothyroxine (Euthyrox) 100 MCG tablet, Take 1 tablet by mouth Daily., Disp: 30 tablet, Rfl: 5  •  lisinopril (PRINIVIL,ZESTRIL) 40 MG tablet, Take 1 tablet by mouth Daily., Disp: 30 tablet, Rfl: 5  •  meloxicam (MOBIC) 15 MG tablet, Take 1 tablet by mouth Daily for 60 days., Disp: 60 tablet, Rfl: 0  •  methylPREDNISolone (MEDROL) 4 MG dose pack, Take as directed on package instructions., Disp: 21 tablet, Rfl: 0  •  multivitamin (THERAGRAN) tablet tablet, Take  by mouth Daily. Nature's made with vitamin c and zinc, Disp: , Rfl:   •  PARoxetine (PAXIL) 10 MG tablet, TAKE 1 TABLET BY MOUTH ONCE DAILY IN THE MORNING, Disp: 30 tablet, Rfl: 0  •  triamcinolone (KENALOG) 0.1 % ointment, Apply  topically to the appropriate area as directed 2 (Two) Times a Day As Needed (Psoriasis)., Disp: 454 g, Rfl: 0    Past Surgical History:   Procedure Laterality Date   • CARPAL TUNNEL RELEASE  1991   • COLONOSCOPY  2013    Dr Maria   • FOREARM SURGERY  2011    /wrist   • INJECTION OF MEDICATION  05/12/2016    Kenalog (1)      • INJECTION OF MEDICATION  05/12/2016    Rocephin (1)      • INJECTION OF MEDICATION   "2016    Zofran (1)      • JOINT REPLACEMENT  2018   • KNEE CARTILAGE SURGERY     • SPLENECTOMY     • TOTAL KNEE ARTHROPLASTY Left 2018   • VAGINAL HYSTERECTOMY SALPINGO OOPHORECTOMY      cerv cancer       Family History   Problem Relation Age of Onset   • Cancer Other    • Diabetes Other    • Heart disease Other    • Hypertension Other    • Arthritis Daughter    • Hyperlipidemia Daughter    • Breast cancer Maternal Aunt    • Coronary artery disease Father         Social History     Socioeconomic History   • Marital status:    Tobacco Use   • Smoking status: Former     Packs/day: 0.25     Years: 2.00     Pack years: 0.50     Types: Cigarettes     Start date: 1986     Quit date: 2014     Years since quittin.7   • Smokeless tobacco: Never   • Tobacco comments:     No longer a smoker for many years   Substance and Sexual Activity   • Alcohol use: No   • Drug use: No   • Sexual activity: Not Currently     Partners: Male     Birth control/protection: Abstinence        Review of Systems   Constitutional: Negative.    HENT: Negative.    Eyes: Negative.    Respiratory: Negative.    Cardiovascular: Negative.    Gastrointestinal: Negative.    Endocrine: Negative.    Genitourinary: Negative.    Musculoskeletal: Negative.    Skin: Negative.    Allergic/Immunologic: Negative.    Neurological: Negative.    Psychiatric/Behavioral: Negative.        PHYSICAL EXAMINATION:       Ht 144.8 cm (57\")   Wt 89.8 kg (198 lb)   BMI 42.85 kg/m²     Physical Exam  Vitals and nursing note reviewed.   Constitutional:       General: She is not in acute distress.     Appearance: She is well-developed. She is not toxic-appearing.   HENT:      Head: Normocephalic.   Pulmonary:      Effort: Pulmonary effort is normal. No respiratory distress.   Skin:     General: Skin is warm and dry.   Neurological:      Mental Status: She is alert and oriented to person, place, and time.   Psychiatric:         " Behavior: Behavior normal.         Thought Content: Thought content normal.         Judgment: Judgment normal.         GAIT:     []  Normal  []  Antalgic    Assistive device: [x]  None  []  Walker     []  Crutches  []  Cane     []  Wheelchair  []  Stretcher    Right Hand Exam     Comments:  Flexor/extensor mechanisms are intact.  No deformity.  No evidence of infection.  Mild pain with arc of motion.  No swelling.  No bony tenderness.  Patient indicates MCP joint of her right ring finger as area of concern when pain does occur.                    ASSESSMENT:    Diagnoses and all orders for this visit:    Right hand pain    Other orders  -     meloxicam (MOBIC) 15 MG tablet; Take 1 tablet by mouth Daily for 60 days.  -     methylPREDNISolone (MEDROL) 4 MG dose pack; Take as directed on package instructions.          PLAN    After discussing available treatment options including risk, benefits, alternatives, patient prescribed Medrol Dosepak and meloxicam today.   Take medication properly explained.  At this point time patient desires to take meloxicam as needed, however if meloxicam seems to help with pain and other osteoarthritic pain, she may be interested in taking it daily.  If symptoms persist past despite current conservative measures.  Patient desires to continue meloxicam on a routine basis, patient is to return in approximately 6 to 8 weeks for recheck.  We discussed getting CMP at least yearly if she decides to do long-term daily NSAIDs.  Patient also instructed to perform RICE therapy as needed.  Signs and symptoms to report and when to seek care explained.  Patient to return as needed.      Return in about 2 months (around 12/17/2022), or if symptoms worsen or fail to improve.    EMR Dragon/Transciption Disclaimer: Some of this note may be an electronic transcription/translation of spoken language to printed text.  The electronic translation of spoken language may permit erroneous, or at times,  nonsensical words or phrases to be inadvertently transcribed. Although I have reviewed the note for such errors, some may still exist.     Time spent of a minimum of 30 minutes including the face to face evaluation, reviewing of medical history and prior medial records, reviewing of diagnostic studies, ordering additional tests, documentation, patient education and coordination of care.         This document has been electronically signed by Eva WARREN on October 17, 2022 09:21 CDT

## 2022-10-24 ENCOUNTER — OFFICE VISIT (OUTPATIENT)
Dept: FAMILY MEDICINE CLINIC | Facility: CLINIC | Age: 67
End: 2022-10-24

## 2022-10-24 VITALS
WEIGHT: 194.5 LBS | DIASTOLIC BLOOD PRESSURE: 80 MMHG | TEMPERATURE: 97.7 F | BODY MASS INDEX: 41.96 KG/M2 | OXYGEN SATURATION: 97 % | SYSTOLIC BLOOD PRESSURE: 140 MMHG | HEIGHT: 57 IN | HEART RATE: 92 BPM

## 2022-10-24 DIAGNOSIS — F41.9 ANXIETY: ICD-10-CM

## 2022-10-24 DIAGNOSIS — I10 ESSENTIAL HYPERTENSION: ICD-10-CM

## 2022-10-24 DIAGNOSIS — E03.9 ACQUIRED HYPOTHYROIDISM: ICD-10-CM

## 2022-10-24 DIAGNOSIS — M79.644 PAIN IN FINGER OF RIGHT HAND: ICD-10-CM

## 2022-10-24 PROCEDURE — 99214 OFFICE O/P EST MOD 30 MIN: CPT | Performed by: FAMILY MEDICINE

## 2022-10-24 RX ORDER — LEVOTHYROXINE SODIUM 0.1 MG/1
100 TABLET ORAL DAILY
Qty: 30 TABLET | Refills: 5 | Status: SHIPPED | OUTPATIENT
Start: 2022-10-24

## 2022-10-24 RX ORDER — PAROXETINE 10 MG/1
10 TABLET, FILM COATED ORAL EVERY MORNING
Qty: 30 TABLET | Refills: 5 | Status: SHIPPED | OUTPATIENT
Start: 2022-10-24

## 2022-10-24 RX ORDER — LISINOPRIL 40 MG/1
40 TABLET ORAL DAILY
Qty: 30 TABLET | Refills: 5 | Status: SHIPPED | OUTPATIENT
Start: 2022-10-24

## 2022-10-24 NOTE — PROGRESS NOTES
Answers for HPI/ROS submitted by the patient on 10/17/2022  Please describe your symptoms.: Just follow up from last visit  Have you had these symptoms before?: No  How long have you been having these symptoms?: 1-4 days  Please list any medications you are currently taking for this condition.: Advice +  What is the primary reason for your visit?: Other

## 2022-10-25 NOTE — PROGRESS NOTES
Chief Complaint  Finger pain, Anxiety, Hypothyroidism, and Hypertension    Subjective          Review of Systems   HENT: Negative for ear pain, postnasal drip, sinus pressure, sinus pain ( mild) and sneezing.    Eyes: Negative.    Respiratory: Negative for wheezing.    Cardiovascular: Negative for palpitations.        S/P Cardiac work- up Neg   Endocrine: Negative.    Genitourinary: Negative for dysuria.   Musculoskeletal:        R Ring finger pain improving    H/O L TKA    L Shoulder pain since Flu  injection at site   Skin:        Psoriasis   Allergic/Immunologic: Positive for environmental allergies.   Hematological: Negative.    Psychiatric/Behavioral: The patient is nervous/anxious.        Mira Deal presents to [facility] - [place]  Anxiety  Presents for follow-up visit. Symptoms include nervous/anxious behavior. Patient reports no palpitations. Symptoms occur occasionally. The severity of symptoms is mild. The quality of sleep is fair.       Hypothyroidism  This is a chronic problem. The current episode started more than 1 year ago. The problem occurs daily. The problem has been gradually improving. Nothing aggravates the symptoms. Treatments tried: Levothyroxine. The treatment provided significant relief.   Hypertension  This is a chronic problem. The current episode started more than 1 year ago. The problem is unchanged. The problem is controlled. Associated symptoms include anxiety. Pertinent negatives include no palpitations. Risk factors for coronary artery disease include obesity and post-menopausal state. Current antihypertension treatment includes ACE inhibitors. The current treatment provides significant improvement.   Osteoarthritis  Presents for follow-up visit. She complains of pain. Affected location: Multiple joints. Finger painimproving after steroids and Mobic. Her pain is at a severity of 6/10. Pertinent negatives include no dysuria.  "Her past medical history is significant for osteoarthritis.       Objective   Vital Signs:   /80 (BP Location: Left arm, Patient Position: Sitting, Cuff Size: Adult)   Pulse 92   Temp 97.7 °F (36.5 °C) (Temporal)   Ht 144.8 cm (57\")   Wt 88.2 kg (194 lb 8 oz)   SpO2 97%   BMI 42.09 kg/m²     Physical Exam  Vitals and nursing note reviewed.   Constitutional:       Appearance: Normal appearance. She is well-developed. She is obese. She is not ill-appearing.   HENT:      Head: Normocephalic and atraumatic.      Right Ear: Tympanic membrane, ear canal and external ear normal. There is no impacted cerumen.      Left Ear: Tympanic membrane, ear canal and external ear normal. There is no impacted cerumen.      Nose: Nose normal. No congestion.      Mouth/Throat:      Mouth: Mucous membranes are moist.      Pharynx: Oropharynx is clear.   Eyes:      Extraocular Movements: Extraocular movements intact.      Conjunctiva/sclera: Conjunctivae normal.      Pupils: Pupils are equal, round, and reactive to light.   Cardiovascular:      Rate and Rhythm: Normal rate and regular rhythm.      Heart sounds: Normal heart sounds.   Pulmonary:      Effort: Pulmonary effort is normal.      Breath sounds: Normal breath sounds.   Abdominal:      General: Bowel sounds are normal. There is no distension.      Palpations: Abdomen is soft.      Tenderness: There is no abdominal tenderness. There is no right CVA tenderness, left CVA tenderness or guarding.   Musculoskeletal:         General: Tenderness present.      Cervical back: Neck supple.      Comments: Swelling and tenderness to touch across base of index middle and ring finger improving   Skin:     General: Skin is warm and dry.      Capillary Refill: Capillary refill takes 2 to 3 seconds.      Findings: Rash present.      Comments: Psoriasis   Neurological:      General: No focal deficit present.      Mental Status: She is alert and oriented to person, place, and time. Mental " status is at baseline.      Cranial Nerves: No cranial nerve deficit.      Sensory: No sensory deficit.      Motor: No weakness.      Coordination: Coordination normal.      Gait: Gait normal.   Psychiatric:         Mood and Affect: Mood normal.         Speech: Speech normal.         Behavior: Behavior normal.         Thought Content: Thought content normal.         Judgment: Judgment normal.        Result Review :                 Assessment and Plan    Diagnoses and all orders for this visit:    1. Pain in finger of right hand  Comments:  R ring finger.     2. Anxiety  -     PARoxetine (PAXIL) 10 MG tablet; Take 1 tablet by mouth Every Morning.  Dispense: 30 tablet; Refill: 5    3. Essential hypertension  -     lisinopril (PRINIVIL,ZESTRIL) 40 MG tablet; Take 1 tablet by mouth Daily.  Dispense: 30 tablet; Refill: 5    4. Acquired hypothyroidism  -     levothyroxine (Euthyrox) 100 MCG tablet; Take 1 tablet by mouth Daily.  Dispense: 30 tablet; Refill: 5        Follow Up   Return in about 6 months (around 4/24/2023).  Patient was given instructions and counseling regarding her condition or for health maintenance advice. Please see specific information pulled into the AVS if appropriate.         This document has been electronically signed by Lloyd Varghese MD on October 24, 2022 20:02 CDT        Answers for HPI/ROS submitted by the patient on 10/17/2022  Please describe your symptoms.: Just follow up from last visit  Have you had these symptoms before?: No  How long have you been having these symptoms?: 1-4 days  Please list any medications you are currently taking for this condition.: Advice +  What is the primary reason for your visit?: Other

## 2023-02-17 ENCOUNTER — OFFICE VISIT (OUTPATIENT)
Dept: FAMILY MEDICINE CLINIC | Facility: CLINIC | Age: 68
End: 2023-02-17
Payer: COMMERCIAL

## 2023-02-17 ENCOUNTER — LAB (OUTPATIENT)
Dept: LAB | Facility: HOSPITAL | Age: 68
End: 2023-02-17
Payer: COMMERCIAL

## 2023-02-17 VITALS
HEIGHT: 57 IN | TEMPERATURE: 97.3 F | HEART RATE: 92 BPM | OXYGEN SATURATION: 97 % | WEIGHT: 190.3 LBS | DIASTOLIC BLOOD PRESSURE: 92 MMHG | SYSTOLIC BLOOD PRESSURE: 144 MMHG | BODY MASS INDEX: 41.06 KG/M2

## 2023-02-17 DIAGNOSIS — R07.9 CHEST PAIN, UNSPECIFIED TYPE: ICD-10-CM

## 2023-02-17 DIAGNOSIS — R07.9 CHEST PAIN, UNSPECIFIED TYPE: Primary | ICD-10-CM

## 2023-02-17 PROCEDURE — 93010 ELECTROCARDIOGRAM REPORT: CPT | Performed by: INTERNAL MEDICINE

## 2023-02-17 PROCEDURE — 93005 ELECTROCARDIOGRAM TRACING: CPT | Performed by: FAMILY MEDICINE

## 2023-02-17 PROCEDURE — 96372 THER/PROPH/DIAG INJ SC/IM: CPT | Performed by: FAMILY MEDICINE

## 2023-02-17 PROCEDURE — 99214 OFFICE O/P EST MOD 30 MIN: CPT | Performed by: FAMILY MEDICINE

## 2023-02-17 PROCEDURE — 80050 GENERAL HEALTH PANEL: CPT

## 2023-02-17 PROCEDURE — 86140 C-REACTIVE PROTEIN: CPT

## 2023-02-17 PROCEDURE — 81001 URINALYSIS AUTO W/SCOPE: CPT

## 2023-02-17 RX ORDER — KETOROLAC TROMETHAMINE 30 MG/ML
30 INJECTION, SOLUTION INTRAMUSCULAR; INTRAVENOUS ONCE
Status: COMPLETED | OUTPATIENT
Start: 2023-02-17 | End: 2023-02-17

## 2023-02-17 RX ORDER — KETOROLAC TROMETHAMINE 10 MG/1
10 TABLET, FILM COATED ORAL EVERY 6 HOURS PRN
Qty: 12 TABLET | Refills: 0 | Status: SHIPPED | OUTPATIENT
Start: 2023-02-17

## 2023-02-17 RX ADMIN — KETOROLAC TROMETHAMINE 30 MG: 30 INJECTION, SOLUTION INTRAMUSCULAR; INTRAVENOUS at 17:05

## 2023-02-17 NOTE — PROGRESS NOTES
"Chief Complaint  Pain (Pain in sternum area)  ' ' Last Wednesday, started having midsternal pain, walking makes worse. Swallow OK. No N/V, No SOA'.    Subjective          Review of Systems   HENT: Negative for ear pain, postnasal drip, sinus pressure, sinus pain ( mild) and sneezing.    Eyes: Negative.    Respiratory: Negative for wheezing.    Cardiovascular: Positive for chest pain. Negative for palpitations.        2020 Cardiac work- up Neg with Dr Beltran    Sternal pain since Wed, refused to go from work to ER when started.    Endocrine: Negative.    Genitourinary: Negative for dysuria.   Musculoskeletal:        R Ring finger pain improving    H/O L TKA    L Shoulder pain since Flu  injection at site   Skin:        Psoriasis   Allergic/Immunologic: Positive for environmental allergies.   Hematological: Negative.    Psychiatric/Behavioral: The patient is nervous/anxious.        Mira Deal presents to [facility] - [place]  Chest Pain   This is a new problem. The current episode started in the past 7 days. The onset quality is sudden. The problem occurs constantly. The problem has been unchanged. The pain is at a severity of 8/10. The pain is moderate. Quality: aching. The pain radiates to the epigastrium (substernal). Pertinent negatives include no palpitations. She has tried acetaminophen for the symptoms. The treatment provided no relief. Prior diagnostic workup includes chest x-ray.       Objective   Vital Signs:   /92 (BP Location: Right arm, Patient Position: Sitting, Cuff Size: Adult)   Pulse 92   Temp 97.3 °F (36.3 °C) (Temporal)   Ht 144.8 cm (57\")   Wt 86.3 kg (190 lb 4.8 oz)   SpO2 97%   BMI 41.18 kg/m²     Physical Exam  Vitals and nursing note reviewed.   Constitutional:       Appearance: Normal appearance. She is well-developed. She is obese. She is not ill-appearing.   HENT:      Head: Normocephalic and atraumatic.      Right Ear: " Tympanic membrane, ear canal and external ear normal. There is no impacted cerumen.      Left Ear: Tympanic membrane, ear canal and external ear normal. There is no impacted cerumen.      Nose: Nose normal. No congestion.      Mouth/Throat:      Mouth: Mucous membranes are moist.      Pharynx: Oropharynx is clear.   Eyes:      Extraocular Movements: Extraocular movements intact.      Conjunctiva/sclera: Conjunctivae normal.      Pupils: Pupils are equal, round, and reactive to light.   Cardiovascular:      Rate and Rhythm: Normal rate and regular rhythm.      Heart sounds: Normal heart sounds.   Pulmonary:      Effort: Pulmonary effort is normal.      Breath sounds: Normal breath sounds.   Abdominal:      General: Bowel sounds are normal. There is no distension.      Palpations: Abdomen is soft.      Tenderness: There is no abdominal tenderness. There is no right CVA tenderness, left CVA tenderness or guarding.   Musculoskeletal:         General: Tenderness present.      Cervical back: Neck supple.      Comments: Has C/O pain with light pressure to sternum   Skin:     General: Skin is warm and dry.      Capillary Refill: Capillary refill takes 2 to 3 seconds.      Findings: Rash present.      Comments: Psoriasis   Neurological:      General: No focal deficit present.      Mental Status: She is alert and oriented to person, place, and time. Mental status is at baseline.      Cranial Nerves: No cranial nerve deficit.      Sensory: No sensory deficit.      Motor: No weakness.      Coordination: Coordination normal.      Gait: Gait normal.   Psychiatric:         Mood and Affect: Mood normal.         Speech: Speech normal.         Behavior: Behavior normal.         Thought Content: Thought content normal.         Judgment: Judgment normal.        Result Review :               Discussed exam ,health problems, checking CXR, Labs, EKG done NSR . Discussed if pain worsening to present to ED for eval. Will call lab and test  result.   Assessment and Plan    Diagnoses and all orders for this visit:    1. Chest pain, unspecified type (Primary)  -     CBC & Differential; Future  -     Comprehensive metabolic panel; Future  -     TSH; Future  -     C-reactive protein; Future  -     Urinalysis With Culture If Indicated -; Future  -     XR Chest PA & Lateral (In Office)  -     ECG 12 Lead  -     ketorolac (TORADOL) injection 30 mg  -     ketorolac (TORADOL) 10 MG tablet; Take 1 tablet by mouth Every 6 (Six) Hours As Needed for Moderate Pain.  Dispense: 12 tablet; Refill: 0        Follow Up   Return in about 1 week (around 2/24/2023).  Patient was given instructions and counseling regarding her condition or for health maintenance advice. Please see specific information pulled into the AVS if appropriate.         This document has been electronically signed by Lloyd Varghese MD on February 17, 2023 19:35 CST

## 2023-02-17 NOTE — PROGRESS NOTES
Injection  Injection performed in left upper outer quadrant by Leela Adame MA. Patient tolerated the procedure well without complications.  02/17/23   Leela Adame MA

## 2023-02-18 LAB
ALBUMIN SERPL-MCNC: 4.3 G/DL (ref 3.5–5.2)
ALBUMIN/GLOB SERPL: 1.4 G/DL
ALP SERPL-CCNC: 128 U/L (ref 39–117)
ALT SERPL W P-5'-P-CCNC: 31 U/L (ref 1–33)
ANION GAP SERPL CALCULATED.3IONS-SCNC: 7 MMOL/L (ref 5–15)
AST SERPL-CCNC: 32 U/L (ref 1–32)
BACTERIA UR QL AUTO: ABNORMAL /HPF
BASOPHILS # BLD AUTO: 0.06 10*3/MM3 (ref 0–0.2)
BASOPHILS NFR BLD AUTO: 0.5 % (ref 0–1.5)
BILIRUB SERPL-MCNC: 0.3 MG/DL (ref 0–1.2)
BILIRUB UR QL STRIP: NEGATIVE
BUN SERPL-MCNC: 15 MG/DL (ref 8–23)
BUN/CREAT SERPL: 15.5 (ref 7–25)
CALCIUM SPEC-SCNC: 9.6 MG/DL (ref 8.6–10.5)
CHLORIDE SERPL-SCNC: 109 MMOL/L (ref 98–107)
CLARITY UR: ABNORMAL
CO2 SERPL-SCNC: 28 MMOL/L (ref 22–29)
COD CRY URNS QL: ABNORMAL /HPF
COLOR UR: YELLOW
CREAT SERPL-MCNC: 0.97 MG/DL (ref 0.57–1)
CRP SERPL-MCNC: 1.42 MG/DL (ref 0–0.5)
DEPRECATED RDW RBC AUTO: 44.5 FL (ref 37–54)
EGFRCR SERPLBLD CKD-EPI 2021: 64.2 ML/MIN/1.73
EOSINOPHIL # BLD AUTO: 0.38 10*3/MM3 (ref 0–0.4)
EOSINOPHIL NFR BLD AUTO: 3.1 % (ref 0.3–6.2)
ERYTHROCYTE [DISTWIDTH] IN BLOOD BY AUTOMATED COUNT: 14.9 % (ref 12.3–15.4)
GLOBULIN UR ELPH-MCNC: 3 GM/DL
GLUCOSE SERPL-MCNC: 96 MG/DL (ref 65–99)
GLUCOSE UR STRIP-MCNC: NEGATIVE MG/DL
HCT VFR BLD AUTO: 39.2 % (ref 34–46.6)
HGB BLD-MCNC: 13.1 G/DL (ref 12–15.9)
HGB UR QL STRIP.AUTO: NEGATIVE
HYALINE CASTS UR QL AUTO: ABNORMAL /LPF
IMM GRANULOCYTES # BLD AUTO: 0.04 10*3/MM3 (ref 0–0.05)
IMM GRANULOCYTES NFR BLD AUTO: 0.3 % (ref 0–0.5)
KETONES UR QL STRIP: ABNORMAL
LEUKOCYTE ESTERASE UR QL STRIP.AUTO: NEGATIVE
LYMPHOCYTES # BLD AUTO: 4.13 10*3/MM3 (ref 0.7–3.1)
LYMPHOCYTES NFR BLD AUTO: 33.6 % (ref 19.6–45.3)
MCH RBC QN AUTO: 27.5 PG (ref 26.6–33)
MCHC RBC AUTO-ENTMCNC: 33.4 G/DL (ref 31.5–35.7)
MCV RBC AUTO: 82.4 FL (ref 79–97)
MONOCYTES # BLD AUTO: 1.01 10*3/MM3 (ref 0.1–0.9)
MONOCYTES NFR BLD AUTO: 8.2 % (ref 5–12)
MUCOUS THREADS URNS QL MICRO: ABNORMAL /HPF
NEUTROPHILS NFR BLD AUTO: 54.3 % (ref 42.7–76)
NEUTROPHILS NFR BLD AUTO: 6.66 10*3/MM3 (ref 1.7–7)
NITRITE UR QL STRIP: NEGATIVE
NRBC BLD AUTO-RTO: 0 /100 WBC (ref 0–0.2)
PH UR STRIP.AUTO: 5.5 [PH] (ref 5–8)
PLATELET # BLD AUTO: 401 10*3/MM3 (ref 140–450)
PMV BLD AUTO: 11.8 FL (ref 6–12)
POTASSIUM SERPL-SCNC: 4.5 MMOL/L (ref 3.5–5.2)
PROT SERPL-MCNC: 7.3 G/DL (ref 6–8.5)
PROT UR QL STRIP: ABNORMAL
RBC # BLD AUTO: 4.76 10*6/MM3 (ref 3.77–5.28)
RBC # UR STRIP: ABNORMAL /HPF
REF LAB TEST METHOD: ABNORMAL
SODIUM SERPL-SCNC: 144 MMOL/L (ref 136–145)
SP GR UR STRIP: >=1.03 (ref 1–1.03)
SQUAMOUS #/AREA URNS HPF: ABNORMAL /HPF
TSH SERPL DL<=0.05 MIU/L-ACNC: 0.07 UIU/ML (ref 0.27–4.2)
UROBILINOGEN UR QL STRIP: ABNORMAL
WBC # UR STRIP: ABNORMAL /HPF
WBC NRBC COR # BLD: 12.28 10*3/MM3 (ref 3.4–10.8)

## 2023-02-20 ENCOUNTER — TELEPHONE (OUTPATIENT)
Dept: FAMILY MEDICINE CLINIC | Facility: CLINIC | Age: 68
End: 2023-02-20

## 2023-02-20 NOTE — TELEPHONE ENCOUNTER
Patient called and stated that she was to let provider know how she was feeling; she doesn't feel as bad as she did on Friday

## 2023-03-04 LAB
QT INTERVAL: 450 MS
QTC INTERVAL: 464 MS

## 2023-04-10 ENCOUNTER — OFFICE VISIT (OUTPATIENT)
Dept: FAMILY MEDICINE CLINIC | Facility: CLINIC | Age: 68
End: 2023-04-10
Payer: COMMERCIAL

## 2023-04-10 VITALS
DIASTOLIC BLOOD PRESSURE: 82 MMHG | HEIGHT: 57 IN | HEART RATE: 99 BPM | BODY MASS INDEX: 40.39 KG/M2 | WEIGHT: 187.2 LBS | TEMPERATURE: 98.2 F | OXYGEN SATURATION: 99 % | SYSTOLIC BLOOD PRESSURE: 148 MMHG

## 2023-04-10 DIAGNOSIS — M75.51 SUBACROMIAL BURSITIS OF RIGHT SHOULDER JOINT: Primary | ICD-10-CM

## 2023-04-10 RX ORDER — TRIAMCINOLONE ACETONIDE 40 MG/ML
40 INJECTION, SUSPENSION INTRA-ARTICULAR; INTRAMUSCULAR ONCE
Status: COMPLETED | OUTPATIENT
Start: 2023-04-10 | End: 2023-04-10

## 2023-04-10 RX ADMIN — TRIAMCINOLONE ACETONIDE 40 MG: 40 INJECTION, SUSPENSION INTRA-ARTICULAR; INTRAMUSCULAR at 15:15

## 2023-04-10 NOTE — PROGRESS NOTES
"Chief Complaint  Shoulder Pain (Right/)  'R shoulder pain 8/10 x 3 days, unable to sleep. No injury'  Subjective          Review of Systems   HENT: Negative for ear pain, postnasal drip, sinus pressure, sinus pain ( mild) and sneezing.    Eyes: Negative.    Respiratory: Negative for wheezing.    Cardiovascular: Negative for chest pain and palpitations.        2020 Cardiac work- up Neg with Dr Beltran       Endocrine: Negative.    Genitourinary: Negative for dysuria.   Musculoskeletal:        R shoulder pain 8/10 x 3 days    H/O L TKA    L Shoulder pain since Flu  injection at site   Skin:        Psoriasis   Allergic/Immunologic: Positive for environmental allergies.   Hematological: Negative.    Psychiatric/Behavioral: The patient is nervous/anxious.        Mira Deal presents to [facility] - [place]  History of Present Illness  R shoulder pain 8/10 x 3 days, cause difficulty sleeping, have difficulty moving due to pain      Objective   Vital Signs:   /82 (BP Location: Left arm, Patient Position: Sitting, Cuff Size: Adult)   Pulse 99   Temp 98.2 °F (36.8 °C) (Temporal)   Ht 144.8 cm (57\")   Wt 84.9 kg (187 lb 3.2 oz)   SpO2 99%   BMI 40.51 kg/m²     Physical Exam  Constitutional:       Appearance: Normal appearance. She is well-developed.   HENT:      Head: Normocephalic and atraumatic.      Right Ear: Tympanic membrane, ear canal and external ear normal.      Left Ear: Tympanic membrane, ear canal and external ear normal.      Nose: Nose normal.      Mouth/Throat:      Mouth: Mucous membranes are moist.      Pharynx: Oropharynx is clear.   Eyes:      Extraocular Movements: Extraocular movements intact.      Conjunctiva/sclera: Conjunctivae normal.      Pupils: Pupils are equal, round, and reactive to light.   Cardiovascular:      Rate and Rhythm: Normal rate and regular rhythm.      Heart sounds: Normal heart sounds.   Pulmonary:      Effort: " Pulmonary effort is normal.      Breath sounds: Normal breath sounds.   Abdominal:      General: Bowel sounds are normal. There is no distension.      Palpations: Abdomen is soft.      Tenderness: There is no abdominal tenderness.   Musculoskeletal:         General: Tenderness present.      Cervical back: Normal range of motion and neck supple.      Comments: POMT R subacromial bursa. PROM painful   Skin:     General: Skin is warm and dry.   Neurological:      Mental Status: She is alert and oriented to person, place, and time.   Psychiatric:         Mood and Affect: Mood normal.         Speech: Speech normal.         Behavior: Behavior normal.         Thought Content: Thought content normal.         Judgment: Judgment normal.        Result Review :          Arthrocentesis    Date/Time: 4/10/2023 3:15 PM  Performed by: Lloyd Varghese MD  Authorized by: Lloyd Varghese MD   Consent: Verbal consent obtained. Written consent obtained.  Consent given by: patient  Patient understanding: patient states understanding of the procedure being performed  Patient consent: the patient's understanding of the procedure matches consent given  Procedure consent: procedure consent matches procedure scheduled  Site marked: the operative site was marked  Patient identity confirmed: verbally with patient  Indications: pain   Body area: shoulder  Joint: right subacromial bursa  Needle gauge: 25G.  Approach: posterior  Patient tolerance: patient tolerated the procedure well with no immediate complications  Comments: 1ml Kenalog mixed in 1 ml Lidocaine 1%, R shoulder cleansed and injected. Pt had relief of pain with improved ROM, shortly after injection.             Assessment and Plan    Diagnoses and all orders for this visit:    1. Subacromial bursitis of right shoulder joint (Primary)  -     Arthrocentesis  -     triamcinolone acetonide (KENALOG-40) injection 40 mg        Follow Up   Return if symptoms worsen or fail to  improve, for Keep, Next scheduled follow up.  Patient was given instructions and counseling regarding her condition or for health maintenance advice. Please see specific information pulled into the AVS if appropriate.         This document has been electronically signed by Lloyd Varghese MD on April 10, 2023 15:43 CDT

## 2023-04-25 ENCOUNTER — OFFICE VISIT (OUTPATIENT)
Dept: FAMILY MEDICINE CLINIC | Facility: CLINIC | Age: 68
End: 2023-04-25
Payer: COMMERCIAL

## 2023-04-25 VITALS
HEART RATE: 97 BPM | SYSTOLIC BLOOD PRESSURE: 138 MMHG | WEIGHT: 187 LBS | TEMPERATURE: 97.5 F | DIASTOLIC BLOOD PRESSURE: 70 MMHG | BODY MASS INDEX: 40.34 KG/M2 | OXYGEN SATURATION: 96 % | HEIGHT: 57 IN

## 2023-04-25 DIAGNOSIS — F41.9 ANXIETY: ICD-10-CM

## 2023-04-25 DIAGNOSIS — M25.511 ACUTE PAIN OF RIGHT SHOULDER: Primary | ICD-10-CM

## 2023-04-25 DIAGNOSIS — E03.9 ACQUIRED HYPOTHYROIDISM: ICD-10-CM

## 2023-04-25 DIAGNOSIS — M79.644 PAIN IN FINGER OF RIGHT HAND: ICD-10-CM

## 2023-04-25 DIAGNOSIS — I10 ESSENTIAL HYPERTENSION: ICD-10-CM

## 2023-04-25 RX ORDER — LEVOTHYROXINE SODIUM 0.1 MG/1
100 TABLET ORAL DAILY
Qty: 30 TABLET | Refills: 5 | Status: SHIPPED | OUTPATIENT
Start: 2023-04-25

## 2023-04-25 RX ORDER — LISINOPRIL 40 MG/1
40 TABLET ORAL DAILY
Qty: 30 TABLET | Refills: 5 | Status: SHIPPED | OUTPATIENT
Start: 2023-04-25

## 2023-04-25 RX ORDER — PAROXETINE 10 MG/1
10 TABLET, FILM COATED ORAL EVERY MORNING
Qty: 30 TABLET | Refills: 5 | Status: SHIPPED | OUTPATIENT
Start: 2023-04-25

## 2023-04-25 RX ORDER — ACETAMINOPHEN AND CODEINE PHOSPHATE 300; 30 MG/1; MG/1
2 TABLET ORAL NIGHTLY PRN
Qty: 45 TABLET | Refills: 1 | Status: SHIPPED | OUTPATIENT
Start: 2023-04-25

## 2023-04-25 NOTE — PROGRESS NOTES
Chief Complaint  Shoulder Pain, Hypertension, Hypothyroidism, and Anxiety    Subjective          Review of Systems   Constitutional: Positive for fatigue.   HENT: Negative for ear pain, postnasal drip, sinus pressure, sinus pain ( mild) and sneezing.    Eyes: Negative.    Respiratory: Negative for wheezing.    Cardiovascular: Negative for chest pain and palpitations.        2020 Cardiac work- up Neg with Dr Beltran       Endocrine: Negative.    Genitourinary: Negative for dysuria.   Musculoskeletal:        R shoulder pain 8/10 x 2-3 weeks had brief improvement after Steroid injection.     H/O L TKA    L Shoulder pain since Flu  injection at site   Skin: Positive for rash.        Psoriasis   Allergic/Immunologic: Positive for environmental allergies.   Neurological: Positive for headaches.   Hematological: Negative.    Psychiatric/Behavioral: The patient is nervous/anxious.        Mira Deal presents to [facility] - [place]  Shoulder Injury   The right shoulder is affected. The injury mechanism was repetitive motion. The pain is at a severity of 8/10. The pain is severe. Pertinent negatives include no chest pain. The symptoms are aggravated by movement. She has tried NSAIDs, rest, heat and acetaminophen (Steroid injection) for the symptoms. The treatment provided mild relief.   Anxiety  Presents for follow-up visit. Symptoms include nervous/anxious behavior. Patient reports no chest pain or palpitations. Symptoms occur occasionally. The severity of symptoms is mild. The quality of sleep is fair.       Hypothyroidism  This is a chronic problem. The current episode started more than 1 year ago. The problem occurs daily. The problem has been gradually improving. Associated symptoms include fatigue, headaches and a rash. Pertinent negatives include no chest pain. Nothing aggravates the symptoms. Treatments tried: Levothyroxine. The treatment provided significant  "relief.   Hypertension  This is a chronic problem. The current episode started more than 1 year ago. The problem is unchanged. The problem is controlled. Associated symptoms include anxiety and headaches. Pertinent negatives include no chest pain or palpitations. Risk factors for coronary artery disease include obesity and post-menopausal state. Current antihypertension treatment includes ACE inhibitors. The current treatment provides significant improvement.   Osteoarthritis  Presents for follow-up visit. She complains of pain. Affected location: Multiple joints. Finger painimproving after steroids and Mobic. Her pain is at a severity of 6/10. Associated symptoms include fatigue and rash. Pertinent negatives include no dysuria. Her past medical history is significant for osteoarthritis.       Objective   Vital Signs:   /70 (BP Location: Left arm, Patient Position: Sitting, Cuff Size: Adult)   Pulse 97   Temp 97.5 °F (36.4 °C) (Temporal)   Ht 144.8 cm (57\")   Wt 84.8 kg (187 lb)   SpO2 96%   BMI 40.47 kg/m²     Physical Exam  Constitutional:       Appearance: Normal appearance. She is well-developed.   HENT:      Head: Normocephalic and atraumatic.      Right Ear: Tympanic membrane, ear canal and external ear normal.      Left Ear: Tympanic membrane, ear canal and external ear normal.      Nose: Nose normal.      Mouth/Throat:      Mouth: Mucous membranes are moist.      Pharynx: Oropharynx is clear.   Eyes:      Extraocular Movements: Extraocular movements intact.      Conjunctiva/sclera: Conjunctivae normal.      Pupils: Pupils are equal, round, and reactive to light.   Cardiovascular:      Rate and Rhythm: Normal rate and regular rhythm.      Heart sounds: Normal heart sounds.   Pulmonary:      Effort: Pulmonary effort is normal.      Breath sounds: Normal breath sounds.   Abdominal:      General: Bowel sounds are normal. There is no distension.      Palpations: Abdomen is soft.      Tenderness: There " is no abdominal tenderness.   Musculoskeletal:         General: Tenderness present.      Cervical back: Normal range of motion and neck supple.      Comments: POMT R subacromial bursa resolved. PROM painful. POMT upper arm Biceps tendon and shoulder.    Skin:     General: Skin is warm and dry.   Neurological:      Mental Status: She is alert and oriented to person, place, and time.   Psychiatric:         Mood and Affect: Mood normal.         Speech: Speech normal.         Behavior: Behavior normal.         Thought Content: Thought content normal.         Judgment: Judgment normal.        Result Review :                 Assessment and Plan    Diagnoses and all orders for this visit:    1. Acute pain of right shoulder (Primary)  -     XR Shoulder 2+ View Right (In Office)  -     acetaminophen-codeine (TYLENOL #3) 300-30 MG per tablet; Take 2 tablets by mouth At Night As Needed for Moderate Pain.  Dispense: 45 tablet; Refill: 1  -     Ambulatory Referral to Physical Therapy Evaluate and treat    2. Pain in finger of right hand  Comments:  R ring finger.   Orders:  -     Diclofenac Sodium (VOLTAREN) 1 % gel gel; Apply 4 g topically to the appropriate area as directed 4 (Four) Times a Day As Needed (Pain R fingers.).  Dispense: 150 g; Refill: 0    3. Acquired hypothyroidism  -     levothyroxine (Euthyrox) 100 MCG tablet; Take 1 tablet by mouth Daily.  Dispense: 30 tablet; Refill: 5    4. Essential hypertension  -     lisinopril (PRINIVIL,ZESTRIL) 40 MG tablet; Take 1 tablet by mouth Daily.  Dispense: 30 tablet; Refill: 5    5. Anxiety  -     PARoxetine (PAXIL) 10 MG tablet; Take 1 tablet by mouth Every Morning.  Dispense: 30 tablet; Refill: 5    Other orders  -     diclofenac (VOLTAREN) 50 MG EC tablet; Take 1 tablet by mouth Daily As Needed (R shoulder pain).  Dispense: 30 tablet; Refill: 1        Follow Up   Return in about 4 weeks (around 5/23/2023).  Patient was given instructions and counseling regarding her  condition or for health maintenance advice. Please see specific information pulled into the AVS if appropriate.       Answers for HPI/ROS submitted by the patient on 4/24/2023  Please describe your symptoms.: Regular health care appointment and still having annoying pain in right upper arm.  Have you had these symptoms before?: No  How long have you been having these symptoms?: 1-2 weeks  Please list any medications you are currently taking for this condition.: Icy Hot roll on  What is the primary reason for your visit?: Other        This document has been electronically signed by Lloyd Varghese MD on April 25, 2023 16:51 CDT

## 2023-04-25 NOTE — PROGRESS NOTES
Answers for HPI/ROS submitted by the patient on 4/24/2023  Please describe your symptoms.: Regular health care appointment and still having annoying pain in right upper arm.  Have you had these symptoms before?: No  How long have you been having these symptoms?: 1-2 weeks  Please list any medications you are currently taking for this condition.: Icy Hot roll on  What is the primary reason for your visit?: Other

## 2023-04-26 DIAGNOSIS — M25.511 ACUTE PAIN OF RIGHT SHOULDER: Primary | ICD-10-CM

## 2023-05-04 ENCOUNTER — HOSPITAL ENCOUNTER (OUTPATIENT)
Dept: PHYSICAL THERAPY | Facility: HOSPITAL | Age: 68
Setting detail: THERAPIES SERIES
Discharge: HOME | End: 2023-05-04
Payer: COMMERCIAL

## 2023-05-04 DIAGNOSIS — M25.511 ACUTE PAIN OF RIGHT SHOULDER: Primary | ICD-10-CM

## 2023-05-04 PROCEDURE — 97535 SELF CARE MNGMENT TRAINING: CPT

## 2023-05-04 PROCEDURE — 97110 THERAPEUTIC EXERCISES: CPT

## 2023-05-04 PROCEDURE — 97162 PT EVAL MOD COMPLEX 30 MIN: CPT

## 2023-05-04 NOTE — THERAPY EVALUATION
Outpatient Physical Therapy Ortho Initial Evaluation  [facility]     Patient Name: Mira Deal  : 1955  MRN: [id number]  Today's Date: 2023    Patient seen for 1 PT sessions.  Patient reports N/A% of improvement.  Next MD appt: 2023   Recertification: 2023      Therapy Diagnosis: R shoulder pain with suspected subacromial impingement     Visit Date: 2023    Patient Active Problem List   Diagnosis   • Essential hypertension   • Acquired hypothyroidism   • BMI 39.0-39.9,adult   • Encounter for screening mammogram for malignant neoplasm of breast   • Asplenia   • Hypothyroidism   • Routine medical exam   • Psoriasis   • Hip pain, acute, right   • Acute pain of right knee   • Fall   • Acute otitis externa of right ear   • Internal derangement of right knee   • Sensation of fullness in right ear   • Class 2 severe obesity due to excess calories with serious comorbidity and body mass index (BMI) of 39.0 to 39.9 in adult   • Primary osteoarthritis of knee   • Anxiety   • Macrocytosis without anemia   • Spleen absent   • Dizziness   • Chest pain   • Need for prophylactic vaccination and inoculation against meningococcus   • B12 deficiency   • Muscle strain   • Left wrist pain   • Thumb pain, left   • Acute pain of right shoulder   • Class 3 severe obesity due to excess calories with serious comorbidity and body mass index (BMI) of 40.0 to 44.9 in adult        Past Medical History:   Diagnosis Date   • Acute suppurative otitis media without spontaneous rupture of eardrum     right ear   • Benign essential hypertension    • Cancer     cervix   • Chronic osteoarthritis    • Deep vein thrombosis    • Diverticular disease of colon    • Dysfunction of eustachian tube     Will start OTC Nasal steroid      • Encounter for gynecological examination (general) (routine) without abnormal findings    • Encounter for screening for malignant neoplasm of breast    • Encounter for screening  mammogram for malignant neoplasm of breast     02/04/16 negative      • Gastrointestinal hemorrhage    • Gout 1979   • History of transfusion 1971   • HL (hearing loss) 1.5 months ago   • Hypercholesterolemia    • Hyperlipidemia    • Hypothyroidism    • Kidney stone 1984   • Left lower quadrant pain    • Mild depression    • Nausea    • Obesity     Hyperinsulinar    • Obesity, unspecified    • Vertigo         Past Surgical History:   Procedure Laterality Date   • CARPAL TUNNEL RELEASE  1991   • COLONOSCOPY  2013    Dr Maria   • FOREARM SURGERY  2011    /wrist   • INJECTION OF MEDICATION  05/12/2016    Kenalog (1)      • INJECTION OF MEDICATION  05/12/2016    Rocephin (1)      • INJECTION OF MEDICATION  05/12/2016    Zofran (1)      • JOINT REPLACEMENT  July 16, 2018   • KNEE CARTILAGE SURGERY  2012   • SPLENECTOMY  1972   • TOTAL KNEE ARTHROPLASTY Left 07/16/2018   • VAGINAL HYSTERECTOMY SALPINGO OOPHORECTOMY  1995    cerv cancer       Visit Dx:     ICD-10-CM ICD-9-CM   1. Acute pain of right shoulder  M25.511 719.41          Patient History     Row Name 05/04/23 1500             History    Chief Complaint Pain  -AC      Type of Pain Shoulder pain  -      Date Current Problem(s) Began --  month ago  -AC      Brief Description of Current Complaint Patient reports about a month ago her R shoulder started hurting her. No previous shoulder injuries. Denies JACQUELINE. Worse in the evenings. Use to put arm above head would provide relief. But does not help now. Denies radicular symptoms. Pain radiates from R shoulder to elbow.  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 Yes, PCP  -AC      How has patient tried to help current problem? pain medications, ice pack, heat pack  -AC      What clinical tests have you had for this problem? X-ray;MRI  -AC      Results of Clinical Tests see EMR  -AC      Related/Recent Hospitalizations No  -AC      History of Previous Related Injuries no  -AC      Are you or can you be pregnant No  -AC         Pain     Pain Location Shoulder  -AC      Pain at Present 7  -AC      Pain Frequency Constant/continuous  -AC      Pain Description Sharp;Aching;Other (Comment)  annoying  -AC      What Performance Factors Make the Current Problem(s) WORSE? moving her arm, turning the key, sitting, standing, reaching behind her back  -AC      What Performance Factors Make the Current Problem(s) BETTER? nothing  -AC      Is your sleep disturbed? Yes  -AC      Is medication used to assist with sleep? Yes  -AC      What position do you sleep in? Supine;Left sidelying  -AC         Fall Risk Assessment    Any falls in the past year: No  -AC         Services    Prior Rehab/Home Health Experiences No  -AC      Are you currently receiving Home Health services No  -AC      Do you plan to receive Home Health services in the near future No  -AC         Daily Activities    Primary Language English  -AC      Are you able to read Yes  -AC      Are you able to write Yes  -AC            User Key  (r) = Recorded By, (t) = Taken By, (c) = Cosigned By    Initials Name Provider Type    Gaye Keith PT Physical Therapist                 PT Ortho     Row Name 05/04/23 1500       Subjective Comments    Subjective Comments see patient history  -AC       Precautions and Contraindications    Precautions/Limitations no known precautions/limitations  -AC       Subjective Pain    Able to rate subjective pain? yes  -AC    Post-Treatment Pain Level 0  \"dont feel the pain\"  -AC       Posture/Observations    Forward Head Mild;Increased;Sitting " posture;Standing posture  -AC    Cervical Lordosis Bilateral:;Normal  -AC    Thoracic Kyphosis Normal;Increased;Sitting posture  -AC    Rounded Shoulders Normal;Increased;Sitting posture;Standing posture  -AC    Scapular Elevation Bilateral:;Normal  -AC    Scapular winging Bilateral:;Normal  -AC    Scoliosis Bilateral:;Normal  -AC    Posture/Observations Comments Patient does not appear to be in acute distress. No guarding present. Poor postural awareness in seated and standing positions.  -AC       DTR- Upper Quarter Clearing    Biceps (C5/6) Bilateral:;2- Normal response  -AC    Brachioradialis (C6) Bilateral:;2- Normal response  -AC    Triceps (C7) Bilateral:;2- Normal response  -AC       Sensory Screen for Light Touch- Upper Quarter Clearing    C4 (posterior shoulder) Bilateral:;Intact  -AC    C5 (lateral upper arm) Bilateral:;Intact  -AC    C6 (tip of thumb) Bilateral:;Intact  -AC    C7 (tip of 3rd finger) Bilateral:;Intact  -AC    C8 (tip of 5th finger) Bilateral:;Intact  -AC    T1 (medial lower arm) Bilateral:;Intact  -AC       Cervical/Shoulder ROM Screen    Cervical flexion Normal  -AC    Cervical extension Normal  -AC    Cervical lateral flexion Normal  -AC    Cervical rotation Normal  -AC    Cervical quadrant (Spurling's) --  -AC       Shoulder Girdle Accessory Motions    Posterior glide of humerus Right:;Hypomobile;Left:;WNL  -AC    Inferior glide of humerus Right:;Hypomobile;Left:;WNL  -AC    Protraction mobility of scapula WNL  -AC    Retraction mobility of scapula WNL  -AC    Upward rotation mobility of scapula Right:;Left:;Hypomobile  -AC       Shoulder Impingement/Rotator Cuff Special Tests    Knox-Michael Test (RC Lesion vs. Bursitis) Bilateral:;Negative  -AC    Neer Impingement Test (RC Lesion vs. Bursitis) Right:;Positive  -AC    Full Can Test (RC Lesion) Bilateral:;Negative  -AC    Empty Can Test (RC Lesion) Bilateral:;Negative  -AC    Lift-Off Test (Subscapularis Lesion) Bilateral:;Negative   -AC    Speed's Test (LH of Biceps Lesion) Bilateral:;Negative  -AC       Shoulder Laxity/Instability Special Tests    Anterior Apprehension/Relocation Test, at 90 Degrees Bilateral:;Negative  -AC    Anterior Apprehension/Relocation Test, at 120 Degrees Bilateral:;Negative  -AC    Anterior Drawer Sign Bilateral:;Negative  -AC    Posterior Drawer Sign Bilateral:;Negative  -AC       Shoulder Girdle Palpation    Subacromial Space Right:;Tender  -AC    AC Joint Right:;Tender  -AC    Deltoid Right:;Tender  -AC    Greater Tubercule Right:;Tender  -AC    Upper Trap Right:;Tender;Guarded/taut  -AC       General ROM    GENERAL ROM COMMENTS B UE AROM WFL.  -AC       Right Upper Ext    Rt Shoulder Abduction AROM 170°  -AC    Rt Shoulder Flexion AROM 168°  -AC    Rt Shoulder External Rotation AROM WFL, able to reach C7 spinous process  -AC    Rt Shoulder Internal Rotation AROM WFL, able to reach L1 spinous process  -AC       Left Upper Ext    Lt Shoulder Abduction AROM 175°  -AC    Lt Shoulder Flexion AROM 170°  -AC    Lt Shoulder External Rotation AROM WFL, able to reach T1 spinous process  -AC    Lt Shoulder Internal Rotation AROM WFL, able to reach inferior angle of scapular  -AC       MMT (Manual Muscle Testing)    General MMT Comments L UE strength 5/5. R UE strength grossly 4/5.  -AC        Strength Right    # Reps 3  -AC    Right Rung 2  -AC    Right  Test 1 18  -AC    Right  Test 2 16  -AC    Right  Test 3 17  -AC     Strength Average Right 17  -AC        Strength Left    # Reps 3  -AC    Left Rung 2  -AC    Left  Test 1 20  -AC    Left  Test 2 16  -AC    Left  Test 3 18  -AC     Strength Average Left 18  -AC       Sensation    Sensation WNL? WNL  -AC    Light Touch No apparent deficits  -       Pathomechanics    Upper Extremity Pathomechanics Limited thoracic extension;Limited wrist extension with   -       Transfers    Comment, (Transfers) I with all transfers.  -AC        Gait/Stairs (Locomotion)    Comment, (Gait/Stairs) I with ambulation into clinic with no AD. No significant gait deviaitions.  -          User Key  (r) = Recorded By, (t) = Taken By, (c) = Cosigned By    Initials Name Provider Type    Gaye Keith PT Physical Therapist                            Therapy Education  Education Details: HEP: exercises provided this visit; cryotherapy, pain management strategies; pillow prop under R arm during bedtime  Given: HEP, Symptoms/condition management, Pain management  Program: New  How Provided: Verbal, Demonstration, Written  Provided to: Patient  Level of Understanding: Teach back education performed, Verbalized, Demonstrated      PT OP Goals     Row Name 05/04/23 1500          PT Short Term Goals    STG Date to Achieve 05/25/23  -     STG 1 I with HEP and have additions/changes by next recertification.  -     STG 2 Patient to perform 20 scapular retractions with proper form with no cues from PT.  -     STG 3 Patient to demo improved postural awareness with all therEx with no cues from PT.  -     STG 4 Patient to verbalize understanding of preferred desk ergonomics to improve overall posture at the work place.  -        Long Term Goals    LTG 1 I with final HEP.  -     LTG 2 R UE strength 5/5.  -     LTG 3 Patient to improve B  strength >/=10 lbs from initial eval findings in 3 attempt average.  -     LTG 4 Patient able to perform 3 minutes of OH activities with no increase in pain.  -     LTG 5 --  -     LTG 6 --  -        Time Calculation    PT Goal Re-Cert Due Date 05/25/23  -           User Key  (r) = Recorded By, (t) = Taken By, (c) = Cosigned By    Initials Name Provider Type    Gaye Keith PT Physical Therapist                 PT Assessment/Plan     Row Name 05/04/23 1500          PT Assessment    Functional Limitations Performance in work activities;Performance in leisure activities;Limitations in functional capacity and  "performance;Limitations in community activities;Limitation in home management  -     Impairments Posture;Poor body mechanics;Pain;Muscle strength;Impaired muscle endurance;Impaired muscle length;Impaired flexibility;Joint mobility  -AC     Assessment Comments Patient is a 67 y.o. patient who presents with R shoulder pain which is acute in nature. No JACQUELINE. Patient presents with decreased R shoulder strength, poor postural awareness, flexibility, and scapular stability. Patient would benefit from skilled PT to address deficits listed above.  -AC     Please refer to paper survey for additional self-reported information No  -AC     Rehab Potential Good  -AC     Patient/caregiver participated in establishment of treatment plan and goals Yes  -AC     Patient would benefit from skilled therapy intervention Yes  -AC        PT Plan    PT Frequency 2x/week  -AC     Predicted Duration of Therapy Intervention (PT) 6-8 visits  -AC     Planned CPT's? PT EVAL MOD COMPLELITY: 29008;PT RE-EVAL: 02716;PT THER PROC EA 15 MIN: 50046;PT THER ACT EA 15 MIN: 62993;PT MANUAL THERAPY EA 15 MIN: 57370;PT NEUROMUSC RE-EDUCATION EA 15 MIN: 59292;PT SELF CARE/HOME MGMT/TRAIN EA 15: 08179;PT HOT OR COLD PACK TREAT [facility];PT THER SUPP EA 15 MIN;PT SELF CARE/MGMT/TRAIN 15 MIN: 61770  -AC     PT Plan Comments Progress overall R shoulder strength, postural awareness, flexibility, scapular stability. Next visit add No $ and tband rows.  -           User Key  (r) = Recorded By, (t) = Taken By, (c) = Cosigned By    Initials Name Provider Type     Gaye Romero, PT Physical Therapist                   OP Exercises     Row Name 05/04/23 1500             Subjective Comments    Subjective Comments see patient history  -AC         Subjective Pain    Able to rate subjective pain? yes  -AC      Pre-Treatment Pain Level 7  -AC      Post-Treatment Pain Level 0  \"dont feel the pain\"  -AC         Exercise 1    Exercise Name 1 Pro II UE bike for postural " "re-ed, strength  -AC      Time 1 5 minutes  -AC      Additional Comments L 1.0  -AC         Exercise 2    Exercise Name 2 B UT S  -AC      Reps 2 2  -AC      Time 2 30 sec hold  -AC         Exercise 3    Exercise Name 3 Doorway S  -AC      Reps 3 2  -AC      Time 3 30 sec hold  -AC         Exercise 4    Exercise Name 4 Scapular retractions  -AC      Sets 4 2  -AC      Reps 4 10  -AC      Time 4 3\" hold  -AC         Exercise 5    Exercise Name 5 Patient education  -AC      Additional Comments see flowsheet  -AC            User Key  (r) = Recorded By, (t) = Taken By, (c) = Cosigned By    Initials Name Provider Type    Gaye Keith, PT Physical Therapist                              Outcome Measure Options: Quick DASH  Quick DASH  Open a tight or new jar.: Severe Difficulty  Do heavy household chores (e.g., wash walls, wash floors): Moderate Difficulty  Carry a shopping bag or briefcase: No Difficulty  Wash your back: No Difficulty  Use a knife to cut food: No Difficulty  Recreational activities in which you take some force or impact through your arm, should or hand (e.g. golf, hammering, tennis, etc.): Moderate Difficulty  During the past week, to what extent has your arm, shoulder, or hand problem interfered with your normal social activites with family, friends, neighbors or groups?: Moderately  During the past week, were you limited in your work or other regular daily activities as a result of your arm, shoulder or hand problem?: Not limited at all  Arm, Shoulder, or hand pain: Extreme  Tingling (pins and needles) in your arm, shoulder, or hand: None  During the past week, how much difficulty have you had sleeping because of the pain in your arm, shoulder or hand?: Mild Difficulty  Number of Questions Answered: 11  Quick DASH Score: 31.82         Time Calculation:     Start Time: 1551  Stop Time: 1645  Time Calculation (min): 54 min  Total Timed Code Minutes- PT: 54 minute(s)     Therapy Charges for Today  "    Code Description Service Date Service Provider Modifiers Qty    82247509621 HC PT THER SUPP EA 15 MIN 5/4/2023 Bonita, Autumn, PT GP 1    83832377369 HC PT THER PROC EA 15 MIN 5/4/2023 Bonita, Autumn, PT GP 1    79802876035 HC PT SELF CARE/MGMT/TRAIN EA 15 MIN 5/4/2023 Romero, Autumn, PT GP 1    22184567101 HC PT EVAL MOD COMPLEXITY 2 5/4/2023 Bonita, Autumn, PT GP 1          PT G-Codes  Outcome Measure Options: Quick DASH  Quick DASH Score: 31.82         Gaye Bonita, PT , DPT 5/4/2023

## 2023-05-08 ENCOUNTER — TELEPHONE (OUTPATIENT)
Dept: FAMILY MEDICINE CLINIC | Facility: CLINIC | Age: 68
End: 2023-05-08
Payer: COMMERCIAL

## 2023-05-08 ENCOUNTER — HOSPITAL ENCOUNTER (OUTPATIENT)
Dept: PHYSICAL THERAPY | Facility: HOSPITAL | Age: 68
Setting detail: THERAPIES SERIES
Discharge: HOME | End: 2023-05-08
Payer: COMMERCIAL

## 2023-05-08 DIAGNOSIS — M25.511 ACUTE PAIN OF RIGHT SHOULDER: Primary | ICD-10-CM

## 2023-05-08 PROCEDURE — 97110 THERAPEUTIC EXERCISES: CPT | Performed by: PHYSICAL THERAPIST

## 2023-05-08 NOTE — TELEPHONE ENCOUNTER
Namrata from [facility] called with a question about an MRI for this patient.     Please return call at [phone]

## 2023-05-08 NOTE — THERAPY TREATMENT NOTE
Outpatient Physical Therapy Ortho Treatment Note  [facility]     Patient Name: Mira Deal  : 1955  MRN: [id number]  Today's Date: 2023      Visit Date: 2023     Patient seen for 2 PT sessions.  Patient reports N/A% of improvement.  Next MD appt: 2023   Recertification: 2023      Therapy Diagnosis: R shoulder pain with suspected subacromial impingement     Visit Dx:    ICD-10-CM ICD-9-CM   1. Acute pain of right shoulder  M25.511 719.41       Patient Active Problem List   Diagnosis   • Essential hypertension   • Acquired hypothyroidism   • BMI 39.0-39.9,adult   • Encounter for screening mammogram for malignant neoplasm of breast   • Asplenia   • Hypothyroidism   • Routine medical exam   • Psoriasis   • Hip pain, acute, right   • Acute pain of right knee   • Fall   • Acute otitis externa of right ear   • Internal derangement of right knee   • Sensation of fullness in right ear   • Class 2 severe obesity due to excess calories with serious comorbidity and body mass index (BMI) of 39.0 to 39.9 in adult   • Primary osteoarthritis of knee   • Anxiety   • Macrocytosis without anemia   • Spleen absent   • Dizziness   • Chest pain   • Need for prophylactic vaccination and inoculation against meningococcus   • B12 deficiency   • Muscle strain   • Left wrist pain   • Thumb pain, left   • Acute pain of right shoulder   • Class 3 severe obesity due to excess calories with serious comorbidity and body mass index (BMI) of 40.0 to 44.9 in adult        Past Medical History:   Diagnosis Date   • Acute suppurative otitis media without spontaneous rupture of eardrum     right ear   • Benign essential hypertension    • Cancer     cervix   • Chronic osteoarthritis    • Deep vein thrombosis    • Diverticular disease of colon    • Dysfunction of eustachian tube     Will start OTC Nasal steroid      • Encounter for gynecological examination (general) (routine) without abnormal findings   "  • Encounter for screening for malignant neoplasm of breast    • Encounter for screening mammogram for malignant neoplasm of breast     02/04/16 negative      • Gastrointestinal hemorrhage    • Gout 1979   • History of transfusion 1971   • HL (hearing loss) 1.5 months ago   • Hypercholesterolemia    • Hyperlipidemia    • Hypothyroidism    • Kidney stone 1984   • Left lower quadrant pain    • Mild depression    • Nausea    • Obesity     Hyperinsulinar    • Obesity, unspecified    • Vertigo         Past Surgical History:   Procedure Laterality Date   • CARPAL TUNNEL RELEASE  1991   • COLONOSCOPY  2013    Dr Maria   • FOREARM SURGERY  2011    /wrist   • INJECTION OF MEDICATION  05/12/2016    Kenalog (1)      • INJECTION OF MEDICATION  05/12/2016    Rocephin (1)      • INJECTION OF MEDICATION  05/12/2016    Zofran (1)      • JOINT REPLACEMENT  July 16, 2018   • KNEE CARTILAGE SURGERY  2012   • SPLENECTOMY  1972   • TOTAL KNEE ARTHROPLASTY Left 07/16/2018   • VAGINAL HYSTERECTOMY SALPINGO OOPHORECTOMY  1995    cerv cancer        PT Ortho     Row Name 05/08/23 1600       Subjective Comments    Subjective Comments Patient rpeorts the shoulder is mainly sore.  -AJ       Precautions and Contraindications    Precautions/Limitations no known precautions/limitations  -AJ       Subjective Pain    Able to rate subjective pain? yes  -    Pre-Treatment Pain Level 8  -AJ    Post-Treatment Pain Level 0  \"I'm fine, I am numb isabela\"  -          User Key  (r) = Recorded By, (t) = Taken By, (c) = Cosigned By    Initials Name Provider Type    Eneida Adame, PT DPT Physical Therapist                             PT Assessment/Plan     Row Name 05/08/23 1600          PT Assessment    Assessment Comments Patient required some cueing thoughout treatment not to engage upper traps with scap stabalizatin exercises. Patient laughed on/offthroughout treatment with no reports of pain with any exercises. Issued RTB for additin of No $ " "and CH pulls to HEP.  -AJ        PT Plan    PT Frequency 2x/week  -AJ     PT Plan Comments Add Tband Clocks. Review Rows for proper form.  -AJ           User Key  (r) = Recorded By, (t) = Taken By, (c) = Cosigned By    Initials Name Provider Type    Eneida Adame, PT DPT Physical Therapist                 Modalities     Row Name 05/08/23 1600             Ice    Patient denies application of Ice Yes  -AJ            User Key  (r) = Recorded By, (t) = Taken By, (c) = Cosigned By    Initials Name Provider Type    Eneida Adame, PT DPT Physical Therapist               OP Exercises     Row Name 05/08/23 1600             Subjective Comments    Subjective Comments Patient rpeorts the shoulder is mainly sore.  -AJ         Subjective Pain    Able to rate subjective pain? yes  -AJ      Pre-Treatment Pain Level 8  -AJ      Post-Treatment Pain Level 0  \"I'm fine, I am numb isabela\"  -AJ         Exercise 1    Exercise Name 1 Pro II UE bike for postural re-ed, strength  -AJ      Time 1 10 min  -AJ      Additional Comments L 4.0  -AJ         Exercise 2    Exercise Name 2 B UT S  -AJ      Reps 2 2  -AJ      Time 2 30 sec hold  -AJ         Exercise 3    Exercise Name 3 Doorway S  -AJ      Reps 3 2  -AJ      Time 3 30 sec hold  -AJ         Exercise 4    Exercise Name 4 Tband Rows: Lo, Mid  -AJ      Cueing 4 Verbal;Demo  -AJ      Sets 4 2  -AJ      Reps 4 10 each  -AJ      Additional Comments GTB, cues to not elevate and engge UTs  -AJ         Exercise 5    Exercise Name 5 Tband No $  -AJ      Cueing 5 Demo  -AJ      Sets 5 2  -AJ      Reps 5 10  -AJ      Time 5 5\" hold  -AJ      Additional Comments RTB  -AJ         Exercise 6    Exercise Name 6 Seated scapular retractions  -AJ      Cueing 6 Verbal;Demo  -AJ      Sets 6 1  -AJ      Reps 6 20  -AJ      Time 6 5\" hold  -AJ      Additional Comments Mild cues to not elevate shoulders.  -AJ         Exercise 7    Exercise Name 7 Tband CH pulls  -AJ      Cueing 7 " Verbal;Demo  -AJ      Sets 7 2  -AJ      Reps 7 10  -AJ      Additional Comments Cues to not elevate B shoulders and engage UTs  -AJ            User Key  (r) = Recorded By, (t) = Taken By, (c) = Cosigned By    Initials Name Provider Type    Eneida Adame, PT DPT Physical Therapist               All therapeutic interventions performed today were to address current functional limitations and/or deficits in addressing all physical therapy goals.                  PT OP Goals     Row Name 05/08/23 1600          PT Short Term Goals    STG Date to Achieve 05/25/23  -AJ     STG 1 I with HEP and have additions/changes by next recertification.  -AJ     STG 1 Progress Partially Met;Ongoing  -AJ     STG 2 Patient to perform 20 scapular retractions with proper form with no cues from PT.  -AJ     STG 2 Progress Ongoing;Progressing  -AJ     STG 3 Patient to demo improved postural awareness with all therEx with no cues from PT.  -AJ     STG 3 Progress Ongoing  -     STG 4 Patient to verbalize understanding of preferred desk ergonomics to improve overall posture at the work place.  -        Long Term Goals    LTG 1 I with final HEP.  -AJ     LTG 2 R UE strength 5/5.  -AJ     LTG 3 Patient to improve B  strength >/=10 lbs from initial eval findings in 3 attempt average.  -AJ     LTG 4 Patient able to perform 3 minutes of OH activities with no increase in pain.  -AJ        Time Calculation    PT Goal Re-Cert Due Date 05/25/23  -           User Key  (r) = Recorded By, (t) = Taken By, (c) = Cosigned By    Initials Name Provider Type    Eneida Adame, PT DPT Physical Therapist                Therapy Education  Education Details: HEP: Add Tband no $ and CH pulls  Given: HEP, Posture/body mechanics  Program: New, Reinforced  How Provided: Verbal, Demonstration, Written  Provided to: Patient  Level of Understanding: Verbalized, Demonstrated, Teach back education performed              Time Calculation:    Start Time: 1603  Stop Time: 1641  Time Calculation (min): 38 min  Total Timed Code Minutes- PT: 38 minute(s)  Therapy Charges for Today     Code Description Service Date Service Provider Modifiers Qty    42056640848 HC PT THER SUPP EA 15 MIN 5/8/2023 Eneida Ruth, PT DPT GP 1    92362829071 HC PT THER PROC EA 15 MIN 5/8/2023 Eneida Ruth, PT DPT GP 3                    This document has been electronically signed by Eneida Ruth PT DPT, [facility] on May 8, 2023 16:50 CDT

## 2023-05-15 ENCOUNTER — HOSPITAL ENCOUNTER (OUTPATIENT)
Dept: PHYSICAL THERAPY | Facility: HOSPITAL | Age: 68
Setting detail: THERAPIES SERIES
Discharge: HOME | End: 2023-05-15
Payer: COMMERCIAL

## 2023-05-15 DIAGNOSIS — M25.511 ACUTE PAIN OF RIGHT SHOULDER: Primary | ICD-10-CM

## 2023-05-15 PROCEDURE — 97110 THERAPEUTIC EXERCISES: CPT

## 2023-05-15 NOTE — THERAPY TREATMENT NOTE
Outpatient Physical Therapy Ortho Treatment Note  [facility]     Patient Name: Mira Deal  : 1955  MRN: [id number]  Today's Date: 5/15/2023     Pt seen for 3 PT sessions  Reported Improvement:  N/A %  MD Visit: 2023  Recheck Date: 2023    Therapy Diagnosis:   R shoulder pain with suspected subacromial impingement         Visit Date: 05/15/2023    Visit Dx:    ICD-10-CM ICD-9-CM   1. Acute pain of right shoulder  M25.511 719.41       Patient Active Problem List   Diagnosis   • Essential hypertension   • Acquired hypothyroidism   • BMI 39.0-39.9,adult   • Encounter for screening mammogram for malignant neoplasm of breast   • Asplenia   • Hypothyroidism   • Routine medical exam   • Psoriasis   • Hip pain, acute, right   • Acute pain of right knee   • Fall   • Acute otitis externa of right ear   • Internal derangement of right knee   • Sensation of fullness in right ear   • Class 2 severe obesity due to excess calories with serious comorbidity and body mass index (BMI) of 39.0 to 39.9 in adult   • Primary osteoarthritis of knee   • Anxiety   • Macrocytosis without anemia   • Spleen absent   • Dizziness   • Chest pain   • Need for prophylactic vaccination and inoculation against meningococcus   • B12 deficiency   • Muscle strain   • Left wrist pain   • Thumb pain, left   • Acute pain of right shoulder   • Class 3 severe obesity due to excess calories with serious comorbidity and body mass index (BMI) of 40.0 to 44.9 in adult        Past Medical History:   Diagnosis Date   • Acute suppurative otitis media without spontaneous rupture of eardrum     right ear   • Benign essential hypertension    • Cancer     cervix   • Chronic osteoarthritis    • Deep vein thrombosis    • Diverticular disease of colon    • Dysfunction of eustachian tube     Will start OTC Nasal steroid      • Encounter for gynecological examination (general) (routine) without abnormal findings    • Encounter for  screening for malignant neoplasm of breast    • Encounter for screening mammogram for malignant neoplasm of breast     02/04/16 negative      • Gastrointestinal hemorrhage    • Gout 1979   • History of transfusion 1971   • HL (hearing loss) 1.5 months ago   • Hypercholesterolemia    • Hyperlipidemia    • Hypothyroidism    • Kidney stone 1984   • Left lower quadrant pain    • Mild depression    • Nausea    • Obesity     Hyperinsulinar    • Obesity, unspecified    • Vertigo         Past Surgical History:   Procedure Laterality Date   • CARPAL TUNNEL RELEASE  1991   • COLONOSCOPY  2013    Dr Maria   • FOREARM SURGERY  2011    /wrist   • INJECTION OF MEDICATION  05/12/2016    Kenalog (1)      • INJECTION OF MEDICATION  05/12/2016    Rocephin (1)      • INJECTION OF MEDICATION  05/12/2016    Zofran (1)      • JOINT REPLACEMENT  July 16, 2018   • KNEE CARTILAGE SURGERY  2012   • SPLENECTOMY  1972   • TOTAL KNEE ARTHROPLASTY Left 07/16/2018   • VAGINAL HYSTERECTOMY SALPINGO OOPHORECTOMY  1995    cerv cancer                        PT Assessment/Plan     Row Name 05/15/23 1700          PT Assessment    Assessment Comments Pt able to perform all therex without reports of increased s/s.  Pt laughs when asked what increases/decreases s/s in R shoulder.  States today has been a bad day and has been doing a lot today.  Defers ice post tx.  -        PT Plan    PT Frequency 2x/week  -     PT Plan Comments Next visit add bicep/tricep strengthening with tband.  -MAGNO           User Key  (r) = Recorded By, (t) = Taken By, (c) = Cosigned By    Initials Name Provider Type    Minna Gordon PTA Physical Therapist Assistant                   OP Exercises     Row Name 05/15/23 1703             Subjective Comments    Subjective Comments Shoulder is hurting, it has been a bad day.  -MAGNO         Subjective Pain    Able to rate subjective pain? yes  -MAGNO      Pre-Treatment Pain Level 8  -MAGNO      Post-Treatment Pain Level --  still hurts   -JW         Exercise 1    Exercise Name 1 Pro II UE bike for postural re-ed, strength  -JW      Time 1 10 min  -JW      Additional Comments L 4.0  -JW         Exercise 2    Exercise Name 2 3 way pulley's  -JW      Reps 2 --  -JW      Time 2 2 min ea position  -JW      Additional Comments Flexion/scaption/ abduction  -JW         Exercise 3    Exercise Name 3 Tband No Money's  -JW      Reps 3 2  -JW      Time 3 10  -JW      Additional Comments RTB  -JW         Exercise 4    Exercise Name 4 Tband X's  -JW      Sets 4 1  -JW      Reps 4 10  -JW      Additional Comments RTB  -JW         Exercise 5    Exercise Name 5 Tband chest pulls  -JW      Reps 5 10  -JW      Additional Comments RTB  -JW         Exercise 6    Exercise Name 6 Tband Rows; Low  -JW      Reps 6 20  -JW      Additional Comments GTB  -JW         Exercise 7    Exercise Name 7 Standing scap squeezes  -JW      Reps 7 20  -JW      Additional Comments cues for minimial UT hiking  -JW            User Key  (r) = Recorded By, (t) = Taken By, (c) = Cosigned By    Initials Name Provider Type    Minna Gordon, PTA Physical Therapist Assistant                              PT OP Goals     Row Name 05/15/23 1700          PT Short Term Goals    STG Date to Achieve 05/25/23  -     STG 1 I with HEP and have additions/changes by next recertification.  -     STG 1 Progress Partially Met;Ongoing  -     STG 2 Patient to perform 20 scapular retractions with proper form with no cues from PT.  -     STG 2 Progress Ongoing;Progressing  -     STG 3 Patient to demo improved postural awareness with all therEx with no cues from PT.  -     STG 3 Progress Ongoing  -     STG 4 Patient to verbalize understanding of preferred desk ergonomics to improve overall posture at the work place.  -     STG 4 Progress Progressing  -        Long Term Goals    LTG 1 I with final HEP.  -     LTG 2 R UE strength 5/5.  -     LTG 3 Patient to improve B  strength >/=10 lbs  from initial eval findings in 3 attempt average.  -MAGNO     LTG 4 Patient able to perform 3 minutes of OH activities with no increase in pain.  -MAGNO        Time Calculation    PT Goal Re-Cert Due Date 05/25/23  -MAGNO           User Key  (r) = Recorded By, (t) = Taken By, (c) = Cosigned By    Initials Name Provider Type    Minna Gordon PTA Physical Therapist Assistant                               Time Calculation:   Start Time: 1657 (pt arrives late)  Stop Time: 1736  Time Calculation (min): 39 min  Therapy Charges for Today     Code Description Service Date Service Provider Modifiers Qty    93212774000  PT THER PROC EA 15 MIN 5/15/2023 Minna Higgins PTA GP, CQ 3    61495037447 HC PT THER SUPP EA 15 MIN 5/15/2023 Minna Higgins PTA GP, CQ 1                    Minna Higgins PTA  5/15/2023

## 2023-05-18 ENCOUNTER — HOSPITAL ENCOUNTER (OUTPATIENT)
Dept: PHYSICAL THERAPY | Facility: HOSPITAL | Age: 68
Setting detail: THERAPIES SERIES
Discharge: HOME | End: 2023-05-18
Payer: COMMERCIAL

## 2023-05-18 DIAGNOSIS — M25.511 ACUTE PAIN OF RIGHT SHOULDER: Primary | ICD-10-CM

## 2023-05-18 PROCEDURE — 97110 THERAPEUTIC EXERCISES: CPT

## 2023-05-18 NOTE — THERAPY TREATMENT NOTE
Outpatient Physical Therapy Ortho Treatment Note  [facility]     Patient Name: Mira Deal  : 1955  MRN: [id number]  Today's Date: 2023       Pt seen for 4 PT sessions  Reported Improvement:  N/A %  MD Visit: 2023  Recheck Date: 2023     Therapy Diagnosis:   R shoulder pain with suspected subacromial impingement     Visit Date: 2023    Visit Dx:    ICD-10-CM ICD-9-CM   1. Acute pain of right shoulder  M25.511 719.41       Patient Active Problem List   Diagnosis   • Essential hypertension   • Acquired hypothyroidism   • BMI 39.0-39.9,adult   • Encounter for screening mammogram for malignant neoplasm of breast   • Asplenia   • Hypothyroidism   • Routine medical exam   • Psoriasis   • Hip pain, acute, right   • Acute pain of right knee   • Fall   • Acute otitis externa of right ear   • Internal derangement of right knee   • Sensation of fullness in right ear   • Class 2 severe obesity due to excess calories with serious comorbidity and body mass index (BMI) of 39.0 to 39.9 in adult   • Primary osteoarthritis of knee   • Anxiety   • Macrocytosis without anemia   • Spleen absent   • Dizziness   • Chest pain   • Need for prophylactic vaccination and inoculation against meningococcus   • B12 deficiency   • Muscle strain   • Left wrist pain   • Thumb pain, left   • Acute pain of right shoulder   • Class 3 severe obesity due to excess calories with serious comorbidity and body mass index (BMI) of 40.0 to 44.9 in adult        Past Medical History:   Diagnosis Date   • Acute suppurative otitis media without spontaneous rupture of eardrum     right ear   • Benign essential hypertension    • Cancer     cervix   • Chronic osteoarthritis    • Deep vein thrombosis    • Diverticular disease of colon    • Dysfunction of eustachian tube     Will start OTC Nasal steroid      • Encounter for gynecological examination (general) (routine) without abnormal findings    • Encounter for  screening for malignant neoplasm of breast    • Encounter for screening mammogram for malignant neoplasm of breast     02/04/16 negative      • Gastrointestinal hemorrhage    • Gout 1979   • History of transfusion 1971   • HL (hearing loss) 1.5 months ago   • Hypercholesterolemia    • Hyperlipidemia    • Hypothyroidism    • Kidney stone 1984   • Left lower quadrant pain    • Mild depression    • Nausea    • Obesity     Hyperinsulinar    • Obesity, unspecified    • Vertigo         Past Surgical History:   Procedure Laterality Date   • CARPAL TUNNEL RELEASE  1991   • COLONOSCOPY  2013    Dr Maria   • FOREARM SURGERY  2011    /wrist   • INJECTION OF MEDICATION  05/12/2016    Kenalog (1)      • INJECTION OF MEDICATION  05/12/2016    Rocephin (1)      • INJECTION OF MEDICATION  05/12/2016    Zofran (1)      • JOINT REPLACEMENT  July 16, 2018   • KNEE CARTILAGE SURGERY  2012   • SPLENECTOMY  1972   • TOTAL KNEE ARTHROPLASTY Left 07/16/2018   • VAGINAL HYSTERECTOMY SALPINGO OOPHORECTOMY  1995    cerv cancer        PT Ortho     Row Name 05/18/23 1600       Precautions and Contraindications    Precautions/Limitations no known precautions/limitations  -AC       Subjective Pain    Able to rate subjective pain? yes  -AC    Pre-Treatment Pain Level 8  -AC          User Key  (r) = Recorded By, (t) = Taken By, (c) = Cosigned By    Initials Name Provider Type    Gaye Keith, PT Physical Therapist                             PT Assessment/Plan     Row Name 05/18/23 1600          PT Assessment    Assessment Comments Patient reports 8/10 pain however does not appear to be in acute distress. Patient tolerates all therEx well. Reports she is unsure what actually brings on her R shoulder pain. Requires verbal and tactile cues for good scapular retraction activation.  -AC        PT Plan    PT Frequency 2x/week  -AC     Predicted Duration of Therapy Intervention (PT) 2-3 more visits  -AC     PT Plan Comments Next visit attempt  "joint mobilizations to shoulder. Add tband clock.  -AC           User Key  (r) = Recorded By, (t) = Taken By, (c) = Cosigned By    Initials Name Provider Type    AC Gaye Romero, PT Physical Therapist                   OP Exercises     Row Name 05/18/23 1600             Subjective Comments    Subjective Comments Shoulder is hurting. Reports she attempted No $$ at work and it made it worse.  -AC         Subjective Pain    Able to rate subjective pain? yes  -AC      Pre-Treatment Pain Level 8  -AC      Post-Treatment Pain Level --  \"still hurts\"  -AC         Exercise 1    Exercise Name 1 Pro II UE bike for postural re-ed, strength  -AC      Time 1 10 min  -AC      Additional Comments L 4.5  -AC         Exercise 2    Exercise Name 2 3 way pulley's  -AC      Time 2 3 min ea position  -AC         Exercise 3    Exercise Name 3 Doorway S  -AC      Reps 3 2  -AC      Time 3 30 sec hold  -AC         Exercise 4    Exercise Name 4 Tband Rows: Lo, Mid  -AC      Sets 4 2  -AC      Reps 4 10 each  -AC      Additional Comments GTB  -AC         Exercise 5    Exercise Name 5 Tband chest pulls  -AC      Reps 5 10  -AC      Additional Comments RTB  -AC         Exercise 6    Exercise Name 6 B UT S  -AC      Reps 6 2  -AC      Time 6 30 sec hold  -AC         Exercise 7    Exercise Name 7 B LS S  -AC      Reps 7 2  -AC      Time 7 30 sec hold  -AC            User Key  (r) = Recorded By, (t) = Taken By, (c) = Cosigned By    Initials Name Provider Type    Gaye Keith, PT Physical Therapist                              PT OP Goals     Row Name 05/18/23 1600          PT Short Term Goals    STG Date to Achieve 05/25/23  -     STG 1 I with HEP and have additions/changes by next recertification.  -AC     STG 1 Progress Partially Met;Ongoing  -AC     STG 2 Patient to perform 20 scapular retractions with proper form with no cues from PT.  -AC     STG 2 Progress Ongoing;Progressing  -AC     STG 3 Patient to demo improved postural " awareness with all therEx with no cues from PT.  -     STG 3 Progress Ongoing  -     STG 4 Patient to verbalize understanding of preferred desk ergonomics to improve overall posture at the work place.  -     STG 4 Progress Progressing  -        Long Term Goals    LTG 1 I with final HEP.  -     LTG 2 R UE strength 5/5.  -     LTG 3 Patient to improve B  strength >/=10 lbs from initial eval findings in 3 attempt average.  -     LTG 4 Patient able to perform 3 minutes of OH activities with no increase in pain.  -        Time Calculation    PT Goal Re-Cert Due Date 05/25/23  -           User Key  (r) = Recorded By, (t) = Taken By, (c) = Cosigned By    Initials Name Provider Type    Gaye Keith, DHAVAL Physical Therapist                Therapy Education  Education Details: HEP: 3-way pulley's and B UT S  Given: HEP  Program: New, Progressed  How Provided: Verbal, Demonstration, Written  Provided to: Patient  Level of Understanding: Verbalized, Demonstrated              Time Calculation:   Start Time: 1602  Stop Time: 1643  Time Calculation (min): 41 min  Therapy Charges for Today     Code Description Service Date Service Provider Modifiers Qty    68739342982 HC PT THER SUPP EA 15 MIN 5/18/2023 Gaye Romero PT GP 1    40481050025 HC PT THER PROC EA 15 MIN 5/18/2023 Gaye Romero PT GP 3                    Gaye Romero PT , DPT 5/18/2023

## 2023-05-22 ENCOUNTER — HOSPITAL ENCOUNTER (OUTPATIENT)
Dept: PHYSICAL THERAPY | Facility: HOSPITAL | Age: 68
Setting detail: THERAPIES SERIES
Discharge: HOME | End: 2023-05-22
Payer: COMMERCIAL

## 2023-05-22 ENCOUNTER — OFFICE VISIT (OUTPATIENT)
Dept: FAMILY MEDICINE CLINIC | Facility: CLINIC | Age: 68
End: 2023-05-22
Payer: COMMERCIAL

## 2023-05-22 VITALS
SYSTOLIC BLOOD PRESSURE: 138 MMHG | DIASTOLIC BLOOD PRESSURE: 82 MMHG | BODY MASS INDEX: 41.38 KG/M2 | HEIGHT: 57 IN | WEIGHT: 191.8 LBS | OXYGEN SATURATION: 98 % | TEMPERATURE: 98 F | HEART RATE: 90 BPM

## 2023-05-22 DIAGNOSIS — G89.29 CHRONIC RIGHT SHOULDER PAIN: Chronic | ICD-10-CM

## 2023-05-22 DIAGNOSIS — M25.511 CHRONIC RIGHT SHOULDER PAIN: Chronic | ICD-10-CM

## 2023-05-22 DIAGNOSIS — M25.511 ACUTE PAIN OF RIGHT SHOULDER: Primary | ICD-10-CM

## 2023-05-22 DIAGNOSIS — Z12.31 ENCOUNTER FOR SCREENING MAMMOGRAM FOR MALIGNANT NEOPLASM OF BREAST: ICD-10-CM

## 2023-05-22 PROCEDURE — 97110 THERAPEUTIC EXERCISES: CPT | Performed by: PHYSICAL THERAPIST

## 2023-05-22 RX ORDER — LIDOCAINE 50 MG/G
1 PATCH TOPICAL EVERY 24 HOURS
Qty: 30 PATCH | Refills: 2 | Status: SHIPPED | OUTPATIENT
Start: 2023-05-22

## 2023-05-22 NOTE — PROGRESS NOTES
"Chief Complaint  Shoulder Pain  ' R shoulder has not improved with PT, have difficulty sleeping due to pain'   Subjective          Review of Systems   HENT: Negative for ear pain, postnasal drip, sinus pressure, sinus pain ( mild) and sneezing.    Eyes: Negative.    Respiratory: Negative for wheezing.    Cardiovascular:        2020 Cardiac work- up Neg with Dr Beltran       Endocrine: Negative.    Musculoskeletal:        R shoulder pain 8/10 x 4-6 weeks had brief improvement after Steroid injection. PT has not helped.    H/O L TKA    L Shoulder pain since Flu  injection at site   Skin:        Psoriasis   Allergic/Immunologic: Positive for environmental allergies.   Hematological: Negative.        Mira Deal presents to [facility] [place]  Shoulder Injury   The right shoulder is affected. The injury mechanism was repetitive motion. The pain is at a severity of 8/10. The pain is severe. The symptoms are aggravated by movement. She has tried NSAIDs, rest, heat and acetaminophen (Steroid injection, PT) for the symptoms. The treatment provided mild relief.       Objective   Vital Signs:   /82 (BP Location: Right arm, Patient Position: Sitting, Cuff Size: Adult)   Pulse 90   Temp 98 °F (36.7 °C) (Temporal)   Ht 144.8 cm (57\")   Wt 87 kg (191 lb 12.8 oz)   SpO2 98%   BMI 41.51 kg/m²     Physical Exam  Constitutional:       Appearance: Normal appearance. She is well-developed.   HENT:      Head: Normocephalic and atraumatic.      Right Ear: Tympanic membrane, ear canal and external ear normal.      Left Ear: Tympanic membrane, ear canal and external ear normal.      Nose: Nose normal.      Mouth/Throat:      Mouth: Mucous membranes are moist.      Pharynx: Oropharynx is clear.   Eyes:      Extraocular Movements: Extraocular movements intact.      Conjunctiva/sclera: Conjunctivae normal.      Pupils: Pupils are equal, round, and reactive to light. "   Cardiovascular:      Rate and Rhythm: Normal rate and regular rhythm.      Heart sounds: Normal heart sounds.   Pulmonary:      Effort: Pulmonary effort is normal.      Breath sounds: Normal breath sounds.   Abdominal:      General: Bowel sounds are normal. There is no distension.      Palpations: Abdomen is soft.      Tenderness: There is no abdominal tenderness.   Musculoskeletal:         General: Tenderness present.      Cervical back: Normal range of motion and neck supple.      Comments: POMT R subacromial bursa resolved. PROM painful. POMT upper arm Biceps tendon and shoulder.    Skin:     General: Skin is warm and dry.   Neurological:      Mental Status: She is alert and oriented to person, place, and time.   Psychiatric:         Mood and Affect: Mood normal.         Speech: Speech normal.         Behavior: Behavior normal.         Thought Content: Thought content normal.         Judgment: Judgment normal.        Result Review :                 Assessment and Plan    Diagnoses and all orders for this visit:    1. Encounter for screening mammogram for malignant neoplasm of breast  -     Mammo Screening Digital Tomosynthesis Bilateral With CAD; Future    2. Chronic right shoulder pain  Comments:  Worsening  Orders:  -     MRI Shoulder Right Without Contrast; Future  -     acetaminophen-codeine (TYLENOL #3) 300-30 MG per tablet; Take 2 tablets by mouth Every 6 (Six) Hours As Needed for Moderate Pain.  Dispense: 45 tablet; Refill: 1  -     Ambulatory Referral to Orthopedic Surgery  -     XR Spine Cervical Complete 4 or 5 View (In Office)  -     lidocaine (LIDODERM) 5 %; Place 1 patch on the skin as directed by provider Daily. Remove & Discard patch within 12 hours or as directed by MD  Dispense: 30 patch; Refill: 2        Follow Up   Return in about 4 weeks (around 6/19/2023).  Patient was given instructions and counseling regarding her condition or for health maintenance advice. Please see specific information  pulled into the AVS if appropriate.         This document has been electronically signed by Lloyd Varghese MD on May 22, 2023 17:10 CDT

## 2023-05-22 NOTE — THERAPY TREATMENT NOTE
Outpatient Physical Therapy Ortho Treatment Note  [facility]     Patient Name: Mira Deal  : 1955  MRN: [id number]  Today's Date: 2023      Visit Date: 2023     Patient seen for 5 PT sessions.  Patient reports 0% of improvement.  Next MD appt: Ortho, TBD.  Recertification: 2023.    Therapy Diagnosis:  R shoulder pain with suspected subacromial impingement             Visit Dx:    ICD-10-CM ICD-9-CM   1. Acute pain of right shoulder  M25.511 719.41       Patient Active Problem List   Diagnosis   • Essential hypertension   • Acquired hypothyroidism   • BMI 39.0-39.9,adult   • Encounter for screening mammogram for malignant neoplasm of breast   • Asplenia   • Hypothyroidism   • Routine medical exam   • Psoriasis   • Hip pain, acute, right   • Acute pain of right knee   • Fall   • Acute otitis externa of right ear   • Internal derangement of right knee   • Sensation of fullness in right ear   • Class 2 severe obesity due to excess calories with serious comorbidity and body mass index (BMI) of 39.0 to 39.9 in adult   • Primary osteoarthritis of knee   • Anxiety   • Macrocytosis without anemia   • Spleen absent   • Dizziness   • Chest pain   • Need for prophylactic vaccination and inoculation against meningococcus   • B12 deficiency   • Muscle strain   • Left wrist pain   • Thumb pain, left   • Acute pain of right shoulder   • Class 3 severe obesity due to excess calories with serious comorbidity and body mass index (BMI) of 40.0 to 44.9 in adult        Past Medical History:   Diagnosis Date   • Acute suppurative otitis media without spontaneous rupture of eardrum     right ear   • Benign essential hypertension    • Cancer     cervix   • Chronic osteoarthritis    • Deep vein thrombosis    • Diverticular disease of colon    • Dysfunction of eustachian tube     Will start OTC Nasal steroid      • Encounter for gynecological examination (general) (routine) without abnormal  findings    • Encounter for screening for malignant neoplasm of breast    • Encounter for screening mammogram for malignant neoplasm of breast     02/04/16 negative      • Gastrointestinal hemorrhage    • Gout 1979   • History of transfusion 1971   • HL (hearing loss) 1.5 months ago   • Hypercholesterolemia    • Hyperlipidemia    • Hypothyroidism    • Kidney stone 1984   • Left lower quadrant pain    • Mild depression    • Nausea    • Obesity     Hyperinsulinar    • Obesity, unspecified    • Vertigo         Past Surgical History:   Procedure Laterality Date   • CARPAL TUNNEL RELEASE  1991   • COLONOSCOPY  2013    Dr Maria   • FOREARM SURGERY  2011    /wrist   • INJECTION OF MEDICATION  05/12/2016    Kenalog (1)      • INJECTION OF MEDICATION  05/12/2016    Rocephin (1)      • INJECTION OF MEDICATION  05/12/2016    Zofran (1)      • JOINT REPLACEMENT  July 16, 2018   • KNEE CARTILAGE SURGERY  2012   • SPLENECTOMY  1972   • TOTAL KNEE ARTHROPLASTY Left 07/16/2018   • VAGINAL HYSTERECTOMY SALPINGO OOPHORECTOMY  1995    cerv cancer        PT Ortho     Row Name 05/22/23 1600       Precautions and Contraindications    Precautions/Limitations no known precautions/limitations  -       Subjective Pain    Post-Treatment Pain Level 5  -          User Key  (r) = Recorded By, (t) = Taken By, (c) = Cosigned By    Initials Name Provider Type    Eneida Adame, PT DPT Physical Therapist                             PT Assessment/Plan     Row Name 05/22/23 1600          PT Assessment    Assessment Comments Full AAROM with 3-way pulley's. Good tolerance of al exercises today with no s/s of distress. Met a few goals. PROM is guarded but full in all planes.  -        PT Plan    PT Frequency 2x/week  -     PT Plan Comments Recheck next session.  -           User Key  (r) = Recorded By, (t) = Taken By, (c) = Cosigned By    Initials Name Provider Type    Eneida Adame, PT DPT Physical Therapist           "         OP Exercises     Row Name 05/22/23 1600             Subjective Comments    Subjective Comments Patient rpeworts she saw the MD earlier today and he made her shoulder hurt more. She reports he also ordered an MRI.  -AJ         Subjective Pain    Able to rate subjective pain? yes  -AJ      Pre-Treatment Pain Level 8  -AJ      Post-Treatment Pain Level 5  -AJ         Exercise 1    Exercise Name 1 Pro II UE bike for postural re-ed, strength  -AJ      Time 1 10 min  -AJ      Additional Comments L 5.0  -AJ         Exercise 2    Exercise Name 2 Pulley's 3-way  -AJ      Time 2 3 min each  -AJ         Exercise 3    Exercise Name 3 Tband Clocks 3-way  -AJ      Sets 3 1  -AJ      Reps 3 15 each  -AJ      Additional Comments RTB  -AJ         Exercise 4    Exercise Name 4 Tband No $  -AJ      Sets 4 2  -AJ      Reps 4 10  -AJ      Time 4 5\" hold  -AJ      Additional Comments RTB  -AJ         Exercise 5    Exercise Name 5 wall push up with plus  -AJ      Sets 5 2  -AJ      Reps 5 10  -AJ         Exercise 6    Exercise Name 6 Supine 90° flexion circles: cw/ccw  -AJ      Sets 6 1  -AJ      Reps 6 20 each  -AJ      Additional Comments sm/lrg  -AJ         Exercise 7    Exercise Name 7 SL 90° abduction circles: cw/ccw  -AJ      Sets 7 1  -AJ      Reps 7 20 each  -AJ      Additional Comments sm/lrg  -AJ         Exercise 8    Exercise Name 8 OH drawing  -AJ      Time 8 3 min  -AJ         Exercise 9    Exercise Name 9 PROM- guarded but fill in all planes  -AJ      Time 9 ~5 min  -AJ            User Key  (r) = Recorded By, (t) = Taken By, (c) = Cosigned By    Initials Name Provider Type    Eneida Adame, PT DPT Physical Therapist                 All therapeutic interventions performed today were to address current functional limitations and/or deficits in addressing all physical therapy goals.                PT OP Goals     Row Name 05/22/23 1600          PT Short Term Goals    STG Date to Achieve 05/25/23  -AJ     STG " 1 I with HEP and have additions/changes by next recertification.  -     STG 1 Progress Met;Ongoing  -     STG 2 Patient to perform 20 scapular retractions with proper form with no cues from PT.  -     STG 2 Progress Met  -     STG 3 Patient to demo improved postural awareness with all therEx with no cues from PT.  -     STG 3 Progress Ongoing;Progressing  -     STG 4 Patient to verbalize understanding of preferred desk ergonomics to improve overall posture at the work place.  -     STG 4 Progress Progressing  -        Long Term Goals    LTG 1 I with final HEP.  -     LTG 2 R UE strength 5/5.  -     LTG 3 Patient to improve B  strength >/=10 lbs from initial eval findings in 3 attempt average.  -     LTG 4 Patient able to perform 3 minutes of OH activities with no increase in pain.  -     LTG 4 Progress Met  -        Time Calculation    PT Goal Re-Cert Due Date 05/25/23  -           User Key  (r) = Recorded By, (t) = Taken By, (c) = Cosigned By    Initials Name Provider Type    Eneida Adame, PT DPT Physical Therapist                               Time Calculation:   Start Time: 1640  Stop Time: 1721  Time Calculation (min): 41 min  Total Timed Code Minutes- PT: 41 minute(s)  Therapy Charges for Today     Code Description Service Date Service Provider Modifiers Qty    70503783956 HC PT THER SUPP EA 15 MIN 5/22/2023 Eneida Ruth PT DPT GP 1    28680415964 HC PT THER PROC EA 15 MIN 5/22/2023 Eneida Ruth, PT DPT GP 3              This document has been electronically signed by Eneida Ruth PT JULIO, [facility] on May 22, 2023 17:23 CDT

## 2023-05-25 ENCOUNTER — APPOINTMENT (OUTPATIENT)
Dept: PHYSICAL THERAPY | Facility: HOSPITAL | Age: 68
End: 2023-05-25
Payer: COMMERCIAL

## 2023-06-01 ENCOUNTER — HOSPITAL ENCOUNTER (OUTPATIENT)
Dept: PHYSICAL THERAPY | Facility: HOSPITAL | Age: 68
Setting detail: THERAPIES SERIES
Discharge: HOME | End: 2023-06-01
Payer: COMMERCIAL

## 2023-06-01 DIAGNOSIS — M25.511 ACUTE PAIN OF RIGHT SHOULDER: Primary | ICD-10-CM

## 2023-06-01 PROCEDURE — 97110 THERAPEUTIC EXERCISES: CPT

## 2023-06-01 NOTE — THERAPY TREATMENT NOTE
Outpatient Physical Therapy Ortho Treatment Note  [facility]     Patient Name: Mira Deal  : 1955  MRN: [id number]  Today's Date: 2023     Pt seen for 6 PT sessions  Reported Improvement:  0 %  MD Visit: 2023  Recheck Date: 2023    Therapy Diagnosis:   R shoulder pain with suspected subacromial impingement         Visit Date: 2023    Visit Dx:    ICD-10-CM ICD-9-CM   1. Acute pain of right shoulder  M25.511 719.41       Patient Active Problem List   Diagnosis   • Essential hypertension   • Acquired hypothyroidism   • BMI 39.0-39.9,adult   • Encounter for screening mammogram for malignant neoplasm of breast   • Asplenia   • Hypothyroidism   • Routine medical exam   • Psoriasis   • Hip pain, acute, right   • Acute pain of right knee   • Fall   • Acute otitis externa of right ear   • Internal derangement of right knee   • Sensation of fullness in right ear   • Class 2 severe obesity due to excess calories with serious comorbidity and body mass index (BMI) of 39.0 to 39.9 in adult   • Primary osteoarthritis of knee   • Anxiety   • Macrocytosis without anemia   • Spleen absent   • Dizziness   • Chest pain   • Need for prophylactic vaccination and inoculation against meningococcus   • B12 deficiency   • Muscle strain   • Left wrist pain   • Thumb pain, left   • Acute pain of right shoulder   • Class 3 severe obesity due to excess calories with serious comorbidity and body mass index (BMI) of 40.0 to 44.9 in adult        Past Medical History:   Diagnosis Date   • Acute suppurative otitis media without spontaneous rupture of eardrum     right ear   • Benign essential hypertension    • Cancer     cervix   • Chronic osteoarthritis    • Deep vein thrombosis    • Diverticular disease of colon    • Dysfunction of eustachian tube     Will start OTC Nasal steroid      • Encounter for gynecological examination (general) (routine) without abnormal findings    • Encounter for  screening for malignant neoplasm of breast    • Encounter for screening mammogram for malignant neoplasm of breast     02/04/16 negative      • Gastrointestinal hemorrhage    • Gout 1979   • History of transfusion 1971   • HL (hearing loss) 1.5 months ago   • Hypercholesterolemia    • Hyperlipidemia    • Hypothyroidism    • Kidney stone 1984   • Left lower quadrant pain    • Mild depression    • Nausea    • Obesity     Hyperinsulinar    • Obesity, unspecified    • Vertigo         Past Surgical History:   Procedure Laterality Date   • CARPAL TUNNEL RELEASE  1991   • COLONOSCOPY  2013    Dr Maria   • FOREARM SURGERY  2011    /wrist   • INJECTION OF MEDICATION  05/12/2016    Kenalog (1)      • INJECTION OF MEDICATION  05/12/2016    Rocephin (1)      • INJECTION OF MEDICATION  05/12/2016    Zofran (1)      • JOINT REPLACEMENT  July 16, 2018   • KNEE CARTILAGE SURGERY  2012   • SPLENECTOMY  1972   • TOTAL KNEE ARTHROPLASTY Left 07/16/2018   • VAGINAL HYSTERECTOMY SALPINGO OOPHORECTOMY  1995    cerv cancer        PT Ortho     Row Name 06/01/23 1600       Precautions and Contraindications    Precautions/Limitations no known precautions/limitations  -          User Key  (r) = Recorded By, (t) = Taken By, (c) = Cosigned By    Initials Name Provider Type    Minna Gordon, PTA Physical Therapist Assistant                             PT Assessment/Plan     Row Name 06/01/23 1600          PT Assessment    Assessment Comments Pt reports no improvemetn in R shoulder pain.  States she has recieved rx pain patches that did help some last night.  States she is doing her exercises and stretches daily.  No pain with AAROM or scap strengthening activity.  Issued putty for B  strength this date for HEP. Pt reports increased pain post tx session howeer defers ice.  -        PT Plan    PT Frequency 2x/week  -     PT Plan Comments Recheck next visit, kelin QUINN with no reported improvemetns despite progress towards some  goals.  -JW           User Key  (r) = Recorded By, (t) = Taken By, (c) = Cosigned By    Initials Name Provider Type    MAGNO Minna Higgins PTA Physical Therapist Assistant                   OP Exercises     Row Name 06/01/23 1600             Subjective Comments    Subjective Comments The shoulder hurts, its not getting any better.  -JW         Subjective Pain    Able to rate subjective pain? yes  -JW      Pre-Treatment Pain Level 8  -JW      Post-Treatment Pain Level 9  -JW         Exercise 1    Exercise Name 1 Pro II UE bike for postural re-ed, strength  -JW      Time 1 10 min  -JW      Additional Comments L 5.0  -JW         Exercise 2    Exercise Name 2 Pulley's 3-way  -JW      Time 2 3 min each  -JW         Exercise 3    Exercise Name 3 Tband Rows; Low  -JW      Reps 3 20  -JW      Additional Comments GTB  -JW         Exercise 4    Exercise Name 4 Tband B shoulder extension  -JW      Reps 4 20  -JW      Time 4 to neutral  -JW      Additional Comments GTB  -JW         Exercise 5    Exercise Name 5 wall push up with plus  -JW      Reps 5 20  -JW         Exercise 6    Exercise Name 6 Putty   -JW      Time 6 3 min ea hand  -JW      Additional Comments red putty  -JW            User Key  (r) = Recorded By, (t) = Taken By, (c) = Cosigned By    Initials Name Provider Type    Minna Gordon PTA Physical Therapist Assistant                              PT OP Goals     Row Name 06/01/23 1600 06/01/23 1500       PT Short Term Goals    STG Date to Achieve 05/25/23  -JW --    STG 1 I with HEP and have additions/changes by next recertification.  -JW --    STG 1 Progress Met;Ongoing  -JW --    STG 2 Patient to perform 20 scapular retractions with proper form with no cues from PT.  -JW --    STG 2 Progress Met  -JW --    STG 3 Patient to demo improved postural awareness with all therEx with no cues from PT.  -JW --    STG 3 Progress Met  -JW --    STG 4 Patient to verbalize understanding of preferred desk ergonomics to  improve overall posture at the work place.  -JW --    STG 4 Progress Met  -JW --       Long Term Goals    LTG 1 I with final HEP.  -JW --    LTG 2 R UE strength 5/5.  -JW --    LTG 3 Patient to improve B  strength >/=10 lbs from initial eval findings in 3 attempt average.  -JW --    LTG 3 Progress Progressing  -JW --    LTG 4 Patient able to perform 3 minutes of OH activities with no increase in pain.  -JW --    LTG 4 Progress Met  -JW --       Time Calculation    PT Goal Re-Cert Due Date -- 05/25/23  -          User Key  (r) = Recorded By, (t) = Taken By, (c) = Cosigned By    Initials Name Provider Type    Minna Gordon PTA Physical Therapist Assistant                Therapy Education  Education Details: red putty 5 min ea hand  Given: HEP, Symptoms/condition management  Program: New, Reinforced, Progressed  How Provided: Verbal, Demonstration  Provided to: Patient  Level of Understanding: Verbalized              Time Calculation:   Start Time: 1557  Stop Time: 1642  Time Calculation (min): 45 min  Therapy Charges for Today     Code Description Service Date Service Provider Modifiers Qty    11185175332 HC PT THER PROC EA 15 MIN 6/1/2023 Minna Higgins PTA GP, CQ 3    49511066802 HC PT THER SUPP EA 15 MIN 6/1/2023 Minna Higgins PTA GP, CQ 1                    Minna Higgins PTA  6/1/2023

## 2023-06-04 ENCOUNTER — PATIENT MESSAGE (OUTPATIENT)
Dept: FAMILY MEDICINE CLINIC | Facility: CLINIC | Age: 68
End: 2023-06-04
Payer: COMMERCIAL

## 2023-06-05 ENCOUNTER — PRIOR AUTHORIZATION (OUTPATIENT)
Dept: FAMILY MEDICINE CLINIC | Facility: CLINIC | Age: 68
End: 2023-06-05
Payer: COMMERCIAL

## 2023-06-05 NOTE — TELEPHONE ENCOUNTER
PA received for tylenol #3  Valid 6/5/23-12/2/23  Authorization faxed to pharmacy.      PA for lidocaine patches denied.

## 2023-06-06 ENCOUNTER — OFFICE VISIT (OUTPATIENT)
Dept: ORTHOPEDIC SURGERY | Facility: CLINIC | Age: 68
End: 2023-06-06
Payer: COMMERCIAL

## 2023-06-06 ENCOUNTER — HOSPITAL ENCOUNTER (OUTPATIENT)
Dept: PHYSICAL THERAPY | Facility: HOSPITAL | Age: 68
Setting detail: THERAPIES SERIES
Discharge: HOME | End: 2023-06-06
Payer: COMMERCIAL

## 2023-06-06 VITALS — BODY MASS INDEX: 40.78 KG/M2 | WEIGHT: 189 LBS | HEIGHT: 57 IN

## 2023-06-06 DIAGNOSIS — M75.41 IMPINGEMENT SYNDROME OF RIGHT SHOULDER: ICD-10-CM

## 2023-06-06 DIAGNOSIS — M25.511 ACUTE PAIN OF RIGHT SHOULDER: Primary | ICD-10-CM

## 2023-06-06 DIAGNOSIS — M24.811 INTERNAL DERANGEMENT OF RIGHT SHOULDER: ICD-10-CM

## 2023-06-06 PROBLEM — G89.29 CHRONIC RIGHT SHOULDER PAIN: Status: ACTIVE | Noted: 2023-06-06

## 2023-06-06 PROCEDURE — 99213 OFFICE O/P EST LOW 20 MIN: CPT | Performed by: NURSE PRACTITIONER

## 2023-06-06 PROCEDURE — 97110 THERAPEUTIC EXERCISES: CPT | Performed by: PHYSICAL THERAPIST

## 2023-06-06 NOTE — PROGRESS NOTES
Answers submitted by the patient for this visit:  Other (Submitted on 6/5/2023)  Please describe your symptoms.: Constant pain with right shoulder and upper arm.  Sometimes the pain has a high pitch pain. Irritating  and annoying because my arm can be  completely still, and it hurts alot.  Have you had these symptoms before?: No  How long have you been having these symptoms?: Greater than 2 weeks  Please list any medications you are currently taking for this condition.: Acetami/codeine 300-30 MG.  Diclofenac sodium  50mg.   Voltaren  Please describe any probable cause for these symptoms. : I have no earthly idea.  Primary Reason for Visit (Submitted on 6/5/2023)  What is the primary reason for your visit?: Other            Biloxi Moreno Deal is a 67 y.o. female   Primary provider:  Lloyd Varghese MD       Chief Complaint   Patient presents with    Right Shoulder - Shoulder Pain       HISTORY OF PRESENT ILLNESS:      Mrs. Deal is a 67-year-old female who presents today with complaints of right shoulder pain.  Right shoulder pain has been present for 1+ months.  She describes the pain as severe and constant.  She denies shoulder pain prior to this episode.  Pain is described as grinding, stabbing, aching.  She has pain with overhead activity and reaching behind her.  She also has pain at wrist.  Pain radiates down into her arm.  No burning, tingling, numbness.  No injuries or traumas.  She has tried diclofenac, Tylenol 3, RICE therapy, formal physical therapy, and subacromial injection without relief of symptoms.  She has a MRI pending through her primary care office.      Arm Pain   Incident onset: ONE MONTH, NO INJURY. There was no injury mechanism. The pain is present in the right shoulder. The quality of the pain is described as aching and stabbing (grinding.). The pain is severe. The pain has been Constant since the incident. Nothing aggravates the symptoms.      CONCURRENT MEDICAL HISTORY:    Past  Medical History:   Diagnosis Date    Acute suppurative otitis media without spontaneous rupture of eardrum     right ear    Benign essential hypertension     Cancer     cervix    Chronic osteoarthritis     Deep vein thrombosis 1971    Diverticular disease of colon     Dysfunction of eustachian tube     Will start OTC Nasal steroid       Encounter for gynecological examination (general) (routine) without abnormal findings     Encounter for screening for malignant neoplasm of breast     Encounter for screening mammogram for malignant neoplasm of breast     02/04/16 negative       Gastrointestinal hemorrhage     Gout 1979    History of transfusion 1971    HL (hearing loss) 1.5 months ago    Hypercholesterolemia     Hyperlipidemia     Hypothyroidism     Kidney stone 1984    Left lower quadrant pain     Mild depression     Nausea     Obesity     Hyperinsulinar     Obesity, unspecified     Vertigo        Allergies   Allergen Reactions    Sulfa Antibiotics Angioedema    Lortab [Hydrocodone-Acetaminophen] GI Intolerance    Tramadol Dizziness         Current Outpatient Medications:     acetaminophen-codeine (TYLENOL #3) 300-30 MG per tablet, Take 2 tablets by mouth At Night As Needed for Moderate Pain., Disp: 45 tablet, Rfl: 1    diclofenac (VOLTAREN) 50 MG EC tablet, Take 1 tablet by mouth Daily As Needed (R shoulder pain)., Disp: 30 tablet, Rfl: 1    Diclofenac Sodium (VOLTAREN) 1 % gel gel, Apply 4 g topically to the appropriate area as directed 4 (Four) Times a Day As Needed (Pain R fingers.)., Disp: 150 g, Rfl: 0    levothyroxine (Euthyrox) 100 MCG tablet, Take 1 tablet by mouth Daily., Disp: 30 tablet, Rfl: 5    lisinopril (PRINIVIL,ZESTRIL) 40 MG tablet, Take 1 tablet by mouth Daily., Disp: 30 tablet, Rfl: 5    PARoxetine (PAXIL) 10 MG tablet, Take 1 tablet by mouth Every Morning., Disp: 30 tablet, Rfl: 5    triamcinolone (KENALOG) 0.1 % ointment, Apply  topically to the appropriate area as directed 2 (Two) Times a  Day As Needed (Psoriasis)., Disp: 454 g, Rfl: 0    acetaminophen-codeine (TYLENOL #3) 300-30 MG per tablet, Take 2 tablets by mouth Every 6 (Six) Hours As Needed for Moderate Pain., Disp: 45 tablet, Rfl: 1    ASPIRIN 81 PO, Take 1 tablet by mouth Daily., Disp: , Rfl:     lidocaine (LIDODERM) 5 %, Place 1 patch on the skin as directed by provider Daily. Remove & Discard patch within 12 hours or as directed by MD, Disp: 30 patch, Rfl: 2    Past Surgical History:   Procedure Laterality Date    CARPAL TUNNEL RELEASE      COLONOSCOPY      Dr Maria    FOREARM SURGERY  2011    /wrist    INJECTION OF MEDICATION  2016    Kenalog (1)       INJECTION OF MEDICATION  2016    Rocephin (1)       INJECTION OF MEDICATION  2016    Zofran (1)       JOINT REPLACEMENT  2018    KNEE CARTILAGE SURGERY  2012    SPLENECTOMY  1972    TOTAL KNEE ARTHROPLASTY Left 2018    VAGINAL HYSTERECTOMY SALPINGO OOPHORECTOMY      cerv cancer       Family History   Problem Relation Age of Onset    Cancer Other     Diabetes Other     Heart disease Other     Hypertension Other     Arthritis Daughter     Hyperlipidemia Daughter     Breast cancer Maternal Aunt     Coronary artery disease Father     Diabetes Maternal Grandmother         Social History     Socioeconomic History    Marital status:    Tobacco Use    Smoking status: Former     Packs/day: 0.25     Years: 2.00     Pack years: 0.50     Types: Cigarettes     Start date: 1986     Quit date: 2014     Years since quittin.3    Smokeless tobacco: Never    Tobacco comments:     No longer a smoker for many years   Substance and Sexual Activity    Alcohol use: No    Drug use: Never    Sexual activity: Not Currently     Partners: Male     Birth control/protection: Abstinence        Review of Systems   Constitutional: Negative.    HENT: Negative.     Eyes: Negative.    Respiratory: Negative.     Cardiovascular: Negative.    Gastrointestinal:  "Negative.    Endocrine: Negative.    Genitourinary: Negative.    Musculoskeletal: Negative.    Skin: Negative.    Allergic/Immunologic: Negative.    Neurological: Negative.    Hematological: Negative.    Psychiatric/Behavioral: Negative.     Right shoulder pain.      PHYSICAL EXAMINATION:       Ht 144.8 cm (57\")   Wt 85.7 kg (189 lb)   BMI 40.90 kg/m²     Physical Exam  Vitals and nursing note reviewed.   Constitutional:       General: She is not in acute distress.     Appearance: She is well-developed. She is not toxic-appearing.   HENT:      Head: Normocephalic.   Pulmonary:      Effort: Pulmonary effort is normal. No respiratory distress.   Skin:     General: Skin is warm and dry.   Neurological:      Mental Status: She is alert and oriented to person, place, and time.   Psychiatric:         Behavior: Behavior normal.         Thought Content: Thought content normal.         Judgment: Judgment normal.       GAIT:     []  Normal  []  Antalgic    Assistive device: []  None  []  Walker     []  Crutches  []  Cane     []  Wheelchair  []  Stretcher    Right Elbow Exam     Tenderness   The patient is experiencing no tenderness.     Range of Motion   The patient has normal right elbow ROM.    Comments:  Distal biceps tendon felt with hook test  Mild tenderness with palpation of biceps muscle      Right Shoulder Exam     Tenderness   The patient is experiencing tenderness in the acromion and biceps tendon.    Range of Motion   The patient has normal right shoulder ROM.    Muscle Strength   The patient has normal right shoulder strength.    Tests   Knox test: positive  Impingement: positive    Comments:  Positive full can test.  Positive empty can test.  Pain with range of motion.            No results found.    Study Result    Narrative & Impression      FINDINGS:  There are no acute bony, joint or soft tissue abnormalities.     IMPRESSION:  No significant abnormality of the right shoulder. "       ASSESSMENT:    Diagnoses and all orders for this visit:    Acute pain of right shoulder    Internal derangement of right shoulder    Impingement syndrome of right shoulder          PLAN    Patient has good strength and range of motion today which is reassuring, however patient has tried extensive conservative management without relief of symptoms.  Suspect impingement syndrome based off exam, however current treatment for impingement syndrome has not been helpful.  Recommend patient continue with use of NSAIDs, Tylenol 3, RICE therapy, activity modification as needed.  Patient instructed to continue formal physical therapy.  Patient instructed to return to office after MRI so results can be reviewed.        RTO: After MRI       EMR Dragon/Transciption Disclaimer: Some of this note may be an electronic transcription/translation of spoken language to printed text.  The electronic translation of spoken language may permit erroneous, or at times, nonsensical words or phrases to be inadvertently transcribed. Although I have reviewed the note for such errors, some may still exist.       This document has been electronically signed by Eva WARREN on June 6, 2023 08:38 CDT

## 2023-06-06 NOTE — THERAPY DISCHARGE NOTE
Outpatient Physical Therapy Ortho Progress Note/Discharge Summary  [facility]     Patient Name: Mira Deal  : 1955  MRN: [id number]  Today's Date: 2023      Visit Date: 2023    Patient seen for 7 PT sessions.  Patient reports 0% of improvement.  Next MD appt: ADRIAN  Recertification: N/A.    Therapy Diagnosis: R shoulder pain with suspected subacromial impingement        Visit Dx:    ICD-10-CM ICD-9-CM   1. Acute pain of right shoulder  M25.511 719.41       Patient Active Problem List   Diagnosis    Essential hypertension    Acquired hypothyroidism    BMI 39.0-39.9,adult    Encounter for screening mammogram for malignant neoplasm of breast    Asplenia    Hypothyroidism    Routine medical exam    Psoriasis    Hip pain, acute, right    Acute pain of right knee    Fall    Acute otitis externa of right ear    Internal derangement of right knee    Sensation of fullness in right ear    Class 2 severe obesity due to excess calories with serious comorbidity and body mass index (BMI) of 39.0 to 39.9 in adult    Primary osteoarthritis of knee    Anxiety    Macrocytosis without anemia    Spleen absent    Dizziness    Chest pain    Need for prophylactic vaccination and inoculation against meningococcus    B12 deficiency    Muscle strain    Left wrist pain    Thumb pain, left    Acute pain of right shoulder    Class 3 severe obesity due to excess calories with serious comorbidity and body mass index (BMI) of 40.0 to 44.9 in adult    Chronic right shoulder pain        Past Medical History:   Diagnosis Date    Acute suppurative otitis media without spontaneous rupture of eardrum     right ear    Benign essential hypertension     Cancer     cervix    Chronic osteoarthritis     Deep vein thrombosis 1971    Diverticular disease of colon     Dysfunction of eustachian tube     Will start OTC Nasal steroid       Encounter for gynecological examination (general) (routine) without abnormal findings      Encounter for screening for malignant neoplasm of breast     Encounter for screening mammogram for malignant neoplasm of breast     02/04/16 negative       Gastrointestinal hemorrhage     Gout 1979    History of transfusion 1971    HL (hearing loss) 1.5 months ago    Hypercholesterolemia     Hyperlipidemia     Hypothyroidism     Kidney stone 1984    Left lower quadrant pain     Mild depression     Nausea     Obesity     Hyperinsulinar     Obesity, unspecified     Vertigo         Past Surgical History:   Procedure Laterality Date    CARPAL TUNNEL RELEASE  1991    COLONOSCOPY  2013    Dr Maria    FOREARM SURGERY  2011    /wrist    INJECTION OF MEDICATION  05/12/2016    Kenalog (1)       INJECTION OF MEDICATION  05/12/2016    Rocephin (1)       INJECTION OF MEDICATION  05/12/2016    Zofran (1)       JOINT REPLACEMENT  July 16, 2018    KNEE CARTILAGE SURGERY  2012    SPLENECTOMY  1972    TOTAL KNEE ARTHROPLASTY Left 07/16/2018    VAGINAL HYSTERECTOMY SALPINGO OOPHORECTOMY  1995    cerv cancer        PT Ortho       Row Name 06/06/23 1500       Subjective Comments    Subjective Comments Patient reprots she saw the MD this morning and they are just waiting on the MRI approval.  -AJ       Precautions and Contraindications    Precautions/Limitations no known precautions/limitations  -AJ       Subjective Pain    Post-Treatment Pain Level 9  -AJ       Posture/Observations    Forward Head Mild;Increased;Sitting posture;Standing posture  -AJ    Cervical Lordosis Bilateral:;Normal  -AJ    Thoracic Kyphosis Normal;Increased;Sitting posture  -AJ    Rounded Shoulders Normal;Increased;Sitting posture;Standing posture  -AJ    Scapular Elevation Bilateral:;Normal  -AJ    Scapular winging Bilateral:;Normal  -AJ    Scoliosis Bilateral:;Normal  -AJ       Sensory Screen for Light Touch- Upper Quarter Clearing    C4 (posterior shoulder) Bilateral:;Intact  -AJ    C5 (lateral upper arm) Bilateral:;Intact  -AJ    C6 (tip of thumb)  Bilateral:;Intact  -AJ    C7 (tip of 3rd finger) Bilateral:;Intact  -AJ    C8 (tip of 5th finger) Bilateral:;Intact  -AJ    T1 (medial lower arm) Bilateral:;Intact  -AJ       Shoulder Impingement/Rotator Cuff Special Tests    Neer Impingement Test (RC Lesion vs. Bursitis) Right:;Positive  -AJ       Shoulder Girdle Palpation    Subacromial Space Right:;Tender  -AJ    AC Joint Right:;Tender  -AJ    Greater Tubercule Right:;Tender  -AJ    Upper Trap Right:;Tender;Guarded/taut  -AJ       Left Upper Ext    Lt Shoulder Abduction AROM 170°  -AJ    Lt Shoulder Flexion AROM 170°  -AJ    Lt Shoulder External Rotation AROM 95° @ 90° of shoulder abduction  -AJ    Lt Shoulder Internal Rotation AROM 60° @ 90° of shoulder abduction  -AJ       MMT (Manual Muscle Testing)    General MMT Comments b UE 5/5, R is painful for all.  -AJ        Strength Right    # Reps 3  -AJ    Right Rung 2  -AJ    Right  Test 1 28  -AJ    Right  Test 2 28  -AJ    Right  Test 3 28  -AJ     Strength Average Right 28  -AJ        Strength Left    # Reps 3  -AJ    Left Rung 2  -AJ    Left  Test 1 41  -AJ    Left  Test 2 38  -AJ    Left  Test 3 36  -AJ     Strength Average Left 38.33  -AJ       Sensation    Sensation WNL? WNL  -AJ    Light Touch No apparent deficits  -AJ       Pathomechanics    Upper Extremity Pathomechanics Limited thoracic extension;Limited wrist extension with   -AJ       Transfers    Comment, (Transfers) I with all transfers.  -AJ       Gait/Stairs (Locomotion)    Comment, (Gait/Stairs) FWB, non-antalgic gait, no assistive device, no significant deviations noted, normal arm swing with gait.  -AJ              User Key  (r) = Recorded By, (t) = Taken By, (c) = Cosigned By      Initials Name Provider Type    Eneida Adame, PT DPT Physical Therapist                  Barriers to Rehab: Include significant or possible arthritic/degenerative changes that have occurred within the joint, The  chronicity of this issue, The patient's obesity.    Safety Issues: None noted.        PT Assessment/Plan       Row Name 06/06/23 1500          PT Assessment    Functional Limitations Performance in work activities;Performance in leisure activities;Limitations in functional capacity and performance;Limitations in community activities;Limitation in home management  -     Impairments Posture;Poor body mechanics;Pain;Muscle strength;Impaired muscle endurance;Impaired muscle length;Impaired flexibility;Joint mobility  -     Assessment Comments Patient has met all goals but still reports 0% subjective improvemt and significnat pain in the shoulder. 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  -AJ     Please refer to paper survey for additional self-reported information No  -AJ     Rehab Potential Good  -AJ     Patient/caregiver participated in establishment of treatment plan and goals Yes  -AJ     Patient would benefit from skilled therapy intervention No  -AJ        PT Plan    PT Frequency --  N/A  -     PT Plan Comments D/C today.  -               User Key  (r) = Recorded By, (t) = Taken By, (c) = Cosigned By      Initials Name Provider Type    Eneida Adame, PT DPT Physical Therapist                         OP Exercises       Row Name 06/06/23 1500             Subjective Comments    Subjective Comments Patient reprots she saw the MD this morning and they are just waiting on the MRI approval.  -AJ         Subjective Pain    Able to rate subjective pain? yes  -      Pre-Treatment Pain Level 9  -      Post-Treatment Pain Level 9  -         Exercise 1    Exercise Name 1 Pro II UE bike for postural re-ed, strength  -      Time 1 10 min  -      Additional Comments L 5.0  -         Exercise 2    Exercise Name 2 Pulley's 3-way  -      Time 2 3 min each  -         Exercise 3    Exercise Name 3 measurements  -         Exercise 4    Exercise Name 4 Ethel  "No $  -AJ      Sets 4 2  -AJ      Reps 4 10  -AJ      Time 4 5\" hold  -AJ      Additional Comments RTB  -AJ         Exercise 5    Exercise Name 5 Tband Rows: Low, Mid  -AJ      Sets 5 1  -AJ      Reps 5 20 each  -AJ      Additional Comments GTB  -AJ         Exercise 6    Exercise Name 6 Tband B shoulder ext  -AJ      Sets 6 1  -AJ      Reps 6 20  -AJ      Additional Comments GTB  -AJ         Exercise 7    Exercise Name 7 B UT S  -AJ      Reps 7 2  -AJ      Time 7 30 seconds  -AJ         Exercise 8    Exercise Name 8 B LS S  -AJ      Reps 8 2  -AJ      Time 8 30 seconds  -AJ                User Key  (r) = Recorded By, (t) = Taken By, (c) = Cosigned By      Initials Name Provider Type    Eneida Adame, PT DPT Physical Therapist                  All therapeutic interventions performed today were to address current functional limitations and/or deficits in addressing all physical therapy goals.                    PT OP Goals       Row Name 06/06/23 1500          PT Short Term Goals    STG Date to Achieve 05/25/23  -     STG 1 I with HEP and have additions/changes by next recertification.  -AJ     STG 1 Progress Met  -     STG 2 Patient to perform 20 scapular retractions with proper form with no cues from PT.  -     STG 2 Progress Met  -     STG 3 Patient to demo improved postural awareness with all therEx with no cues from PT.  -     STG 3 Progress Met  -     STG 4 Patient to verbalize understanding of preferred desk ergonomics to improve overall posture at the work place.  -     STG 4 Progress Met  -        Long Term Goals    LTG 1 I with final HEP.  -AJ     LTG 1 Progress Met  -     LTG 2 R UE strength 5/5.  -     LTG 2 Progress Met  -     LTG 3 Patient to improve B  strength >/=10 lbs from initial eval findings in 3 attempt average.  -     LTG 3 Progress Met  -     LTG 4 Patient able to perform 3 minutes of OH activities with no increase in pain.  -     LTG 4 Progress Met  " -DIVYA        Time Calculation    PT Goal Re-Cert Due Date --  N/A  -DIVYA               User Key  (r) = Recorded By, (t) = Taken By, (c) = Cosigned By      Initials Name Provider Type    Eneida Adame, PT DPT Physical Therapist                    Therapy Education  Education Details: HEP: issued GTB for Rows and ext at home  Given: HEP, Symptoms/condition management, Pain management, Posture/body mechanics  Program: Reinforced, Progressed  How Provided: Verbal, Demonstration  Provided to: Patient  Level of Understanding: Teach back education performed, Verbalized, Demonstrated    Outcome Measure Options: Quick DASH  Quick DASH  Open a tight or new jar.: Moderate Difficulty  Do heavy household chores (e.g., wash walls, wash floors): Moderate Difficulty  Carry a shopping bag or briefcase: Mild Difficulty  Wash your back: Severe Difficulty  Use a knife to cut food: Mild Difficulty  Recreational activities in which you take some force or impact through your arm, should or hand (e.g. golf, hammering, tennis, etc.): Mild Difficulty  During the past week, to what extent has your arm, shoulder, or hand problem interfered with your normal social activites with family, friends, neighbors or groups?: Moderately  During the past week, were you limited in your work or other regular daily activities as a result of your arm, shoulder or hand problem?: Moderately Limited  Arm, Shoulder, or hand pain: Extreme  Tingling (pins and needles) in your arm, shoulder, or hand: None  During the past week, how much difficulty have you had sleeping because of the pain in your arm, shoulder or hand?: Moderate Difficiculty  Number of Questions Answered: 11  Quick DASH Score: 45.45         Time Calculation:   Start Time: 1520  Stop Time: 1605  Time Calculation (min): 45 min  Total Timed Code Minutes- PT: 45 minute(s)  Therapy Charges for Today       Code Description Service Date Service Provider Modifiers Qty    00116163721 HC PT THER SUPP  EA 15 MIN 6/6/2023 Eneida Ruth, PT DPT GP 1    72200301175 HC PT THER PROC EA 15 MIN 6/6/2023 Eneida Ruth, PT DPT GP 3            PT G-Codes  Outcome Measure Options: Quick DASH  Quick DASH Score: 45.45     OP PT Discharge Summary  Date of Discharge: 06/06/23  Reason for Discharge: All goals achieved, Independent  Outcomes Achieved: Able to achieve all goals within established timeline, Refer to plan of care for updates on goals achieved  Discharge Destination: Home with home program      This document has been electronically signed by Eneida Ruth, PT DPT, CSCS on June 6, 2023 16:09 CDT

## 2023-06-06 NOTE — LETTER
June 6, 2023     Patient: Mira Deal   YOB: 1955   Date of Visit: 6/6/2023       To Whom it May Concern:    Mira Deal was seen in my clinic on 6/6/2023.      If you have any questions or concerns, please don't hesitate to call.         Sincerely,          KELVIN Jones        CC: No Recipients

## 2023-07-21 DIAGNOSIS — G89.29 CHRONIC RIGHT SHOULDER PAIN: Chronic | ICD-10-CM

## 2023-07-21 DIAGNOSIS — M25.511 CHRONIC RIGHT SHOULDER PAIN: Chronic | ICD-10-CM

## 2023-07-27 ENCOUNTER — OFFICE VISIT (OUTPATIENT)
Dept: ORTHOPEDIC SURGERY | Facility: CLINIC | Age: 68
End: 2023-07-27
Payer: COMMERCIAL

## 2023-07-27 VITALS — BODY MASS INDEX: 40.99 KG/M2 | HEIGHT: 57 IN | WEIGHT: 190 LBS

## 2023-07-27 DIAGNOSIS — M75.111 NONTRAUMATIC INCOMPLETE TEAR OF RIGHT ROTATOR CUFF: ICD-10-CM

## 2023-07-27 DIAGNOSIS — M25.511 CHRONIC RIGHT SHOULDER PAIN: Primary | ICD-10-CM

## 2023-07-27 DIAGNOSIS — M19.011 ARTHRITIS OF RIGHT ACROMIOCLAVICULAR JOINT: ICD-10-CM

## 2023-07-27 DIAGNOSIS — G89.29 CHRONIC RIGHT SHOULDER PAIN: Primary | ICD-10-CM

## 2023-07-27 PROBLEM — M75.41 IMPINGEMENT SYNDROME OF RIGHT SHOULDER: Status: ACTIVE | Noted: 2023-07-27

## 2023-07-27 PROBLEM — M24.811 INTERNAL DERANGEMENT OF RIGHT SHOULDER: Status: ACTIVE | Noted: 2023-07-27

## 2023-07-27 PROCEDURE — 99214 OFFICE O/P EST MOD 30 MIN: CPT | Performed by: NURSE PRACTITIONER

## 2023-07-27 NOTE — PROGRESS NOTES
"[place] Moreno Deal is a 67 y.o. female returns for     Chief Complaint   Patient presents with    Right Shoulder - Follow-up, Pain    Results       HISTORY OF PRESENT ILLNESS:      Mrs. Deal is a 67-year-old female who presents today to follow-up on right shoulder pain that has been present for the past 3 to 4 months.  She reports shoulder pain is severe and worsening.  She reports worsening range of motion since last being seen.  Pain continues to be grinding, stabbing, aching and increased with attempts of overhead activity and reaching behind her.  Pain radiates down into her arm.  No burning, tingling, numbness.  No traumas or injuries.  She has tried diclofenac, Tylenol 3, RICE therapy, formal physical therapy, and subacromial injection without relief of symptoms.  Subacromial injection was given by Dr. Zamudio on 4/10/2023.  She is here to review MRI results.     CONCURRENT MEDICAL HISTORY:    The following portions of the patient's history were reviewed and updated as appropriate: allergies, current medications, past family history, past medical history, past social history, past surgical history and problem list.     ROS  No fevers or chills.  No chest pain or shortness of air.  No GI or  disturbances.  Right shoulder pain.    PHYSICAL EXAMINATION:       Ht 144.8 cm (57\")   Wt 86.2 kg (190 lb)   BMI 41.12 kg/m²     Physical Exam  Vitals and nursing note reviewed.   Constitutional:       General: She is not in acute distress.     Appearance: She is well-developed. She is not toxic-appearing.   HENT:      Head: Normocephalic.   Pulmonary:      Effort: Pulmonary effort is normal. No respiratory distress.   Skin:     General: Skin is warm and dry.   Neurological:      Mental Status: She is alert and oriented to person, place, and time.   Psychiatric:         Behavior: Behavior normal.         Thought Content: Thought content normal.         Judgment: Judgment normal.       GAIT:     []  Normal  []  " Antalgic    Assistive device: [x]  None  []  Walker     []  Crutches  []  Cane     []  Wheelchair  []  Stretcher    Right Shoulder Exam     Tenderness   The patient is experiencing tenderness in the acromion and biceps tendon.    Range of Motion   Active abduction:  90   Forward flexion:  90     Comments:  Positive full can test.  Positive empty can test.  Pain with range of motion.  Significantly decreased range of motion from last exam.                    ASSESSMENT:    Diagnoses and all orders for this visit:    Chronic right shoulder pain    Nontraumatic incomplete tear of right rotator cuff    Arthritis of right acromioclavicular joint          PLAN    At last appointment patient's range of motion was normal, however today patient cannot forward flex or abduct past 90 degrees.  She has tried multiple conservative management interventions including formal physical therapy, NSAIDs, RICE therapy, Tylenol 3, and subacromial injection.  Patient reports symptoms are affecting her quality of life.  After discussing available treatment options, patient desires to return for surgical consult.  Patient instructed to bring MRI disc with her to this appointment.    EMR Dragon/Transciption Disclaimer: Some of this note may be an electronic transcription/translation of spoken language to printed text.  The electronic translation of spoken language may permit erroneous, or at times, nonsensical words or phrases to be inadvertently transcribed. Although I have reviewed the note for such errors, some may still exist.         This document has been electronically signed by Eva WARREN on July 27, 2023 09:36 CDT

## 2023-08-09 ENCOUNTER — OFFICE VISIT (OUTPATIENT)
Dept: ORTHOPEDIC SURGERY | Facility: CLINIC | Age: 68
End: 2023-08-09
Payer: COMMERCIAL

## 2023-08-09 ENCOUNTER — PREP FOR SURGERY (OUTPATIENT)
Dept: OTHER | Facility: HOSPITAL | Age: 68
End: 2023-08-09
Payer: COMMERCIAL

## 2023-08-09 VITALS — WEIGHT: 189.2 LBS | BODY MASS INDEX: 40.82 KG/M2 | HEIGHT: 57 IN

## 2023-08-09 DIAGNOSIS — M75.41 IMPINGEMENT SYNDROME OF RIGHT SHOULDER: ICD-10-CM

## 2023-08-09 DIAGNOSIS — E66.01 MORBID (SEVERE) OBESITY DUE TO EXCESS CALORIES: ICD-10-CM

## 2023-08-09 DIAGNOSIS — G89.29 CHRONIC RIGHT SHOULDER PAIN: Primary | ICD-10-CM

## 2023-08-09 DIAGNOSIS — I10 ESSENTIAL HYPERTENSION: ICD-10-CM

## 2023-08-09 DIAGNOSIS — L40.9 PSORIASIS: ICD-10-CM

## 2023-08-09 DIAGNOSIS — M24.811 INTERNAL DERANGEMENT OF RIGHT SHOULDER: ICD-10-CM

## 2023-08-09 DIAGNOSIS — E66.01 CLASS 3 SEVERE OBESITY DUE TO EXCESS CALORIES WITH SERIOUS COMORBIDITY AND BODY MASS INDEX (BMI) OF 40.0 TO 44.9 IN ADULT: ICD-10-CM

## 2023-08-09 DIAGNOSIS — E03.9 ACQUIRED HYPOTHYROIDISM: ICD-10-CM

## 2023-08-09 DIAGNOSIS — M25.511 CHRONIC RIGHT SHOULDER PAIN: Primary | ICD-10-CM

## 2023-08-09 DIAGNOSIS — M75.51 SUBACROMIAL BURSITIS OF RIGHT SHOULDER JOINT: ICD-10-CM

## 2023-08-09 DIAGNOSIS — M25.511 CHRONIC RIGHT SHOULDER PAIN: ICD-10-CM

## 2023-08-09 DIAGNOSIS — M19.011 ARTHRITIS OF RIGHT ACROMIOCLAVICULAR JOINT: ICD-10-CM

## 2023-08-09 DIAGNOSIS — G89.29 CHRONIC RIGHT SHOULDER PAIN: ICD-10-CM

## 2023-08-09 DIAGNOSIS — M75.111 NONTRAUMATIC INCOMPLETE TEAR OF RIGHT ROTATOR CUFF: ICD-10-CM

## 2023-08-09 DIAGNOSIS — M75.111 NONTRAUMATIC INCOMPLETE TEAR OF RIGHT ROTATOR CUFF: Primary | ICD-10-CM

## 2023-08-09 RX ORDER — BUPIVACAINE HCL/0.9 % NACL/PF 0.1 %
2 PLASTIC BAG, INJECTION (ML) EPIDURAL ONCE
OUTPATIENT
Start: 2023-08-09 | End: 2023-08-09

## 2023-08-09 NOTE — PROGRESS NOTES
"[place] Moreno Deal is a 67 y.o. female returns for     Chief Complaint   Patient presents with    Right Shoulder - Follow-up       HISTORY OF PRESENT ILLNESS:  Mrs. Deal is a 67-year-old female who presents today for the first time to see me to follow-up on right shoulder pain that has been present for the past few months, gotten worse over the last month. She reports shoulder pain is severe and worsening. She reports worsening range of motion since last being seen. Pain continues to be grinding, stabbing, aching and increased with attempts of overhead activity and reaching behind her. Pain radiates down into her arm. No burning, tingling, numbness.  No traumas or injuries. She has tried diclofenac, Tylenol 3, RICE therapy, formal physical therapy, and subacromial injection without relief of symptoms. Subacromial injection was given by Dr. Zamudio on 4/10/2023. She she was initially seen and worked up by KELVIN Betancourt and is here today to discuss definitive treatment options and review of her MRI results for her right shoulder.    Patient denies any new injury, denies any tingling numbness in the right upper extremity, denies any confusional disturbance.    CONCURRENT MEDICAL HISTORY:    The following portions of the patient's history were reviewed and updated as appropriate: allergies, current medications, past family history, past medical history, past social history, past surgical history and problem list.     ROS  No fevers or chills.  No chest pain or shortness of air.  No GI or  disturbances.  Right shoulder pain.    PHYSICAL EXAMINATION:       Ht 144.8 cm (57\")   Wt 85.8 kg (189 lb 3.2 oz)   BMI 40.94 kg/mý     Physical Exam  Vitals and nursing note reviewed.   Constitutional:       General: She is not in acute distress.     Appearance: She is well-developed. She is not toxic-appearing.   HENT:      Head: Normocephalic.   Pulmonary:      Effort: Pulmonary effort is normal. No respiratory " distress.   Skin:     General: Skin is warm and dry.   Neurological:      Mental Status: She is alert and oriented to person, place, and time.   Psychiatric:         Behavior: Behavior normal.         Thought Content: Thought content normal.         Judgment: Judgment normal.       GAIT:     []  Normal  []  Antalgic    Assistive device: [x]  None  []  Walker     []  Crutches  []  Cane     []  Wheelchair  []  Stretcher    Right Shoulder Exam     Tenderness   The patient is experiencing tenderness in the acromion and biceps tendon.    Range of Motion   Active abduction:  90   Forward flexion:  90     Comments:  Positive full can test.  Positive empty can test.  Pain with range of motion.  Significantly decreased range of motion from last exam.      Right shoulder exam: Exam comments:  Drop arm sign positive with weakness of the rotator cuff particularly involving the supraspinatus and infraspinatus noted.  Neer/Knox cervical impingement positive.  .  Belly press/bearhug/Gerbers liftoff positive.  Biceps tendinitis noted in the groove.  Creek's/empty can positive.  No sign of DVT/compartment soft nontender.  ROM restricted particularly active overhead abduction/forward elevation and internal rotation.                ASSESSMENT:  Mrs. Deal is a 67-year-old female who presents today for the first time to see me to follow-up on right shoulder pain that has been present for the past few months, gotten worse over the last month. She reports shoulder pain is severe and worsening. She reports worsening range of motion since last being seen. Pain continues to be grinding, stabbing, aching and increased with attempts of overhead activity and reaching behind her. Pain radiates down into her arm. No burning, tingling, numbness.  No traumas or injuries. She has tried diclofenac, Tylenol 3, RICE therapy, formal physical therapy, and subacromial injection without relief of symptoms. Subacromial injection was given by   Robb on 4/10/2023. She she was initially seen and worked up by KELVIN Betancourt and is here today to discuss definitive treatment options and review of her MRI results for her right shoulder.    Patient denies any new injury, denies any tingling numbness in the right upper extremity, denies any confusional disturbance.  Diagnoses and all orders for this visit:    Chronic right shoulder pain    Nontraumatic incomplete tear of right rotator cuff    Arthritis of right acromioclavicular joint    Impingement syndrome of right shoulder    Morbid (severe) obesity due to excess calories    Essential hypertension    Acquired hypothyroidism      I personally reviewed this patient's right shoulder x-rays from 4/25/2023 and MRI findings from [facility] open MRI from 7/12/2020 that clearly indicate rotator cuff tear with abdominal impingement with AC joint arthritis.  I explained the clinical findings and correlating with the MRI findings and indicated to the patient that since there is a structural derangement in the right shoulder the conservative measures did not seem to affect relief and that she would benefit by arthroscopic intervention to delineate the underlying structural derangement and to address surgical repair that was identified during arthroscopy.  Patient wished to proceed with right shoulder arthroscopy as soon as possible.      PLAN  Scheduled for right shoulder arthroscopic rotator cuff repair, subacromial decompression, Damir procedure, possible labral debridement versus repair and all other indicated procedures; as soon as possible based on the OR schedule availability.  The patient voiced understanding of the risks, benefits, and alternative forms of treatment that were discussed and the patient consents to proceed with right shoulder arthroscopic rotator cuff repair, subacromial decompression, Damir procedure, possible labral debridement versus repair and all other indicated procedures  surgery.  All risks, benefits and alternatives were discussed.  Risks include, but not exclusive to anesthetic complications, including death, MI, CVA, infection, bleeding DVT, fracture, residual pain and need for future surgery.  This discussion was held with the patient by  Wilfred Selby MD and all questions were answered.  In the meantime, patient is advised to continue rest, activity modification, local pain gel application, over-the-counter pain meds, avoid sleeping on the right shoulder and wanting lifting overhead weights with the right upper extremity to minimize right shoulder discomfort while waiting for the right shoulder surgery.    EMR Dragon/Transciption Disclaimer: Some of this note may be an electronic transcription/translation of spoken language to printed text using the Dragon Dictation System.  Time spent of a minimum of 45 minutes including the face to face evaluation, reviewing of medical history and prior medial records, reviewing of diagnostic studies, documentation, patient education and coordination of care and surgical treatment decision.    Wilfred Selby MD   August 20, 2023 17:28 CDT

## 2023-08-09 NOTE — LETTER
August 9, 2023     Patient: Mira Deal   YOB: 1955   Date of Visit: 8/9/2023       To Whom It May Concern:    Mira Deal was seen in my office today and was accompanied by Cristiane Umanzor.           Sincerely,        Wilfred Selby MD    CC: No Recipients

## 2023-08-09 NOTE — H&P (VIEW-ONLY)
"[place] Moreno Deal is a 67 y.o. female returns for     Chief Complaint   Patient presents with    Right Shoulder - Follow-up       HISTORY OF PRESENT ILLNESS:  Mrs. Deal is a 67-year-old female who presents today for the first time to see me to follow-up on right shoulder pain that has been present for the past few months, gotten worse over the last month. She reports shoulder pain is severe and worsening. She reports worsening range of motion since last being seen. Pain continues to be grinding, stabbing, aching and increased with attempts of overhead activity and reaching behind her. Pain radiates down into her arm. No burning, tingling, numbness.  No traumas or injuries. She has tried diclofenac, Tylenol 3, RICE therapy, formal physical therapy, and subacromial injection without relief of symptoms. Subacromial injection was given by Dr. Zamudio on 4/10/2023. She she was initially seen and worked up by KELVIN Betancourt and is here today to discuss definitive treatment options and review of her MRI results for her right shoulder.    Patient denies any new injury, denies any tingling numbness in the right upper extremity, denies any confusional disturbance.    CONCURRENT MEDICAL HISTORY:    The following portions of the patient's history were reviewed and updated as appropriate: allergies, current medications, past family history, past medical history, past social history, past surgical history and problem list.     ROS  No fevers or chills.  No chest pain or shortness of air.  No GI or  disturbances.  Right shoulder pain.    PHYSICAL EXAMINATION:       Ht 144.8 cm (57\")   Wt 85.8 kg (189 lb 3.2 oz)   BMI 40.94 kg/m²     Physical Exam  Vitals and nursing note reviewed.   Constitutional:       General: She is not in acute distress.     Appearance: She is well-developed. She is not toxic-appearing.   HENT:      Head: Normocephalic.   Pulmonary:      Effort: Pulmonary effort is normal. No respiratory " distress.   Skin:     General: Skin is warm and dry.   Neurological:      Mental Status: She is alert and oriented to person, place, and time.   Psychiatric:         Behavior: Behavior normal.         Thought Content: Thought content normal.         Judgment: Judgment normal.       GAIT:     []  Normal  []  Antalgic    Assistive device: [x]  None  []  Walker     []  Crutches  []  Cane     []  Wheelchair  []  Stretcher    Right Shoulder Exam     Tenderness   The patient is experiencing tenderness in the acromion and biceps tendon.    Range of Motion   Active abduction:  90   Forward flexion:  90     Comments:  Positive full can test.  Positive empty can test.  Pain with range of motion.  Significantly decreased range of motion from last exam.      Right shoulder exam: Exam comments:  Drop arm sign positive with weakness of the rotator cuff particularly involving the supraspinatus and infraspinatus noted.  Neer/Knox cervical impingement positive.  .  Belly press/bearhug/Gerbers liftoff positive.  Biceps tendinitis noted in the groove.  Emery's/empty can positive.  No sign of DVT/compartment soft nontender.  ROM restricted particularly active overhead abduction/forward elevation and internal rotation.                ASSESSMENT:  Mrs. Deal is a 67-year-old female who presents today for the first time to see me to follow-up on right shoulder pain that has been present for the past few months, gotten worse over the last month. She reports shoulder pain is severe and worsening. She reports worsening range of motion since last being seen. Pain continues to be grinding, stabbing, aching and increased with attempts of overhead activity and reaching behind her. Pain radiates down into her arm. No burning, tingling, numbness.  No traumas or injuries. She has tried diclofenac, Tylenol 3, RICE therapy, formal physical therapy, and subacromial injection without relief of symptoms. Subacromial injection was given by   Robb on 4/10/2023. She she was initially seen and worked up by KELVIN Betancourt and is here today to discuss definitive treatment options and review of her MRI results for her right shoulder.    Patient denies any new injury, denies any tingling numbness in the right upper extremity, denies any confusional disturbance.  Diagnoses and all orders for this visit:    Chronic right shoulder pain    Nontraumatic incomplete tear of right rotator cuff    Arthritis of right acromioclavicular joint    Impingement syndrome of right shoulder    Morbid (severe) obesity due to excess calories    Essential hypertension    Acquired hypothyroidism      I personally reviewed this patient's right shoulder x-rays from 4/25/2023 and MRI findings from [facility] MRI from 7/12/2020 that clearly indicate rotator cuff tear with abdominal impingement with AC joint arthritis.  I explained the clinical findings and correlating with the MRI findings and indicated to the patient that since there is a structural derangement in the right shoulder the conservative measures did not seem to affect relief and that she would benefit by arthroscopic intervention to delineate the underlying structural derangement and to address surgical repair that was identified during arthroscopy.  Patient wished to proceed with right shoulder arthroscopy as soon as possible.      PLAN  Scheduled for right shoulder arthroscopic rotator cuff repair, subacromial decompression, Damir procedure, possible labral debridement versus repair and all other indicated procedures; as soon as possible based on the OR schedule availability.  The patient voiced understanding of the risks, benefits, and alternative forms of treatment that were discussed and the patient consents to proceed with right shoulder arthroscopic rotator cuff repair, subacromial decompression, Damir procedure, possible labral debridement versus repair and all other indicated procedures  surgery.  All risks, benefits and alternatives were discussed.  Risks include, but not exclusive to anesthetic complications, including death, MI, CVA, infection, bleeding DVT, fracture, residual pain and need for future surgery.  This discussion was held with the patient by  Wilfred Selby MD and all questions were answered.  In the meantime, patient is advised to continue rest, activity modification, local pain gel application, over-the-counter pain meds, avoid sleeping on the right shoulder and wanting lifting overhead weights with the right upper extremity to minimize right shoulder discomfort while waiting for the right shoulder surgery.    EMR Dragon/Transciption Disclaimer: Some of this note may be an electronic transcription/translation of spoken language to printed text using the Dragon Dictation System.  Time spent of a minimum of 45 minutes including the face to face evaluation, reviewing of medical history and prior medial records, reviewing of diagnostic studies, documentation, patient education and coordination of care and surgical treatment decision.    Wilfred Selby MD   August 20, 2023 17:28 CDT

## 2023-08-13 DIAGNOSIS — Z00.00 ROUTINE MEDICAL EXAM: ICD-10-CM

## 2023-08-20 PROBLEM — E66.01 MORBID (SEVERE) OBESITY DUE TO EXCESS CALORIES: Status: ACTIVE | Noted: 2023-08-20

## 2023-08-28 ENCOUNTER — ANESTHESIA EVENT (OUTPATIENT)
Dept: PERIOP | Facility: HOSPITAL | Age: 68
End: 2023-08-28
Payer: COMMERCIAL

## 2023-08-29 ENCOUNTER — ANESTHESIA (OUTPATIENT)
Dept: PERIOP | Facility: HOSPITAL | Age: 68
End: 2023-08-29
Payer: COMMERCIAL

## 2023-08-29 ENCOUNTER — HOSPITAL ENCOUNTER (OUTPATIENT)
Facility: HOSPITAL | Age: 68
Setting detail: HOSPITAL OUTPATIENT SURGERY
Discharge: HOME | End: 2023-08-29
Attending: SPECIALIST/TECHNOLOGIST, OTHER | Admitting: SPECIALIST/TECHNOLOGIST, OTHER
Payer: COMMERCIAL

## 2023-08-29 VITALS
TEMPERATURE: 97.2 F | WEIGHT: 191.36 LBS | HEART RATE: 50 BPM | BODY MASS INDEX: 41.28 KG/M2 | HEIGHT: 57 IN | SYSTOLIC BLOOD PRESSURE: 144 MMHG | RESPIRATION RATE: 20 BRPM | DIASTOLIC BLOOD PRESSURE: 67 MMHG | OXYGEN SATURATION: 96 %

## 2023-08-29 DIAGNOSIS — M75.51 SUBACROMIAL BURSITIS OF RIGHT SHOULDER JOINT: ICD-10-CM

## 2023-08-29 DIAGNOSIS — G89.18 ACUTE POSTOPERATIVE PAIN OF RIGHT SHOULDER: ICD-10-CM

## 2023-08-29 DIAGNOSIS — M75.111 NONTRAUMATIC INCOMPLETE TEAR OF RIGHT ROTATOR CUFF: ICD-10-CM

## 2023-08-29 DIAGNOSIS — M25.511 ACUTE POSTOPERATIVE PAIN OF RIGHT SHOULDER: ICD-10-CM

## 2023-08-29 DIAGNOSIS — M75.41 IMPINGEMENT SYNDROME OF RIGHT SHOULDER: ICD-10-CM

## 2023-08-29 DIAGNOSIS — M24.811 INTERNAL DERANGEMENT OF RIGHT SHOULDER: ICD-10-CM

## 2023-08-29 DIAGNOSIS — G89.29 CHRONIC RIGHT SHOULDER PAIN: ICD-10-CM

## 2023-08-29 DIAGNOSIS — L40.9 PSORIASIS: ICD-10-CM

## 2023-08-29 DIAGNOSIS — Z98.890 STATUS POST ARTHROSCOPY OF RIGHT SHOULDER: Primary | ICD-10-CM

## 2023-08-29 DIAGNOSIS — E66.01 CLASS 3 SEVERE OBESITY DUE TO EXCESS CALORIES WITH SERIOUS COMORBIDITY AND BODY MASS INDEX (BMI) OF 40.0 TO 44.9 IN ADULT: ICD-10-CM

## 2023-08-29 DIAGNOSIS — M25.511 CHRONIC RIGHT SHOULDER PAIN: ICD-10-CM

## 2023-08-29 DIAGNOSIS — E03.9 ACQUIRED HYPOTHYROIDISM: ICD-10-CM

## 2023-08-29 DIAGNOSIS — M19.011 ARTHRITIS OF RIGHT ACROMIOCLAVICULAR JOINT: ICD-10-CM

## 2023-08-29 PROBLEM — M15.4 EROSIVE (OSTEO)ARTHRITIS: Status: ACTIVE | Noted: 2023-08-29

## 2023-08-29 PROBLEM — M75.01 ADHESIVE CAPSULITIS OF RIGHT SHOULDER: Status: ACTIVE | Noted: 2023-08-29

## 2023-08-29 PROBLEM — M75.121 COMPLETE ROTATOR CUFF TEAR OR RUPTURE OF RIGHT SHOULDER, NOT SPECIFIED AS TRAUMATIC: Status: ACTIVE | Noted: 2023-08-29

## 2023-08-29 PROCEDURE — 29828 SHO ARTHRS SRG BICP TENODSIS: CPT | Performed by: SPECIALIST/TECHNOLOGIST, OTHER

## 2023-08-29 PROCEDURE — 25010000002 EPINEPHRINE 1 MG/ML SOLUTION: Performed by: SPECIALIST/TECHNOLOGIST, OTHER

## 2023-08-29 PROCEDURE — 29824 SHO ARTHRS SRG DSTL CLAVICLC: CPT | Performed by: SPECIALIST/TECHNOLOGIST, OTHER

## 2023-08-29 PROCEDURE — 25010000002 CEFAZOLIN PER 500 MG: Performed by: SPECIALIST/TECHNOLOGIST, OTHER

## 2023-08-29 PROCEDURE — 29827 SHO ARTHRS SRG RT8TR CUF RPR: CPT | Performed by: SPECIALIST/TECHNOLOGIST, OTHER

## 2023-08-29 PROCEDURE — 25010000002 DEXAMETHASONE PER 1 MG

## 2023-08-29 PROCEDURE — 25010000002 MIDAZOLAM PER 1 MG

## 2023-08-29 PROCEDURE — C1713 ANCHOR/SCREW BN/BN,TIS/BN: HCPCS | Performed by: SPECIALIST/TECHNOLOGIST, OTHER

## 2023-08-29 PROCEDURE — 25010000002 ROPIVACAINE PER 1 MG: Performed by: ANESTHESIOLOGY

## 2023-08-29 PROCEDURE — 25010000002 NEOSTIGMINE 10 MG/10ML SOLUTION

## 2023-08-29 PROCEDURE — 25010000002 PROPOFOL 200 MG/20ML EMULSION

## 2023-08-29 PROCEDURE — 29826 SHO ARTHRS SRG DECOMPRESSION: CPT | Performed by: SPECIALIST/TECHNOLOGIST, OTHER

## 2023-08-29 PROCEDURE — 25010000002 ONDANSETRON PER 1 MG

## 2023-08-29 PROCEDURE — 25010000002 KETOROLAC TROMETHAMINE PER 15 MG

## 2023-08-29 PROCEDURE — 25010000002 FENTANYL CITRATE (PF) 100 MCG/2ML SOLUTION

## 2023-08-29 DEVICE — IMPLANTABLE DEVICE: Type: IMPLANTABLE DEVICE | Site: SHOULDER | Status: FUNCTIONAL

## 2023-08-29 DEVICE — SUT/ANCH BIOCOMP SWIVELOCK/C DBL 4.75X22MM: Type: IMPLANTABLE DEVICE | Site: SHOULDER | Status: FUNCTIONAL

## 2023-08-29 DEVICE — SUT FIBERTAPE FW 2MM 7IN BLU AR72377: Type: IMPLANTABLE DEVICE | Site: SHOULDER | Status: FUNCTIONAL

## 2023-08-29 RX ORDER — NALOXONE HYDROCHLORIDE 4 MG/.1ML
SPRAY NASAL
Qty: 2 EACH | Refills: 0 | Status: SHIPPED | OUTPATIENT
Start: 2023-08-29 | End: 2023-09-11

## 2023-08-29 RX ORDER — ROPIVACAINE HYDROCHLORIDE 5 MG/ML
INJECTION, SOLUTION EPIDURAL; INFILTRATION; PERINEURAL
Status: COMPLETED | OUTPATIENT
Start: 2023-08-29 | End: 2023-08-29

## 2023-08-29 RX ORDER — LIDOCAINE HYDROCHLORIDE 10 MG/ML
INJECTION, SOLUTION EPIDURAL; INFILTRATION; INTRACAUDAL; PERINEURAL
Status: COMPLETED | OUTPATIENT
Start: 2023-08-29 | End: 2023-08-29

## 2023-08-29 RX ORDER — HYDROCODONE BITARTRATE AND ACETAMINOPHEN 7.5; 325 MG/1; MG/1
1 TABLET ORAL EVERY 4 HOURS PRN
Qty: 40 TABLET | Refills: 0 | Status: SHIPPED | OUTPATIENT
Start: 2023-08-29 | End: 2023-09-11

## 2023-08-29 RX ORDER — DEXAMETHASONE SODIUM PHOSPHATE 4 MG/ML
INJECTION, SOLUTION INTRA-ARTICULAR; INTRALESIONAL; INTRAMUSCULAR; INTRAVENOUS; SOFT TISSUE AS NEEDED
Status: DISCONTINUED | OUTPATIENT
Start: 2023-08-29 | End: 2023-08-29 | Stop reason: SURG

## 2023-08-29 RX ORDER — BUPIVACAINE HCL/0.9 % NACL/PF 0.1 %
2 PLASTIC BAG, INJECTION (ML) EPIDURAL ONCE
Status: COMPLETED | OUTPATIENT
Start: 2023-08-29 | End: 2023-08-29

## 2023-08-29 RX ORDER — EPHEDRINE SULFATE 50 MG/ML
5 INJECTION, SOLUTION INTRAVENOUS ONCE AS NEEDED
Status: DISCONTINUED | OUTPATIENT
Start: 2023-08-29 | End: 2023-08-29 | Stop reason: HOSPADM

## 2023-08-29 RX ORDER — EPHEDRINE SULFATE 50 MG/ML
INJECTION INTRAVENOUS AS NEEDED
Status: DISCONTINUED | OUTPATIENT
Start: 2023-08-29 | End: 2023-08-29 | Stop reason: SURG

## 2023-08-29 RX ORDER — FENTANYL CITRATE 50 UG/ML
INJECTION, SOLUTION INTRAMUSCULAR; INTRAVENOUS AS NEEDED
Status: DISCONTINUED | OUTPATIENT
Start: 2023-08-29 | End: 2023-08-29 | Stop reason: SURG

## 2023-08-29 RX ORDER — NEOSTIGMINE METHYLSULFATE 1 MG/ML
INJECTION, SOLUTION INTRAVENOUS AS NEEDED
Status: DISCONTINUED | OUTPATIENT
Start: 2023-08-29 | End: 2023-08-29 | Stop reason: SURG

## 2023-08-29 RX ORDER — PROPOFOL 10 MG/ML
INJECTION, EMULSION INTRAVENOUS AS NEEDED
Status: DISCONTINUED | OUTPATIENT
Start: 2023-08-29 | End: 2023-08-29 | Stop reason: SURG

## 2023-08-29 RX ORDER — KETOROLAC TROMETHAMINE 15 MG/ML
INJECTION, SOLUTION INTRAMUSCULAR; INTRAVENOUS AS NEEDED
Status: DISCONTINUED | OUTPATIENT
Start: 2023-08-29 | End: 2023-08-29 | Stop reason: SURG

## 2023-08-29 RX ORDER — HYDROCODONE BITARTRATE AND ACETAMINOPHEN 7.5; 325 MG/1; MG/1
1 TABLET ORAL ONCE AS NEEDED
Status: DISCONTINUED | OUTPATIENT
Start: 2023-08-29 | End: 2023-08-29 | Stop reason: HOSPADM

## 2023-08-29 RX ORDER — ONDANSETRON 2 MG/ML
4 INJECTION INTRAMUSCULAR; INTRAVENOUS ONCE AS NEEDED
Status: DISCONTINUED | OUTPATIENT
Start: 2023-08-29 | End: 2023-08-29 | Stop reason: HOSPADM

## 2023-08-29 RX ORDER — ROCURONIUM BROMIDE 10 MG/ML
INJECTION, SOLUTION INTRAVENOUS AS NEEDED
Status: DISCONTINUED | OUTPATIENT
Start: 2023-08-29 | End: 2023-08-29 | Stop reason: SURG

## 2023-08-29 RX ORDER — FLUMAZENIL 0.1 MG/ML
0.2 INJECTION INTRAVENOUS AS NEEDED
Status: DISCONTINUED | OUTPATIENT
Start: 2023-08-29 | End: 2023-08-29 | Stop reason: HOSPADM

## 2023-08-29 RX ORDER — ACETAMINOPHEN 325 MG/1
650 TABLET ORAL ONCE AS NEEDED
Status: DISCONTINUED | OUTPATIENT
Start: 2023-08-29 | End: 2023-08-29 | Stop reason: HOSPADM

## 2023-08-29 RX ORDER — ONDANSETRON 2 MG/ML
INJECTION INTRAMUSCULAR; INTRAVENOUS AS NEEDED
Status: DISCONTINUED | OUTPATIENT
Start: 2023-08-29 | End: 2023-08-29 | Stop reason: SURG

## 2023-08-29 RX ORDER — DIPHENHYDRAMINE HYDROCHLORIDE 50 MG/ML
12.5 INJECTION INTRAMUSCULAR; INTRAVENOUS
Status: DISCONTINUED | OUTPATIENT
Start: 2023-08-29 | End: 2023-08-29 | Stop reason: HOSPADM

## 2023-08-29 RX ORDER — NALOXONE HCL 0.4 MG/ML
0.4 VIAL (ML) INJECTION AS NEEDED
Status: DISCONTINUED | OUTPATIENT
Start: 2023-08-29 | End: 2023-08-29 | Stop reason: HOSPADM

## 2023-08-29 RX ORDER — LIDOCAINE HYDROCHLORIDE 20 MG/ML
INJECTION, SOLUTION EPIDURAL; INFILTRATION; INTRACAUDAL; PERINEURAL AS NEEDED
Status: DISCONTINUED | OUTPATIENT
Start: 2023-08-29 | End: 2023-08-29 | Stop reason: SURG

## 2023-08-29 RX ORDER — SODIUM CHLORIDE, SODIUM GLUCONATE, SODIUM ACETATE, POTASSIUM CHLORIDE AND MAGNESIUM CHLORIDE 526; 502; 368; 37; 30 MG/100ML; MG/100ML; MG/100ML; MG/100ML; MG/100ML
1000 INJECTION, SOLUTION INTRAVENOUS CONTINUOUS PRN
Status: DISCONTINUED | OUTPATIENT
Start: 2023-08-29 | End: 2023-08-29 | Stop reason: HOSPADM

## 2023-08-29 RX ORDER — MIDAZOLAM HYDROCHLORIDE 1 MG/ML
INJECTION INTRAMUSCULAR; INTRAVENOUS AS NEEDED
Status: DISCONTINUED | OUTPATIENT
Start: 2023-08-29 | End: 2023-08-29 | Stop reason: SURG

## 2023-08-29 RX ADMIN — DEXAMETHASONE SODIUM PHOSPHATE 4 MG: 4 INJECTION, SOLUTION INTRAMUSCULAR; INTRAVENOUS at 08:49

## 2023-08-29 RX ADMIN — SODIUM CHLORIDE, SODIUM GLUCONATE, SODIUM ACETATE, POTASSIUM CHLORIDE AND MAGNESIUM CHLORIDE: 526; 502; 368; 37; 30 INJECTION, SOLUTION INTRAVENOUS at 11:08

## 2023-08-29 RX ADMIN — ROCURONIUM BROMIDE 50 MG: 10 INJECTION INTRAVENOUS at 08:28

## 2023-08-29 RX ADMIN — FENTANYL CITRATE 50 MCG: 50 INJECTION, SOLUTION INTRAMUSCULAR; INTRAVENOUS at 09:19

## 2023-08-29 RX ADMIN — LIDOCAINE HYDROCHLORIDE 60 MG: 20 INJECTION, SOLUTION EPIDURAL; INFILTRATION; INTRACAUDAL; PERINEURAL at 08:28

## 2023-08-29 RX ADMIN — LIDOCAINE HYDROCHLORIDE 30 MG: 10 INJECTION, SOLUTION EPIDURAL; INFILTRATION; INTRACAUDAL; PERINEURAL at 08:00

## 2023-08-29 RX ADMIN — NEOSTIGMINE METHYLSULFATE 2 MG: 0.5 INJECTION INTRAVENOUS at 11:30

## 2023-08-29 RX ADMIN — FENTANYL CITRATE 50 MCG: 50 INJECTION, SOLUTION INTRAMUSCULAR; INTRAVENOUS at 08:25

## 2023-08-29 RX ADMIN — ONDANSETRON 4 MG: 2 INJECTION INTRAMUSCULAR; INTRAVENOUS at 11:23

## 2023-08-29 RX ADMIN — EPHEDRINE SULFATE 10 MG: 50 INJECTION INTRAVENOUS at 09:30

## 2023-08-29 RX ADMIN — SODIUM CHLORIDE, SODIUM GLUCONATE, SODIUM ACETATE, POTASSIUM CHLORIDE AND MAGNESIUM CHLORIDE 1000 ML: 526; 502; 368; 37; 30 INJECTION, SOLUTION INTRAVENOUS at 07:42

## 2023-08-29 RX ADMIN — PROPOFOL 100 MG: 10 INJECTION, EMULSION INTRAVENOUS at 08:28

## 2023-08-29 RX ADMIN — TRANEXAMIC ACID 1000 MG: 100 INJECTION, SOLUTION INTRAVENOUS at 07:42

## 2023-08-29 RX ADMIN — GLYCOPYRROLATE 0.4 MG: 0.2 INJECTION, SOLUTION INTRAMUSCULAR; INTRAVITREAL at 11:30

## 2023-08-29 RX ADMIN — MIDAZOLAM HYDROCHLORIDE 1 MG: 1 INJECTION, SOLUTION INTRAMUSCULAR; INTRAVENOUS at 08:18

## 2023-08-29 RX ADMIN — ROPIVACAINE HYDROCHLORIDE 20 ML: 5 INJECTION, SOLUTION EPIDURAL; INFILTRATION; PERINEURAL at 08:00

## 2023-08-29 RX ADMIN — Medication 2 G: at 08:52

## 2023-08-29 RX ADMIN — KETOROLAC TROMETHAMINE 15 MG: 15 INJECTION, SOLUTION INTRAMUSCULAR; INTRAVENOUS at 11:25

## 2023-08-29 RX ADMIN — TRANEXAMIC ACID 1000 MG: 100 INJECTION, SOLUTION INTRAVENOUS at 09:30

## 2023-08-29 RX ADMIN — PROPOFOL 10 MG: 10 INJECTION, EMULSION INTRAVENOUS at 11:33

## 2023-08-29 NOTE — ANESTHESIA POSTPROCEDURE EVALUATION
Patient: Mira Deal    Procedure Summary       Date: 08/29/23 Room / Location: [facility] OR [address] [place] [address] OR    Anesthesia Start: 0820 Anesthesia Stop: 1154    Procedure: RIGHT SHOULDER ARTHROSCOPY WITH ROTATOR CUFF REPAIR , ELÍAS PROCEDURE, AND SUBACROMIAL DECOMPRESSION , LABRAL DEBRIDEMENT, BICEPS TENOTOMY, BICEPS TENODESIS, CONTRACTURE RELEASE (Right: Shoulder) Diagnosis:       Chronic right shoulder pain      Nontraumatic incomplete tear of right rotator cuff      Arthritis of right acromioclavicular joint      Internal derangement of right shoulder      Impingement syndrome of right shoulder      Acquired hypothyroidism      Subacromial bursitis of right shoulder joint      Class 3 severe obesity due to excess calories with serious comorbidity and body mass index (BMI) of 40.0 to 44.9 in adult      Psoriasis      (Chronic right shoulder pain [M25.511, G89.29])      (Nontraumatic incomplete tear of right rotator cuff [M75.111])      (Arthritis of right acromioclavicular joint [M19.011])      (Internal derangement of right shoulder [M24.811])      (Impingement syndrome of right shoulder [M75.41])      (Acquired hypothyroidism [E03.9])      (Subacromial bursitis of right shoulder joint [M75.51])      (Class 3 severe obesity due to excess calories with serious comorbidity and body mass index (BMI) of 40.0 to 44.9 in adult [E66.01, Z68.41])      (Psoriasis [L40.9])    Surgeons: Wilfred Selby MD Provider: Laurel Ash DO    Anesthesia Type: general with block ASA Status: 3            Anesthesia Type: general with block    Vitals  No vitals data found for the desired time range.          Post Anesthesia Care and Evaluation    Patient location during evaluation: PACU  Patient participation: complete - patient participated  Level of consciousness: awake and alert  Pain management: adequate    Airway patency: patent  Anesthetic complications: No anesthetic complications  PONV Status:  none  Cardiovascular status: acceptable  Respiratory status: acceptable and face mask  Hydration status: acceptable    Comments: -------------------------              08/29/23                    0753      1155  -------------------------   BP:         157/83     107/56   Pulse:        60       54   Resp:         18       12   Temp:   96.8 øF (36 øC)97.3   SpO2:         96%      95  -------------------------

## 2023-08-29 NOTE — OP NOTE
RIGHT SHOULDER ARTHROSCOPY WITH ROTATOR CUFF REPAIR , ELÍAS PROCEDURE, AND SUBACROMIAL DECOMPRESSION , LABRAL DEBRIDEMENT, BICEPS TENOTOMY, BICEPS TENODESIS, CONTRACTURE RELEASE  Procedure Note    Name:    Mira Deal  YOB: 1955  Date of surgery:   8/29/2023    Pre-op Diagnosis:   Chronic right shoulder pain [M25.511, G89.29]  Nontraumatic incomplete tear of right rotator cuff [M75.111]  Arthritis of right acromioclavicular joint [M19.011]  Internal derangement of right shoulder [M24.811]  Impingement syndrome of right shoulder [M75.41]  Acquired hypothyroidism [E03.9]  Subacromial bursitis of right shoulder joint [M75.51]  Class 3 severe obesity due to excess calories with serious comorbidity and body mass index (BMI) of 40.0 to 44.9 in adult [E66.01, Z68.41]  Psoriasis [L40.9]    Post-op Diagnosis:    Post-Op Diagnosis Codes:     * Chronic right shoulder pain [M25.511, G89.29]     * Nontraumatic incomplete tear of right rotator cuff [M75.111]     * Arthritis of right acromioclavicular joint [M19.011]     * Internal derangement of right shoulder [M24.811]     * Impingement syndrome of right shoulder [M75.41]     * Acquired hypothyroidism [E03.9]     * Subacromial bursitis of right shoulder joint [M75.51]     * Class 3 severe obesity due to excess calories with serious comorbidity and body mass index (BMI) of 40.0 to 44.9 in adult [E66.01, Z68.41]     * Psoriasis [L40.9]    Procedure:  Procedure(s):  RIGHT SHOULDER ARTHROSCOPY WITH ROTATOR CUFF REPAIR , ELÍAS PROCEDURE, AND SUBACROMIAL DECOMPRESSION , LABRAL DEBRIDEMENT, BICEPS TENOTOMY, BICEPS TENODESIS, CONTRACTURE RELEASE    Surgeon:  Surgeon(s):  Wilfred Selby MD    Assistant: Dee Dee Farias CSA was responsible for performing the following activities: Retraction, Suction, Irrigation, Suturing, Closing, and Placing Dressing and their skilled assistance was necessary for the success of this case.     Anesthesia: General with  Block    Staff:   Circulator: Ness Elias RN; Sangita Escamilla RN  Scrub Person: Rocio Lutz; Liyah Tamayo  Vendor Representative: Shanna Santizo  Assistant: Dee Dee Farias CSA    Estimated Blood Loss: minimal    Specimens:                None      Drains: * No LDAs found *    Findings:    Erosive arthritis, florid synovitis, complex intra-substance and foot print tar of the subscapularis, full thickness U shaped tear with 3 cm retraction od the supra spinatus and infraspinatus, inflammatory / degenerate torn biceps tendon of > 50 % thickness, subacromial impingement, AC joint arthritis    Complications: None    IMPLANTS:   Implant Name Type Inv. Item Serial No.  Lot No. LRB No. Used Action   SUT/ANCH BIOCOMP SWIVELOCK/C DBL [id number] - [id number] Implant SUT/ANCH BIOCOMP SWIVELOCK/C DBL [id number]  [employer] [id number] Right 1 Implanted   SUT FIBERTAPE FW 2MM 7IN MARILEE [id number] - [id number] Implant SUT FIBERTAPE FW 2MM 7IN MARILEE [id number]  [employer] [id number] Right 1 Implanted   SUT/ANCH BIOCOMP SWIVELOCKC DBL LOADED 2NMBR2 5X22MM - [id number] Implant SUT/ANCH BIOCOMP SWIVELOCKC DBL LOADED 2NMBR2 5X22MM  [employer]X [id number] Right 1 Implanted   SUT/ANCH BIOCOMP SWIVELOCKC DBL LOADED 2NMBR2 5X22MM - [id number] Implant SUT/ANCH BIOCOMP SWIVELOCKC DBL LOADED 2NMBR2 5X22MM  AR[employer]REX [id number] Right 1 Implanted         PROCEDURE:  The patient was taken to the operating room and placed in the supine position. Preoperative antibiotics were administered. Surgical time out was performed. After adequate induction of anesthesia, the patient was positioned in BEACH CHAIR with the help of T-max positioner and the pressure points and the bony prominences are protected with soft padding and feet were padded . The left upper extremity was then prepped and draped in the usual sterile fashion, with TRIMANO traction system attached to the left forearm.     Diagnostic arthroscopy of the right shoulder revealed iErosive  arthritis, florid synovitis, complex intra-substance and foot print tar of the subscapularis, full thickness U shaped tear with 3 cm retraction od the supra spinatus and infraspinatus, inflammatory / degenerate torn biceps tendon of > 50 % thickness, subacromial impingement, AC joint arthritis.  The remaining posterior labrum, inferior labrum, and anteroinferior labrum were inflamed but intact.     Then the attention was turned to the anterosuperior contracture release and adhesiolysis along the anterosuperior labral debridement. Later, subscapularis tendon footprint repair, which was performed using a 4.75 mm biocomposite Arthrex corkscrew swivelock anchor and mattress configuration suturing was performed by passing all the sutures through the subscapularis footprint and in a knotted fashion the repair was completed.     Later, the attention was turned to the subacromial aspect of the right shoulder with arthroscopic decompression, bursectomy, bone spur removal from the undersurface of acromion along with debridement of the synovial hypertrophic tissue.  Later arthroscopic distal clavicle resection/AC joint decompression were performed with a robina and shaver and hemostasis was secured.      Later, the attention was turned to the supraspinatus / infraspinatus footprint repair, which was performed using TWO 5.5 mm biocomposite Arthrex corkscrew SWIVELOCK anchors and mattress configuration suturing was performed by passing all the sutures through the supraspinatus footprint along the biceps tendon remnant for myotenodesis and in the anterior cable/comma structure.The suture strands for the supraspinatus / infraspinatus were retrieved subacromially through the lateral portal and repair completed in a knotted fashion under visualization.      After thorough irrigation of normal saline making sure that there was no bone debris in the subacromial area and the intra-articular area, the portals were closed with 3-0 nylon.   Surgical wounds were dressed with 4 x 4 gauze, ABDs.  Later the patient was shifted to the hospital bed in a supine position with a shoulder immobilizer with an abduction pillow and recovered from anesthesia and shifted to the recovery without any complication.      Wilfred Selby MD     Date: 8/29/2023  Time: 12:03 CDT

## 2023-08-29 NOTE — ANESTHESIA PREPROCEDURE EVALUATION
Anesthesia Evaluation     Patient summary reviewed and Nursing notes reviewed   history of anesthetic complications:  PONV  NPO Solid Status: > 8 hours  NPO Liquid Status: > 2 hours           Airway   Mallampati: II  TM distance: >3 FB  Neck ROM: full  No difficulty expected  Dental - normal exam     Pulmonary     breath sounds clear to auscultation  (+) a smoker Former,  (-) COPD, asthma, shortness of breath, rhonchi, decreased breath sounds, wheezes, pulmonary embolism, no home oxygen  Cardiovascular   Exercise tolerance: poor (<4 METS)    ECG reviewed  Rhythm: regular  Rate: normal    (+) hypertension less than 2 medications, AGUILAR, DVT resolved, hyperlipidemia  (-) pacemaker, past MI, dysrhythmias, angina, murmur, peripheral edema, cardiac stents, CABG    ROS comment: EKG 2/17/2023:  Normal sinus rhythm  Nonspecific T wave abnormality - minor  Borderline ECG  When compared with ECG of 12-MAY-2020 15:20,  No significant change was found      Neuro/Psych  (+) dizziness/light headedness, psychiatric history Depression  (-) seizures, TIA, CVA  GI/Hepatic/Renal/Endo    (+) obesity, morbid obesity, GERD poorly controlled, GI bleeding resolved, renal disease stones, thyroid problem hypothyroidism  (-) diabetes    Musculoskeletal     Abdominal   (+) obese   Substance History      OB/GYN negative ob/gyn ROS   (-)  Pregnant        Other   arthritis,   history of cancer remission    ROS/Med Hx Other: Hx of PONV: Would not like scopolamine patch. Please give decadron & zofran.     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     Quit smoking in 2014. Denies COPD diagnosis.     Hx of cervical CA: hysterectomy in 1985                  Anesthesia Plan    ASA 3     general with block     (Discussed peripheral nerve block (interscalene)  for post op pain relief and patient understands possible complications, risks, & agrees.  )  intravenous induction      Anesthetic plan, risks, benefits, and alternatives have been provided, discussed and informed consent has been obtained with: patient and spouse/significant other.  Pre-procedure education provided  Use of blood products discussed with patient  Consented to blood products.    Plan discussed with CRNA.      CODE STATUS:

## 2023-08-29 NOTE — ANESTHESIA PROCEDURE NOTES
Peripheral Block      Patient reassessed immediately prior to procedure    Patient location during procedure: pre-op  Start time: 8/29/2023 8:00 AM  Stop time: 8/29/2023 8:10 AM  Reason for block: procedure for pain, at surgeon's request, post-op pain management and secondary anesthetic  Performed by  Anesthesiologist: Laurel Ash DO  Preanesthetic Checklist  Completed: patient identified, IV checked, site marked, risks and benefits discussed, surgical consent, monitors and equipment checked, pre-op evaluation and timeout performed  Prep:  Pt Position: supine  Sterile barriers:cap, gloves and sterile barriers  Prep: ChloraPrep  Patient monitoring: blood pressure monitoring, continuous pulse oximetry and EKG  Procedure    Sedation: no  Performed under: PNB  Guidance:ultrasound guided    ULTRASOUND INTERPRETATION.  Using ultrasound guidance a 22 G gauge needle was placed in close proximity to the brachial plexus nerve, at which point, under ultrasound guidance anesthetic was injected in the area of the nerve and spread of the anesthesia was seen on ultrasound in close proximity thereto.  There were no abnormalities seen on ultrasound; a digital image was taken; and the patient tolerated the procedure with no complications. Images:still images obtained, printed/placed on chart    Laterality:right  Block Type:interscalene  Injection Technique:single-shot  Needle Type:echogenic  Needle Gauge:22 G  Resistance on Injection: none    Medications Used: lidocaine PF 1% (XYLOCAINE) injection - Injection   30 mg - 8/29/2023 8:00:00 AM  ropivacaine (NAROPIN) 0.5 % injection - Injection   20 mL - 8/29/2023 8:00:00 AM      Post Assessment  Injection Assessment: negative aspiration for heme, no paresthesia on injection and incremental injection  Patient Tolerance:comfortable throughout block  Complications:no  Additional Notes  Pt & side identified  U/S used throughout and needle seen throughout  No complications  Pt  tolerated procedure well

## 2023-08-29 NOTE — ANESTHESIA PROCEDURE NOTES
Airway  Urgency: elective    Date/Time: 8/29/2023 8:30 AM  Airway not difficult    General Information and Staff    Patient location during procedure: OR  CRNA/CAA: Meghna Reyna CRNA  SRNA: Alex Springer SRNA  Indications and Patient Condition  Indications for airway management: airway protection    Preoxygenated: yes  Mask difficulty assessment: 1 - vent by mask    Final Airway Details  Final airway type: endotracheal airway      Successful airway: ETT  Cuffed: yes   Successful intubation technique: video laryngoscopy  Facilitating devices/methods: intubating stylet  Endotracheal tube insertion site: oral  Blade: See  Blade size: 3  ETT size (mm): 7.0  Cormack-Lehane Classification: grade I - full view of glottis  Placement verified by: chest auscultation and capnometry   Cuff volume (mL): 7  Measured from: lips  ETT/EBT  to lips (cm): 18  Number of attempts at approach: 1  Assessment: lips, teeth, and gum same as pre-op and atraumatic intubation

## 2023-08-29 NOTE — INTERVAL H&P NOTE
H&P reviewed. The patient was examined and there are no changes to the H&P.      Review of systems:  No fever or chills.  No chest pain or shortness of breath.  No GI or  disturbances.      Vitals:    08/29/23 0753   BP: 157/83   Pulse: 60   Resp: 18   Temp: 96.8 °F (36 °C)   SpO2: 96%       The following portions of the patient's history were reviewed and updated as appropriate: allergies, current medications, past family history, past medical history, past social history, past surgical history and problem list.     Allergies   Allergen Reactions    Sulfa Antibiotics Angioedema    Lortab [Hydrocodone-Acetaminophen] GI Intolerance    Tramadol Dizziness       Prior to Admission medications    Medication Sig Start Date End Date Taking? Authorizing Provider   acetaminophen-codeine (TYLENOL #3) 300-30 MG per tablet Take 2 tablets by mouth Every 6 (Six) Hours As Needed for Moderate Pain. 5/22/23  Yes Lloyd Varghese MD   levothyroxine (Euthyrox) 100 MCG tablet Take 1 tablet by mouth Daily. 4/25/23  Yes Lloyd Varghese MD   lisinopril (PRINIVIL,ZESTRIL) 40 MG tablet Take 1 tablet by mouth Daily. 4/25/23  Yes Lloyd Varghese MD   PARoxetine (PAXIL) 10 MG tablet Take 1 tablet by mouth Every Morning. 4/25/23  Yes Lloyd Varghese MD   oxyCODONE-acetaminophen (Percocet) 5-325 MG per tablet Take 1 tablet by mouth Every 8 (Eight) Hours As Needed for Severe Pain. 7/17/23   Lloyd Varghese MD   triamcinolone (KENALOG) 0.1 % ointment Apply 1 application  topically to the appropriate area as directed 2 (Two) Times a Day As Needed.    Provider, MD Tashi       Past Medical History:   Diagnosis Date    Acute suppurative otitis media without spontaneous rupture of eardrum     right ear    Benign essential hypertension     Cancer     cervix    Chronic osteoarthritis     Deep vein thrombosis 1971    Diverticular disease of colon     Dysfunction of eustachian tube     Will start OTC Nasal steroid        "Encounter for gynecological examination (general) (routine) without abnormal findings     Encounter for screening for malignant neoplasm of breast     Encounter for screening mammogram for malignant neoplasm of breast     16 negative       Gastrointestinal hemorrhage     Gout 1979    History of transfusion     HL (hearing loss) 1.5 months ago    Hypercholesterolemia     Hyperlipidemia     Hypothyroidism     Kidney stone     Left lower quadrant pain     Mild depression     Nausea     Obesity     PONV (postoperative nausea and vomiting)     Vertigo        Past Surgical History:   Procedure Laterality Date    CARPAL TUNNEL RELEASE Bilateral     COLONOSCOPY      KNEE ARTHROSCOPY Right     ORIF WRIST FRACTURE Right     SPLENECTOMY  1972    TOTAL KNEE ARTHROPLASTY Left 2018    VAGINAL HYSTERECTOMY SALPINGO OOPHORECTOMY         Social History     Socioeconomic History    Marital status:    Tobacco Use    Smoking status: Former     Packs/day: 0.25     Years: 2.00     Pack years: 0.50     Types: Cigarettes     Start date: 1986     Quit date: 2014     Years since quittin.6    Smokeless tobacco: Never   Vaping Use    Vaping Use: Never used   Substance and Sexual Activity    Alcohol use: Yes     Comment: weekly    Drug use: Never    Sexual activity: Defer       Family History   Problem Relation Age of Onset    Cancer Other     Diabetes Other     Heart disease Other     Hypertension Other     Arthritis Daughter     Hyperlipidemia Daughter     Breast cancer Maternal Aunt     Coronary artery disease Father     Diabetes Maternal Grandmother      Physical Exam: Ht 144.8 cm (57\")  Wt 85.8 kg (189 lb 3.2 oz)  BMI 40.94 kg/m²     The patient voiced understanding of the risks, benefits, and alternative forms of treatment that were discussed and the patient consents to proceed with right shoulder arthroscopic rotator cuff repair, subacromial decompression, Damir procedure, possible " labral debridement versus repair and all other indicated procedures surgery.  All risks, benefits and alternatives were discussed.  Risks include, but not exclusive to anesthetic complications, including death, MI, CVA, infection, bleeding DVT, fracture, residual pain and need for future surgery.  This discussion was held with the patient by  Wilfred Selby MD and all questions were answered.    This document has been electronically signed by Wilfred Selby MD on August 29, 2023 07:54 CDT

## 2023-09-11 ENCOUNTER — OFFICE VISIT (OUTPATIENT)
Dept: ORTHOPEDIC SURGERY | Facility: CLINIC | Age: 68
End: 2023-09-11
Payer: COMMERCIAL

## 2023-09-11 VITALS — HEIGHT: 57 IN | BODY MASS INDEX: 41.64 KG/M2 | WEIGHT: 193 LBS

## 2023-09-11 DIAGNOSIS — Z98.890 STATUS POST ARTHROSCOPY OF RIGHT SHOULDER: Primary | ICD-10-CM

## 2023-09-11 PROCEDURE — 99024 POSTOP FOLLOW-UP VISIT: CPT | Performed by: NURSE PRACTITIONER

## 2023-09-11 NOTE — PROGRESS NOTES
Mira Deal is a 67 y.o. female is s/p       Chief Complaint   Patient presents with    Post-op     08/29/23 (13d) Wilfred Selby MD  Right Shoulder Arthroscopy With Rotator Cuff Repair , Damir Procedure, And Subacromial Decompression , Labral Debridement, Biceps Tenotomy, Biceps Tenodesis, Contracture Release - Right           HISTORY OF PRESENT ILLNESS:     Mrs. Deal is a 67-year-old female who presents today for postop appointment after undergoing shoulder arthroscopy with rotator cuff repair, Damir procedure some with subacromial decompression, labral debridement, biceps tenotomy and tenodesis and contracture release.  Patient is doing well and using her sling with abductor pillow.  No fever, nausea, vomiting, no burning, tingling, numbness.  She has been performing pendulum exercises.        Allergies   Allergen Reactions    Sulfa Antibiotics Angioedema    Lortab [Hydrocodone-Acetaminophen] GI Intolerance    Tramadol Dizziness         Current Outpatient Medications:     levothyroxine (Euthyrox) 100 MCG tablet, Take 1 tablet by mouth Daily., Disp: 30 tablet, Rfl: 5    lisinopril (PRINIVIL,ZESTRIL) 40 MG tablet, Take 1 tablet by mouth Daily., Disp: 30 tablet, Rfl: 5    PARoxetine (PAXIL) 10 MG tablet, Take 1 tablet by mouth Every Morning., Disp: 30 tablet, Rfl: 5    triamcinolone (KENALOG) 0.1 % ointment, Apply 1 application  topically to the appropriate area as directed 2 (Two) Times a Day As Needed., Disp: , Rfl:     No fevers or chills.  No nausea or vomiting.      PHYSICAL EXAMINATION:       Mira Deal is a 67 y.o. female    Patient is awake and alert, answers questions appropriately and is in no apparent distress.    GAIT:     []  Normal  []  Antalgic    Assistive device: []  None  []  Walker     []  Crutches  []  Cane     []  Wheelchair  []  Stretcher    Ortho Exam      Right shoulder:    Incision sites are clean, dry, intact, well approximated.  No evidence of infection.  Fingers  are warm, pink, with good capillary refill and normal sensation.  Fingers move freely.  Strength and range of motion testing deferred.    Peripheral Block    Result Date: 8/29/2023  Narrative: Laurel Ash DO     8/29/2023 11:43 AM Peripheral Block Patient reassessed immediately prior to procedure Patient location during procedure: pre-op Start time: 8/29/2023 8:00 AM Stop time: 8/29/2023 8:10 AM Reason for block: procedure for pain, at surgeon's request, post-op pain management and secondary anesthetic Performed by Anesthesiologist: Laurel Ash DO Preanesthetic Checklist Completed: patient identified, IV checked, site marked, risks and benefits discussed, surgical consent, monitors and equipment checked, pre-op evaluation and timeout performed Prep: Pt Position: supine Sterile barriers:cap, gloves and sterile barriers Prep: ChloraPrep Patient monitoring: blood pressure monitoring, continuous pulse oximetry and EKG Procedure Sedation: no Performed under: PNB Guidance:ultrasound guided ULTRASOUND INTERPRETATION.  Using ultrasound guidance a 22 G gauge needle was placed in close proximity to the brachial plexus nerve, at which point, under ultrasound guidance anesthetic was injected in the area of the nerve and spread of the anesthesia was seen on ultrasound in close proximity thereto.  There were no abnormalities seen on ultrasound; a digital image was taken; and the patient tolerated the procedure with no complications. Images:still images obtained, printed/placed on chart Laterality:right Block Type:interscalene Injection Technique:single-shot Needle Type:echogenic Needle Gauge:22 G Resistance on Injection: none Medications Used: lidocaine PF 1% (XYLOCAINE) injection - Injection  30 mg - 8/29/2023 8:00:00 AM ropivacaine (NAROPIN) 0.5 % injection - Injection  20 mL - 8/29/2023 8:00:00 AM Post Assessment Injection Assessment: negative aspiration for heme, no paresthesia on injection and incremental  injection Patient Tolerance:comfortable throughout block Complications:no Additional Notes Pt & side identified U/S used throughout and needle seen throughout No complications Pt tolerated procedure well          ASSESSMENT:    Diagnoses and all orders for this visit:    Status post arthroscopy of right shoulder          PLAN    Sutures removed and Steri-Strips placed.  Incision site care explained to patient.  Patient instructed to use sling for 8 weeks.  Patient not anticipated to return to driving or work until 12 weeks postop.  Signs and symptoms to report including when to seek care explained.  Patient is to return in 4 weeks for follow-up with Dr. Selby.    DAY Alvarado/Transciption Disclaimer: Some of this note may be an electronic transcription/translation of spoken language to printed text.  The electronic translation of spoken language may permit erroneous, or at times, nonsensical words or phrases to be inadvertently transcribed. Although I have reviewed the note for such errors, some may still exist.         This document has been electronically signed by Eva WARREN on September 11, 2023 13:34 CDT     Answers submitted by the patient for this visit:  Other (Submitted on 9/10/2023)  Please describe your symptoms.: Follow up from arm surgery  Have you had these symptoms before?: No  How long have you been having these symptoms?: Greater than 2 weeks  Please list any medications you are currently taking for this condition.: Tylenol 3 and oxycodine  Please describe any probable cause for these symptoms. : From anchors  Primary Reason for Visit (Submitted on 9/10/2023)  What is the primary reason for your visit?: Other

## 2023-09-20 ENCOUNTER — TELEPHONE (OUTPATIENT)
Dept: ORTHOPEDIC SURGERY | Facility: CLINIC | Age: 68
End: 2023-09-20
Payer: COMMERCIAL

## 2023-09-20 DIAGNOSIS — Z98.890 STATUS POST ARTHROSCOPY OF RIGHT SHOULDER: Primary | ICD-10-CM

## 2023-09-20 NOTE — TELEPHONE ENCOUNTER
Called number listed for patient, no answer left message. Physical therapy ordered for sports medicine, [place].

## (undated) DEVICE — SPNG GZ WOVN 4X4IN 12PLY 10/BX STRL

## (undated) DEVICE — ABL ASP APOLLO RF XL 90D

## (undated) DEVICE — CANN TWST IN W/NOSQUIRT CAP 7IDX7CM

## (undated) DEVICE — SHEET,DRAPE,53X77,STERILE: Brand: MEDLINE

## (undated) DEVICE — GLV SURG SENSICARE PI PF LF 7 GRN STRL

## (undated) DEVICE — CYSTO/BLADDER IRRIGATION SET, REGULATING CLAMP

## (undated) DEVICE — TP NDL SCORPION SUREFIRE SD SLOT

## (undated) DEVICE — CONN TBG Y 5 IN 1 LF STRL

## (undated) DEVICE — SOL IRR NACL 0.9PCT 3000ML

## (undated) DEVICE — STCKNT IMPERV 12IN STRL

## (undated) DEVICE — PK SHLDR ARTHSCP 60

## (undated) DEVICE — TUBING, SUCTION, 3/16" X 6', STRAIGHT: Brand: MEDLINE

## (undated) DEVICE — SPNG LAP 18X18IN LF STRL PK/5

## (undated) DEVICE — SUT ETHLN 3-0 FS118IN 663H

## (undated) DEVICE — STERILE POLYISOPRENE POWDER-FREE SURGICAL GLOVES WITH EMOLLIENT COATING: Brand: PROTEXIS

## (undated) DEVICE — GLV SURG SENSICARE PI ORTHO SZ7.5 LF STRL

## (undated) DEVICE — BNDG ELAS CO-FLEX SLF ADHR 4IN5YD LF STRL

## (undated) DEVICE — KT POSTN ARM TRIMANO BEACH CHR W/DRP

## (undated) DEVICE — BLD SHAVER TORPEDO COOLCUT 4.0MM 13CM

## (undated) DEVICE — DRAPE,U/ SHT,SPLIT,PLAS,STERIL: Brand: MEDLINE

## (undated) DEVICE — SUT PDS 0 CT-1 Z340H

## (undated) DEVICE — GOWN,AURORA,NOREINF,RAGLAN,XL,STERILE: Brand: MEDLINE

## (undated) DEVICE — SYR LUERLOK 5CC

## (undated) DEVICE — PAD,ABDOMINAL,8"X10",ST,LF: Brand: MEDLINE

## (undated) DEVICE — GLV SURG SENSICARE PI ORTHO SZ8 LF STRL

## (undated) DEVICE — GLV SURG SENSICARE POLYISPRN W/ALOE PF LF 6.5 GRN STRL

## (undated) DEVICE — Device